# Patient Record
Sex: FEMALE | Race: WHITE | NOT HISPANIC OR LATINO | Employment: OTHER | URBAN - METROPOLITAN AREA
[De-identification: names, ages, dates, MRNs, and addresses within clinical notes are randomized per-mention and may not be internally consistent; named-entity substitution may affect disease eponyms.]

---

## 2017-02-13 ENCOUNTER — ALLSCRIPTS OFFICE VISIT (OUTPATIENT)
Dept: OTHER | Facility: OTHER | Age: 62
End: 2017-02-13

## 2017-03-15 ENCOUNTER — GENERIC CONVERSION - ENCOUNTER (OUTPATIENT)
Dept: OTHER | Facility: OTHER | Age: 62
End: 2017-03-15

## 2017-03-20 ENCOUNTER — LAB (OUTPATIENT)
Dept: LAB | Facility: CLINIC | Age: 62
End: 2017-03-20
Payer: COMMERCIAL

## 2017-03-20 DIAGNOSIS — L40.50 PSORIATIC ARTHROPATHY (HCC): Primary | ICD-10-CM

## 2017-03-20 DIAGNOSIS — E11.9 TYPE 2 DIABETES MELLITUS WITHOUT COMPLICATIONS (HCC): ICD-10-CM

## 2017-03-20 DIAGNOSIS — I10 ESSENTIAL (PRIMARY) HYPERTENSION: ICD-10-CM

## 2017-03-20 LAB
ALBUMIN SERPL BCP-MCNC: 3.7 G/DL (ref 3.5–5)
ALBUMIN SERPL BCP-MCNC: 3.7 G/DL (ref 3.5–5)
ALP SERPL-CCNC: 91 U/L (ref 46–116)
ALP SERPL-CCNC: 95 U/L (ref 46–116)
ALT SERPL W P-5'-P-CCNC: 40 U/L (ref 12–78)
ALT SERPL W P-5'-P-CCNC: 41 U/L (ref 12–78)
ANION GAP SERPL CALCULATED.3IONS-SCNC: 8 MMOL/L (ref 4–13)
ANION GAP SERPL CALCULATED.3IONS-SCNC: 9 MMOL/L (ref 4–13)
AST SERPL W P-5'-P-CCNC: 20 U/L (ref 5–45)
AST SERPL W P-5'-P-CCNC: 22 U/L (ref 5–45)
BASOPHILS # BLD AUTO: 0.03 THOUSANDS/ΜL (ref 0–0.1)
BASOPHILS NFR BLD AUTO: 1 % (ref 0–1)
BILIRUB SERPL-MCNC: 0.3 MG/DL (ref 0.2–1)
BILIRUB SERPL-MCNC: 0.41 MG/DL (ref 0.2–1)
BILIRUB UR QL STRIP: NEGATIVE
BUN SERPL-MCNC: 12 MG/DL (ref 5–25)
BUN SERPL-MCNC: 12 MG/DL (ref 5–25)
CALCIUM SERPL-MCNC: 9.2 MG/DL (ref 8.3–10.1)
CALCIUM SERPL-MCNC: 9.3 MG/DL (ref 8.3–10.1)
CHLORIDE SERPL-SCNC: 102 MMOL/L (ref 100–108)
CHLORIDE SERPL-SCNC: 103 MMOL/L (ref 100–108)
CHOLEST SERPL-MCNC: 181 MG/DL (ref 50–200)
CLARITY UR: NORMAL
CO2 SERPL-SCNC: 29 MMOL/L (ref 21–32)
CO2 SERPL-SCNC: 29 MMOL/L (ref 21–32)
COLOR UR: YELLOW
CREAT SERPL-MCNC: 0.47 MG/DL (ref 0.6–1.3)
CREAT SERPL-MCNC: 0.52 MG/DL (ref 0.6–1.3)
CREAT UR-MCNC: 42.7 MG/DL
CRP SERPL QL: 5.4 MG/L
EOSINOPHIL # BLD AUTO: 0.33 THOUSAND/ΜL (ref 0–0.61)
EOSINOPHIL NFR BLD AUTO: 6 % (ref 0–6)
ERYTHROCYTE [DISTWIDTH] IN BLOOD BY AUTOMATED COUNT: 13.4 % (ref 11.6–15.1)
ERYTHROCYTE [SEDIMENTATION RATE] IN BLOOD: 18 MM/HOUR (ref 0–20)
EST. AVERAGE GLUCOSE BLD GHB EST-MCNC: 194 MG/DL
GFR SERPL CREATININE-BSD FRML MDRD: >60 ML/MIN/1.73SQ M
GFR SERPL CREATININE-BSD FRML MDRD: >60 ML/MIN/1.73SQ M
GLUCOSE P FAST SERPL-MCNC: 157 MG/DL (ref 65–99)
GLUCOSE P FAST SERPL-MCNC: 166 MG/DL (ref 65–99)
GLUCOSE UR STRIP-MCNC: NEGATIVE MG/DL
HBA1C MFR BLD: 8.4 % (ref 4.2–6.3)
HCT VFR BLD AUTO: 42.7 % (ref 34.8–46.1)
HDLC SERPL-MCNC: 41 MG/DL (ref 40–60)
HGB BLD-MCNC: 14.5 G/DL (ref 11.5–15.4)
HGB UR QL STRIP.AUTO: NEGATIVE
KETONES UR STRIP-MCNC: NEGATIVE MG/DL
LDLC SERPL CALC-MCNC: 102 MG/DL (ref 0–100)
LEUKOCYTE ESTERASE UR QL STRIP: NEGATIVE
LYMPHOCYTES # BLD AUTO: 2.3 THOUSANDS/ΜL (ref 0.6–4.47)
LYMPHOCYTES NFR BLD AUTO: 42 % (ref 14–44)
MCH RBC QN AUTO: 30.3 PG (ref 26.8–34.3)
MCHC RBC AUTO-ENTMCNC: 34 G/DL (ref 31.4–37.4)
MCV RBC AUTO: 89 FL (ref 82–98)
MICROALBUMIN UR-MCNC: 6.5 MG/L (ref 0–20)
MICROALBUMIN/CREAT 24H UR: 15 MG/G CREATININE (ref 0–30)
MONOCYTES # BLD AUTO: 0.44 THOUSAND/ΜL (ref 0.17–1.22)
MONOCYTES NFR BLD AUTO: 8 % (ref 4–12)
NEUTROPHILS # BLD AUTO: 2.41 THOUSANDS/ΜL (ref 1.85–7.62)
NEUTS SEG NFR BLD AUTO: 43 % (ref 43–75)
NITRITE UR QL STRIP: NEGATIVE
NRBC BLD AUTO-RTO: 0 /100 WBCS
PH UR STRIP.AUTO: 7 [PH] (ref 4.5–8)
PLATELET # BLD AUTO: 177 THOUSANDS/UL (ref 149–390)
PMV BLD AUTO: 11.3 FL (ref 8.9–12.7)
POTASSIUM SERPL-SCNC: 3.5 MMOL/L (ref 3.5–5.3)
POTASSIUM SERPL-SCNC: 3.8 MMOL/L (ref 3.5–5.3)
PROT SERPL-MCNC: 7.4 G/DL (ref 6.4–8.2)
PROT SERPL-MCNC: 7.4 G/DL (ref 6.4–8.2)
PROT UR STRIP-MCNC: NEGATIVE MG/DL
RBC # BLD AUTO: 4.78 MILLION/UL (ref 3.81–5.12)
SODIUM SERPL-SCNC: 139 MMOL/L (ref 136–145)
SODIUM SERPL-SCNC: 141 MMOL/L (ref 136–145)
SP GR UR STRIP.AUTO: 1.01 (ref 1–1.03)
TRIGL SERPL-MCNC: 189 MG/DL
TSH SERPL DL<=0.05 MIU/L-ACNC: 2.21 UIU/ML (ref 0.36–3.74)
UROBILINOGEN UR QL STRIP.AUTO: 0.2 E.U./DL
WBC # BLD AUTO: 5.53 THOUSAND/UL (ref 4.31–10.16)

## 2017-03-20 PROCEDURE — 82570 ASSAY OF URINE CREATININE: CPT

## 2017-03-20 PROCEDURE — 84443 ASSAY THYROID STIM HORMONE: CPT

## 2017-03-20 PROCEDURE — 80061 LIPID PANEL: CPT

## 2017-03-20 PROCEDURE — 81003 URINALYSIS AUTO W/O SCOPE: CPT

## 2017-03-20 PROCEDURE — 80053 COMPREHEN METABOLIC PANEL: CPT

## 2017-03-20 PROCEDURE — 85025 COMPLETE CBC W/AUTO DIFF WBC: CPT

## 2017-03-20 PROCEDURE — 86140 C-REACTIVE PROTEIN: CPT

## 2017-03-20 PROCEDURE — 36415 COLL VENOUS BLD VENIPUNCTURE: CPT

## 2017-03-20 PROCEDURE — 83036 HEMOGLOBIN GLYCOSYLATED A1C: CPT

## 2017-03-20 PROCEDURE — 85652 RBC SED RATE AUTOMATED: CPT

## 2017-03-20 PROCEDURE — 82043 UR ALBUMIN QUANTITATIVE: CPT

## 2017-03-21 ENCOUNTER — GENERIC CONVERSION - ENCOUNTER (OUTPATIENT)
Dept: OTHER | Facility: OTHER | Age: 62
End: 2017-03-21

## 2017-04-06 ENCOUNTER — ALLSCRIPTS OFFICE VISIT (OUTPATIENT)
Dept: OTHER | Facility: OTHER | Age: 62
End: 2017-04-06

## 2017-06-15 ENCOUNTER — ALLSCRIPTS OFFICE VISIT (OUTPATIENT)
Dept: OTHER | Facility: OTHER | Age: 62
End: 2017-06-15

## 2017-06-30 ENCOUNTER — GENERIC CONVERSION - ENCOUNTER (OUTPATIENT)
Dept: OTHER | Facility: OTHER | Age: 62
End: 2017-06-30

## 2017-07-01 DIAGNOSIS — E11.9 TYPE 2 DIABETES MELLITUS WITHOUT COMPLICATIONS (HCC): ICD-10-CM

## 2017-08-09 ENCOUNTER — APPOINTMENT (OUTPATIENT)
Dept: LAB | Facility: CLINIC | Age: 62
End: 2017-08-09
Payer: COMMERCIAL

## 2017-08-09 DIAGNOSIS — E11.9 TYPE 2 DIABETES MELLITUS WITHOUT COMPLICATIONS (HCC): ICD-10-CM

## 2017-08-09 LAB
EST. AVERAGE GLUCOSE BLD GHB EST-MCNC: 131 MG/DL
HBA1C MFR BLD: 6.2 % (ref 4.2–6.3)

## 2017-08-09 PROCEDURE — 83036 HEMOGLOBIN GLYCOSYLATED A1C: CPT

## 2017-08-09 PROCEDURE — 36415 COLL VENOUS BLD VENIPUNCTURE: CPT

## 2017-08-10 ENCOUNTER — GENERIC CONVERSION - ENCOUNTER (OUTPATIENT)
Dept: OTHER | Facility: OTHER | Age: 62
End: 2017-08-10

## 2017-10-02 ENCOUNTER — ALLSCRIPTS OFFICE VISIT (OUTPATIENT)
Dept: OTHER | Facility: OTHER | Age: 62
End: 2017-10-02

## 2017-10-02 DIAGNOSIS — E11.9 TYPE 2 DIABETES MELLITUS WITHOUT COMPLICATIONS (HCC): ICD-10-CM

## 2017-10-02 DIAGNOSIS — I10 ESSENTIAL (PRIMARY) HYPERTENSION: ICD-10-CM

## 2017-10-02 DIAGNOSIS — E55.9 VITAMIN D DEFICIENCY: ICD-10-CM

## 2017-10-03 NOTE — PROGRESS NOTES
Assessment  1  Benign essential hypertension (401 1) (I10)   2  Anxiety disorder (300 00) (F41 9)   3  Hypercholesterolemia (272 0) (E78 00)   4  Controlled diabetes mellitus type II without complication (074 68) (K33 8)   5  Need for prophylactic vaccination and inoculation against influenza (V04 81) (Z23)   6  Vitamin D deficiency (268 9) (E55 9)   7  Other muscle spasm (728 85) (R07 395)    Plan  Benign essential hypertension    · HydroCHLOROthiazide 25 MG Oral Tablet; take 1 tablet by mouth every day  Benign essential hypertension, Controlled diabetes mellitus type II without complication,  Vitamin D deficiency    · (1) COMPREHENSIVE METABOLIC PANEL; Status:Active; Requested EOO:[id number];    · (1) HEMOGLOBIN A1C; Status:Active; Requested for:02Oct2017;    · (1) LIPID PANEL FASTING W DIRECT LDL REFLEX; Status:Active; Requested  for:02Oct2017;    · (1) TSH WITH FT4 REFLEX; Status:Active; Requested XES:[id number]; Controlled diabetes mellitus type II without complication    · *1 - [facility] FOR DIABETES [facility]  *  Status:  Need Information - Financial Authorization  Requested for: [id number]  Care Summary provided  : Yes  Fibromyalgia    · Hydrocodone-Acetaminophen 5-325 MG Oral Tablet; Take 1 tablet twice daily  Health Maintenance    · Aspir-81 81 MG Oral Tablet Delayed Release; TAKE 1 TABLET DAILY AS  DIRECTED  Need for prophylactic vaccination and inoculation against influenza    · Fluzone Quadrivalent 0 5 ML Intramuscular Suspension Prefilled Syringe  Other muscle spasm    · Methocarbamol 750 MG Oral Tablet; TAKE 1 TABLET 3 TIMES DAILY  Screening for depression    · *VB - PHQ-9 Tool; Status:Complete - Retrospective By Protocol Authorization;   Done:  20QIN4336 11:56AM  Vitamin D deficiency    · (1) VITAMIN D 25-HYDROXY; Status:Active;  Requested DZM:[id number];     Chief Complaint  Patient here for 3 month follow up on Anxiety, Hyperlipidemia, Hypertension, Type II Diabetes   Patient is here today for follow up of chronic conditions described in HPI  History of Present Illness  here for follow up   The patient states her hyperlipidemia has been under good control since the last visit  Comorbid Illnesses: diabetes mellitus-and-hypertension  She has no significant interval events  Symptoms: The patient is currently asymptomatic  Medications: the patient is adherent with her medication regimen  The patient states she has been doing well with her Type II Diabetes control since the last visit  Comorbid Illnesses: hypertension  She has no significant interval events  Symptoms: The patient is currently asymptomatic  Medications: the patient is adherent with her medication regimen  The patient presents for follow-up of essential hypertension  The patient states she has been doing well with her blood pressure control since the last visit  She has no significant interval events  Symptoms: The patient is currently asymptomatic  Medications: the patient is adherent with her medication regimen  Review of Systems    Constitutional: No fever, no chills, feels well, no tiredness, no recent weight gain or weight loss  Eyes: No complaints of eye pain, no red eyes, no eyesight problems, no discharge, no dry eyes, no itching of eyes  ENT: no complaints of earache, no loss of hearing, no nose bleeds, no nasal discharge, no sore throat, no hoarseness  Cardiovascular: No complaints of slow heart rate, no fast heart rate, no chest pain, no palpitations, no leg claudication, no lower extremity edema  Respiratory: No complaints of shortness of breath, no wheezing, no cough, no SOB on exertion, no orthopnea, no PND  Gastrointestinal: No complaints of abdominal pain, no constipation, no nausea or vomiting, no diarrhea, no bloody stools  Genitourinary: No complaints of dysuria, no incontinence, no pelvic pain, no dysmenorrhea, no vaginal discharge or bleeding     Musculoskeletal: as noted in HPI  Integumentary: No complaints of skin rash or lesions, no itching, no skin wounds, no breast pain or lump  Neurological: No complaints of headache, no confusion, no convulsions, no numbness, no dizziness or fainting, no tingling, no limb weakness, no difficulty walking  Psychiatric: Not suicidal, no sleep disturbance, no anxiety or depression, no change in personality, no emotional problems  Endocrine: No complaints of proptosis, no hot flashes, no muscle weakness, no deepening of the voice, no feelings of weakness  Hematologic/Lymphatic: No complaints of swollen glands, no swollen glands in the neck, does not bleed easily, does not bruise easily  Over the past 2 weeks, how often have you been bothered by the following problems? 1 ) Little interest or pleasure in doing things? Not at all    2 ) Feeling down, depressed or hopeless? Not at all    3 ) Trouble falling asleep or sleeping too much? Half the days or more  4 ) Feeling tired or having little energy? Several days  5 ) Poor appetite or overeating? Several days  6 ) Feeling bad about yourself, or that you are a failure, or have let yourself or your family down? Not at all    7 ) Trouble concentrating on things, such as reading a newspaper or watching television? Not at all    8 ) Moving or speaking so slowly that other people could have noticed, or the opposite, moving or speaking faster than usual? Not at all  How difficult have these problems made it for you to do your work, take care of things at home, or get along with people? Not at all  Score 4      Active Problems  1  Allergy to pollen (477 0) (J30 1)   2  Anxiety disorder (300 00) (F41 9)   3  Benign essential hypertension (401 1) (I10)   4  Controlled diabetes mellitus type II without complication (898 54) (D00 8)   5  Fibromyalgia (729 1) (M79 7)   6  Generalized osteoarthritis of multiple sites (715 09) (M15 9)   7  Hearing Loss (389 9)   8   Hypercholesterolemia (272 0) (E78 00)   9  Need for prophylactic vaccination against Streptococcus pneumoniae (pneumococcus)   (V03 82) (Z23)   10  Need for prophylactic vaccination and inoculation against influenza (V04 81) (Z23)   11  Other muscle spasm (728 85) (M62 838)   12  Pain of toe of right foot (729 5) (M79 674)   13  Psoriasis (696 1) (L40 9)   14  Psoriatic arthropathy (696 0) (L40 50)   15  Rheumatoid arthritis (714 0) (M06 9)   16  Right knee pain (719 46) (M25 561)   17  Screening for breast cancer (V76 10) (Z12 31)   18  Screening for cervical cancer (V76 2) (Z12 4)   19  Screening for colorectal cancer (V76 51) (Z12 11,Z12 12)   20  Screening for depression (V79 0) (Z13 89)   21  Squamous cell carcinoma of skin (173 92) (C44 92)   22  Vertigo (780 4) (R42)   23  Visit for pre-operative examination (V72 84) (Z01 818)   24  Vitamin D deficiency (268 9) (E55 9)    Past Medical History  1  History of Asthma with acute exacerbation (493 92) (J45 901)   2  History of Benign essential hypertension (401 1) (I10)   3  History of candidiasis of mouth (V12 09) (Z86 19)   4  History of contact dermatitis (V13 3) (Z87 2)   5  History of contusion (V15 59) (Z87 828)   6  History of hyperglycemia (V12 29) (Z86 39)   7  History of nausea (V12 79) (Z87 898)   8  History of psoriasis (V13 3) (Z87 2)   9  History of New onset type 2 diabetes mellitus (250 00) (E11 9)   10  History of New onset type 2 diabetes mellitus (250 00) (E11 9)   11  History of Pain and swelling of right lower leg (729 5,729 81) (M79 661,M79 89)   12  History of Sore throat (462) (J02 9)    The active problems and past medical history were reviewed and updated today  Surgical History  1  History of  Section   2  History of Closed Treatment Of Fracture Of Fibular Shaft   3  History of Closed Treatment Of Fracture Of Tibial Shaft   4  History of Knee Arthroscopy With Medial And Lateral Meniscus Repair   5  History of Oral Surgery Tooth Extraction   6  History of Pilonidal Cyst Resection    The surgical history was reviewed and updated today  Family History  Mother    1  Family history of Benign Essential Hypertension   2  Family history of Osteoarthritis (V17 7)  Father    3  Family history of Alcoholism  Brother    4  Family history of Benign Essential Hypertension   5  Family history of Diabetes Mellitus (V18 0)   6  Family history of Dyslipidemia    The family history was reviewed and updated today  Social History   · Never A Smoker  The social history was reviewed and updated today  Current Meds   1  ALPRAZolam 0 5 MG Oral Tablet; Take one half to one tablet every 6-8 hours as needed   for anxiety; Last Rx:76Xeh8426 Ordered   2  AmLODIPine Besylate 10 MG Oral Tablet; TAKE 1 TABLET DAILY; Therapy: 31CVH5113 to (Evaluate:02Oct2017)  Requested for: 07Oct2016; Last   Rx:07Oct2016 Ordered   3  Aspir-81 81 MG Oral Tablet Delayed Release; TAKE 1 TABLET DAILY AS DIRECTED; Therapy: 73IDD8632 to (Evaluate:03Jan2017); Last Rx:21Yop4518 Ordered   4  Enbrel 25 MG KIT; Therapy: 53IVZ6782 to Recorded   5  Escitalopram Oxalate 10 MG Oral Tablet; take 1 tablet by mouth once daily; Therapy: 81Zuh3358 to (Evaluate:35Byl7353)  Requested for: 63AUX3753; Last   Rx:71Zoy8102 Ordered   6  Folic Acid 1 MG Oral Tablet; TAKE 1 TABLET DAILY; Therapy: 36SKV2102 to (Evaluate:06Oct2017) Recorded   7  HydroCHLOROthiazide 25 MG Oral Tablet; take 1 tablet by mouth every day; Therapy: 67RMA8093 to (Evaluate:29Jun2017)  Requested for: 96JEN6524; Last   Rx:31Mar2017 Ordered   8  Hydrocodone-Acetaminophen 5-325 MG Oral Tablet; Take 1 tablet twice daily; Therapy: 16ISE6749 to (Evaluate:51Ytz4337); Last Rx:22Jan2016 Ordered   9  Losartan Potassium 50 MG Oral Tablet; TAKE 1 TABLET DAILY AS DIRECTED; Therapy: 04RWE4871 to (Evaluate:17Glp3658)  Requested for: 91BPC9487; Last   Rx:50Axd0029 Ordered   10   MetFORMIN HCl  MG Oral Tablet Extended Release 24 Hour; take 1 tablet by    mouth twice a day; Therapy: 49FZN8062 to (Evaluate:01Apr2018)  Requested for: 06Apr2017; Last    Rx:06Apr2017 Ordered   11  Methotrexate Sodium (PF) 25 MG/ML SOLN;    Therapy: 12XTD9046 to Recorded   12  Metoprolol Succinate  MG Oral Tablet Extended Release 24 Hour; take one tablet    by mouth one time daily; Therapy: 28WUS8572 to (Evaluate:72Ibu1642)  Requested for: 92XBP5895; Last    Rx:31Mar2017 Ordered   13  OneTouch Ultra Blue In Vitro Strip; TEST ONCE DAILY; Therapy: 32LSS2931 to (Last Rx:76Qzk4733)  Requested for: 12Bok6614 Ordered   14  Pioglitazone HCl - 15 MG Oral Tablet; Take 1 tablet daily; Therapy: 40UJJ1710 to (Evaluate:01Apr2018)  Requested for: 06Apr2017; Last    Rx:06Apr2017 Ordered   15  Simvastatin 10 MG Oral Tablet; TAKE 1 TABLET DAILY  Requested for: 20KZY5865; Last    Rx:08Nov2016 Ordered   16  Symbicort 160-4 5 MCG/ACT Inhalation Aerosol; INHALE 1 PUFF TWO TIMES A DAY; Therapy: 04LHK2716 to (Evaluate:71Srf4244)  Requested for: 84TUZ3424; Last    Rx:22Jun2017 Ordered   17  Ventolin  (90 Base) MCG/ACT Inhalation Aerosol Solution; INHALE 2 PUFFS    EVERY 4-6 HOURS AS NEEDED; Therapy: 05YGF9000 to (Last RK:42VFK0588)  Requested for: 48XVC8228 Ordered    Allergies  1  Azithromycin TABS   2  Lisinopril TABS  3  No Known Environmental Allergies   4  No Known Food Allergies    Vitals  Vital Signs    Recorded: 80THE6328 11:21AM   Heart Rate 72   Respiration 20   Systolic 726   Diastolic 84   Height 5 ft 2 in   Weight 196 lb 2 oz   BMI Calculated 35 87   BSA Calculated 1 9     Physical Exam    Constitutional   General appearance: No acute distress, well appearing and well nourished  Pulmonary   Respiratory effort: No increased work of breathing or signs of respiratory distress  Auscultation of lungs: Clear to auscultation  Cardiovascular   Auscultation of heart: Normal rate and rhythm, normal S1 and S2, without murmurs      Examination of extremities for edema and/or varicosities: Normal     Abdomen   Abdomen: Non-tender, no masses  Liver and spleen: No hepatomegaly or splenomegaly  Lymphatic   Palpation of lymph nodes in neck: No lymphadenopathy  Musculoskeletal   Inspection/palpation of joints, bones, and muscles: Abnormal  -muscle spasm  Skin   Skin and subcutaneous tissue: Normal without rashes or lesions  Results/Data  *VB - PHQ-9 Tool 02Oct2017 11:56AM Iver Closs     Test Name Result Flag Reference   PHQ-9 Adult Depression Score 4     PHQ-9 Adult Depression Screening Negative         Health Management  History of New onset type 2 diabetes mellitus   *VB - Eye Exam; every 2 years; Last 28Rnn2887; Next Due: 30Fgs8851; Overdue  Screening for breast cancer   Digital Bilateral Screening Mammogram With CAD; every 1 year; Next Due: 60GCY9999; Overdue  Screening for cervical cancer   (every) THINPREP TIS PAP RFX HPV; every 5 years; Next Due: 37ETW0898; Overdue  Screening for colorectal cancer   COLONOSCOPY; every 10 years; Last 39HOI7223; Next Due: 52SGA3651;  Active    Future Appointments    Date/Time Provider Specialty Site   01/03/2018 11:15 AM Iver Closs, DO Family Medicine [id number] [facility]     Signatures   Electronically signed by : Gavi Bob DO; Oct  2 2017 12:09PM EST                       (Author)

## 2017-11-24 ENCOUNTER — HOSPITAL ENCOUNTER (OUTPATIENT)
Facility: HOSPITAL | Age: 62
Setting detail: OBSERVATION
Discharge: HOME | End: 2017-11-25
Attending: EMERGENCY MEDICINE | Admitting: INTERNAL MEDICINE
Payer: COMMERCIAL

## 2017-11-24 ENCOUNTER — APPOINTMENT (EMERGENCY)
Dept: RADIOLOGY | Facility: HOSPITAL | Age: 62
End: 2017-11-24
Payer: COMMERCIAL

## 2017-11-24 DIAGNOSIS — R07.9 CHEST PAIN: Primary | ICD-10-CM

## 2017-11-24 LAB
ANION GAP SERPL CALCULATED.3IONS-SCNC: 5 MMOL/L (ref 4–13)
BASOPHILS # BLD AUTO: 0.04 THOUSANDS/ΜL (ref 0–0.1)
BASOPHILS NFR BLD AUTO: 1 % (ref 0–1)
BUN SERPL-MCNC: 14 MG/DL (ref 5–25)
CALCIUM SERPL-MCNC: 9.4 MG/DL (ref 8.3–10.1)
CHLORIDE SERPL-SCNC: 102 MMOL/L (ref 100–108)
CO2 SERPL-SCNC: 30 MMOL/L (ref 21–32)
CREAT SERPL-MCNC: 0.71 MG/DL (ref 0.6–1.3)
DEPRECATED D DIMER PPP: 410 NG/ML (FEU) (ref 0–424)
EOSINOPHIL # BLD AUTO: 0.33 THOUSAND/ΜL (ref 0–0.61)
EOSINOPHIL NFR BLD AUTO: 5 % (ref 0–6)
ERYTHROCYTE [DISTWIDTH] IN BLOOD BY AUTOMATED COUNT: 13.4 % (ref 11.6–15.1)
GFR SERPL CREATININE-BSD FRML MDRD: 90 ML/MIN/1.73SQ M
GLUCOSE SERPL-MCNC: 133 MG/DL (ref 65–140)
HCT VFR BLD AUTO: 40.8 % (ref 34.8–46.1)
HGB BLD-MCNC: 13.6 G/DL (ref 11.5–15.4)
HOLD SPECIMEN: NORMAL
LIPASE SERPL-CCNC: 134 U/L (ref 73–393)
LYMPHOCYTES # BLD AUTO: 2.36 THOUSANDS/ΜL (ref 0.6–4.47)
LYMPHOCYTES NFR BLD AUTO: 39 % (ref 14–44)
MCH RBC QN AUTO: 30.6 PG (ref 26.8–34.3)
MCHC RBC AUTO-ENTMCNC: 33.3 G/DL (ref 31.4–37.4)
MCV RBC AUTO: 92 FL (ref 82–98)
MONOCYTES # BLD AUTO: 0.5 THOUSAND/ΜL (ref 0.17–1.22)
MONOCYTES NFR BLD AUTO: 8 % (ref 4–12)
NEUTROPHILS # BLD AUTO: 2.87 THOUSANDS/ΜL (ref 1.85–7.62)
NEUTS SEG NFR BLD AUTO: 47 % (ref 43–75)
PLATELET # BLD AUTO: 164 THOUSANDS/UL (ref 149–390)
PMV BLD AUTO: 11.1 FL (ref 8.9–12.7)
POTASSIUM SERPL-SCNC: 3.6 MMOL/L (ref 3.5–5.3)
RBC # BLD AUTO: 4.44 MILLION/UL (ref 3.81–5.12)
SODIUM SERPL-SCNC: 137 MMOL/L (ref 136–145)
TROPONIN I SERPL-MCNC: <0.02 NG/ML
WBC # BLD AUTO: 6.1 THOUSAND/UL (ref 4.31–10.16)

## 2017-11-24 PROCEDURE — 99285 EMERGENCY DEPT VISIT HI MDM: CPT

## 2017-11-24 PROCEDURE — 96374 THER/PROPH/DIAG INJ IV PUSH: CPT

## 2017-11-24 PROCEDURE — 84484 ASSAY OF TROPONIN QUANT: CPT | Performed by: PHYSICIAN ASSISTANT

## 2017-11-24 PROCEDURE — 83690 ASSAY OF LIPASE: CPT | Performed by: PHYSICIAN ASSISTANT

## 2017-11-24 PROCEDURE — 85379 FIBRIN DEGRADATION QUANT: CPT | Performed by: PHYSICIAN ASSISTANT

## 2017-11-24 PROCEDURE — 80048 BASIC METABOLIC PNL TOTAL CA: CPT | Performed by: PHYSICIAN ASSISTANT

## 2017-11-24 PROCEDURE — 71020 HB CHEST X-RAY 2VW FRONTAL&LATL: CPT

## 2017-11-24 PROCEDURE — 93005 ELECTROCARDIOGRAM TRACING: CPT | Performed by: PHYSICIAN ASSISTANT

## 2017-11-24 PROCEDURE — 85025 COMPLETE CBC W/AUTO DIFF WBC: CPT | Performed by: PHYSICIAN ASSISTANT

## 2017-11-24 PROCEDURE — 36415 COLL VENOUS BLD VENIPUNCTURE: CPT

## 2017-11-24 PROCEDURE — 83880 ASSAY OF NATRIURETIC PEPTIDE: CPT | Performed by: PHYSICIAN ASSISTANT

## 2017-11-24 RX ORDER — MORPHINE SULFATE 4 MG/ML
4 INJECTION, SOLUTION INTRAMUSCULAR; INTRAVENOUS ONCE
Status: COMPLETED | OUTPATIENT
Start: 2017-11-24 | End: 2017-11-24

## 2017-11-24 RX ORDER — HYDROCHLOROTHIAZIDE 25 MG/1
25 TABLET ORAL DAILY
Status: DISCONTINUED | OUTPATIENT
Start: 2017-11-25 | End: 2017-11-25 | Stop reason: HOSPADM

## 2017-11-24 RX ORDER — MORPHINE SULFATE 2 MG/ML
1 INJECTION, SOLUTION INTRAMUSCULAR; INTRAVENOUS EVERY 4 HOURS PRN
Status: DISCONTINUED | OUTPATIENT
Start: 2017-11-24 | End: 2017-11-25 | Stop reason: HOSPADM

## 2017-11-24 RX ORDER — ESCITALOPRAM OXALATE 10 MG/1
5 TABLET ORAL DAILY
Status: DISCONTINUED | OUTPATIENT
Start: 2017-11-25 | End: 2017-11-25 | Stop reason: HOSPADM

## 2017-11-24 RX ORDER — 0.9 % SODIUM CHLORIDE 0.9 %
3 VIAL (ML) INJECTION AS NEEDED
Status: DISCONTINUED | OUTPATIENT
Start: 2017-11-24 | End: 2017-11-25 | Stop reason: HOSPADM

## 2017-11-24 RX ORDER — PRAVASTATIN SODIUM 40 MG
40 TABLET ORAL
Status: DISCONTINUED | OUTPATIENT
Start: 2017-11-25 | End: 2017-11-25 | Stop reason: HOSPADM

## 2017-11-24 RX ORDER — PIOGLITAZONEHYDROCHLORIDE 15 MG/1
15 TABLET ORAL DAILY
COMMUNITY
End: 2018-03-02 | Stop reason: SDUPTHER

## 2017-11-24 RX ORDER — AMLODIPINE BESYLATE 10 MG/1
10 TABLET ORAL DAILY
Status: DISCONTINUED | OUTPATIENT
Start: 2017-11-25 | End: 2017-11-25 | Stop reason: HOSPADM

## 2017-11-24 RX ORDER — MORPHINE SULFATE 4 MG/ML
4 INJECTION, SOLUTION INTRAMUSCULAR; INTRAVENOUS ONCE AS NEEDED
Status: DISCONTINUED | OUTPATIENT
Start: 2017-11-24 | End: 2017-11-25 | Stop reason: HOSPADM

## 2017-11-24 RX ORDER — METOPROLOL TARTRATE 5 MG/5ML
5 INJECTION INTRAVENOUS EVERY 6 HOURS PRN
Status: DISCONTINUED | OUTPATIENT
Start: 2017-11-24 | End: 2017-11-25 | Stop reason: HOSPADM

## 2017-11-24 RX ORDER — OXYCODONE HYDROCHLORIDE AND ACETAMINOPHEN 5; 325 MG/1; MG/1
1 TABLET ORAL EVERY 8 HOURS PRN
Status: DISCONTINUED | OUTPATIENT
Start: 2017-11-24 | End: 2017-11-25 | Stop reason: HOSPADM

## 2017-11-24 RX ORDER — METOPROLOL SUCCINATE 100 MG/1
100 TABLET, EXTENDED RELEASE ORAL DAILY
Status: DISCONTINUED | OUTPATIENT
Start: 2017-11-25 | End: 2017-11-25 | Stop reason: HOSPADM

## 2017-11-24 RX ORDER — OXYCODONE HYDROCHLORIDE AND ACETAMINOPHEN 5; 325 MG/1; MG/1
1 TABLET ORAL EVERY 8 HOURS PRN
COMMUNITY

## 2017-11-24 RX ORDER — ASPIRIN 81 MG/1
81 TABLET, CHEWABLE ORAL DAILY
Status: DISCONTINUED | OUTPATIENT
Start: 2017-11-25 | End: 2017-11-25 | Stop reason: SDUPTHER

## 2017-11-24 RX ORDER — LOSARTAN POTASSIUM 50 MG/1
50 TABLET ORAL DAILY
COMMUNITY
End: 2018-05-21 | Stop reason: SDUPTHER

## 2017-11-24 RX ORDER — ASPIRIN 81 MG/1
81 TABLET, CHEWABLE ORAL DAILY
Status: DISCONTINUED | OUTPATIENT
Start: 2017-11-25 | End: 2017-11-25 | Stop reason: HOSPADM

## 2017-11-24 RX ORDER — NITROGLYCERIN 0.4 MG/1
0.4 TABLET SUBLINGUAL
Status: DISCONTINUED | OUTPATIENT
Start: 2017-11-24 | End: 2017-11-25 | Stop reason: HOSPADM

## 2017-11-24 RX ORDER — ASPIRIN 81 MG/1
243 TABLET, CHEWABLE ORAL ONCE
Status: COMPLETED | OUTPATIENT
Start: 2017-11-24 | End: 2017-11-24

## 2017-11-24 RX ORDER — ACETAMINOPHEN 325 MG/1
650 TABLET ORAL EVERY 6 HOURS PRN
Status: DISCONTINUED | OUTPATIENT
Start: 2017-11-24 | End: 2017-11-25 | Stop reason: HOSPADM

## 2017-11-24 RX ORDER — LOSARTAN POTASSIUM 50 MG/1
50 TABLET ORAL DAILY
Status: DISCONTINUED | OUTPATIENT
Start: 2017-11-25 | End: 2017-11-25 | Stop reason: HOSPADM

## 2017-11-24 RX ORDER — FOLIC ACID 1 MG/1
1 TABLET ORAL DAILY
Status: DISCONTINUED | OUTPATIENT
Start: 2017-11-25 | End: 2017-11-25 | Stop reason: HOSPADM

## 2017-11-24 RX ADMIN — MORPHINE SULFATE 4 MG: 4 INJECTION, SOLUTION INTRAMUSCULAR; INTRAVENOUS at 22:04

## 2017-11-24 RX ADMIN — ASPIRIN 81 MG 243 MG: 81 TABLET ORAL at 21:40

## 2017-11-25 VITALS
SYSTOLIC BLOOD PRESSURE: 132 MMHG | DIASTOLIC BLOOD PRESSURE: 61 MMHG | BODY MASS INDEX: 36.47 KG/M2 | TEMPERATURE: 97.7 F | RESPIRATION RATE: 18 BRPM | HEART RATE: 84 BPM | OXYGEN SATURATION: 95 % | HEIGHT: 62 IN | WEIGHT: 198.19 LBS

## 2017-11-25 LAB
ANION GAP SERPL CALCULATED.3IONS-SCNC: 9 MMOL/L (ref 4–13)
BUN SERPL-MCNC: 14 MG/DL (ref 5–25)
CALCIUM SERPL-MCNC: 8.7 MG/DL (ref 8.3–10.1)
CHLORIDE SERPL-SCNC: 104 MMOL/L (ref 100–108)
CO2 SERPL-SCNC: 27 MMOL/L (ref 21–32)
CREAT SERPL-MCNC: 0.69 MG/DL (ref 0.6–1.3)
ERYTHROCYTE [DISTWIDTH] IN BLOOD BY AUTOMATED COUNT: 13.2 % (ref 11.6–15.1)
EST. AVERAGE GLUCOSE BLD GHB EST-MCNC: 146 MG/DL
GFR SERPL CREATININE-BSD FRML MDRD: 92 ML/MIN/1.73SQ M
GLUCOSE P FAST SERPL-MCNC: 222 MG/DL (ref 65–99)
GLUCOSE SERPL-MCNC: 107 MG/DL (ref 65–140)
GLUCOSE SERPL-MCNC: 222 MG/DL (ref 65–140)
HBA1C MFR BLD: 6.7 % (ref 4.2–6.3)
HCT VFR BLD AUTO: 38.3 % (ref 34.8–46.1)
HGB BLD-MCNC: 12.5 G/DL (ref 11.5–15.4)
MCH RBC QN AUTO: 30.3 PG (ref 26.8–34.3)
MCHC RBC AUTO-ENTMCNC: 32.6 G/DL (ref 31.4–37.4)
MCV RBC AUTO: 93 FL (ref 82–98)
NT-PROBNP SERPL-MCNC: 55 PG/ML
PLATELET # BLD AUTO: 145 THOUSANDS/UL (ref 149–390)
PLATELET # BLD AUTO: 145 THOUSANDS/UL (ref 149–390)
PMV BLD AUTO: 10.6 FL (ref 8.9–12.7)
PMV BLD AUTO: 10.6 FL (ref 8.9–12.7)
POTASSIUM SERPL-SCNC: 3.4 MMOL/L (ref 3.5–5.3)
RBC # BLD AUTO: 4.13 MILLION/UL (ref 3.81–5.12)
SODIUM SERPL-SCNC: 140 MMOL/L (ref 136–145)
TROPONIN I SERPL-MCNC: <0.02 NG/ML
TROPONIN I SERPL-MCNC: <0.02 NG/ML
WBC # BLD AUTO: 5.93 THOUSAND/UL (ref 4.31–10.16)

## 2017-11-25 PROCEDURE — 82948 REAGENT STRIP/BLOOD GLUCOSE: CPT

## 2017-11-25 PROCEDURE — 83036 HEMOGLOBIN GLYCOSYLATED A1C: CPT | Performed by: PHYSICIAN ASSISTANT

## 2017-11-25 PROCEDURE — 84484 ASSAY OF TROPONIN QUANT: CPT | Performed by: PHYSICIAN ASSISTANT

## 2017-11-25 PROCEDURE — 85049 AUTOMATED PLATELET COUNT: CPT | Performed by: PHYSICIAN ASSISTANT

## 2017-11-25 PROCEDURE — 85027 COMPLETE CBC AUTOMATED: CPT | Performed by: INTERNAL MEDICINE

## 2017-11-25 PROCEDURE — 80048 BASIC METABOLIC PNL TOTAL CA: CPT | Performed by: INTERNAL MEDICINE

## 2017-11-25 RX ORDER — DIPHENHYDRAMINE HCL 25 MG
25 TABLET ORAL
Status: DISCONTINUED | OUTPATIENT
Start: 2017-11-25 | End: 2017-11-25 | Stop reason: HOSPADM

## 2017-11-25 RX ADMIN — ESCITALOPRAM OXALATE 5 MG: 10 TABLET ORAL at 08:07

## 2017-11-25 RX ADMIN — LOSARTAN POTASSIUM 50 MG: 50 TABLET, FILM COATED ORAL at 08:07

## 2017-11-25 RX ADMIN — METOPROLOL SUCCINATE 100 MG: 100 TABLET, EXTENDED RELEASE ORAL at 08:07

## 2017-11-25 RX ADMIN — HYDROCHLOROTHIAZIDE 25 MG: 25 TABLET ORAL at 08:07

## 2017-11-25 RX ADMIN — ASPIRIN 81 MG 81 MG: 81 TABLET ORAL at 08:07

## 2017-11-25 RX ADMIN — DIPHENHYDRAMINE HYDROCHLORIDE 25 MG: 25 TABLET ORAL at 00:55

## 2017-11-25 RX ADMIN — AMLODIPINE BESYLATE 10 MG: 10 TABLET ORAL at 08:07

## 2017-11-25 RX ADMIN — FOLIC ACID 1 MG: 1 TABLET ORAL at 08:07

## 2017-11-25 RX ADMIN — OXYCODONE HYDROCHLORIDE AND ACETAMINOPHEN 1 TABLET: 5; 325 TABLET ORAL at 08:11

## 2017-11-25 NOTE — DISCHARGE SUMMARY
Discharge Summary - [facility] Internal Medicine    Patient Information: Marquis Tovar 57 y o  female MRN: [id number]  Unit/Bed#: -01 Encounter: [id number]    Discharging Physician / Practitioner: Jae Chu MD  PCP: Shanelle Olvera DO  Admission Date: 11/24/2017  Discharge Date: 11/25/17    Reason for Admission: chest pain     Discharge Diagnoses:     Principal Problem:    Chest pain  Active Problems:    Hypertension    Hyperlipidemia    Diabetes mellitus ([facility] )    Psoriatic arthritis ([facility] )    RA (rheumatoid arthritis) ([place] [address] )  Resolved Problems:    * No resolved hospital problems  *      Consultations During Hospital Stay:  · none    Procedures Performed:     · none    Significant Findings / Test Results:     · Troponin negative x3    Incidental Findings:   · none     Test Results Pending at Discharge (will require follow up):   · none     Outpatient Tests Requested:  · Outpatient stress testing  Complications:  none    Hospital Course:     Marquis Tovar is a 57 y o  female patient who originally presented to the hospital on 11/24/2017 due to atypical chest pain  Occurred at rest, was sharp and intermittent  No radiation or associated SOB or diaphoresis  Denies recent symptoms of chest pain with exertion  SHAISTA score of 2  Pt was admitted for observation on telemetry and trops were trended  There were no events on telemetry  Troponins negative x3  Discussed obtaining an outpt stress test for completion of work-up  Pt is apprehensive to complete as her last stress test (dobutamine echo stress) was very uncomfortable for her  Pt has exercise induced asthma  I explained the reasoning behind the test  Pt wishes to discuss this with her PCP prior to proceeding  Given her low risk features, I feel comfortable with this plan      Condition at Discharge: fair     Discharge Day Visit / Exam:     Subjective:    Vitals: Blood Pressure: 132/61 (11/25/17 0700)  Pulse: 84 (11/25/17 0700)  Temperature: 97 7 °F (36 5 °C) (11/25/17 0700)  Temp Source: Oral (11/25/17 0700)  Respirations: 18 (11/25/17 0700)  Height: 5' 2" (157 5 cm) (11/24/17 2350)  Weight - Scale: 89 9 kg (198 lb 3 1 oz) (11/24/17 2350)  SpO2: 95 % (11/25/17 0700)  Exam:   Physical Exam   Constitutional: She is oriented to person, place, and time  She appears well-developed and well-nourished  No distress  HENT:   Mouth/Throat: No oropharyngeal exudate  Cardiovascular: Normal rate  Pulmonary/Chest: Effort normal  No respiratory distress  Abdominal: Soft  She exhibits no distension  There is no tenderness  Neurological: She is alert and oriented to person, place, and time  No cranial nerve deficit  Skin: Skin is warm and dry  She is not diaphoretic  Psychiatric: She has a normal mood and affect  Her behavior is normal    Nursing note and vitals reviewed  Discussion with Family: patient     Discharge instructions/Information to patient and family:   See after visit summary for information provided to patient and family  Provisions for Follow-Up Care:  See after visit summary for information related to follow-up care and any pertinent home health orders  Disposition:     Home    For Discharges to [facility] SN[facility]:   · Not Applicable to this Patient - Not Applicable to this Patient    Planned Readmission: none     Discharge Statement:  I spent 25 minutes discharging the patient  This time was spent on the day of discharge  I had direct contact with the patient on the day of discharge  Greater than 50% of the total time was spent examining patient, answering all patient questions, arranging and discussing plan of care with patient as well as directly providing post-discharge instructions  Additional time then spent on discharge activities  Discharge Medications:  See after visit summary for reconciled discharge medications provided to patient and family        ** Please Note: This note has been constructed using a voice recognition system **

## 2017-11-25 NOTE — ED NOTES
Nico at bedside     Janet Pak [facility] [address]  11/24/17 [id number]

## 2017-11-25 NOTE — H&P
History and Physical - [facility] Internal Medicine    Patient Information: Riri Davila 57 y o  female MRN: [id number]  Unit/Bed#: ED 14 Encounter: [id number]  Admitting Physician: Griffin Hanna PA-C  PCP: Alfredo Coronado DO  Date of Admission:  11/24/17    Assessment/Plan:    Hospital Problem List:     Principal Problem:    Chest pain  Active Problems:    Hypertension    Hyperlipidemia    Diabetes mellitus ([facility] )    Psoriatic arthritis ([place] [address] )    RA (rheumatoid arthritis) ([place] [address] )      Plan for the Primary Problem(s):  · Chest Pain  · ACS r/o  · CXR- No acute pulmonary disease  · EKG- NSR  · Troponin <0 02  Will trend  · D-Dimer 410  · Nitro, Morphine, ASA  · EKG for chest pain  · Telemetry  · SHAISTA score of 2  Discussed stress test with patient- would prefer to do it as outpatient  · Consider cardiology consult  Plan for Additional Problems:   · HTN- /79  Continue home medications  Lopressor PRN  · HLD- Continue statin  · DM- Glucose 133  Hold metformin, actos  SSI coverage  · Psoriatic/Rheumatoid Arthritis- Continue home medications  VTE Prophylaxis: Enoxaparin (Lovenox)  / sequential compression device   Code Status: Full Code  POLST: POLST form is not discussed and not completed at this time  Anticipated Length of Stay:  Patient will be admitted on an Emergency basis with an anticipated length of stay of  Less than 2 midnights  Justification for Hospital Stay: ACS r/o  Total Time for Visit, including Counseling / Coordination of Care: 45 minutes  Greater than 50% of this total time spent on direct patient counseling and coordination of care  Chief Complaint:   Chest pain  History of Present Illness:    Riri Davila is a 57 y o  female who presents with chest pain beginning at 8pm this evening  Patient states that she was sitting at home, watching TV when she developed sudden onset, sharp, 4/10 central chest pain without radiation    The patient states that the pain is coming and going, and states that it is erratic   She denies associated diaphoresis, SOB, palpitations, dizziness, RESENDEZ  She denies personal cardiac history  Family history is positive for cardiac disease in mother and brother  She has had a stress test before in the past- she states she had to do a pharmacologic stress test as she has injured her ankles in the past and has exercise induced asthma so she could not do an exercise stress test  Stress was normal as far as she knows  Patient travels often, last trip was in September in which she flew  She denies pleuritic chest pain, posterior calf pain  No personal or family history of blood clots  Review of Systems:    Review of Systems   Constitutional: Negative for chills, diaphoresis and fever  Respiratory: Negative for cough, shortness of breath and wheezing  Cardiovascular: Positive for chest pain  Negative for palpitations  Gastrointestinal: Negative for abdominal pain, constipation, diarrhea, nausea and vomiting  Genitourinary: Negative for dysuria and hematuria  Neurological: Negative for dizziness, light-headedness and headaches  All other systems reviewed and are negative  Past Medical and Surgical History:     Past Medical History:   Diagnosis Date    Anxiety     Arthritis     OA    Asthma     exercise iniduced      Cancer ([address] )     Squamous Cell on back    Dependence on crutches     prn    Depression     Diabetes mellitus (HCC)     NIDDM    Fibromyalgia     Hearing aid worn     mayra    Heart murmur     Hyperlipidemia     Hypertension     Psoriasis     red dry patch left side- waist area    Psoriatic arthritis (HCC)     RA (rheumatoid arthritis) ([address] )     Wears glasses     reading       Past Surgical History:   Procedure Laterality Date    ARTHROSCOPY KNEE Left 3/2/2016    Procedure: ARTHROSCOPY KNEE;  Surgeon: Arnaldo Soulier, MD;  Location[facility] OR;  Service:      SECTION      X2    CYST REMOVAL Right     Cyst on bone     FRACTURE SURGERY      (Right) tibia/fibula Fx, and ankle    PILONIDAL CYST / SINUS EXCISION      SC KNEE SCOPE,MED/LAT MENISECTOMY Left 3/2/2016    Procedure: PARTIAL MEDIAL &  LATERAL MENISCECTOMIES, CHONDROPLASTY, REMOVAL OSTEOPHYTE, LIMITED SYNOVECTOMY;  Surgeon: Trina Parker MD;  Location: [facility];  Service: Orthopedics    REPAIR KNEE LIGAMENT         Meds/Allergies:    Prior to Admission medications    Medication Sig Start Date End Date Taking? Authorizing Provider   amLODIPine (NORVASC) 10 mg tablet Take 10 mg by mouth daily  Yes Historical Provider, MD   aspirin 81 mg chewable tablet Chew 81 mg daily  Yes Historical Provider, MD   escitalopram (LEXAPRO) 5 mg tablet Take 5 mg by mouth daily  Yes Historical Provider, MD   etanercept (ENBREL) 25 MG Inject 25 mg under the skin 2 (two) times a week  Yes Historical Provider, MD   folic acid (FOLVITE) 1 mg tablet Take 1 mg by mouth daily  Yes Historical Provider, MD   hydrochlorothiazide (HYDRODIURIL) 25 mg tablet Take 25 mg by mouth daily  Yes Historical Provider, MD   losartan (COZAAR) 50 mg tablet Take 50 mg by mouth daily   Yes Historical Provider, MD   metFORMIN (GLUCOPHAGE) 500 mg tablet Take 500 mg by mouth 2 (two) times a day with meals  Yes Historical Provider, MD   Methotrexate, PF, 10 MG/0 4ML SOAJ Inject 10 mg under the skin once a week  Yes Historical Provider, MD   metoprolol succinate (TOPROL-XL) 100 mg 24 hr tablet Take 100 mg by mouth daily  Yes Historical Provider, MD   oxyCODONE-acetaminophen (PERCOCET) 5-325 mg per tablet Take 1 tablet by mouth every 8 (eight) hours as needed for moderate pain   Yes Historical Provider, MD   pioglitazone (ACTOS) 15 mg tablet Take 15 mg by mouth daily   Yes Historical Provider, MD   simvastatin (ZOCOR) 10 mg tablet Take 20 mg by mouth daily       Yes Historical Provider, MD   Acetaminophen-Codeine (TYLENOL WITH CODEINE #3 PO) Take 1 tablet by mouth every 6 (six) hours as needed  11/24/17  Historical Provider, MD     I have reviewed home medications with patient personally  Allergies: Allergies   Allergen Reactions    Zithromax [Azithromycin] Hives       Social History:     Marital Status: /Civil Union   Occupation: Unknown  Patient Pre-hospital Living Situation: Home  Patient Pre-hospital Level of Mobility: Independent  Patient Pre-hospital Diet Restrictions: None  Substance Use History:   History   Alcohol Use No     History   Smoking Status    Former Smoker    Packs/day: 2 00    Quit date: [address] Never Used     History   Drug Use No       Family History:    non-contributory    Physical Exam:     Vitals:   Blood Pressure: (!) 180/79 (11/24/17 2158)  Pulse: 81 (11/24/17 2158)  Temperature: 97 9 °F (36 6 °C) (11/24/17 2052)  Temp Source: Oral (11/24/17 2052)  Respirations: 18 (11/24/17 2158)  Weight - Scale: 92 8 kg (204 lb 9 4 oz) (11/24/17 2052)  SpO2: 96 % (11/24/17 2158)    Physical Exam   Constitutional: She is oriented to person, place, and time  She appears well-developed and well-nourished  She is cooperative  She does not appear ill  No distress  HENT:   Head: Normocephalic and atraumatic  Eyes: Conjunctivae, EOM and lids are normal  Pupils are equal, round, and reactive to light  Cardiovascular: Normal rate, regular rhythm, normal heart sounds and normal pulses  No murmur heard  Pulmonary/Chest: Effort normal and breath sounds normal  She has no wheezes  She has no rhonchi  She has no rales  Abdominal: Soft  Normal appearance and bowel sounds are normal  There is no tenderness  Musculoskeletal: Normal range of motion  Neurological: She is alert and oriented to person, place, and time  She has normal strength  She is not disoriented  No sensory deficit  Skin: Skin is warm, dry and intact  She is not diaphoretic  Psychiatric: She has a normal mood and affect   Her speech is normal and behavior is normal  Cognition and memory are normal    Vitals reviewed  Additional Data:     Lab Results: I have personally reviewed pertinent reports  Results from last 7 days  Lab Units 11/24/17  2111   WBC Thousand/uL 6 10   HEMOGLOBIN g/dL 13 6   HEMATOCRIT % 40 8   PLATELETS Thousands/uL 164   NEUTROS PCT % 47   LYMPHS PCT % 39   MONOS PCT % 8   EOS PCT % 5       Results from last 7 days  Lab Units 11/24/17  2111   SODIUM mmol/L 137   POTASSIUM mmol/L 3 6   CHLORIDE mmol/L 102   CO2 mmol/L 30   BUN mg/dL 14   CREATININE mg/dL 0 71   CALCIUM mg/dL 9 4   GLUCOSE RANDOM mg/dL 133           Imaging: I have personally reviewed pertinent reports  No results found  EKG, Pathology, and Other Studies Reviewed on Admission:   · EKG: NSR  [employer] Records Reviewed: Yes     ** Please Note: Dragon 360 Dictation voice to text software may have been used in the creation of this document   **

## 2017-11-25 NOTE — DISCHARGE INSTRUCTIONS
Please follow-up with Dr Reg Henley to discuss the need for stress testing as an outpatient   Ideally, the stress test should be completed within 72 hours of discharge, so an appointment early this week would be ideal

## 2017-11-25 NOTE — ED NOTES
Pt presented without chest pain however during triage pt c/o pain in chest, the same as occurred earlier in night     Vianca Murillo RN  11/24/17 [id number]

## 2017-11-25 NOTE — ED PROVIDER NOTES
History  Chief Complaint   Patient presents with    Chest Pain     Pt states she started with some strange pain in the middle of her chest approx 30 minutes that made her sit up and take notice, pt also stated some pain in her L shoulder, pt with family hx of cardiac issues       History provided by:  Patient  Chest Pain   Pain location:  Substernal area  Pain quality: sharp and stabbing    Pain radiates to:  Does not radiate  Pain radiates to the back: no    Pain severity:  Severe  Onset quality:  Sudden  Duration:  1 hour  Timing:  Intermittent  Progression:  Partially resolved  Chronicity:  New  Context: at rest    Context: not breathing, not eating, not lifting, no movement, not raising an arm and no trauma    Relieved by:  Nothing  Worsened by:  Nothing tried  Ineffective treatments:  None tried  Associated symptoms: lower extremity edema and nausea    Associated symptoms: no abdominal pain, no altered mental status, no anorexia, no anxiety, no back pain, no claudication, no cough, no dizziness, no dysphagia, no fatigue, no fever, no headache, no heartburn, no near-syncope, no numbness, no orthopnea, no palpitations, no shortness of breath, no syncope, not vomiting and no weakness        Prior to Admission Medications   Prescriptions Last Dose Informant Patient Reported? Taking? Methotrexate, PF, 10 MG/0 4ML SOAJ Past Week at Unknown time Self Yes Yes   Sig: Inject 10 mg under the skin once a week  amLODIPine (NORVASC) 10 mg tablet 11/24/2017 at Unknown time Self Yes Yes   Sig: Take 10 mg by mouth daily  aspirin 81 mg chewable tablet 11/24/2017 at Unknown time Self Yes Yes   Sig: Chew 81 mg daily  escitalopram (LEXAPRO) 5 mg tablet 11/24/2017 at Unknown time Self Yes Yes   Sig: Take 5 mg by mouth daily  etanercept (ENBREL) 25 MG Past Week at Unknown time Self Yes Yes   Sig: Inject 25 mg under the skin 2 (two) times a week     folic acid (FOLVITE) 1 mg tablet 11/24/2017 at Unknown time Self Yes Yes Sig: Take 1 mg by mouth daily  hydrochlorothiazide (HYDRODIURIL) 25 mg tablet 2017 at Unknown time Self Yes Yes   Sig: Take 25 mg by mouth daily  losartan (COZAAR) 50 mg tablet 2017 at Unknown time  Yes Yes   Sig: Take 50 mg by mouth daily   metFORMIN (GLUCOPHAGE) 500 mg tablet 2017 at Unknown time Self Yes Yes   Sig: Take 500 mg by mouth 2 (two) times a day with meals  metoprolol succinate (TOPROL-XL) 100 mg 24 hr tablet 2017 at Unknown time Self Yes Yes   Sig: Take 100 mg by mouth daily  oxyCODONE-acetaminophen (PERCOCET) 5-325 mg per tablet 2017 at Unknown time  Yes Yes   Sig: Take 1 tablet by mouth every 8 (eight) hours as needed for moderate pain   pioglitazone (ACTOS) 15 mg tablet 2017 at Unknown time  Yes Yes   Sig: Take 15 mg by mouth daily   simvastatin (ZOCOR) 10 mg tablet 2017 at Unknown time Self Yes Yes   Sig: Take 20 mg by mouth daily  Facility-Administered Medications: None       Past Medical History:   Diagnosis Date    Anxiety     Arthritis     OA    Asthma     exercise iniduced      Cancer ([facility] )     Squamous Cell on back    Dependence on crutches     prn    Depression     Diabetes mellitus (HCC)     NIDDM    Fibromyalgia     Hearing aid worn     mayra    Heart murmur     Hyperlipidemia     Hypertension     Psoriasis     red dry patch left side- waist area    Psoriatic arthritis (HCC)     RA [facility]rheumatoid arthritis) ([facility]     Wears glasses     reading       Past Surgical History:   Procedure Laterality Date    ARTHROSCOPY KNEE Left 3/2/2016    Procedure: ARTHROSCOPY KNEE;  Surgeon: Laura Bajwa MD;  Location[facility];  Service:      SECTION      X2    CYST REMOVAL Right     Cyst on bone     FRACTURE SURGERY      (Right) tibia/fibula Fx, and ankle    PILONIDAL CYST / SINUS EXCISION      IL KNEE SCOPE,MED/LAT MENISECTOMY Left 3/2/2016    Procedure: PARTIAL MEDIAL &  LATERAL MENISCECTOMIES, CHONDROPLASTY, REMOVAL OSTEOPHYTE, LIMITED SYNOVECTOMY;  Surgeon: Millie Sanchez MD;  Location: [facility];  Service: [address]         History reviewed  No pertinent family history  I have reviewed and agree with the history as documented  Social History   Substance Use Topics    Smoking status: Former Smoker     Packs/day: 2 00     Quit date: 1996    Smokeless tobacco: Never Used    Alcohol use No        Review of Systems   Constitutional: Negative for activity change, chills, fatigue and fever  HENT: Negative for congestion, ear pain, mouth sores, sore throat and trouble swallowing  Eyes: Negative for photophobia and visual disturbance  Respiratory: Negative for cough, chest tightness, shortness of breath and wheezing  Cardiovascular: Positive for chest pain  Negative for palpitations, orthopnea, claudication, syncope and near-syncope  Gastrointestinal: Positive for nausea  Negative for abdominal pain, anorexia, constipation, diarrhea, heartburn and vomiting  Genitourinary: Negative for decreased urine volume, difficulty urinating, dysuria, genital sores and hematuria  Musculoskeletal: Negative for arthralgias, back pain, myalgias, neck pain and neck stiffness  Skin: Negative for rash and wound  Neurological: Negative for dizziness, syncope, weakness, light-headedness, numbness and headaches  Hematological: Negative for adenopathy  All other systems reviewed and are negative        Physical Exam  ED Triage Vitals [11/24/17 2052]   Temperature Pulse Respirations Blood Pressure SpO2   97 9 °F (36 6 °C) 88 20 (!) 189/84 99 %      Temp Source Heart Rate Source Patient Position - Orthostatic VS BP Location FiO2 (%)   Oral Monitor Lying Right arm --      Pain Score       No Pain           Orthostatic Vital Signs  Vitals:    11/24/17 2052 11/24/17 2158 11/24/17 2333 11/24/17 2350   BP: (!) 189/84 (!) 180/79 148/66 142/70   Pulse: 88 81 87 82   Patient Position - Orthostatic VS: Lying Lying Lying Lying       Physical Exam   Constitutional: She is oriented to person, place, and time  She appears well-developed and well-nourished  No distress  HENT:   Head: Normocephalic and atraumatic  Right Ear: External ear normal    Left Ear: External ear normal    Nose: Nose normal    Mouth/Throat: Oropharynx is clear and moist    Eyes: Conjunctivae and EOM are normal  Pupils are equal, round, and reactive to light  No scleral icterus  Neck: Normal range of motion  Neck supple  Cardiovascular: Normal rate, regular rhythm, normal heart sounds and intact distal pulses  Exam reveals no gallop and no friction rub  No murmur heard  Pulmonary/Chest: Effort normal and breath sounds normal  No respiratory distress  She has no wheezes  No chest wall tenderness   Abdominal: Soft  She exhibits no distension  There is no tenderness  There is no guarding  Musculoskeletal: Normal range of motion  She exhibits no edema, tenderness or deformity  Bilateral posterior calf tenderness, No pitting edema  Lymphadenopathy:     She has no cervical adenopathy  Neurological: She is alert and oriented to person, place, and time  Skin: Skin is warm and dry  Capillary refill takes less than 2 seconds  No rash noted  She is not diaphoretic  No erythema  Nursing note and vitals reviewed        ED Medications  Medications   sodium chloride (PF) 0 9 % injection 3 mL (not administered)   morphine (PF) 4 mg/mL injection 4 mg (not administered)   amLODIPine (NORVASC) tablet 10 mg (not administered)   aspirin chewable tablet 81 mg (not administered)   escitalopram (LEXAPRO) tablet 5 mg (not administered)   folic acid (FOLVITE) tablet 1 mg (not administered)   hydrochlorothiazide (HYDRODIURIL) tablet 25 mg (not administered)   losartan (COZAAR) tablet 50 mg (not administered)   metoprolol succinate (TOPROL-XL) 24 hr tablet 100 mg (not administered)   oxyCODONE-acetaminophen (PERCOCET) 5-325 mg per tablet 1 tablet (not administered)   pravastatin (PRAVACHOL) tablet 40 mg (not administered)   nitroglycerin (NITROSTAT) SL tablet 0 4 mg (not administered)   enoxaparin (LOVENOX) subcutaneous injection 40 mg (not administered)   acetaminophen (TYLENOL) tablet 650 mg (not administered)   morphine injection 1 mg (not administered)   insulin lispro (HumaLOG) 100 units/mL subcutaneous injection 1-6 Units (not administered)   metoprolol (LOPRESSOR) injection 5 mg (not administered)   aspirin chewable tablet 243 mg (243 mg Oral Given 11/24/17 2140)   morphine (PF) 4 mg/mL injection 4 mg (4 mg Intravenous Given 11/24/17 2204)       Diagnostic Studies  Results Reviewed     Procedure Component Value Units Date/Time    NT-BNP PRO [[id number]] Collected:  11/24/17 2111    Lab Status: In process Specimen:  Blood from Arm, Left Updated:  11/25/17 0005    [employer] draw [[id number]] Collected:  11/24/17 2111    Lab Status:  Final result Specimen:  Blood from Arm, Left Updated:  11/24/17 2301    Narrative: The following orders were created for panel order [employer] draw  Procedure                               Abnormality         Status                     ---------                               -----------         ------                     Tilly Mend Top on SMES[[id number]]                            Final result               Green / Yellow tube on SLPH[[id number]]                       Final result               Green / Black tube on MFYD[[id number]]                        Final result               Lavender Top on VUNX[[id number]]                              Final result                 Please view results for these tests on the individual orders      Troponin I [[id number]]  (Normal) Collected:  11/24/17 2111    Lab Status:  Final result Specimen:  Blood from Arm, Left Updated:  11/24/17 2217     Troponin I <0 02 ng/mL     Narrative:         [employer] Chemistry analyzer 99% cutoff is > 0 04 ng/mL in network labs    o cTnI 99% cutoff is useful only when applied to patients in the clinical setting of myocardial ischemia  o cTnI 99% cutoff should be interpreted in the context of clinical history, ECG findings and possibly cardiac imaging to establish correct diagnosis  o cTnI 99% cutoff may be suggestive but clearly not indicative of a coronary event without the clinical setting of myocardial ischemia  D-dimer, quantitative [[id number]]  (Normal) Collected:  11/24/17 2111    Lab Status:  Final result Specimen:  Blood from Arm, Left Updated:  11/24/17 2210     D-Dimer, Quant 410 ng/ml (FEU)     Basic metabolic panel [[id number]] Collected:  11/24/17 2111    Lab Status:  Final result Specimen:  Blood from Arm, Left Updated:  11/24/17 2210     Sodium 137 mmol/L      Potassium 3 6 mmol/L      Chloride 102 mmol/L      CO2 30 mmol/L      Anion Gap 5 mmol/L      BUN 14 mg/dL      Creatinine 0 71 mg/dL      Glucose 133 mg/dL      Calcium 9 4 mg/dL      eGFR 90 ml/min/1 73sq m     Narrative:         National Kidney Disease Education Program recommendations are as follows:  GFR calculation is accurate only with a steady state creatinine  Chronic Kidney disease less than 60 ml/min/1 73 sq  meters  Kidney failure less than 15 ml/min/1 73 sq  meters      Lipase [[id number]]  (Normal) Collected:  11/24/17 2111    Lab Status:  Final result Specimen:  Blood from Arm, Left Updated:  11/24/17 2210     Lipase 134 u/L     CBC and differential [[id number]]  (Normal) Collected:  11/24/17 2111    Lab Status:  Final result Specimen:  Blood from Arm, Left Updated:  11/24/17 2204     WBC 6 10 Thousand/uL      RBC 4 44 Million/uL      Hemoglobin 13 6 g/dL      Hematocrit 40 8 %      MCV 92 fL      MCH 30 6 pg      MCHC 33 3 g/dL      RDW 13 4 %      MPV 11 1 fL      Platelets 961 Thousands/uL      Neutrophils Relative 47 %      Lymphocytes Relative 39 %      Monocytes Relative 8 %      Eosinophils Relative 5 %      Basophils Relative 1 %      Neutrophils Absolute 2 87 Thousands/µL      Lymphocytes Absolute 2 36 Thousands/µL      Monocytes Absolute 0 50 Thousand/µL      Eosinophils Absolute 0 33 Thousand/µL      Basophils Absolute 0 04 Thousands/µL                  X-ray chest 2 views   ED Interpretation by Henrique Del Castillo PA-C (11/24 2211)   Abnormal   Pulmonary congestion                  Procedures  Procedures       Phone Contacts  ED Phone Contact    ED Course  ED Course                        SHAISTA Risk Score    Flowsheet Row Most Recent Value   Age >= 72  0 Filed at: 11/24/2017 2234   Known CAD (stenosis >= 50%)  0 Filed at: 11/24/2017 2234   Recent (<=24 hrs) Service Angina  0 Filed at: 11/24/2017 2234   ST Deviation >= 0 5 mm  0 Filed at: 11/24/2017 2234   3+ CAD Risk Factors (FHx, HTN, HLP, DM, Smoker)  1 Filed at: 11/24/2017 2234   Aspirin Use Past 7 Days  1 Filed at: 11/24/2017 2234   Elevated Cardiac Markers  0 Filed at: 11/24/2017 2234   SHAISTA Risk Score (Calculated)  2 Filed at: 11/24/2017 2234              MDM  Number of Diagnoses or Management Options  Chest pain: new and requires workup  Diagnosis management comments: ddx to include but not limited to: ACS, PE, PNA, GERD, Pericarditis, Aortic dissection     patient is a 78-year-old female with multiple comorbidities including diabetes mellitus, hypertension, hyperlipidemia, smoking history,  Presents emergency department for evaluation of 1 hour of chest pain  Patient states that an hour ago she started having intermittent sharp stabbing substernal pains  No history of GERD and patient did have 2 when she and the 2 previous hours before the pain started  She states that she did have nausea 30 min prior to pain starting  No SOB associated with pain  No abdominal pain either  No headaches, dizziness, lightheadedness  Patient states she travels often and was in [address] less than 2 months ago  Takes methotrexate for psoriatic arthritis   Plan with be to get cardiac workup to include d dimer to RO PE as source of pain, with her recent travel history  Will admit for observation to rule out cardiac source    Update: no STEMI on EKG, CXR with possible pulmonary congestion, ddimer WNL, trop I <0 02, pain controlled with morphine, 243 ASA given as patient had baby ASA this AM  Will discuss with SLIM and admit for obs for cardiac rule out  Amount and/or Complexity of Data Reviewed  Clinical lab tests: ordered and reviewed  Tests in the radiology section of CPT®: ordered and reviewed  Independent visualization of images, tracings, or specimens: yes    Risk of Complications, Morbidity, and/or Mortality  Presenting problems: moderate  Diagnostic procedures: high  Management options: moderate    Patient Progress  Patient progress: stable    CritCare Time    Disposition  Final diagnoses:   Chest pain     Time reflects when diagnosis was documented in both MDM as applicable and the Disposition within this note     Time User Action Codes Description Comment    11/24/2017 10:25 PM Pham Yin Add [R07 9] Chest pain       ED Disposition     ED Disposition Condition Comment    Admit  Case was discussed with Jamila GOMEZ  and the patient's admission status was agreed to be Admission Status: observation status to the service of Dr Brenna Hernandez   Follow-up Information    None       Current Discharge Medication List      CONTINUE these medications which have NOT CHANGED    Details   amLODIPine (NORVASC) 10 mg tablet Take 10 mg by mouth daily  aspirin 81 mg chewable tablet Chew 81 mg daily  escitalopram (LEXAPRO) 5 mg tablet Take 5 mg by mouth daily  etanercept (ENBREL) 25 MG Inject 25 mg under the skin 2 (two) times a week  folic acid (FOLVITE) 1 mg tablet Take 1 mg by mouth daily  hydrochlorothiazide (HYDRODIURIL) 25 mg tablet Take 25 mg by mouth daily  losartan (COZAAR) 50 mg tablet Take 50 mg by mouth daily      metFORMIN (GLUCOPHAGE) 500 mg tablet Take 500 mg by mouth 2 (two) times a day with meals          Methotrexate, PF, 10 MG/0 4ML SOAJ Inject 10 mg under the skin once a week  metoprolol succinate (TOPROL-XL) 100 mg 24 hr tablet Take 100 mg by mouth daily  oxyCODONE-acetaminophen (PERCOCET) 5-325 mg per tablet Take 1 tablet by mouth every 8 (eight) hours as needed for moderate pain      pioglitazone (ACTOS) 15 mg tablet Take 15 mg by mouth daily      simvastatin (ZOCOR) 10 mg tablet Take 20 mg by mouth daily  No discharge procedures on file      ED Provider  Electronically Signed by           Cornelius Abbott PA-C  11/25/17 0010

## 2017-11-25 NOTE — CASE MANAGEMENT
Initial Clinical Review    Admission: Date/Time/Statement:  11/24/2017  2227 OBSERVATION    Orders Placed This Encounter   Procedures    Place in Observation (expected length of stay for this patient is less than two midnights)     Standing Status:   Standing     Number of Occurrences:   1     Order Specific Question:   Admitting Physician     Answer:   Guille Arellano [[id number]]     Order Specific Question:   Level of Care     Answer:   Med Surg [16]         ED: Date/Time/Mode of Arrival:   ED Arrival Information     Expected Arrival Acuity Means of Arrival Escorted By Service Admission Type    - 11/24/2017 20:46 Urgent Walk-In Family Member General Medicine Urgent    Arrival Complaint    Chest Pain          Chief Complaint:   Chief Complaint   Patient presents with    Chest Pain     Pt states she started with some strange pain in the middle of her chest approx 30 minutes that made her sit up and take notice, pt also stated some pain in her L shoulder, pt with family hx of cardiac issues       History of Illness: 57 y o  female who presents with chest pain beginning at 8pm this evening  Patient states that she was sitting at home, watching TV when she developed sudden onset, sharp, 4/10 central chest pain without radiation  The patient states that the pain is coming and going, and states that it is erratic   She denies associated diaphoresis, SOB, palpitations, dizziness, RESENDEZ  She denies personal cardiac history  Family history is positive for cardiac disease in mother and brother  She has had a stress test before in the past- she states she had to do a pharmacologic stress test as she has injured her ankles in the past and has exercise induced asthma so she could not do an exercise stress test  Stress was normal as far as she knows  Patient travels often, last trip was in September in which she flew  She denies pleuritic chest pain, posterior calf pain  No personal or family history of blood clots      ED Vital Signs:   ED Triage Vitals [11/24/17 2052]   Temperature Pulse Respirations Blood Pressure SpO2   97 9 °F (36 6 °C) 88 20 (!) 189/84 99 %      Temp Source Heart Rate Source Patient Position - Orthostatic VS BP Location FiO2 (%)   Oral Monitor Lying Right arm --      Pain Score       No Pain        Wt Readings from Last 1 Encounters:   11/24/17 89 9 kg (198 lb 3 1 oz)       Vital Signs (abnormal): /79 - 189/84    Abnormal Labs/Diagnostic Test Results:   CxR- pulmonary congestion    Serial troponin negative    Labs am 11/25- K 3 4  Glucose 222  Platelets 038  ED Treatment:   Medication Administration from 11/24/2017 2046 to 11/24/2017 2349       Date/Time Order Dose Route Action Action by Comments     11/24/2017 2140 aspirin chewable tablet 243 mg 243 mg Oral Given Micky Burnett RN      11/24/2017 2204 morphine (PF) 4 mg/mL injection 4 mg 4 mg Intravenous Given Micky Burnett RN           Past Medical/Surgical History: Active Ambulatory Problems     Diagnosis Date Noted    Fibromyalgia      Resolved Ambulatory Problems     Diagnosis Date Noted    No Resolved Ambulatory Problems     Past Medical History:   Diagnosis Date    Anxiety     Arthritis     Asthma     [facility]     Dependence on crutches     Depression     Diabetes mellitus (HCC)     Fibromyalgia     Hearing aid worn     Heart murmur     Hyperlipidemia     Hypertension     Psoriasis     Psoriatic arthritis (HCC)     [facility]     Wears glasses        Admitting Diagnosis: Chest pain [R07 9]    Age/Sex: 57 y o  female  Assessment/Plan: Chest Pain  ? ACS r/o   ? CXR- No acute pulmonary disease  ? EKG- NSR  ? Troponin <0 02  Will trend  ? D-Dimer 410   ? Nitro, Morphine, ASA  ? EKG for chest pain  ? Telemetry  ? SHAISTA score of 2  Discussed stress test with patient- would prefer to do it as outpatient  ? Consider cardiology consult      Plan for Additional Problems:   · HTN- /79   Continue home medications  Lopressor PRN  · HLD- Continue statin  · DM- Glucose 133  Hold metformin, actos  SSI coverage  Psoriatic/Rheumatoid Arthritis- Continue home medications  Admission Orders:  TELE  11/24/2017  2227 OBSERVATION   Scheduled Meds:   amLODIPine 10 mg Oral Daily   aspirin 81 mg Oral Daily   enoxaparin 40 mg Subcutaneous Daily   escitalopram 5 mg Oral Daily   folic acid 1 mg Oral Daily   hydrochlorothiazide 25 mg Oral Daily   insulin lispro 1-6 Units Subcutaneous TID AC   losartan 50 mg Oral Daily   metoprolol succinate 100 mg Oral Daily   pravastatin 40 mg Oral Daily With Dinner     Continuous Infusions:    PRN Meds:   acetaminophen    diphenhydrAMINE - used x 1      metoprolol    morphine injection    morphine injection    nitroglycerin    oxyCODONE-acetaminophen    Insert peripheral IV **AND** sodium chloride (PF)    OTHER ORDERS:  Fingerstick glucose qid  Continuous st monitoring   scds      St  [address] [facility] in the [employer] by Flakito Tavares for 2017  Network Utilization Review Department  Phone: [phone]; Fax [phone]  ATTENTION: The Network Utilization Review Department is now centralized for our 7 Facilities  Make a note that we have a new phone and fax numbers for our Department  Please call with any questions or concerns to [phone] and carefully follow the prompts so that you are directed to the right person  All voicemails are confidential  Fax any determinations, approvals, denials, and requests for initial or continue stay review clinical to [phone]  Due to HIGH CALL volume, it would be easier if you could please send faxed requests to expedite your requests and in part, help us provide discharge notifications faster

## 2017-11-26 LAB
ATRIAL RATE: 83 BPM
P AXIS: 69 DEGREES
PR INTERVAL: 162 MS
QRS AXIS: 21 DEGREES
QRSD INTERVAL: 94 MS
QT INTERVAL: 368 MS
QTC INTERVAL: 432 MS
T WAVE AXIS: 58 DEGREES
VENTRICULAR RATE: 83 BPM

## 2018-01-03 ENCOUNTER — ALLSCRIPTS OFFICE VISIT (OUTPATIENT)
Dept: OTHER | Facility: OTHER | Age: 63
End: 2018-01-03

## 2018-01-03 DIAGNOSIS — N64.4 MASTODYNIA: ICD-10-CM

## 2018-01-10 NOTE — RESULT NOTES
Discussion/Summary   [address] [facility]     Verified Results  (1) HEMOGLOBIN A1C [id number] 07:33AM [facility] Order Number: [id number]     Test Name Result Flag Reference   HEMOGLOBIN A1C 6 2 %  4 2-6 3   EST  AVG   GLUCOSE 131 mg/dl

## 2018-01-10 NOTE — RESULT NOTES
Verified Results  (1) THROAT CULTURE (CULTURE, UPPER RESPIRATORY) 08Apr2016 07:48PM [facility]     Test Name Result Flag Reference   CLINICAL REPORT (Report)     Test:        Throat culture  Specimen Source:  Throat  Specimen Type:   Throat  Specimen Date:   4/8/2016 7:48 PM  Result Date:    4/10/2016 9:23 AM  Result Status:   Final result  Resulting Lab:   [place] [address]            Tel: [phone]                 CULTURE                                       ------------------                                   Negative for beta-hemolytic Streptococcus

## 2018-01-10 NOTE — RESULT NOTES
Verified Results  (1) COMPREHENSIVE METABOLIC PANEL 10HSB5574 97:01EW Sandy Ca Order Number: [id number]     Test Name Result Flag Reference   SODIUM 141 mmol/L  136-145   POTASSIUM 3 8 mmol/L  3 5-5 3   CHLORIDE 103 mmol/L  100-108   CARBON DIOXIDE 29 mmol/L  21-32   ANION GAP (CALC) 9 mmol/L  4-13   BLOOD UREA NITROGEN 12 mg/dL  5-25   CREATININE 0 52 mg/dL L 0 60-1 30   Standardized to IDMS reference method   CALCIUM 9 2 mg/dL  8 3-10 1   BILI, TOTAL 0 41 mg/dL  0 20-1 00   ALK PHOSPHATAS 95 U/L     ALT (SGPT) 40 U/L  12-78   AST(SGOT) 22 U/L  5-45   ALBUMIN 3 7 g/dL  3 5-5 0   TOTAL PROTEIN 7 4 g/dL  6 4-8 2   eGFR Non-African American      >60 0 ml/min/1 73sq m   - Patient Instructions: This is a fasting blood test  Water, black tea or black coffee only after 9:00pm the night before test Drink 2 glasses of water the morning of test   National Kidney Disease Education Program recommendations are as follows:  GFR calculation is accurate only with a steady state creatinine  Chronic Kidney disease less than 60 ml/min/1 73 sq  meters  Kidney failure less than 15 ml/min/1 73 sq  meters  GLUCOSE FASTING 166 mg/dL H 65-99     (1) HEMOGLOBIN A1C 20Mar2017 07:34AM Kelvin Garcia    Order Number: [id number]     Test Name Result Flag Reference   HEMOGLOBIN A1C 8 4 % H 4 2-6 3   EST  AVG  GLUCOSE 194 mg/dl       (1) LIPID PANEL FASTING W DIRECT LDL REFLEX [id number] 07:34AM Carlitosmauri Ca Order Number: [id number]     Test Name Result Flag Reference   CHOLESTEROL 181 mg/dL     LDL CHOLESTEROL CALCULATED 102 mg/dL H 0-100   - Patient Instructions: This is a fasting blood test  Water, black tea or black coffee only after 9:00pm the night before test   Drink 2 glasses of water the morning of test     - Patient Instructions:  This is a fasting blood test  Water, black tea or black coffee only after 9:00pm the night before test Drink 2 glasses of water the morning of test   Triglyceride:         Normal              <150 mg/dl       Borderline High    150-199 mg/dl       High               200-499 mg/dl       Very High          >499 mg/dl  Cholesterol:         Desirable        <200 mg/dl      Borderline High  200-239 mg/dl      High             >239 mg/dl  HDL Cholesterol:        High    >59 mg/dL      Low     <41 mg/dL  LDL Cholesterol:        Optimal          <100 mg/dl        Near Optimal     100-129 mg/dl        Above Optimal          Borderline High   130-159 mg/dl          High              160-189 mg/dl          Very High        >189 mg/dl  LDL CALCULATED:    This screening LDL is a calculated result  It does not have the accuracy of the Direct Measured LDL in the monitoring of patients with hyperlipidemia and/or statin therapy  Direct Measure LDL (OFF546) must be ordered separately in these patients  TRIGLYCERIDES 189 mg/dL H <=150   Specimen collection should occur prior to N-Acetylcysteine or Metamizole administration due to the potential for falsely depressed results  HDL,DIRECT 41 mg/dL  40-60   Specimen collection should occur prior to Metamizole administration due to the potential for falsely depressed results  (1) MICROALBUMIN CREATININE RATIO, RANDOM URINE 20Mar2017 07:34AM Jaimee Giron Order Number: [id number]     Test Name Result Flag Reference   MICROALBUMIN/ CREAT R 15 mg/g creatinine  0-30   MICROALBUMIN,URINE 6 5 mg/L  0 0-20 0   CREATININE URINE 42 7 mg/dL       (1) TSH WITH FT4 REFLEX 57CIB2989 07:34AM Adam Benz   [facility] Order Number: [id number]     Test Name Result Flag Reference   TSH 2 210 uIU/mL  0 358-3 740   - Patient Instructions: This is a fasting blood test  Water, black tea or black coffee only after 9:00pm the night before test Drink 2 glasses of water the morning of test   Patients undergoing fluorescein dye angiography may retain small amounts of fluorescein in the body for 48-72 hours post procedure  Samples containing fluorescein can produce falsely depressed TSH values  If the patient had this procedure,a specimen should be resubmitted post fluorescein clearance            The recommended reference ranges for TSH during pregnancy are as follows:  First trimester 0 1 to 2 5 uIU/mL  Second trimester  0 2 to 3 0 uIU/mL  Third trimester 0 3 to 3 0 uIU/m       Discussion/Summary   IMPROVING BUT NOT AT GOAL YET      [facility]

## 2018-01-11 NOTE — PROGRESS NOTES
History of Present Illness  [facility] Revaccination:   Revaccination   Vaccine Information: Vaccine(s) Given (names): Pneumovax [id number]  Spoke with patient regarding vaccine out of temperature range and risks and benefits of revaccination  Action(s): Pt will be revaccinated  Appointment scheduled: [phone]  Other Information: Revaccination  [id number] Spoke with patient; Joie Ring to be done at next [address] [phone]  Revaccination Completed: [id number]  Active Problems    1  Acute knee pain (719 46) (M25 569)   2  Allergy to pollen (477 0) (J30 1)   3  Anxiety disorder (300 00) (F41 9)   4  Benign essential hypertension (401 1) (I10)   5  Controlled diabetes mellitus type II without complication (790 92) (T78 2)   6  Contusion (924 9) (T14 8)   7  Fibromyalgia (729 1) (M79 7)   8  Generalized osteoarthritis of multiple sites (715 09) (M15 9)   9  Hand pain, unspecified laterality   10  Hearing Loss (389 9)   11  Hypercholesterolemia (272 0) (E78 00)   12  Nausea (787 02) (R11 0)   13  Neck muscle spasm (728 85) (M62 838)   14  Need for prophylactic vaccination against Streptococcus pneumoniae (pneumococcus)    (V03 82) (Z23)   15  Need for prophylactic vaccination and inoculation against influenza (V04 81) (Z23)   16  Need for revaccination (V05 9) (Z23)   17  New onset type 2 diabetes mellitus (250 00) (E11 9)   18  Oral thrush (112 0) (B37 0)   19  Other muscle spasm (728 85) (M62 838)   20  Pain and swelling of right lower leg (729 5,729 81) (M79 661,M79 89)   21  Psoriasis (696 1) (L40 9)   22  Psoriatic arthropathy (696 0) (L40 50)   23  Rheumatoid arthritis (714 0) (M06 9)   24  Right knee pain (719 46) (M25 561)   25  Screening for breast cancer (V76 10) (Z12 39)   26  Screening for cervical cancer (V76 2) (Z12 4)   27  Screening for colorectal cancer (V76 51) (Z12 11,Z12 12)   28  Squamous cell carcinoma of skin (173 92) (C44 92)   29  Strain of right knee and leg (844 9) (G01 545G)   30   Vertigo (780 4) (R42)   31  Visit for pre-operative examination (V72 84) (Z01 818)   32  Vitamin D deficiency (268 9) (E55 9)    Current Meds   1  ALPRAZolam 0 5 MG Oral Tablet; Take one half to one tablet every 6-8 hours as needed   for anxiety; Last Rx:73Qhv6979 Ordered   2  AmLODIPine Besylate 10 MG Oral Tablet; TAKE 1 TABLET DAILY; Therapy: 89GHJ3900 to (Evaluate:02Oct2017)  Requested for: 07Oct2016; Last   Rx:07Oct2016 Ordered   3  Aspir-81 81 MG Oral Tablet Delayed Release; TAKE 1 TABLET DAILY AS DIRECTED; Therapy: 67ZKO2194 to (Evaluate:03Jan2017); Last Rx:30Vwf7804 Ordered   4  Cyclobenzaprine HCl - 10 MG Oral Tablet; TAKE 1/2 TO 1 TABLET EVERY 8 HOURS; Therapy: 28ENA0141 to (Evaluate:11Jan2017)  Requested for: 80Kyv6298; Last   Rx:59Kwk8785 Ordered   5  Enbrel 25 MG KIT; Therapy: 09VNW4365 to Recorded   6  Escitalopram Oxalate 10 MG Oral Tablet; TAKE 1 TABLET BY MOUTH EVERY DAY; Therapy: 88Ods3442 to (Evaluate:16Jun2017)  Requested for: 21Jun2016; Last   Rx:21Jun2016 Ordered   7  HydroCHLOROthiazide 25 MG Oral Tablet; take 1 tablet by mouth every day; Therapy: 48OWF7569 to (Evaluate:09Oct2016)  Requested for: 38RKL8976; Last   Rx:57Tec1937 Ordered   8  Hydrocodone-Acetaminophen 5-325 MG Oral Tablet; Take 1 tablet twice daily; Therapy: 61LPF3871 to (Evaluate:74Ptm2109); Last Rx:22Jan2016 Ordered   9  MetFORMIN HCl - 500 MG Oral Tablet; TAKE 1 TABLET BY MOUTH AT NIGHT FOR 1   WEEK, THEN 1 TAB TWICE A DAY FOR 1 WEEK,THEN 1 TAB IN THE MORNING AND 2   TABS IN THE EVENING FOR 1 WEEK; Therapy: 74RGF0189 to (Rosalba Lyn)  Requested for: 64WJY2925; Last   Rx:62Uke9162 Ordered   10  Methotrexate Sodium (PF) 25 MG/ML SOLN;    Therapy: 49IFR3079 to Recorded   11  Metoprolol Succinate  MG Oral Tablet Extended Release 24 Hour; take 1 tablet    by mouth once daily; Therapy: 33HIM1263 to (Evaluate:63Ewl3266)  Requested for: 20OAU8890; Last    Rx:92Ddp4544 Ordered   12   Ondansetron 4 MG Oral Tablet Dispersible; TAKE 1 TABLET Every 6 hours; Therapy: 81Zqo6007 to ([address])  Requested for: 66Uyf9263; Last    Rx:83Tlc6300 Ordered   13  OneTouch Ultra Blue In Vitro Strip; TEST ONCE DAILY; Therapy: 16NRV7551 to (Last Rx:84Rsp5058)  Requested for: 97Ldg5371 Ordered   14  Simvastatin 10 MG Oral Tablet; TAKE 1 TABLET DAILY  Requested for: 72KKH5372; Last    Rx:08Nov2016 Ordered   15  Symbicort 160-4 5 MCG/ACT Inhalation Aerosol; INHALE 1 PUFF TWO TIMES A DAY; Therapy: 55FUU6996 to (Evaluate:47Xbt6671)  Requested for: 11Aug2016; Last    Rx:11Aug2016 Ordered   16  Ventolin  (90 Base) MCG/ACT Inhalation Aerosol Solution; INHALE 2 PUFFS    EVERY 4-6 HOURS AS NEEDED; Therapy: 60XVH4829 to (Last MP:68NBD0718)  Requested for: 39ZWG6320 Ordered    Allergies    1  Azithromycin TABS    2  No Known Environmental Allergies   3   No Known Food Allergies    Signatures   Electronically signed by : Nisha Parsons DO; Dec 28 2016 11:25AM EST                       (Author)

## 2018-01-12 VITALS — HEART RATE: 88 BPM | RESPIRATION RATE: 18 BRPM

## 2018-01-12 NOTE — RESULT NOTES
Verified Results  (1) CBC/PLT/DIFF 71PVF2921 12:13PM Kaitlin Cha Order Number: [id number]     Order Number: [id number]     Test Name Result Flag Reference   WBC COUNT 8 61 Thousand/uL  4 31-10 16   RBC COUNT 4 74 Million/uL  3 81-5 12   HEMOGLOBIN 14 7 g/dL  11 5-15 4   HEMATOCRIT 42 3 %  34 8-46  1   MCV 89 fL  82-98   MCH 31 0 pg  26 8-34 3   MCHC 34 8 g/dL  31 4-37 4   RDW 13 1 %  11 6-15 1   MPV 11 7 fL  8 9-12 7   PLATELET COUNT 177 Thousands/uL  149-390   nRBC AUTOMATED 0 /100 WBCs     NEUTROPHILS RELATIVE PERCENT 64 %  43-75   LYMPHOCYTES RELATIVE PERCENT 24 %  14-44   MONOCYTES RELATIVE PERCENT 8 %  4-12   EOSINOPHILS RELATIVE PERCENT 3 %  0-6   BASOPHILS RELATIVE PERCENT 1 %  0-1   NEUTROPHILS ABSOLUTE COUNT 5 58 Thousands/µL  1 85-7 62   LYMPHOCYTES ABSOLUTE COUNT 2 06 Thousands/µL  0 60-4 47   MONOCYTES ABSOLUTE COUNT 0 65 Thousand/µL  0 17-1 22   EOSINOPHILS ABSOLUTE COUNT 0 26 Thousand/µL  0 00-0 61   BASOPHILS ABSOLUTE COUNT 0 04 Thousands/µL  0 00-0 10

## 2018-01-13 VITALS
HEART RATE: 76 BPM | BODY MASS INDEX: 35 KG/M2 | SYSTOLIC BLOOD PRESSURE: 152 MMHG | DIASTOLIC BLOOD PRESSURE: 82 MMHG | RESPIRATION RATE: 14 BRPM | WEIGHT: 191.38 LBS

## 2018-01-14 VITALS
SYSTOLIC BLOOD PRESSURE: 120 MMHG | BODY MASS INDEX: 36.09 KG/M2 | HEIGHT: 62 IN | HEART RATE: 72 BPM | DIASTOLIC BLOOD PRESSURE: 84 MMHG | RESPIRATION RATE: 20 BRPM | WEIGHT: 196.13 LBS

## 2018-01-15 ENCOUNTER — GENERIC CONVERSION - ENCOUNTER (OUTPATIENT)
Dept: OTHER | Facility: OTHER | Age: 63
End: 2018-01-15

## 2018-01-15 ENCOUNTER — HOSPITAL ENCOUNTER (OUTPATIENT)
Dept: RADIOLOGY | Facility: HOSPITAL | Age: 63
Discharge: HOME | End: 2018-01-15
Payer: COMMERCIAL

## 2018-01-15 DIAGNOSIS — N64.4 MASTODYNIA: ICD-10-CM

## 2018-01-15 PROCEDURE — 77066 DX MAMMO INCL CAD BI: CPT

## 2018-01-15 PROCEDURE — 76642 ULTRASOUND BREAST LIMITED: CPT

## 2018-01-17 NOTE — PROGRESS NOTES
Chief Complaint  Patient here for flu shot      Active Problems    1  Acute knee pain (719 46) (M25 569)   2  Allergy to pollen (477 0) (J30 1)   3  Anxiety disorder (300 00) (F41 9)   4  Benign essential hypertension (401 1) (I10)   5  Contact dermatitis (692 9) (L25 9)   6  Controlled diabetes mellitus type II without complication (002 03) (E10 7)   7  Fibromyalgia (729 1) (M79 7)   8  Generalized osteoarthritis of multiple sites (715 09) (M15 9)   9  Hand pain, unspecified laterality   10  Hearing Loss (389 9)   11  Hypercholesterolemia (272 0) (E78 00)   12  Need for prophylactic vaccination against Streptococcus pneumoniae (pneumococcus)    (V03 82) (Z23)   13  Need for prophylactic vaccination and inoculation against influenza (V04 81) (Z23)   14  New onset type 2 diabetes mellitus (250 00) (E11 9)   15  Oral thrush (112 0) (B37 0)   16  Other muscle spasm (728 85) (M62 838)   17  Psoriasis (696 1) (L40 9)   18  Psoriatic arthropathy (696 0) (L40 50)   19  Rheumatoid arthritis (714 0) (M06 9)   20  Screening for breast cancer (V76 10) (Z12 39)   21  Screening for cervical cancer (V76 2) (Z12 4)   22  Screening for colorectal cancer (V76 51) (Z12 11,Z12 12)   23  Sore throat (462) (J02 9)   24  Squamous cell carcinoma of skin (173 92) (C44 92)   25  Vertigo (780 4) (R42)   26  Visit for pre-operative examination (V72 84) (Z01 818)   27  Vitamin D deficiency (268 9) (E55 9)    Current Meds   1  ALPRAZolam 0 5 MG Oral Tablet; Take one half to one tablet every 6-8 hours as needed   for anxiety; Last Rx:86Apd0539 Ordered   2  AmLODIPine Besylate 10 MG Oral Tablet; TAKE 1 TABLET DAILY; Therapy: 81HUK9838 to (Evaluate:02Oct2017)  Requested for: 07Oct2016; Last   Rx:07Oct2016 Ordered   3  Aspir-81 81 MG Oral Tablet Delayed Release; TAKE 1 TABLET DAILY AS DIRECTED; Therapy: 56IFP6537 to (Evaluate:03Jan2017); Last Rx:54Yvl6776 Ordered   4  Enbrel 25 MG KIT; Therapy: 26BMO1415 to Recorded   5   Escitalopram Oxalate 10 MG Oral Tablet; TAKE 1 TABLET BY MOUTH EVERY DAY; Therapy: 77Rch3468 to (Evaluate:16Jun2017)  Requested for: 36Fea6440; Last   Rx:21Jun2016 Ordered   6  HydroCHLOROthiazide 25 MG Oral Tablet; take 1 tablet by mouth every day; Therapy: 06LFB6596 to (Evaluate:09Oct2016)  Requested for: 06NZT7360; Last   Rx:61Cos5605 Ordered   7  Hydrocodone-Acetaminophen 5-325 MG Oral Tablet; Take 1 tablet twice daily; Therapy: 87WWD3182 to (Evaluate:23Iik2291); Last Rx:72Vgc7448 Ordered   8  MetFORMIN HCl - 500 MG Oral Tablet; 1 TAB AT NIGHT X1 WEEK,1 TAB TWICE DAILY   X1 WEEK,1 TAB AM AND 2 TABS PM X1 WEEK,2 TABS TWICE DAILY; Therapy: 97FSL5158 to (Martina Chu)  Requested for: 22KJY2909; Last   Rx:78Inj7508 Ordered   9  Methotrexate Sodium (PF) 25 MG/ML SOLN;   Therapy: 85MFY7692 to Recorded   10  Metoprolol Succinate  MG Oral Tablet Extended Release 24 Hour; take 1 tablet    by mouth once daily; Therapy: 94GZQ2137 to (Evaluate:58Mkn9113)  Requested for: 19HWA2340; Last    Rx:65Sel4692 Ordered   11  OneTouch Ultra Blue In Vitro Strip; TEST ONCE DAILY; Therapy: 13OVG1417 to (Last Rx:60Dbn2040)  Requested for: 02Oaq9556 Ordered   12  Simvastatin 10 MG Oral Tablet; TAKE 1 TABLET DAILY  Requested for: 06IPM5822; Last    Rx:08Nov2016 Ordered   13  Symbicort 160-4 5 MCG/ACT Inhalation Aerosol; INHALE 1 PUFF TWO TIMES A DAY; Therapy: 54MRJ7013 to (Evaluate:50Req7462)  Requested for: 33Vxt3362; Last    Rx:12Pfs7771 Ordered   14  Ventolin  (90 Base) MCG/ACT Inhalation Aerosol Solution; INHALE 2 PUFFS    EVERY 4-6 HOURS AS NEEDED; Therapy: 15AEK8848 to (Last CB:86YMX4934)  Requested for: 13TKU6421 Ordered    Allergies    1  Azithromycin TABS    2  No Known Environmental Allergies   3   No Known Food Allergies    Plan  Need for prophylactic vaccination and inoculation against influenza    · Fluzone Quadrivalent 0 5 ML Intramuscular Suspension Prefilled Syringe    Signatures   Electronically signed by : Gavin Oneal DO; Nov 21 2016 11:59AM EST                       (Author)

## 2018-01-17 NOTE — MISCELLANEOUS
Provider Comments  Provider Comments:   Our records indicate that you missed an appointment on 03/13/2017  If you have not done so already, please call our office and we will be happy to schedule another appointment for you  If you must cancel your appointment, our office policy requires you to call our office at least 24 hours in advance so that we may utilize your appointment time for another patient who requires our services  If you have any questions, please contact our office at [phone]  Signatures   Electronically signed by :  Tess Donald, ; Mar 15 2017  4:36PM EST                       (Author)

## 2018-01-22 VITALS — HEART RATE: 74 BPM | RESPIRATION RATE: 18 BRPM | SYSTOLIC BLOOD PRESSURE: 124 MMHG | DIASTOLIC BLOOD PRESSURE: 62 MMHG

## 2018-01-23 NOTE — RESULT NOTES
Discussion/Summary   LOOKS GOOD SHILPA Muñoz     Verified Results  *US BREAST RIGHT LIMITED (DIAGNOSTIC) 50CNE6898 12:00PM Rosa Booth Order Number: [id number]    - Patient Instructions: To schedule this appointment, please contact Central Scheduling at [phone]  Test Name Result Flag Reference   US BREAST RIGHT LIMITED (Report)     Patient History:   Patient is postmenopausal, has history of other cancer at age 62,   and had first child at age 33  Family history of breast cancer at age 80 in mother  Took estrogen beginning at age 46  Patient's BMI is 35 7  Reason for exam: clinical finding  Patient has pain at the site of a prior skin biopsy procedure for   removal of a skin tag  Mammo Diagnostic Bilateral W CAD: January 15, 2018 - Check In #:    [de-identified]   Bilateral CC and MLO view(s) were taken  Technologist: EBENEZER Cui   Prior study comparison: October 17, 2016, bilateral mammogram     June 25, 2013, bilateral mammogram      The breast tissue is almost entirely fat  The area of clinical    concern on the right breast was marked prior to imaging  The    breasts appear stable compared with prior studies with some    scattered benign-appearing calcifications and a small focal    asymmetry in the left outer breast unchanged for several years  In the area of clinical concern on the right, there is no visible   mammographic abnormality  There are no malignant calcifications   on either side  Skin and nipple contours appear within normal    limits  A targeted ultrasound of the right breast was performed over the    area of pain at the 1:00 location 12 cm from nipple  In this    area there is a focus of diminished echogenicity within the skin    with associated slight skin thickening  This has appearance most   compatible with a scar given the history of skin tag removal in    this area  The patient reports that this has been stable   This    finding measures 1 4 cm diameter and 2 mm thickness  The    underlying breast tissue appears fatty texture without any mass    lesion seen in the breast      US Breast Right Limited: January 15, 2018 - Check In #: [de-identified]   Standard views  Technologist: Johan Patton RDMS   AREA OF PAIN SCANNED   See above   See above     ACR BI-RADSï¾® Assessments: BiRad:2 - Benign (Overall)   Diag Mammogram: BiRad:2 - benign finding  Right breast Right Brst US: BiRad:2 - benign finding in the right   breast      Recommendation:   Clinical management of the right breast    Routine screening mammogram of both breasts in 1 year  Analyzed by CAD   The patient is scheduled in a reminder system for screening    mammography  Transcription Location: [address] [place] [address] [place]: [id number]     Risk Value(s):   Tyrer-Cuzick 10 Year: 6 200%, Tyrer-Cuzick Lifetime: 13 200%,    Myriad Table: 1 5%, JESSA 5 Year: 3 3%, NCI Lifetime: 12 7%   Signed by:   Radha Pizarro MD   1/15/18     MAMMO DIAGNOSTIC BILATERAL W CAD 37ZDS1105 11:58AM Yin Edmonds Order Number: [id number]    - Patient Instructions: To schedule this appointment, please contact Central Scheduling at [phone]  Do not wear any perfume, powder, lotion or deodorant on breast or underarm area  Please bring your doctors order, referral (if needed) and insurance information with you on the day of the test  Failure to bring this information may result in this test being rescheduled  Arrive 15 minutes prior to your appointment time to register  On the day of your test, please bring any prior mammogram or breast studies with you that were not performed at a [facility]  Failure to bring prior exams may result in your test needing to be rescheduled  Test Name Result Flag Reference   MAMMO DIAGNOSTIC BILATERAL W CAD (Report)     Patient History:   Patient is postmenopausal, has history of other cancer at age 62,   and had first child at age 33     Family history of breast cancer at age 80 in mother  Took estrogen beginning at age 46  Patient's BMI is 35 7  Reason for exam: clinical finding  Patient has pain at the site of a prior skin biopsy procedure for   removal of a skin tag  Mammo Diagnostic Bilateral W CAD: January 15, 2018 - Check In #:    [de-identified]   Bilateral CC and MLO view(s) were taken  Technologist: EBENEZER Knott   Prior study comparison: October 17, 2016, bilateral mammogram     June 25, 2013, bilateral mammogram      The breast tissue is almost entirely fat  The area of clinical    concern on the right breast was marked prior to imaging  The    breasts appear stable compared with prior studies with some    scattered benign-appearing calcifications and a small focal    asymmetry in the left outer breast unchanged for several years  In the area of clinical concern on the right, there is no visible   mammographic abnormality  There are no malignant calcifications   on either side  Skin and nipple contours appear within normal    limits  A targeted ultrasound of the right breast was performed over the    area of pain at the 1:00 location 12 cm from nipple  In this    area there is a focus of diminished echogenicity within the skin    with associated slight skin thickening  This has appearance most   compatible with a scar given the history of skin tag removal in    this area  The patient reports that this has been stable  This    finding measures 1 4 cm diameter and 2 mm thickness  The    underlying breast tissue appears fatty texture without any mass    lesion seen in the breast      US Breast Right Limited: January 15, 2018 - Check In #: [de-identified]   Standard views  Technologist: Danielle Quispe RDMS   AREA OF PAIN SCANNED   See above   See above     ACR BI-RADSï¾® Assessments: BiRad:2 - Benign (Overall)   Diag Mammogram: BiRad:2 - benign finding     Right breast Right Brst US: BiRad:2 - benign finding in the right   breast  Recommendation:   Clinical management of the right breast    Routine screening mammogram of both breasts in 1 year  Analyzed by CAD   The patient is scheduled in a reminder system for screening    mammography       Transcription Location: [address] [place] [address] [place]: [id number]     Risk Value(s):   Shayyer-Cuzick 10 Year: 6 200%, Tyrer-Culuis Lifetime: 13 200%,    Myriad Table: 1 5%, JESSA 5 Year: 3 3%, NCI Lifetime: 12 7%   Signed by:   Loise Baumgarten, MD   1/15/18

## 2018-01-23 NOTE — PROGRESS NOTES
Assessment   1  Encounter for preventive health examination (V70 0) (Z00 00)  2  Benign essential hypertension (401 1) (I10)  3  Controlled diabetes mellitus type II without complication (277 53) (A60 9)  4  Hypercholesterolemia (272 0) (E78 00)  5  Chronic pain of left knee (809 91,900 60) (M25 562,G89 29)  6  Breast pain, right (611 71) (N64 4)  7  Family history of myocardial infarction (V17 3) (Z82 49) : Mother  8  Family history of coronary artery disease (V17 3) (Z82 49) : Brother  9  Need for prophylactic vaccination against Streptococcus pneumoniae (pneumococcus)   (V03 82) (Z23)    Plan  Benign essential hypertension, Controlled diabetes mellitus type II without complication,  Hypercholesterolemia, Vitamin D deficiency    · (1) COMPREHENSIVE METABOLIC PANEL; Status:Active; Requested for:01Mar2018;    · (1) HEMOGLOBIN A1C; Status:Active; Requested for:01Mar2018;    · (1) LIPID PANEL FASTING W DIRECT LDL REFLEX; Status:Active; Requested  for:01Mar2018;    · (1) MICROALBUMIN CREATININE RATIO, RANDOM URINE; Status:Active; Requested  for:01Mar2018;    · (1) TSH WITH FT4 REFLEX; Status:Active; Requested for:01Mar2018;   Breast pain, right    · *US BREAST RIGHT LIMITED (DIAGNOSTIC); Status:Active; Requested NCA:[id number];    · MAMMO DIAGNOSTIC BILATERAL W CAD; Status:Active;  Requested IAH:[id number];   Chronic pain of left knee    · 1 - Emperatriz GUTIERREZ, Kg Templeton  (Orthopedic Surgery) Co-Management  *  Status: Active   Requested for: 69EVT0097  () Care Summary provided  : Yes  Need for prophylactic vaccination against Streptococcus pneumoniae (pneumococcus)    · Administered: Prevnar 13 Intramuscular Suspension  Other muscle spasm    · Continue: Methocarbamol 750 MG Oral Tablet; TAKE 1 TABLET 3 TIMES DAILY  PMH: Asthma with acute exacerbation    · Continue: Symbicort 160-4 5 MCG/ACT Inhalation Aerosol; INHALE 1 PUFF TWO TIMES  A DAY    Discussion/Summary  health maintenance visit Currently, she eats a healthy diet and has an adequate exercise regimen  the risks and benefits of cervical cancer screening were discussed Breast cancer screening: mammogram has been ordered  Colorectal cancer screening: colorectal cancer screening is current  Osteoporosis screening: bone mineral density testing is not indicated  She was advised to be evaluated by an ophthalmologist  Advice and education were given regarding aerobic exercise  Patient discussion: discussed with the patient  Chief Complaint  Patient here for annual wellness exam      History of Present Illness  HM, Adult Female: The patient is being seen for a health maintenance evaluation  General Health: The patient's health since the last visit is described as good  She has regular dental visits  She denies vision problems  She denies hearing loss  Lifestyle:  She consumes a diverse and healthy diet  She does not have any weight concerns  She does not exercise regularly  She does not use tobacco  She denies alcohol use  She denies drug use  Reproductive health: the patient is postmenopausal    Screening: Cervical cancer screening includes uncertain timing of her last pap smear  Breast cancer screening includes a mammogram performed last year  Colorectal cancer screening includes a colonoscopy performed within the past ten years  Review of Systems    Constitutional: No fever, no chills, feels well, no tiredness, no recent weight gain or weight loss  Eyes: No complaints of eye pain, no red eyes, no eyesight problems, no discharge, no dry eyes, no itching of eyes  ENT: no complaints of earache, no loss of hearing, no nose bleeds, no nasal discharge, no sore throat, no hoarseness  Cardiovascular: No complaints of slow heart rate, no fast heart rate, no chest pain, no palpitations, no leg claudication, no lower extremity edema  Respiratory: No complaints of shortness of breath, no wheezing, no cough, no SOB on exertion, no orthopnea, no PND     Gastrointestinal: No complaints of abdominal pain, no constipation, no nausea or vomiting, no diarrhea, no bloody stools  Genitourinary: No complaints of dysuria, no incontinence, no pelvic pain, no dysmenorrhea, no vaginal discharge or bleeding  Musculoskeletal: arthralgias and joint stiffness, but as noted in HPI  Integumentary: breast pain, but as noted in HPI  Neurological: No complaints of headache, no confusion, no convulsions, no numbness, no dizziness or fainting, no tingling, no limb weakness, no difficulty walking  Psychiatric: Not suicidal, no sleep disturbance, no anxiety or depression, no change in personality, no emotional problems  Endocrine: No complaints of proptosis, no hot flashes, no muscle weakness, no deepening of the voice, no feelings of weakness  Hematologic/Lymphatic: No complaints of swollen glands, no swollen glands in the neck, does not bleed easily, does not bruise easily  Active Problems   1  Allergy to pollen (477 0) (J30 1)  2  Anxiety disorder (300 00) (F41 9)  3  Benign essential hypertension (401 1) (I10)  4  Controlled diabetes mellitus type II without complication (024 34) (V98 7)  5  Fibromyalgia (729 1) (M79 7)  6  Generalized osteoarthritis of multiple sites (715 09) (M15 9)  7  Hearing Loss (389 9)  8  Hypercholesterolemia (272 0) (E78 00)  9  Need for prophylactic vaccination against Streptococcus pneumoniae (pneumococcus)   (V03 82) (Z23)  10  Need for prophylactic vaccination and inoculation against influenza (V04 81) (Z23)  11  Other muscle spasm (728 85) (M62 838)  12  Pain of toe of right foot (729 5) (M79 674)  13  Psoriasis (696 1) (L40 9)  14  Psoriatic arthropathy (696 0) (L40 50)  15  Rheumatoid arthritis (714 0) (M06 9)  16  Right knee pain (719 46) (M25 561)  17  Screening for breast cancer (V76 10) (Z12 31)  18  Screening for cervical cancer (V76 2) (Z12 4)  19  Screening for colorectal cancer (V76 51) (Z12 11,Z12 12)  20   Screening for depression (V79 0) (Z13 89)  21  Squamous cell carcinoma of skin (173 92) (C44 92)  22  Vertigo (780 4) (R42)  23  Visit for pre-operative examination (V72 84) (Z01 818)  24  Vitamin D deficiency (268 9) (E55 9)    Past Medical History    · History of Asthma with acute exacerbation (493 92) (J45 901)   · History of Benign essential hypertension (401 1) (I10)   · History of candidiasis of mouth (V12 09) (Z86 19)   · History of contact dermatitis (V13 3) (Z87 2)   · History of contusion (V15 59) (J79 587)   · History of hyperglycemia (V12 29) (Z86 39)   · History of nausea (V12 79) (Z87 898)   · History of psoriasis (V13 3) (Z87 2)   · History of New onset type 2 diabetes mellitus (250 00) (E11 9)   · History of New onset type 2 diabetes mellitus (250 00) (E11 9)   · History of Pain and swelling of right lower leg (729 5,729 81) (M79 661,M79 89)   · History of Sore throat (462) (J02 9)    Surgical History    · History of  Section   · History of Closed Treatment Of Fracture Of Fibular Shaft   · History of Closed Treatment Of Fracture Of Tibial Shaft   · History of Knee Arthroscopy With Medial And Lateral Meniscus Repair   · History of Oral Surgery Tooth Extraction   · History of Pilonidal Cyst Resection    Family History  Mother    · Family history of Benign Essential Hypertension   · Family history of myocardial infarction (V17 3) (Z82 49)   · Family history of Osteoarthritis (V17 7)  Father    · Family history of Alcoholism  Brother    · Family history of Benign Essential Hypertension   · Family history of Diabetes Mellitus (V18 0)   · Family history of Dyslipidemia   · Family history of coronary artery disease (V17 3) (Z82 49)    Social History    · Never A Smoker   · Past smoker, quit in   Current Meds  1  ALPRAZolam 0 5 MG Oral Tablet; Take one half to one tablet every 6-8 hours as needed   for anxiety; Last Rx:48Mtl8431 Ordered  2  AmLODIPine Besylate 10 MG Oral Tablet; take 1 tablet by mouth once daily;    Therapy: 02TUE4826 to (Evaluate:24Lun1958)  Requested for: 66Dil9368; Last   Rx:54Sov3311 Ordered  3  Aspir-81 81 MG Oral Tablet Delayed Release; TAKE 1 TABLET DAILY AS DIRECTED; Therapy: 45LOD9904 to (Evaluate:03Jan2022); Last Rx:46Mkb0743 Ordered  4  Cyclobenzaprine HCl - 10 MG Oral Tablet; TAKE 1 TABLET AT BEDTIME; Therapy: 70TVF7161 to (Laquita Palmer)  Requested for: 45PZF1557; Last   Rx:26Gyd6247 Ordered  5  Enbrel 25 MG KIT; Therapy: 65CEU6914 to Recorded  6  Escitalopram Oxalate 10 MG Oral Tablet; take 1 tablet by mouth once daily; Therapy: 84Rif8201 to (Evaluate:12Wvh4306)  Requested for: 17PVM2181; Last   Rx:28Ieo8711 Ordered  7  Folic Acid 1 MG Oral Tablet; TAKE 1 TABLET DAILY; Therapy: 95ZXE1673 to (Evaluate:06Oct2017) Recorded  8  HydroCHLOROthiazide 25 MG Oral Tablet; take 1 tablet by mouth every day; Therapy: 04VJQ7238 to (Evaluate:29Jun2021)  Requested for: 08EHZ0147; Last   Rx:31Mar2017 Ordered  9  Hydrocodone-Acetaminophen 5-325 MG Oral Tablet; Take 1 tablet twice daily; Therapy: 11KMP1366 to (Evaluate:01Gpr1311); Last Rx:22Jan2016 Ordered  10  Losartan Potassium 50 MG Oral Tablet; TAKE 1 TABLET DAILY AS DIRECTED; Therapy: 45LWT8765 to (Evaluate:14May2018)  Requested for: 83ONZ5830; Last    Rx:08Jrz3798 Ordered  11  MetFORMIN HCl  MG Oral Tablet Extended Release 24 Hour; take 1 tablet by    mouth twice a day; Therapy: 44QEK7505 to (Evaluate:01Apr2018)  Requested for: 46Bdd9158; Last    Rx:06Apr2017 Ordered  12  Methocarbamol 750 MG Oral Tablet; TAKE 1 TABLET 3 TIMES DAILY; Therapy: 71HAC8130 to (Charlette Severance)  Requested for: 02Oct2017; Last    Rx:02Oct2017 Ordered  13  Methotrexate Sodium (PF) 25 MG/ML SOLN;    Therapy: 32PPV1944 to Recorded  14  Metoprolol Succinate  MG Oral Tablet Extended Release 24 Hour; take 1 tablet    by mouth once daily; Therapy: 33WFF1674 to (Evaluate:09Qmj0344)  Requested for: 18Lws7031; Last    Rx:18Jmo4660 Ordered  15   OneTouch Ultra Blue In Vitro Strip; TEST ONCE DAILY; Therapy: 16RCG7464 to (Last Rx:73Tvu1881)  Requested for: 56Jpi3043 Ordered  16  Pioglitazone HCl - 15 MG Oral Tablet; Take 1 tablet daily; Therapy: 04LNA6533 to (Evaluate:01Apr2018)  Requested for: 02VJN2296; Last    Rx:19Xiq8966 Ordered  17  Simvastatin 10 MG Oral Tablet; take 1 tablet by mouth once daily; Therapy: 54Cmj6055 to (Evaluate:72Ngr1572)  Requested for: 61Uen9003; Last    Rx:83Wnr9341 Ordered  18  Symbicort 160-4 5 MCG/ACT Inhalation Aerosol; INHALE 1 PUFF TWO TIMES A DAY; Therapy: 10NJE9031 to (Evaluate:29Vax1964)  Requested for: 48NBQ0749; Last    Rx:86Lko8999 Ordered  19  Ventolin  (90 Base) MCG/ACT Inhalation Aerosol Solution; INHALE 2 PUFFS    EVERY 4-6 HOURS AS NEEDED; Therapy: 75OCJ5264 to (Last FW:53YZX1079)  Requested for: 50OUM1127 Ordered    Allergies   1  Azithromycin TABS  2  Lisinopril TABS   3  No Known Environmental Allergies  4  No Known Food Allergies    Vitals   Recorded: 54LDN7437 11:11AM   Heart Rate 74    Respiration 18    Systolic 324    Diastolic 62    Patient Refused Height Yes Yes   Patient Refused Weight Yes Yes     Physical Exam    Constitutional   General appearance: No acute distress, well appearing and well nourished  Head and Face   Head and face: Normal     Eyes   Conjunctiva and lids: No swelling, erythema or discharge  Pupils and irises: Equal, round, reactive to light  Ears, Nose, Mouth, and Throat   External inspection of ears and nose: Normal     Otoscopic examination: Tympanic membranes translucent with normal light reflex  Canals patent without erythema  Hearing: Normal     Nasal mucosa, septum, and turbinates: Normal without edema or erythema  Lips, teeth, and gums: Normal, good dentition  Oropharynx: Normal with no erythema, edema, exudate or lesions  Neck   Neck: Supple, symmetric, trachea midline, no masses  Thyroid: Normal, no thyromegaly      Pulmonary   Respiratory effort: No increased work of breathing or signs of respiratory distress  Auscultation of lungs: Clear to auscultation  Cardiovascular   Auscultation of heart: Normal rate and rhythm, normal S1 and S2, no murmurs  Examination of extremities for edema and/or varicosities: Normal     Abdomen   Abdomen: Non-tender, no masses  Liver and spleen: No hepatomegaly or splenomegaly  Lymphatic   Palpation of lymph nodes in neck: No lymphadenopathy  Palpation of lymph nodes in axillae: No lymphadenopathy  Musculoskeletal   Gait and station: Normal     Digits and nails: Normal without clubbing or cyanosis  Joints, bones, and muscles: Normal     Range of motion: Normal     Stability: Normal     Muscle strength/tone: Normal     Skin   Skin and subcutaneous tissue: Normal without rashes or lesions  Palpation of skin and subcutaneous tissue: Normal turgor  Neurologic   Cranial nerves: Cranial nerves II-XII intact  Cortical function: Normal mental status  Reflexes: 2+ and symmetric  Sensation: No sensory loss  Coordination: Normal finger to nose and heel to shin  Psychiatric   Judgment and insight: Normal     Orientation to person, place, and time: Normal     Recent and remote memory: Intact  Mood and affect: Normal        Health Management  History of New onset type 2 diabetes mellitus   *VB - Eye Exam; every 2 years; Last 32Yar7779; Next Due: 59Zls3078; Overdue  Screening for breast cancer   Digital Bilateral Screening Mammogram With CAD; every 1 year; Next Due: 05VBA4809; Overdue  Screening for cervical cancer   (every) THINPREP TIS PAP RFX HPV; every 5 years; Next Due: 74LPE4771; Overdue  Screening for colorectal cancer   COLONOSCOPY; every 10 years; Last 83CVH3499; Next Due: 23RGP0691;  Active    Future Appointments    Date/Time Provider Specialty Site   03/19/2018 11:45 AM Nuzhat Hughes DO Family Medicine [facility]     Signatures   Electronically signed by : Ruy Jacinto DO; Khalif  3 2018 12:12PM EST                       (Author)

## 2018-02-14 DIAGNOSIS — M62.838 MUSCLE SPASM: Primary | ICD-10-CM

## 2018-02-14 RX ORDER — CYCLOBENZAPRINE HCL 10 MG
TABLET ORAL
Qty: 30 TABLET | Refills: 1 | Status: SHIPPED | OUTPATIENT
Start: 2018-02-14 | End: 2018-05-02 | Stop reason: SDUPTHER

## 2018-02-20 ENCOUNTER — TELEPHONE (OUTPATIENT)
Dept: FAMILY MEDICINE CLINIC | Facility: CLINIC | Age: 63
End: 2018-02-20

## 2018-03-01 DIAGNOSIS — E55.9 VITAMIN D DEFICIENCY: ICD-10-CM

## 2018-03-01 DIAGNOSIS — E11.9 TYPE 2 DIABETES MELLITUS WITHOUT COMPLICATIONS (HCC): ICD-10-CM

## 2018-03-01 DIAGNOSIS — I10 ESSENTIAL (PRIMARY) HYPERTENSION: ICD-10-CM

## 2018-03-01 DIAGNOSIS — E78.00 PURE HYPERCHOLESTEROLEMIA: ICD-10-CM

## 2018-03-02 DIAGNOSIS — E11.9 CONTROLLED TYPE 2 DIABETES MELLITUS WITHOUT COMPLICATION, WITHOUT LONG-TERM CURRENT USE OF INSULIN (HCC): Primary | ICD-10-CM

## 2018-03-02 RX ORDER — PIOGLITAZONEHYDROCHLORIDE 15 MG/1
TABLET ORAL
Qty: 90 TABLET | Refills: 3 | Status: SHIPPED | OUTPATIENT
Start: 2018-03-02 | End: 2018-04-12 | Stop reason: SDUPTHER

## 2018-03-07 NOTE — PROGRESS NOTES
History of Present Illness    Revaccination   Vaccine Information: Vaccine(s) Given (names): Pneumovax [id number]  Spoke with patient regarding vaccine out of temperature range and risks and benefits of revaccination  Action(s): Pt will be revaccinated  Appointment scheduled: [id number]  Other Information: Revaccination  [id number] Spoke with patient; [facility] to be done at next [address] [id number]  Revaccination Completed: [id number]  Active Problems    1  Acute knee pain (719 46) (M25 569)   2  Allergy to pollen (477 0) (J30 1)   3  Anxiety disorder (300 00) (F41 9)   4  Contusion (924 9) (T14 8)   5  Fibromyalgia (729 1) (M79 7)   6  Generalized osteoarthritis of multiple sites (715 09) (M15 9)   7  Hand pain, unspecified laterality   8  Hearing Loss (389 9)   9  Hypercholesterolemia (272 0) (E78 00)   10  Need for prophylactic vaccination against Streptococcus pneumoniae (pneumococcus)    (V03 82) (Z23)   11  Need for prophylactic vaccination and inoculation against influenza (V04 81) (Z23)   12  Need for revaccination (V05 9) (Z23)   13  New onset type 2 diabetes mellitus (250 00) (E11 9)   14  Oral thrush (112 0) (B37 0)   15  Other muscle spasm (728 85) (M62 838)   16  Pain and swelling of right lower leg (729 5,729 81) (M79 661,M79 89)   17  Psoriasis (696 1) (L40 9)   18  Psoriatic arthropathy (696 0) (L40 50)   19  Rheumatoid arthritis (714 0) (M06 9)   20  Screening for breast cancer (V76 10) (Z12 39)   21  Screening for cervical cancer (V76 2) (Z12 4)   22  Screening for colorectal cancer (V76 51) (Z12 11,Z12 12)   23  Squamous cell carcinoma of skin (173 92) (C44 92)   24  Vertigo (780 4) (R42)   25  Visit for pre-operative examination (V72 84) (Z01 818)   26  Vitamin D deficiency (268 9) (E55 9)    Immunizations  Influenza --- Mirtha Ridges: 79-Kdo-1035Nus Siad: 21-Bdz-1699Gygxu Kaiser: 21-Nov-2016; Series4:  01-Jan-2014   PPSV --- Mirtha Magana: 29-Oct-2015     Current Meds   1  ALPRAZolam 0 5 MG Oral Tablet;  Take one half to one tablet every 6-8 hours as needed   for anxiety   2  AmLODIPine Besylate 10 MG Oral Tablet; TAKE 1 TABLET DAILY   3  Aspir-81 81 MG Oral Tablet Delayed Release; TAKE 1 TABLET DAILY AS DIRECTED   4  Enbrel 25 MG KIT   5  Escitalopram Oxalate 10 MG Oral Tablet; TAKE 1 TABLET BY MOUTH EVERY DAY   6  HydroCHLOROthiazide 25 MG Oral Tablet; take 1 tablet by mouth every day   7  Hydrocodone-Acetaminophen 5-325 MG Oral Tablet; TAKE 1 TABLET EVERY 4 TO 6   HOURS AS NEEDED   8  Hydrocodone-Acetaminophen 5-325 MG Oral Tablet; Take 1 tablet twice daily   9  MetFORMIN HCl - 500 MG Oral Tablet; TAKE 1 TABLET BY MOUTH AT NIGHT FOR 1   WEEK, THEN 1 TAB TWICE A DAY FOR 1 WEEK,THEN 1 TAB IN THE MORNING AND 2   TABS IN THE EVENING FOR 1 WEEK   10  Methotrexate Sodium (PF) 25 MG/ML SOLN   11  Metoprolol Succinate  MG Oral Tablet Extended Release 24 Hour; take 1 tablet by    mouth once daily   12  OneTouch Ultra Blue In Vitro Strip; TEST ONCE DAILY   13  Simvastatin 10 MG Oral Tablet; TAKE 1 TABLET DAILY   14  Symbicort 160-4 5 MCG/ACT Inhalation Aerosol; INHALE 1 PUFF TWO TIMES A DAY   15  Ventolin  (90 Base) MCG/ACT Inhalation Aerosol Solution; INHALE 2 PUFFS    EVERY 4-6 HOURS AS NEEDED    Allergies    1  Azithromycin TABS    2  No Known Environmental Allergies   3   No Known Food Allergies    Education  Education Items with no Session   RVAC-Pneumovax 23 25 MCG/0 5ML Injection Injectable;  Provided: 81VLV3217 11:02AM;  Counselor: Darrius Mendoza;     Signatures   Electronically signed by : Praveen Marino DO; Dec 28 2016 11:25AM EST                       (Author)

## 2018-03-19 ENCOUNTER — APPOINTMENT (OUTPATIENT)
Dept: LAB | Facility: CLINIC | Age: 63
End: 2018-03-19
Payer: COMMERCIAL

## 2018-03-19 DIAGNOSIS — E11.9 TYPE 2 DIABETES MELLITUS WITHOUT COMPLICATIONS (HCC): ICD-10-CM

## 2018-03-19 DIAGNOSIS — I10 ESSENTIAL (PRIMARY) HYPERTENSION: ICD-10-CM

## 2018-03-19 DIAGNOSIS — E78.00 PURE HYPERCHOLESTEROLEMIA: ICD-10-CM

## 2018-03-19 DIAGNOSIS — E55.9 VITAMIN D DEFICIENCY: ICD-10-CM

## 2018-03-19 LAB
ALBUMIN SERPL BCP-MCNC: 3.6 G/DL (ref 3.5–5)
ALP SERPL-CCNC: 96 U/L (ref 46–116)
ALT SERPL W P-5'-P-CCNC: 30 U/L (ref 12–78)
ANION GAP SERPL CALCULATED.3IONS-SCNC: 6 MMOL/L (ref 4–13)
AST SERPL W P-5'-P-CCNC: 19 U/L (ref 5–45)
BILIRUB SERPL-MCNC: 0.27 MG/DL (ref 0.2–1)
BUN SERPL-MCNC: 13 MG/DL (ref 5–25)
CALCIUM SERPL-MCNC: 9.2 MG/DL (ref 8.3–10.1)
CHLORIDE SERPL-SCNC: 104 MMOL/L (ref 100–108)
CHOLEST SERPL-MCNC: 180 MG/DL (ref 50–200)
CO2 SERPL-SCNC: 30 MMOL/L (ref 21–32)
CREAT SERPL-MCNC: 0.5 MG/DL (ref 0.6–1.3)
CREAT UR-MCNC: 23.9 MG/DL
EST. AVERAGE GLUCOSE BLD GHB EST-MCNC: 140 MG/DL
GFR SERPL CREATININE-BSD FRML MDRD: 103 ML/MIN/1.73SQ M
GLUCOSE P FAST SERPL-MCNC: 113 MG/DL (ref 65–99)
HBA1C MFR BLD: 6.5 % (ref 4.2–6.3)
HDLC SERPL-MCNC: 45 MG/DL (ref 40–60)
LDLC SERPL CALC-MCNC: 99 MG/DL (ref 0–100)
MICROALBUMIN UR-MCNC: <5 MG/L (ref 0–20)
MICROALBUMIN/CREAT 24H UR: <21 MG/G CREATININE (ref 0–30)
POTASSIUM SERPL-SCNC: 3.8 MMOL/L (ref 3.5–5.3)
PROT SERPL-MCNC: 7.6 G/DL (ref 6.4–8.2)
SODIUM SERPL-SCNC: 140 MMOL/L (ref 136–145)
TRIGL SERPL-MCNC: 179 MG/DL
TSH SERPL DL<=0.05 MIU/L-ACNC: 2.17 UIU/ML (ref 0.36–3.74)

## 2018-03-19 PROCEDURE — 36415 COLL VENOUS BLD VENIPUNCTURE: CPT

## 2018-03-19 PROCEDURE — 82043 UR ALBUMIN QUANTITATIVE: CPT

## 2018-03-19 PROCEDURE — 82570 ASSAY OF URINE CREATININE: CPT

## 2018-03-19 PROCEDURE — 80053 COMPREHEN METABOLIC PANEL: CPT

## 2018-03-19 PROCEDURE — 80061 LIPID PANEL: CPT

## 2018-03-19 PROCEDURE — 83036 HEMOGLOBIN GLYCOSYLATED A1C: CPT

## 2018-03-19 PROCEDURE — 84443 ASSAY THYROID STIM HORMONE: CPT

## 2018-03-19 RX ORDER — METHOCARBAMOL 750 MG/1
1 TABLET, FILM COATED ORAL 3 TIMES DAILY
COMMUNITY
Start: 2017-10-02 | End: 2018-04-26 | Stop reason: ALTCHOICE

## 2018-03-19 RX ORDER — ERGOCALCIFEROL (VITAMIN D2) 1250 MCG
CAPSULE ORAL
COMMUNITY
Start: 2015-02-21 | End: 2018-05-03 | Stop reason: ALTCHOICE

## 2018-03-19 RX ORDER — HYDROCODONE BITARTRATE AND ACETAMINOPHEN 5; 325 MG/1; MG/1
TABLET ORAL
COMMUNITY
Start: 2015-07-03 | End: 2018-05-03 | Stop reason: ALTCHOICE

## 2018-03-19 RX ORDER — ALPRAZOLAM 0.5 MG/1
0.25 TABLET ORAL DAILY
COMMUNITY
End: 2019-12-16 | Stop reason: SDUPTHER

## 2018-03-19 RX ORDER — BUDESONIDE AND FORMOTEROL FUMARATE DIHYDRATE 160; 4.5 UG/1; UG/1
1 AEROSOL RESPIRATORY (INHALATION) 2 TIMES DAILY
COMMUNITY
Start: 2014-03-05 | End: 2018-07-06 | Stop reason: ALTCHOICE

## 2018-03-19 RX ORDER — ALBUTEROL SULFATE 90 UG/1
2 AEROSOL, METERED RESPIRATORY (INHALATION) EVERY 4 HOURS PRN
COMMUNITY
Start: 2015-05-04 | End: 2019-05-15 | Stop reason: SDUPTHER

## 2018-03-20 ENCOUNTER — HOSPITAL ENCOUNTER (OUTPATIENT)
Dept: RADIOLOGY | Facility: HOSPITAL | Age: 63
Discharge: HOME | End: 2018-03-20
Attending: ORTHOPAEDIC SURGERY
Payer: COMMERCIAL

## 2018-03-20 ENCOUNTER — OFFICE VISIT (OUTPATIENT)
Dept: OBGYN CLINIC | Facility: HOSPITAL | Age: 63
End: 2018-03-20
Payer: COMMERCIAL

## 2018-03-20 VITALS
WEIGHT: 197.4 LBS | HEART RATE: 89 BPM | BODY MASS INDEX: 36.33 KG/M2 | SYSTOLIC BLOOD PRESSURE: 160 MMHG | DIASTOLIC BLOOD PRESSURE: 96 MMHG | HEIGHT: 62 IN

## 2018-03-20 DIAGNOSIS — M17.12 PRIMARY OSTEOARTHRITIS OF LEFT KNEE: ICD-10-CM

## 2018-03-20 DIAGNOSIS — M25.562 LEFT KNEE PAIN, UNSPECIFIED CHRONICITY: Primary | ICD-10-CM

## 2018-03-20 DIAGNOSIS — M25.562 LEFT KNEE PAIN, UNSPECIFIED CHRONICITY: ICD-10-CM

## 2018-03-20 PROBLEM — G89.29 CHRONIC PAIN OF LEFT KNEE: Status: ACTIVE | Noted: 2018-03-20

## 2018-03-20 PROCEDURE — 73562 X-RAY EXAM OF KNEE 3: CPT

## 2018-03-20 PROCEDURE — 99203 OFFICE O/P NEW LOW 30 MIN: CPT | Performed by: ORTHOPAEDIC SURGERY

## 2018-03-20 NOTE — PROGRESS NOTES
57 y o female with long history of left knee problems presents for evaluation  She reports pain level left knee joint, the pain is made worse bearing weight, the pain increases with increased activities  She has had a variety of treatments including injection of corticosteroid, viscosupplementation, and even arthroscopic surgery last year  While in [place] in the talar 2017 she fell, landing on her left knee  Her knee symptoms have been worse since  Review of Systems  Review of systems negative unless otherwise specified in HPI    Past Medical History  Past Medical History:   Diagnosis Date    Anxiety     Arthritis     OA    Asthma     exercise iniduced      Cancer ([facility] [address] )     Squamous Cell on back    Dependence on crutches     prn    Depression     Diabetes mellitus (HCC)     NIDDM; per Allscripts: Last Assessed: 7/15/15, New onset of type 2    Fibromyalgia     Hearing aid worn     mayra    Heart murmur     Hyperglycemia     Last Assessed:10/29/15    Hyperlipidemia     Hypertension     Psoriasis     red dry patch left side- waist area    Psoriatic arthritis ([facility] [address] )     RA (rheumatoid arthritis) ([facility] [address] )     Wears glasses     reading       Past Surgical History  Past Surgical History:   Procedure Laterality Date    ARTHROSCOPY KNEE Left 3/2/2016    Procedure: ARTHROSCOPY KNEE;  Surgeon: April Buckley MD;  Location: [facility];  Service:      SECTION      X2    CYST REMOVAL Right     Cyst on bone     FRACTURE SURGERY      (Right) tibia/fibula Fx, and ankle    PILONIDAL CYST / SINUS EXCISION      MS KNEE SCOPE,MED/LAT MENISECTOMY Left 3/2/2016    Procedure: PARTIAL MEDIAL &  LATERAL MENISCECTOMIES, CHONDROPLASTY, REMOVAL OSTEOPHYTE, LIMITED SYNOVECTOMY;  Surgeon: April Buckley MD;  Location: [facility];  Service: Orthopedics   Florian [address] EXTRACTION         Current Medications  Current Outpatient Prescriptions on File Prior to Visit   Medication Sig Dispense Refill    amLODIPine (NORVASC) 10 mg tablet Take 10 mg by mouth daily   aspirin 81 mg chewable tablet Chew 81 mg daily   cyclobenzaprine (FLEXERIL) 10 mg tablet TAKE 1 TABLET AT BEDTIME 30 tablet 1    folic acid (FOLVITE) 1 mg tablet Take 1 mg by mouth daily   hydrochlorothiazide (HYDRODIURIL) 25 mg tablet Take 25 mg by mouth daily   losartan (COZAAR) 50 mg tablet Take 50 mg by mouth daily      metFORMIN (GLUCOPHAGE) 500 mg tablet Take 500 mg by mouth 2 (two) times a day with meals   Methotrexate, PF, 10 MG/0 4ML SOAJ Inject 10 mg under the skin once a week   metoprolol succinate (TOPROL-XL) 100 mg 24 hr tablet Take 100 mg by mouth daily   oxyCODONE-acetaminophen (PERCOCET) 5-325 mg per tablet Take 1 tablet by mouth every 8 (eight) hours as needed for moderate pain      pioglitazone (ACTOS) 15 mg tablet take 1 tablet by mouth once daily 90 tablet 3    simvastatin (ZOCOR) 10 mg tablet Take 20 mg by mouth daily   escitalopram (LEXAPRO) 5 mg tablet Take 5 mg by mouth daily   etanercept (ENBREL) 25 MG Inject 25 mg under the skin 2 (two) times a week  No current facility-administered medications on file prior to visit  Recent Labs [facility])    0  Lab Value Date/Time   HCT 38 3 11/25/2017 0323   HCT 41 2 09/14/2015 1126   HGB 12 5 11/25/2017 0323   HGB 14 1 09/14/2015 1126   WBC 5 93 11/25/2017 0323   WBC 5 72 09/14/2015 1126   INR 0 91 06/28/2016 1705   ESR 18 03/20/2017 0747   ESR 17 09/14/2015 1126   CRP 5 4 (H) 03/20/2017 0747   CRP 4 5 (H) 09/14/2015 1126   GLUCOSE 222 (H) 11/25/2017 0331   GLUCOSE 100 09/14/2015 1126   HGBA1C 6 5 (H) 03/19/2018 0741   HGBA1C 6 4 (H) 10/27/2015 0727         Physical exam  · General: Awake, Alert, Oriented  · Eyes: Pupils equal, round and reactive to light  · Heart: regular rate and rhythm  · Lungs: No audible wheezing  · Abdomen: soft  Gait pattern has antalgia  Left hip moves well    Left thighs devoid of atrophy left knee is in varus  There is no effusion  Arthroscopic portals clean dry and healed  Extension is full flexion is greater than 100°  There is no mid flexion valgus instability  There is bony enlargement and tenderness medially  There is crepitation flexion extension  There is no palpable warmth of the synovium  Calf compartments are soft and supple  Toes are warm, sensate    Imaging  , mobile  Standing x-rays of the left knee show medial and patellofemoral compartment arthritis    Procedure  None indicated or performed today    Assessment/Plan:   57 y  o female who has osteoarthritis left knee that remained symptomatic  She has predictable pain and dysfunction  She has tried a comprehensive nonsurgical treatment plan, continues to have symptoms  At this point time, elective left total knee replacement surgery is indicated  After review of the risks and benefits, consent was that the above-mentioned surgery  No guarantees given    I would welcome the opportunity see back in the office a postoperative patient

## 2018-03-21 DIAGNOSIS — I10 ESSENTIAL HYPERTENSION: Primary | ICD-10-CM

## 2018-03-21 RX ORDER — HYDROCHLOROTHIAZIDE 25 MG/1
TABLET ORAL
Qty: 90 TABLET | Refills: 2 | Status: SHIPPED | OUTPATIENT
Start: 2018-03-21 | End: 2018-12-11 | Stop reason: SDUPTHER

## 2018-04-06 LAB
LEFT EYE DIABETIC RETINOPATHY: NORMAL
RIGHT EYE DIABETIC RETINOPATHY: NORMAL

## 2018-04-12 DIAGNOSIS — E11.9 CONTROLLED TYPE 2 DIABETES MELLITUS WITHOUT COMPLICATION, WITHOUT LONG-TERM CURRENT USE OF INSULIN (HCC): ICD-10-CM

## 2018-04-12 RX ORDER — METFORMIN HYDROCHLORIDE 500 MG/1
TABLET, EXTENDED RELEASE ORAL
Refills: 0 | COMMUNITY
Start: 2018-03-25 | End: 2018-06-28 | Stop reason: SDUPTHER

## 2018-04-12 RX ORDER — PIOGLITAZONEHYDROCHLORIDE 15 MG/1
15 TABLET ORAL DAILY
Qty: 90 TABLET | Refills: 2 | Status: SHIPPED | OUTPATIENT
Start: 2018-04-12 | End: 2019-04-01 | Stop reason: SDUPTHER

## 2018-04-26 ENCOUNTER — OFFICE VISIT (OUTPATIENT)
Dept: OBGYN CLINIC | Facility: CLINIC | Age: 63
End: 2018-04-26
Payer: COMMERCIAL

## 2018-04-26 VITALS — WEIGHT: 201 LBS | BODY MASS INDEX: 36.76 KG/M2 | DIASTOLIC BLOOD PRESSURE: 80 MMHG | SYSTOLIC BLOOD PRESSURE: 124 MMHG

## 2018-04-26 DIAGNOSIS — Z12.31 ENCOUNTER FOR SCREENING MAMMOGRAM FOR MALIGNANT NEOPLASM OF BREAST: Primary | ICD-10-CM

## 2018-04-26 PROCEDURE — S0610 ANNUAL GYNECOLOGICAL EXAMINA: HCPCS | Performed by: OBSTETRICS & GYNECOLOGY

## 2018-04-26 NOTE — PROGRESS NOTES
Assessment/Plan:    Call with any concerns  Encounter for screening mammogram for malignant neoplasm of breast  -     Mammo screening bilateral w 3d & cad; Future        Subjective:      Patient ID: Sydney Osler is a 57 y o  female  54yo here for yearly exam   Was patient of Dr Lynda Pritchard  , two daughters, one grandchild - they just moved to [facility] for [address] and will be there 3-5 years  Sexually active, does suffer from vaginal dryness - discussed coconut oil as lubricant  Has diagnostic mammo, next return to routine screening  History of vaginitis - occ  Uses Boric Acid, and takes acidophilus daily - doing well with this  The following portions of the patient's history were reviewed and updated as appropriate: allergies, current medications, past family history, past medical history, past social history, past surgical history and problem list     Review of Systems   Gastrointestinal: Negative for abdominal pain, constipation and diarrhea  Genitourinary: Negative for decreased urine volume, difficulty urinating, dyspareunia, pelvic pain, vaginal bleeding, vaginal discharge and vaginal pain  Objective:      /80 (BP Location: Right arm, Patient Position: Sitting, Cuff Size: Standard)   Wt 91 2 kg (201 lb)   BMI 36 76 kg/m²          Physical Exam   Constitutional: She is oriented to person, place, and time  She appears well-developed and well-nourished  Pulmonary/Chest: Effort normal  No respiratory distress  Abdominal: Soft  She exhibits no distension  There is no tenderness  Genitourinary: Vagina normal  There is no rash or lesion on the right labia  There is no rash or lesion on the left labia  Uterus is not enlarged and not tender  Cervix exhibits no motion tenderness  Right adnexum displays no mass and no fullness  Left adnexum displays no mass and no fullness  No vaginal discharge found  Musculoskeletal: She exhibits no edema  Neurological: She is alert and oriented to person, place, and time  Skin: Skin is warm and dry  Psychiatric: She has a normal mood and affect  Vitals reviewed

## 2018-05-02 DIAGNOSIS — M62.838 MUSCLE SPASM: ICD-10-CM

## 2018-05-02 RX ORDER — CYCLOBENZAPRINE HCL 10 MG
10 TABLET ORAL
Qty: 30 TABLET | Refills: 0 | Status: SHIPPED | OUTPATIENT
Start: 2018-05-02 | End: 2018-08-22 | Stop reason: SDUPTHER

## 2018-05-03 ENCOUNTER — APPOINTMENT (OUTPATIENT)
Dept: LAB | Facility: HOSPITAL | Age: 63
End: 2018-05-03
Attending: ORTHOPAEDIC SURGERY
Payer: COMMERCIAL

## 2018-05-03 ENCOUNTER — HOSPITAL ENCOUNTER (OUTPATIENT)
Dept: RADIOLOGY | Facility: HOSPITAL | Age: 63
Discharge: HOME | End: 2018-05-03
Payer: COMMERCIAL

## 2018-05-03 DIAGNOSIS — M25.562 LEFT KNEE PAIN, UNSPECIFIED CHRONICITY: ICD-10-CM

## 2018-05-03 DIAGNOSIS — M17.12 OSTEOARTHRITIS OF LEFT KNEE, UNSPECIFIED OSTEOARTHRITIS TYPE: ICD-10-CM

## 2018-05-03 DIAGNOSIS — M17.12 PRIMARY OSTEOARTHRITIS OF LEFT KNEE: ICD-10-CM

## 2018-05-03 LAB
ABO GROUP BLD: NORMAL
ALBUMIN SERPL BCP-MCNC: 4 G/DL (ref 3.5–5)
ALP SERPL-CCNC: 108 U/L (ref 46–116)
ALT SERPL W P-5'-P-CCNC: 44 U/L (ref 12–78)
ANION GAP SERPL CALCULATED.3IONS-SCNC: 7 MMOL/L (ref 4–13)
APTT PPP: 30 SECONDS (ref 23–35)
AST SERPL W P-5'-P-CCNC: 27 U/L (ref 5–45)
BILIRUB SERPL-MCNC: 0.25 MG/DL (ref 0.2–1)
BILIRUB UR QL STRIP: NEGATIVE
BLD GP AB SCN SERPL QL: NEGATIVE
BUN SERPL-MCNC: 12 MG/DL (ref 5–25)
CALCIUM SERPL-MCNC: 9.3 MG/DL (ref 8.3–10.1)
CHLORIDE SERPL-SCNC: 99 MMOL/L (ref 100–108)
CLARITY UR: CLEAR
CO2 SERPL-SCNC: 30 MMOL/L (ref 21–32)
COLOR UR: YELLOW
CREAT SERPL-MCNC: 0.58 MG/DL (ref 0.6–1.3)
ERYTHROCYTE [DISTWIDTH] IN BLOOD BY AUTOMATED COUNT: 13.9 % (ref 11.6–15.1)
EST. AVERAGE GLUCOSE BLD GHB EST-MCNC: 151 MG/DL
GFR SERPL CREATININE-BSD FRML MDRD: 98 ML/MIN/1.73SQ M
GLUCOSE SERPL-MCNC: 124 MG/DL (ref 65–140)
GLUCOSE UR STRIP-MCNC: NEGATIVE MG/DL
HBA1C MFR BLD: 6.9 % (ref 4.2–6.3)
HCT VFR BLD AUTO: 42.4 % (ref 34.8–46.1)
HGB BLD-MCNC: 14.4 G/DL (ref 11.5–15.4)
HGB UR QL STRIP.AUTO: NEGATIVE
INR PPP: 0.99 (ref 0.86–1.16)
KETONES UR STRIP-MCNC: NEGATIVE MG/DL
LEUKOCYTE ESTERASE UR QL STRIP: NEGATIVE
MCH RBC QN AUTO: 30.9 PG (ref 26.8–34.3)
MCHC RBC AUTO-ENTMCNC: 34 G/DL (ref 31.4–37.4)
MCV RBC AUTO: 91 FL (ref 82–98)
NITRITE UR QL STRIP: NEGATIVE
PH UR STRIP.AUTO: 6.5 [PH] (ref 4.5–8)
PLATELET # BLD AUTO: 197 THOUSANDS/UL (ref 149–390)
PMV BLD AUTO: 11 FL (ref 8.9–12.7)
POTASSIUM SERPL-SCNC: 3.8 MMOL/L (ref 3.5–5.3)
PROT SERPL-MCNC: 8.3 G/DL (ref 6.4–8.2)
PROT UR STRIP-MCNC: NEGATIVE MG/DL
PROTHROMBIN TIME: 13.1 SECONDS (ref 12.1–14.4)
RBC # BLD AUTO: 4.66 MILLION/UL (ref 3.81–5.12)
RH BLD: POSITIVE
SODIUM SERPL-SCNC: 136 MMOL/L (ref 136–145)
SP GR UR STRIP.AUTO: 1.01 (ref 1–1.03)
SPECIMEN EXPIRATION DATE: NORMAL
UROBILINOGEN UR QL STRIP.AUTO: 0.2 E.U./DL
WBC # BLD AUTO: 7.95 THOUSAND/UL (ref 4.31–10.16)

## 2018-05-03 PROCEDURE — 36415 COLL VENOUS BLD VENIPUNCTURE: CPT

## 2018-05-03 PROCEDURE — 83036 HEMOGLOBIN GLYCOSYLATED A1C: CPT

## 2018-05-03 PROCEDURE — 80053 COMPREHEN METABOLIC PANEL: CPT

## 2018-05-03 PROCEDURE — 85027 COMPLETE CBC AUTOMATED: CPT

## 2018-05-03 PROCEDURE — 85730 THROMBOPLASTIN TIME PARTIAL: CPT

## 2018-05-03 PROCEDURE — 86900 BLOOD TYPING SEROLOGIC ABO: CPT

## 2018-05-03 PROCEDURE — 86850 RBC ANTIBODY SCREEN: CPT

## 2018-05-03 PROCEDURE — 71046 X-RAY EXAM CHEST 2 VIEWS: CPT

## 2018-05-03 PROCEDURE — 86901 BLOOD TYPING SEROLOGIC RH(D): CPT

## 2018-05-03 PROCEDURE — 81003 URINALYSIS AUTO W/O SCOPE: CPT

## 2018-05-03 PROCEDURE — 85610 PROTHROMBIN TIME: CPT

## 2018-05-03 PROCEDURE — 93005 ELECTROCARDIOGRAM TRACING: CPT

## 2018-05-04 LAB
ATRIAL RATE: 80 BPM
P AXIS: 60 DEGREES
PR INTERVAL: 150 MS
QRS AXIS: 13 DEGREES
QRSD INTERVAL: 92 MS
QT INTERVAL: 394 MS
QTC INTERVAL: 454 MS
T WAVE AXIS: 32 DEGREES
VENTRICULAR RATE: 80 BPM

## 2018-05-04 PROCEDURE — 93010 ELECTROCARDIOGRAM REPORT: CPT | Performed by: INTERNAL MEDICINE

## 2018-05-18 PROBLEM — R07.9 CHEST PAIN: Status: RESOLVED | Noted: 2017-11-24 | Resolved: 2018-05-18

## 2018-05-21 ENCOUNTER — OFFICE VISIT (OUTPATIENT)
Dept: FAMILY MEDICINE CLINIC | Facility: CLINIC | Age: 63
End: 2018-05-21
Payer: COMMERCIAL

## 2018-05-21 VITALS — HEART RATE: 84 BPM | DIASTOLIC BLOOD PRESSURE: 60 MMHG | RESPIRATION RATE: 18 BRPM | SYSTOLIC BLOOD PRESSURE: 122 MMHG

## 2018-05-21 DIAGNOSIS — I10 BENIGN ESSENTIAL HYPERTENSION: ICD-10-CM

## 2018-05-21 DIAGNOSIS — M17.12 PRIMARY OSTEOARTHRITIS OF LEFT KNEE: ICD-10-CM

## 2018-05-21 DIAGNOSIS — E11.9 CONTROLLED TYPE 2 DIABETES MELLITUS WITHOUT COMPLICATION, WITHOUT LONG-TERM CURRENT USE OF INSULIN (HCC): ICD-10-CM

## 2018-05-21 DIAGNOSIS — I10 ESSENTIAL HYPERTENSION: Primary | ICD-10-CM

## 2018-05-21 DIAGNOSIS — E55.9 VITAMIN D DEFICIENCY: ICD-10-CM

## 2018-05-21 DIAGNOSIS — Z01.818 PRE-OP EXAMINATION: Primary | ICD-10-CM

## 2018-05-21 PROCEDURE — 99243 OFF/OP CNSLTJ NEW/EST LOW 30: CPT | Performed by: FAMILY MEDICINE

## 2018-05-21 PROCEDURE — 4010F ACE/ARB THERAPY RXD/TAKEN: CPT | Performed by: FAMILY MEDICINE

## 2018-05-21 RX ORDER — LOSARTAN POTASSIUM 50 MG/1
TABLET ORAL
Qty: 90 TABLET | Refills: 3 | Status: SHIPPED | OUTPATIENT
Start: 2018-05-21 | End: 2019-05-18 | Stop reason: SDUPTHER

## 2018-05-21 NOTE — PROGRESS NOTES
[facility] PRACTICE PRE-OPERATIVE EVALUATION  [facility] PHYSICIAN [facility] - [address]    NAME: Astrid Hedrick  AGE: 57 y o  SEX: female  : 1955     DATE: 2018    Family Practice Pre-Operative Evaluation      Chief Complaint: Pre-operative Evaluation     Surgery: left tkr  Anticipated Date of Surgery: 18  Referring Provider: Maite ref  provider found       History of Present Illness:     Astrid Hedrick is a 57 y o  female who presents to the office today for a preoperative consultation at the request of surgeon, Ammon Garza, who plans on performing [place] on 18  Planned anesthesia is general  Patient has a bleeding risk of: no recent abnormal bleeding  Patient does not have objections to receiving blood products if needed  Current anti-platelet/anti-coagulation medications that the patient is prescribed includes: Lovenox  Post op     Assessment of Chronic Conditions:   - Diabetes Mellitus: well controlled      Assessment of Cardiac Risk:  · Denies unstable or severe angina or MI in the last 6 weeks or history of stent placement in the last year   · Denies decompensated heart failure (e g  New onset heart failure, NYHA functional class IV heart failure, or worsening existing heart failure)  · Denies significant arrhythmias such as high grade AV block, symptomatic ventricular arrhythmia, newly recognized ventricular tachycardia, supraventricular tachycardia with resting heart rate >100, or symptomatic bradycardia  · Denies severe heart valve disease including aortic stenosis or symptomatic mitral stenosis     Exercise Capacity:  · Able to walk 4 blocks without symptoms?: Yes  · Able to walk 2 flights without symptoms?: Yes    Prior Anesthesia Reactions: Yes     Personal history of venous thromboembolic disease? No    History of steroid use for >2 weeks within last year? No         Review of Systems:     Review of Systems   Constitutional: Negative  HENT: Negative  Eyes: Negative  Respiratory: Negative  Cardiovascular: Negative  Gastrointestinal: Negative  Endocrine: Negative  Genitourinary: Negative  Musculoskeletal: Positive for joint swelling (knee pain left)  Skin: Negative  Allergic/Immunologic: Negative  Neurological: Negative  Hematological: Negative  Psychiatric/Behavioral: Negative  Current Problem List:     Patient Active Problem List   Diagnosis    Hypercholesterolemia    Fibromyalgia    [facility] arthropathy [facility] [address] [facility]    Rheumatoid arthritis ([facility] [address] [facility]    Chronic pain of left knee    Primary osteoarthritis of left knee    Allergy to pollen    Anxiety disorder    Benign essential hypertension    Controlled diabetes mellitus type II without complication (HCC)    Generalized osteoarthritis of multiple sites    Hearing loss    Psoriasis    Squamous cell carcinoma of skin    Vertigo    Vitamin D deficiency    Pre-op examination       Allergies: Allergies   Allergen Reactions    Zithromax [Azithromycin] Hives    Lisinopril Cough       Current Medications:       Current Outpatient Prescriptions:     albuterol (VENTOLIN HFA) 90 mcg/act inhaler, Inhale 2 puffs, Disp: , Rfl:     ALPRAZolam (XANAX) 0 5 mg tablet, Take by mouth, Disp: , Rfl:     amLODIPine (NORVASC) 10 mg tablet, Take 10 mg by mouth daily  , Disp: , Rfl:     ascorbic acid (VITAMIN C) 500 mg tablet, Take 500 mg by mouth daily, Disp: , Rfl:     aspirin 81 mg chewable tablet, Chew 81 mg daily  , Disp: , Rfl:     budesonide-formoterol (SYMBICORT) 160-4 5 mcg/act inhaler, Inhale 1 puff 2 (two) times a day, Disp: , Rfl:     cyclobenzaprine (FLEXERIL) 10 mg tablet, Take 1 tablet (10 mg total) by mouth daily at bedtime for 30 days, Disp: 30 tablet, Rfl: 0    escitalopram (LEXAPRO) 5 mg tablet, Take 5 mg by mouth daily  , Disp: , Rfl:     etanercept (ENBREL) 25 MG, Inject 50 mg under the skin once a week Tuesday , Disp: , Rfl:     ferrous sulfate 325 (65 FE) MG EC tablet, Take 325 mg by mouth 3 (three) times a day with meals, Disp: , Rfl:     folic acid (FOLVITE) 1 mg tablet, Take 1 mg by mouth daily  , Disp: , Rfl:     hydrochlorothiazide (HYDRODIURIL) 25 mg tablet, take 1 tablet by mouth once daily, Disp: 90 tablet, Rfl: 2    losartan (COZAAR) 50 mg tablet, take 1 tablet by mouth once daily as directed, Disp: 90 tablet, Rfl: 3    metFORMIN (GLUCOPHAGE-XR) 500 mg 24 hr tablet, , Disp: , Rfl: 0    Methotrexate, PF, 10 MG/0 4ML SOAJ, Inject 10 mg under the skin once a week Tuesday , Disp: , Rfl:     metoprolol succinate (TOPROL-XL) 100 mg 24 hr tablet, Take 100 mg by mouth daily  , Disp: , Rfl:     oxyCODONE-acetaminophen (PERCOCET) 5-325 mg per tablet, Take 1 tablet by mouth every 8 (eight) hours as needed for moderate pain, Disp: , Rfl:     pioglitazone (ACTOS) 15 mg tablet, Take 1 tablet (15 mg total) by mouth daily, Disp: 90 tablet, Rfl: 2    simvastatin (ZOCOR) 10 mg tablet, Take 20 mg by mouth daily  , Disp: , Rfl:     Past Medical History:       Past Medical History:   Diagnosis Date    Arthritis     OA    Asthma     exercise iniduced   Cancer (HCC)     Squamous Cell on back    Dependence on crutches     prn    Depression     ro       Diabetes mellitus ([address] )     NIDDM; per Allscripts: Last Assessed: 7/15/15, New onset of type 2    Fibromyalgia     Hearing aid worn     mayra    Heart murmur     Hyperglycemia     Last Assessed:10/29/15    Hyperlipidemia     Hypertension     Psoriasis     red dry patch left side- waist area    Psoriatic arthritis ([address] )     RA (rheumatoid arthritis) ([address] )     Wears glasses     reading        Past Surgical History:   Procedure Laterality Date    ARTHROSCOPY KNEE Left 3/2/2016    Procedure: ARTHROSCOPY KNEE;  Surgeon: Marisol Heck MD;  [facility];  Service:      SECTION      X2    CYST REMOVAL Right     Cyst on bone     FRACTURE SURGERY      (Right) tibia/fibula Fx, and ankle    PILONIDAL CYST / SINUS EXCISION      MN KNEE SCOPE,MED/LAT MENISECTOMY Left 3/2/2016    Procedure: PARTIAL MEDIAL &  LATERAL MENISCECTOMIES, CHONDROPLASTY, REMOVAL OSTEOPHYTE, LIMITED SYNOVECTOMY;  Surgeon: Haley Bloom MD;  [facility];  Service: Orthopedics    REPAIR KNEE LIGAMENT      TOOTH EXTRACTION          Family History   Problem Relation Age of Onset    Hypertension Mother     Heart attack Mother     Osteoarthritis Mother     Alcohol abuse Father     Hypertension Brother     Diabetes Brother     Other Brother      Dyslipidemia    Coronary artery disease Brother         Social History     Social History    Marital status: /Civil Union     Spouse name: N/A    Number of children: N/A    Years of education: N/A     Occupational History    Not on file  Social History Main Topics    Smoking status: Former Smoker     Packs/day: 2 00     Quit date: 1996    Smokeless tobacco: Never Used    Alcohol use No    Drug use: No    Sexual activity: Yes     Partners: Male     Birth control/ protection: Post-menopausal     Other Topics Concern    Not on file     Social History Narrative    No narrative on file        Physical Exam:     /60   Pulse 84   Resp 18     Physical Exam   Constitutional: She is oriented to person, place, and time  Vital signs are normal  She appears well-developed and well-nourished  HENT:   Head: Normocephalic and atraumatic  Nose: Nose normal    Mouth/Throat: Oropharynx is clear and moist    Eyes: Conjunctivae, EOM and lids are normal  Pupils are equal, round, and reactive to light  Neck: Normal range of motion  Neck supple  Cardiovascular: Normal rate, regular rhythm, S1 normal, S2 normal, normal heart sounds and intact distal pulses  Pulmonary/Chest: Effort normal and breath sounds normal    Abdominal: Soft  Bowel sounds are normal    Musculoskeletal: Normal range of motion  Neurological: She is alert and oriented to person, place, and time   She has normal strength and normal reflexes  Skin: Skin is warm, dry and intact  Psychiatric: She has a normal mood and affect  Her speech is normal and behavior is normal  Judgment and thought content normal  Cognition and memory are normal    Nursing note and vitals reviewed  Data:     Pre-operative work-up    Laboratory Results: I have personally reviewed the pertinent laboratory results/reports    a1c slightly increased  EKG: I have personally reviewed pertinent reports  Chest x-ray: I have personally reviewed pertinent reports  Previous cardiopulmonary studies within the past year:  · Echocardiogram: n/a  · Cardiac Catheterization: n/a  · Stress Test: n/a  · Pulmonary Function Testing: n/a      Assessment & Recommendations:     1  Pre-op examination     2  Vitamin D deficiency  Vitamin D 25 hydroxy   3  Controlled type 2 diabetes mellitus without complication, without long-term current use of insulin (HCC)  Comprehensive metabolic panel    HEMOGLOBIN A1C W/ EAG ESTIMATION    Lipid Panel with Direct LDL reflex   4  Benign essential hypertension  Lipid Panel with Direct LDL reflex    TSH, 3rd generation with T4 reflex   5  Primary osteoarthritis of left knee         Pre-Op Evaluation Assessment  57 y o  female with planned surgery: tkr  Known risk factors for perioperative complications: Diabetes mellitus  Current medications which may produce withdrawal symptoms if withheld perioperatively: pain meds which she is already decreasing  Pre-Op Evaluation Plan  1  Further preoperative workup as follows:   - None; no further preoperative work-up is required    2  Medication Management/Recommendations:   - Hold metformin the morning of surgery and do not resume until 48 hours AFTER surgery to avoid risk of lactic acidosis  Do not resume if eGFR is < 30    3  Prophylaxis for cardiac events with perioperative beta-blockers: not indicated      4  Patient requires further consultation with: None    Clearance  Patient is CLEARED for surgery without any additional cardiac testing       Lilian Darby DO  [address] [place] [address]  Phone#  [phone]  Fax#  [phone]

## 2018-06-04 ENCOUNTER — TELEPHONE (OUTPATIENT)
Dept: OBGYN CLINIC | Facility: HOSPITAL | Age: 63
End: 2018-06-04

## 2018-06-04 NOTE — TELEPHONE ENCOUNTER
Called patient, she went to PAT class & felt it was not the right time  She will call me back when she feels it is the right time and she will be ready to have the surgery with Dr Leatha Marinelli  I will cancel the surgery and all upcoming appointments

## 2018-06-04 NOTE — TELEPHONE ENCOUNTER
Patient is calling   [phone]  Patient sees Dr Risa Cai    Patient is calling to state that she does not want to be on the schedule at this point for surgery  Patient just doesn't feel that it's time  Patient will call when she wants to proceed

## 2018-06-08 DIAGNOSIS — E11.9 TYPE 2 DIABETES MELLITUS WITHOUT COMPLICATION, WITHOUT LONG-TERM CURRENT USE OF INSULIN (HCC): Primary | ICD-10-CM

## 2018-06-15 ENCOUNTER — TELEPHONE (OUTPATIENT)
Dept: FAMILY MEDICINE CLINIC | Facility: CLINIC | Age: 63
End: 2018-06-15

## 2018-06-15 NOTE — TELEPHONE ENCOUNTER
Ashok Simon - I spoke with Ravi Camara  Her daughter, son-in-law, and grandson recently moved to [place], [place]  He became ill with diarrhea and vomiting  Ravi Camara states that they took him to an ER locally, and it was attributed to "a throat infection "  Antibiotics started  The diarrhea worsened  They then went to a local, highly recommended Pediatrician, and they were told to stop antibiotics  They were worried, that although the diarrhea has lessened, and he is taking in some food / drink by mouth (he is not touching Pedialyte), he is still sleeping a lot, not as playful, etc   I told kaci that the antibiotics are not really necessary  They could add a little juice to the Pedialyte, and make it cold to improve flavor, or, if possible / available, try Pedialyte popsicles  I told her that they should not give him Pepto Bismol (the ASA moiety in it) or Imodium  They are keep food on the blander / lighter side, and to monitor his urine output closely -> not soaking diapers, etc, they need to likely return to the ER for IVF        Thanks,    Rowland Primrose

## 2018-06-28 DIAGNOSIS — E11.9 CONTROLLED TYPE 2 DIABETES MELLITUS WITHOUT COMPLICATION, WITHOUT LONG-TERM CURRENT USE OF INSULIN (HCC): Primary | ICD-10-CM

## 2018-06-28 RX ORDER — METFORMIN HYDROCHLORIDE 500 MG/1
TABLET, EXTENDED RELEASE ORAL
Qty: 180 TABLET | Refills: 3 | Status: SHIPPED | OUTPATIENT
Start: 2018-06-28 | End: 2019-09-30 | Stop reason: SDUPTHER

## 2018-07-06 ENCOUNTER — OFFICE VISIT (OUTPATIENT)
Dept: URGENT CARE | Age: 63
End: 2018-07-06
Payer: COMMERCIAL

## 2018-07-06 VITALS
SYSTOLIC BLOOD PRESSURE: 136 MMHG | DIASTOLIC BLOOD PRESSURE: 81 MMHG | HEART RATE: 64 BPM | HEIGHT: 63 IN | TEMPERATURE: 98.6 F | WEIGHT: 198.8 LBS | BODY MASS INDEX: 35.22 KG/M2 | RESPIRATION RATE: 18 BRPM | OXYGEN SATURATION: 97 %

## 2018-07-06 DIAGNOSIS — S61.211A LACERATION OF LEFT INDEX FINGER WITHOUT FOREIGN BODY WITHOUT DAMAGE TO NAIL, INITIAL ENCOUNTER: Primary | ICD-10-CM

## 2018-07-06 PROCEDURE — 12001 RPR S/N/AX/GEN/TRNK 2.5CM/<: CPT | Performed by: FAMILY MEDICINE

## 2018-07-06 PROCEDURE — 90715 TDAP VACCINE 7 YRS/> IM: CPT

## 2018-07-06 PROCEDURE — 99213 OFFICE O/P EST LOW 20 MIN: CPT | Performed by: FAMILY MEDICINE

## 2018-07-06 PROCEDURE — 90471 IMMUNIZATION ADMIN: CPT | Performed by: PHYSICIAN ASSISTANT

## 2018-07-06 RX ORDER — CEPHALEXIN 500 MG/1
500 CAPSULE ORAL EVERY 12 HOURS SCHEDULED
Qty: 14 CAPSULE | Refills: 0 | Status: SHIPPED | OUTPATIENT
Start: 2018-07-06 | End: 2018-07-13

## 2018-07-06 NOTE — PROGRESS NOTES
[address] [facility]        NAME: Kaylie Ryder is a 57 y o  female  : 1955    MRN: [id number]  DATE: 2018  TIME: 4:00 PM    Assessment and Plan   Laceration of left index finger without foreign body without damage to nail, initial encounter [S61 211A]  1  Laceration of left index finger without foreign body without damage to nail, initial encounter  cephalexin (KEFLEX) 500 mg capsule    Laceration repair    CANCELED: TDAP Vaccine greater than or equal to 8yo     TDAP was offered BUT NOT ADMINISTERED AT THIS OFFICE VISIT  States she wants to go home to check her records  She would prefer to return tomorrow for the shot if her records neccesitate  I informed her of the risks of tetanus  Instructions: If patient returns needing a TDAP, it would be appropriate to re-order and administer  Patient Instructions     Take Keflex as prescribed  Dermabond will slough off naturally in 5-10 days  Do not swim, scrub, soak or expose area to prolonged wetness until film has sloughed   Do not apply topical antibiotic ointment over dermabond  Watch area for signs of infection  Follow up with PCP in 3-5 days  Go to ED if symptoms worsen      Chief Complaint     Chief Complaint   Patient presents with    Laceration     Cut tip of R index finger today on  knife - continues to bleed  Takes ASA 81 mg daily  Unsure of last Td  Is R hand dominant  History of Present Illness       States she washed the area with cold water without soap  Unsure of last tetanus states she might have received the vaccine when her granddaughter was born last year  Laceration    The incident occurred 1 to 3 hours ago  Pain location: Left 2nd finger  The laceration mechanism was a clean knife (cutting sausage)  The pain is at a severity of 2/10  She reports no foreign bodies present  Her tetanus status is unknown         Review of Systems   Review of Systems   Constitutional: Negative for activity change, appetite change, chills and fever  Skin: Positive for wound  Neurological: Negative for dizziness, light-headedness and headaches  Current Medications       Current Outpatient Prescriptions:     albuterol (VENTOLIN HFA) 90 mcg/act inhaler, Inhale 2 puffs every 4 (four) hours as needed  , Disp: , Rfl:     ALPRAZolam (XANAX) 0 5 mg tablet, Take 0 25 mg by mouth daily  , Disp: , Rfl:     amLODIPine (NORVASC) 10 mg tablet, Take 10 mg by mouth daily  , Disp: , Rfl:     aspirin 81 mg chewable tablet, Chew 81 mg daily  , Disp: , Rfl:     escitalopram (LEXAPRO) 5 mg tablet, Take 5 mg by mouth daily  , Disp: , Rfl:     etanercept (ENBREL) 25 MG, Inject 50 mg under the skin once a week Tuesday , Disp: , Rfl:     folic acid (FOLVITE) 1 mg tablet, Take 1 mg by mouth daily  , Disp: , Rfl:     hydrochlorothiazide (HYDRODIURIL) 25 mg tablet, take 1 tablet by mouth once daily, Disp: 90 tablet, Rfl: 2    losartan (COZAAR) 50 mg tablet, take 1 tablet by mouth once daily as directed, Disp: 90 tablet, Rfl: 3    metFORMIN (GLUCOPHAGE-XR) 500 mg 24 hr tablet, take 1 tablet by mouth twice a day, Disp: 180 tablet, Rfl: 3    Methotrexate, PF, 10 MG/0 4ML SOAJ, Inject 10 mg under the skin once a week Tuesday , Disp: , Rfl:     metoprolol succinate (TOPROL-XL) 100 mg 24 hr tablet, Take 100 mg by mouth daily  , Disp: , Rfl:     ONE TOUCH ULTRA TEST test strip, TEST once daily, Disp: 90 each, Rfl: 3    oxyCODONE-acetaminophen (PERCOCET) 5-325 mg per tablet, Take 1 tablet by mouth every 8 (eight) hours as needed for moderate pain, Disp: , Rfl:     pioglitazone (ACTOS) 15 mg tablet, Take 1 tablet (15 mg total) by mouth daily, Disp: 90 tablet, Rfl: 2    simvastatin (ZOCOR) 10 mg tablet, Take 20 mg by mouth daily    , Disp: , Rfl:     ascorbic acid (VITAMIN C) 500 mg tablet, Take 500 mg by mouth daily, Disp: , Rfl:     cephalexin (KEFLEX) 500 mg capsule, Take 1 capsule (500 mg total) by mouth every 12 (twelve) hours for 7 days, Disp: 14 capsule, Rfl: 0    cyclobenzaprine (FLEXERIL) 10 mg tablet, Take 1 tablet (10 mg total) by mouth daily at bedtime for 30 days, Disp: 30 tablet, Rfl: 0    Current Allergies     Allergies as of 2018 - Reviewed 2018   Allergen Reaction Noted    Zithromax [azithromycin] Hives 2012    Lisinopril Cough 2017            The following portions of the patient's history were reviewed and updated as appropriate: allergies, current medications, past family history, past medical history, past social history, past surgical history and problem list      Past Medical History:   Diagnosis Date    Arthritis     OA    Asthma     exercise iniduced   Cancer (HCC)     Squamous Cell on back    Dependence on crutches     prn    Depression     ro       Diabetes mellitus ([facility] [address] [facility]     NIDDM; per Allscripts: Last Assessed: 7/15/15, New onset of type 2    Fibromyalgia     Hearing aid worn     mayra    Heart murmur     HL (hearing loss)     b/l - uses hearing aids    Hyperglycemia     Last Assessed:10/29/15    Hyperlipidemia     Hypertension     Psoriasis     red dry patch left side- waist area    Psoriatic arthritis (HCC)     RA (rheumatoid arthritis) ([facility] [address] [facility]     Wears glasses     reading       Past Surgical History:   Procedure Laterality Date    ARTHROSCOPY KNEE Left 3/2/2016    Procedure: ARTHROSCOPY KNEE;  Surgeon: Nino Borden MD;  [facility];  Service:      SECTION      X2    CYST REMOVAL Right     Cyst on bone     FRACTURE SURGERY      (Right) tibia/fibula Fx, and ankle    PILONIDAL CYST / SINUS EXCISION      MN KNEE SCOPE,MED/LAT MENISECTOMY Left 3/2/2016    Procedure: PARTIAL MEDIAL &  LATERAL MENISCECTOMIES, CHONDROPLASTY, REMOVAL OSTEOPHYTE, LIMITED SYNOVECTOMY;  Surgeon: Nino Borden MD;  [facility];  Service: Orthopedics    REPAIR KNEE LIGAMENT      TOOTH EXTRACTION         Family History   Problem Relation Age of Onset    Hypertension Mother     Heart attack Mother     Osteoarthritis Mother     Alcohol abuse Father     Hypertension Brother     Diabetes Brother     Other Brother         Dyslipidemia    Coronary artery disease Brother          Medications have been verified  Objective   /81 (BP Location: Right arm, Patient Position: Sitting, Cuff Size: Standard)   Pulse 64   Temp 98 6 °F (37 °C) (Oral)   Resp 18   Ht 5' 2 5" (1 588 m)   Wt 90 2 kg (198 lb 12 8 oz)   SpO2 97%   BMI 35 78 kg/m²        Physical Exam     Physical Exam   Constitutional: She appears well-developed and well-nourished  No distress  Cardiovascular: Normal rate, regular rhythm, normal heart sounds and intact distal pulses  Exam reveals no gallop and no friction rub  No murmur heard  Pulmonary/Chest: Effort normal and breath sounds normal  No respiratory distress  She has no wheezes  She has no rales  She exhibits no tenderness  Lymphadenopathy:     She has no cervical adenopathy  Skin: Skin is warm  She is not diaphoretic  1 cm superficial semi-lunar laceration over L 2nd tip of phalynx  Vitals reviewed  Laceration repair  Date/Time: 7/6/2018 1:37 PM  Performed by: Tahmina Chan  Authorized by: Tahmina Chan   Consent: Verbal consent obtained    Consent given by: patient  Body area: upper extremity  Location details: left index finger  Laceration length: 1 cm  Foreign bodies: no foreign bodies  Tendon involvement: none  Nerve involvement: none  Vascular damage: no    Wound Dehiscence:  Superficial Wound Dehiscence: simple closure      Procedure Details:  Amount of cleaning: standard  Skin closure: glue  Approximation: close  Patient tolerance: Patient tolerated the procedure well with no immediate complications

## 2018-07-06 NOTE — PATIENT INSTRUCTIONS
Take Keflex as prescribed  Dermabond will slough off naturally in 5-10 days  Do not swim, scrub, soak or expose area to prolonged wetness until film has sloughed   Do not apply topical antibiotic ointment over dermabond  Watch area for signs of infection  Follow up with PCP in 3-5 days  Go to ED if symptoms worsen    Finger Laceration   WHAT YOU NEED TO KNOW:   A finger laceration is a deep cut in your skin  It is often caused by a sharp object, such as a knife, or blunt force to your finger  Your blood vessels, bones, joints, tendons, or nerves may also be injured  DISCHARGE INSTRUCTIONS:   Return to the emergency department if:   · Your wound comes apart  · Blood soaks through your bandage  · You have severe pain in your finger or hand  · Your finger is pale and cold  · You have sudden trouble moving your finger  · Your swelling suddenly gets worse  · You have red streaks on your skin coming from your wound  Contact your healthcare provider or hand specialist if:   · You have new numbness or tingling  · Your finger feels warm, looks swollen or red, and is draining pus  · You have a fever  · You have questions or concerns about your condition or care  Medicines: You may  need any of the following:  · Antibiotics  help prevent a bacterial infection  · Acetaminophen  decreases pain and fever  It is available without a doctor's order  Ask how much to take and how often to take it  Follow directions  Read the labels of all other medicines you are using to see if they also contain acetaminophen, or ask your doctor or pharmacist  Acetaminophen can cause liver damage if not taken correctly  Do not use more than 4 grams (4,000 milligrams) total of acetaminophen in one day  · Prescription pain medicine  may be given  Ask your healthcare provider how to take this medicine safely  Some prescription pain medicines contain acetaminophen   Do not take other medicines that contain acetaminophen without talking to your healthcare provider  Too much acetaminophen may cause liver damage  Prescription pain medicine may cause constipation  Ask your healthcare provider how to prevent or treat constipation  · Take your medicine as directed  Contact your healthcare provider if you think your medicine is not helping or if you have side effects  Tell him or her if you are allergic to any medicine  Keep a list of the medicines, vitamins, and herbs you take  Include the amounts, and when and why you take them  Bring the list or the pill bottles to follow-up visits  Carry your medicine list with you in case of an emergency  Self-care:   · Apply ice  on your finger for 15 to 20 minutes every hour or as directed  Use an ice pack, or put crushed ice in a plastic bag  Cover it with a towel before you apply it to your skin  Ice helps prevent tissue damage and decreases swelling and pain  · Elevate  your hand above the level of your heart as often as you can  This will help decrease swelling and pain  Prop your hand on pillows or blankets to keep it elevated comfortably  · Wear your splint as directed  A splint will decrease movement and stress on your wound  The splint may help your wound heal faster  Ask your healthcare provider how to apply and remove a splint  · Apply ointments to decrease scarring  Do not apply ointments until your healthcare provider says it is okay  You may need to wait until your wound is healed  Ask which ointment to buy and how often to use it  Wound care:   · Do not get your wound wet until your healthcare provider says it is okay  Do not soak your hand in water  Do not go swimming until your healthcare provider says it is okay  When your healthcare provider says it is okay, carefully wash around the wound with soap and water  Let soap and water run over your wound  Gently pat the area dry or allow it to air dry       · Change your bandages when they get wet, dirty, or after washing  Apply new, clean bandages as directed  Do not apply elastic bandages or tape too tightly  Do not put powders or lotions on your wound  · Apply antibiotic ointment as directed  Your healthcare provider may give you antibiotic ointment to put over your wound if you have stitches  If you have Strips-Strips over your wound, let them dry up and fall off on their own  If they do not fall off within 14 days, gently remove them  If you have glue over your wound, do not remove or pick at it  If your glue comes off, do not replace it with glue that you have at home  · Check your wound every day for signs of infection  Signs of infection include swelling, redness, or pus  Follow up with your healthcare provider or hand specialist in 2 days:  Write down your questions so you remember to ask them during your visits  © 2017 [address] [employer] Information is for End User's use only and may not be sold, redistributed or otherwise used for commercial purposes  All illustrations and images included in CareNotes® are the copyrighted property of A D A M [employer] Inc  or Flakito Tavares  The above information is an  only  It is not intended as medical advice for individual conditions or treatments  Talk to your doctor, nurse or pharmacist before following any medical regimen to see if it is safe and effective for you

## 2018-08-22 DIAGNOSIS — M62.838 MUSCLE SPASM: Primary | ICD-10-CM

## 2018-08-22 RX ORDER — CYCLOBENZAPRINE HCL 10 MG
10 TABLET ORAL
Qty: 30 TABLET | Refills: 1 | Status: SHIPPED | OUTPATIENT
Start: 2018-08-22 | End: 2019-02-07 | Stop reason: SDUPTHER

## 2018-09-12 ENCOUNTER — APPOINTMENT (OUTPATIENT)
Dept: LAB | Facility: CLINIC | Age: 63
End: 2018-09-12
Payer: COMMERCIAL

## 2018-09-12 ENCOUNTER — TRANSCRIBE ORDERS (OUTPATIENT)
Dept: LAB | Facility: CLINIC | Age: 63
End: 2018-09-12

## 2018-09-12 DIAGNOSIS — M15.0 PRIMARY GENERALIZED HYPERTROPHIC OSTEOARTHROSIS: ICD-10-CM

## 2018-09-12 DIAGNOSIS — I10 BENIGN ESSENTIAL HYPERTENSION: ICD-10-CM

## 2018-09-12 DIAGNOSIS — L40.50 PSORIATIC ARTHROPATHY (HCC): Primary | ICD-10-CM

## 2018-09-12 DIAGNOSIS — E11.9 CONTROLLED TYPE 2 DIABETES MELLITUS WITHOUT COMPLICATION, WITHOUT LONG-TERM CURRENT USE OF INSULIN (HCC): ICD-10-CM

## 2018-09-12 DIAGNOSIS — E55.9 AVITAMINOSIS D: ICD-10-CM

## 2018-09-12 DIAGNOSIS — E55.9 VITAMIN D DEFICIENCY: ICD-10-CM

## 2018-09-12 LAB
25(OH)D3 SERPL-MCNC: 14.5 NG/ML (ref 30–100)
ALBUMIN SERPL BCP-MCNC: 4 G/DL (ref 3.5–5)
ALP SERPL-CCNC: 103 U/L (ref 46–116)
ALT SERPL W P-5'-P-CCNC: 52 U/L (ref 12–78)
ANION GAP SERPL CALCULATED.3IONS-SCNC: 7 MMOL/L (ref 4–13)
AST SERPL W P-5'-P-CCNC: 34 U/L (ref 5–45)
BILIRUB SERPL-MCNC: 0.4 MG/DL (ref 0.2–1)
BUN SERPL-MCNC: 8 MG/DL (ref 5–25)
CALCIUM SERPL-MCNC: 9.7 MG/DL (ref 8.3–10.1)
CHLORIDE SERPL-SCNC: 100 MMOL/L (ref 100–108)
CHOLEST SERPL-MCNC: 172 MG/DL (ref 50–200)
CO2 SERPL-SCNC: 28 MMOL/L (ref 21–32)
CREAT SERPL-MCNC: 0.53 MG/DL (ref 0.6–1.3)
CRP SERPL QL: 3 MG/L
ERYTHROCYTE [DISTWIDTH] IN BLOOD BY AUTOMATED COUNT: 13.2 % (ref 11.6–15.1)
ERYTHROCYTE [SEDIMENTATION RATE] IN BLOOD: 16 MM/HOUR (ref 0–20)
EST. AVERAGE GLUCOSE BLD GHB EST-MCNC: 134 MG/DL
GFR SERPL CREATININE-BSD FRML MDRD: 100 ML/MIN/1.73SQ M
GLUCOSE P FAST SERPL-MCNC: 116 MG/DL (ref 65–99)
HBA1C MFR BLD: 6.3 % (ref 4.2–6.3)
HCT VFR BLD AUTO: 44.2 % (ref 34.8–46.1)
HDLC SERPL-MCNC: 40 MG/DL (ref 40–60)
HGB BLD-MCNC: 14.9 G/DL (ref 11.5–15.4)
LDLC SERPL CALC-MCNC: 89 MG/DL (ref 0–100)
MAGNESIUM SERPL-MCNC: 2.4 MG/DL (ref 1.6–2.6)
MCH RBC QN AUTO: 31 PG (ref 26.8–34.3)
MCHC RBC AUTO-ENTMCNC: 33.7 G/DL (ref 31.4–37.4)
MCV RBC AUTO: 92 FL (ref 82–98)
PLATELET # BLD AUTO: 192 THOUSANDS/UL (ref 149–390)
PMV BLD AUTO: 10.8 FL (ref 8.9–12.7)
POTASSIUM SERPL-SCNC: 4.1 MMOL/L (ref 3.5–5.3)
PROT SERPL-MCNC: 7.8 G/DL (ref 6.4–8.2)
RBC # BLD AUTO: 4.8 MILLION/UL (ref 3.81–5.12)
SODIUM SERPL-SCNC: 135 MMOL/L (ref 136–145)
TRIGL SERPL-MCNC: 213 MG/DL
TSH SERPL DL<=0.05 MIU/L-ACNC: 1.94 UIU/ML (ref 0.36–3.74)
URATE SERPL-MCNC: 5.7 MG/DL (ref 2–6.8)
WBC # BLD AUTO: 5.14 THOUSAND/UL (ref 4.31–10.16)

## 2018-09-12 PROCEDURE — 80061 LIPID PANEL: CPT

## 2018-09-12 PROCEDURE — 83735 ASSAY OF MAGNESIUM: CPT

## 2018-09-12 PROCEDURE — 84550 ASSAY OF BLOOD/URIC ACID: CPT

## 2018-09-12 PROCEDURE — 82306 VITAMIN D 25 HYDROXY: CPT

## 2018-09-12 PROCEDURE — 85652 RBC SED RATE AUTOMATED: CPT

## 2018-09-12 PROCEDURE — 84443 ASSAY THYROID STIM HORMONE: CPT

## 2018-09-12 PROCEDURE — 36415 COLL VENOUS BLD VENIPUNCTURE: CPT

## 2018-09-12 PROCEDURE — 80053 COMPREHEN METABOLIC PANEL: CPT

## 2018-09-12 PROCEDURE — 85027 COMPLETE CBC AUTOMATED: CPT

## 2018-09-12 PROCEDURE — 86140 C-REACTIVE PROTEIN: CPT

## 2018-09-12 PROCEDURE — 83036 HEMOGLOBIN GLYCOSYLATED A1C: CPT

## 2018-09-13 DIAGNOSIS — I10 ESSENTIAL HYPERTENSION: Primary | ICD-10-CM

## 2018-09-13 RX ORDER — METOPROLOL SUCCINATE 100 MG/1
TABLET, EXTENDED RELEASE ORAL
Qty: 90 TABLET | Refills: 2 | Status: SHIPPED | OUTPATIENT
Start: 2018-09-13 | End: 2019-06-09 | Stop reason: SDUPTHER

## 2018-09-23 DIAGNOSIS — F41.1 GENERALIZED ANXIETY DISORDER: Primary | ICD-10-CM

## 2018-09-23 RX ORDER — ESCITALOPRAM OXALATE 10 MG/1
TABLET ORAL
Qty: 90 TABLET | Refills: 3 | Status: SHIPPED | OUTPATIENT
Start: 2018-09-23 | End: 2020-02-03 | Stop reason: SDUPTHER

## 2018-09-24 ENCOUNTER — OFFICE VISIT (OUTPATIENT)
Dept: FAMILY MEDICINE CLINIC | Facility: CLINIC | Age: 63
End: 2018-09-24
Payer: COMMERCIAL

## 2018-09-24 VITALS
SYSTOLIC BLOOD PRESSURE: 130 MMHG | WEIGHT: 195 LBS | DIASTOLIC BLOOD PRESSURE: 80 MMHG | RESPIRATION RATE: 16 BRPM | TEMPERATURE: 100 F | OXYGEN SATURATION: 96 % | BODY MASS INDEX: 35.1 KG/M2 | HEART RATE: 84 BPM

## 2018-09-24 DIAGNOSIS — E78.00 HYPERCHOLESTEROLEMIA: ICD-10-CM

## 2018-09-24 DIAGNOSIS — M06.9 RHEUMATOID ARTHRITIS INVOLVING MULTIPLE SITES, UNSPECIFIED RHEUMATOID FACTOR PRESENCE: ICD-10-CM

## 2018-09-24 DIAGNOSIS — E11.9 CONTROLLED TYPE 2 DIABETES MELLITUS WITHOUT COMPLICATION, WITHOUT LONG-TERM CURRENT USE OF INSULIN (HCC): ICD-10-CM

## 2018-09-24 DIAGNOSIS — E55.9 VITAMIN D DEFICIENCY: ICD-10-CM

## 2018-09-24 DIAGNOSIS — L40.50 PSORIASIS WITH ARTHROPATHY (HCC): ICD-10-CM

## 2018-09-24 DIAGNOSIS — I10 BENIGN ESSENTIAL HYPERTENSION: Primary | ICD-10-CM

## 2018-09-24 PROBLEM — Z01.818 PRE-OP EXAMINATION: Status: RESOLVED | Noted: 2018-05-21 | Resolved: 2018-09-24

## 2018-09-24 PROCEDURE — 3079F DIAST BP 80-89 MM HG: CPT | Performed by: FAMILY MEDICINE

## 2018-09-24 PROCEDURE — 99214 OFFICE O/P EST MOD 30 MIN: CPT | Performed by: FAMILY MEDICINE

## 2018-09-24 PROCEDURE — 1101F PT FALLS ASSESS-DOCD LE1/YR: CPT | Performed by: FAMILY MEDICINE

## 2018-09-24 PROCEDURE — 3075F SYST BP GE 130 - 139MM HG: CPT | Performed by: FAMILY MEDICINE

## 2018-09-24 RX ORDER — ERGOCALCIFEROL 1.25 MG/1
50000 CAPSULE ORAL WEEKLY
Qty: 12 CAPSULE | Refills: 3 | Status: SHIPPED | OUTPATIENT
Start: 2018-09-24 | End: 2019-09-10 | Stop reason: SDUPTHER

## 2018-09-24 NOTE — PROGRESS NOTES
Assessment/Plan:    Problem List Items Addressed This Visit     Hypercholesterolemia     Taking simastatin         Psoriasis with arthropathy ([facility] [address] )     seieng rheum         [facility]umat[facility] )     Stable   Seeing rheum           Benign essential hypertension - Primary     Taking cozaar         Controlled diabetes mellitus type II without complication ([facility] )     Lab Results   Component Value Date    HGBA1C 6 3 09/12/2018       No results for input(s): POCGLU in the last 72 hours  Blood Sugar Average: Last 72 hrs:  Stable on current meds           Relevant Orders    Hemoglobin A1C    Vitamin D deficiency     Needs vitamin D- [id number] weekly         Relevant Medications    ergocalciferol (VITAMIN D2) 50,000 units          Patient Instructions   Edema   WHAT YOU NEED TO KNOW:   What is edema? Edema is swelling throughout your body  Edema is usually a sign that you are retaining fluid  The swelling may be caused by heart failure or kidney, thyroid, or liver disease  It may also be caused by medicines such as antidepressants, blood pressure medicines, or hormones  Sudden swelling around the lips or face may be a sign of a severe allergic reaction  Swelling of an arm or leg may be caused by blockage of your veins  What other signs and symptoms may occur with edema? · Discomfort or tenderness in the swollen areas    · Tight and shiny skin over the swollen areas    · Weight gain  How is edema diagnosed? Your healthcare provider will ask about your symptoms and any other symptoms you have  He may also ask about any medical conditions you have  Your healthcare provider will examine your skin over the swollen areas  He may gently push on the swollen area for a short time to see if this leaves a dimple  He may also order tests to find the cause of your edema  How is edema treated and managed? Treatment for edema depends on the cause   Depending on your medical condition, you may be given medicine to help get rid of extra body fluid  Your healthcare provider may suggest that you do any of the following to help manage edema:  · Elevate  your arms or legs as directed  Raise them above the level of your heart as often as you can  This will help decrease swelling and pain  Prop them on pillows or blankets to keep them elevated comfortably  · Wear pressure stockings as directed  The stockings are tight and put pressure on your legs  This helps to keep fluid from collecting in your legs or ankles  · Limit your salt intake  Salt causes your body to hold water  Ask about any other changes to your diet  · Stay active  Do not stand or sit for long periods of time  Ask your healthcare provider about the best exercise plan for you  · Keep your skin moist  using lotion, cream, or ointment  Ask your healthcare provider what to use and how often to use it  When should I contact my healthcare provider? · The swollen area feels cold and is pale or blue in color  · The swollen area feels warm, painful, and is red in color  · You have increased swelling or swelling in other parts of your body  · You have questions or concerns about your condition or care  When should I seek immediate care? · You have shortness of breath at rest, especially when you lie down  · You cough up pink, foamy sputum  · You have chest pain  · Your heartbeat is fast or uneven  CARE AGREEMENT:   You have the right to help plan your care  Learn about your health condition and how it may be treated  Discuss treatment options with your caregivers to decide what care you want to receive  You always have the right to refuse treatment  The above information is an  only  It is not intended as medical advice for individual conditions or treatments  Talk to your doctor, nurse or pharmacist before following any medical regimen to see if it is safe and effective for you    © 2017 [employer] Information is for End User's use only and may not be sold, redistributed or otherwise used for commercial purposes  All illustrations and images included in CareNotes® are the copyrighted property of [employer] , [employer]  or Flakito Weldon in about 4 months (around 1/24/2019)  Subjective:      Patient ID: Roger Talbot is a 70 y o  female  Chief Complaint   Patient presents with    Follow-up     4 month f/u       Here for follow up  Doing well overall  Seeing rheum      Hypertension   This is a chronic problem  The current episode started more than 1 year ago  The problem is unchanged  The problem is controlled  Associated symptoms include peripheral edema  Pertinent negatives include no anxiety, blurred vision, chest pain, headaches, malaise/fatigue, neck pain, orthopnea, palpitations, PND, shortness of breath or sweats  There are no associated agents to hypertension  Risk factors for coronary artery disease include diabetes mellitus and dyslipidemia  Past treatments include ACE inhibitors  The current treatment provides significant improvement  There are no compliance problems  The following portions of the patient's history were reviewed and updated as appropriate: allergies, current medications, past family history, past medical history, past social history, past surgical history and problem list     Review of Systems   Constitutional: Negative  Negative for malaise/fatigue  HENT: Negative  Eyes: Negative  Negative for blurred vision  Respiratory: Negative for shortness of breath  Cardiovascular: Negative for chest pain, palpitations, orthopnea and PND  Gastrointestinal: Negative  Endocrine: Negative  Genitourinary: Negative  Musculoskeletal: Negative for neck pain  Skin: Negative  Allergic/Immunologic: Negative  Neurological: Negative for headaches  Hematological: Negative  Psychiatric/Behavioral: Negative            Current Outpatient Prescriptions   Medication Sig Dispense Refill    albuterol (VENTOLIN HFA) 90 mcg/act inhaler Inhale 2 puffs every 4 (four) hours as needed        ALPRAZolam (XANAX) 0 5 mg tablet Take 0 25 mg by mouth daily        amLODIPine (NORVASC) 10 mg tablet Take 10 mg by mouth daily   ascorbic acid (VITAMIN C) 500 mg tablet Take 500 mg by mouth daily      aspirin 81 mg chewable tablet Chew 81 mg daily   cyclobenzaprine (FLEXERIL) 10 mg tablet Take 1 tablet (10 mg total) by mouth daily at bedtime for 30 days 30 tablet 1    ergocalciferol (VITAMIN D2) 50,000 units Take 1 capsule (50,000 Units total) by mouth once a week 12 capsule 3    escitalopram (LEXAPRO) 5 mg tablet Take 5 mg by mouth daily   etanercept (ENBREL) 25 MG Inject 50 mg under the skin once a week Tuesday       folic acid (FOLVITE) 1 mg tablet Take 1 mg by mouth daily   hydrochlorothiazide (HYDRODIURIL) 25 mg tablet take 1 tablet by mouth once daily 90 tablet 2    losartan (COZAAR) 50 mg tablet take 1 tablet by mouth once daily as directed 90 tablet 3    metFORMIN (GLUCOPHAGE-XR) 500 mg 24 hr tablet take 1 tablet by mouth twice a day 180 tablet 3    Methotrexate, PF, 10 MG/0 4ML SOAJ Inject 10 mg under the skin once a week Tuesday       metoprolol succinate (TOPROL-XL) 100 mg 24 hr tablet take one tablet by mouth one time daily 90 tablet 2    ONE TOUCH ULTRA TEST test strip TEST once daily 90 each 3    oxyCODONE-acetaminophen (PERCOCET) 5-325 mg per tablet Take 1 tablet by mouth every 8 (eight) hours as needed for moderate pain      pioglitazone (ACTOS) 15 mg tablet Take 1 tablet (15 mg total) by mouth daily 90 tablet 2    simvastatin (ZOCOR) 10 mg tablet Take 20 mg by mouth daily  No current facility-administered medications for this visit          Objective:    /80 (BP Location: Left arm)   Pulse 84   Temp 100 °F (37 8 °C) (Tympanic)   Resp 16   Wt 88 5 kg (195 lb)   SpO2 96%   BMI 35 10 kg/m²        Physical Exam   Constitutional: She appears well-developed and well-nourished  Eyes: Pupils are equal, round, and reactive to light  Neck: Normal range of motion  Neck supple  Cardiovascular: Normal rate, regular rhythm, normal heart sounds and intact distal pulses  Pulmonary/Chest: Effort normal and breath sounds normal    Abdominal: Soft  Bowel sounds are normal    Musculoskeletal: Normal range of motion  Neurological: She is alert  Skin: Skin is warm and dry  Psychiatric: She has a normal mood and affect  Her behavior is normal  Judgment and thought content normal    Nursing note and vitals reviewed               Uma Shane DO

## 2018-09-24 NOTE — ASSESSMENT & PLAN NOTE
Lab Results   Component Value Date    HGBA1C 6 3 09/12/2018       No results for input(s): POCGLU in the last 72 hours      Blood Sugar Average: Last 72 hrs:  Stable on current meds

## 2018-09-24 NOTE — PATIENT INSTRUCTIONS
Edema   WHAT YOU NEED TO KNOW:   What is edema? Edema is swelling throughout your body  Edema is usually a sign that you are retaining fluid  The swelling may be caused by heart failure or kidney, thyroid, or liver disease  It may also be caused by medicines such as antidepressants, blood pressure medicines, or hormones  Sudden swelling around the lips or face may be a sign of a severe allergic reaction  Swelling of an arm or leg may be caused by blockage of your veins  What other signs and symptoms may occur with edema? · Discomfort or tenderness in the swollen areas    · Tight and shiny skin over the swollen areas    · Weight gain  How is edema diagnosed? Your healthcare provider will ask about your symptoms and any other symptoms you have  He may also ask about any medical conditions you have  Your healthcare provider will examine your skin over the swollen areas  He may gently push on the swollen area for a short time to see if this leaves a dimple  He may also order tests to find the cause of your edema  How is edema treated and managed? Treatment for edema depends on the cause  Depending on your medical condition, you may be given medicine to help get rid of extra body fluid  Your healthcare provider may suggest that you do any of the following to help manage edema:  · Elevate  your arms or legs as directed  Raise them above the level of your heart as often as you can  This will help decrease swelling and pain  Prop them on pillows or blankets to keep them elevated comfortably  · Wear pressure stockings as directed  The stockings are tight and put pressure on your legs  This helps to keep fluid from collecting in your legs or ankles  · Limit your salt intake  Salt causes your body to hold water  Ask about any other changes to your diet  · Stay active  Do not stand or sit for long periods of time  Ask your healthcare provider about the best exercise plan for you      · Keep your skin moist using lotion, cream, or ointment  Ask your healthcare provider what to use and how often to use it  When should I contact my healthcare provider? · The swollen area feels cold and is pale or blue in color  · The swollen area feels warm, painful, and is red in color  · You have increased swelling or swelling in other parts of your body  · You have questions or concerns about your condition or care  When should I seek immediate care? · You have shortness of breath at rest, especially when you lie down  · You cough up pink, foamy sputum  · You have chest pain  · Your heartbeat is fast or uneven  CARE AGREEMENT:   You have the right to help plan your care  Learn about your health condition and how it may be treated  Discuss treatment options with your caregivers to decide what care you want to receive  You always have the right to refuse treatment  The above information is an  only  It is not intended as medical advice for individual conditions or treatments  Talk to your doctor, nurse or pharmacist before following any medical regimen to see if it is safe and effective for you  © 2017 [address]  Information is for End User's use only and may not be sold, redistributed or otherwise used for commercial purposes  All illustrations and images included in CareNotes® are the copyrighted property of [employer]  or Flakito Tavares

## 2018-12-11 DIAGNOSIS — E78.2 MIXED HYPERLIPIDEMIA: Primary | ICD-10-CM

## 2018-12-11 DIAGNOSIS — I10 ESSENTIAL HYPERTENSION: ICD-10-CM

## 2018-12-11 RX ORDER — SIMVASTATIN 10 MG
TABLET ORAL
Qty: 90 TABLET | Refills: 3 | Status: SHIPPED | OUTPATIENT
Start: 2018-12-11 | End: 2019-09-23 | Stop reason: SDUPTHER

## 2018-12-11 RX ORDER — AMLODIPINE BESYLATE 10 MG/1
TABLET ORAL
Qty: 90 TABLET | Refills: 3 | Status: SHIPPED | OUTPATIENT
Start: 2018-12-11 | End: 2019-12-17 | Stop reason: SDUPTHER

## 2018-12-11 RX ORDER — HYDROCHLOROTHIAZIDE 25 MG/1
TABLET ORAL
Qty: 90 TABLET | Refills: 2 | Status: SHIPPED | OUTPATIENT
Start: 2018-12-11 | End: 2019-09-19 | Stop reason: SDUPTHER

## 2019-02-07 ENCOUNTER — OFFICE VISIT (OUTPATIENT)
Dept: FAMILY MEDICINE CLINIC | Facility: CLINIC | Age: 64
End: 2019-02-07
Payer: COMMERCIAL

## 2019-02-07 VITALS
DIASTOLIC BLOOD PRESSURE: 70 MMHG | SYSTOLIC BLOOD PRESSURE: 110 MMHG | BODY MASS INDEX: 34.84 KG/M2 | WEIGHT: 196.6 LBS | HEART RATE: 75 BPM | HEIGHT: 63 IN | RESPIRATION RATE: 16 BRPM | TEMPERATURE: 99.3 F | OXYGEN SATURATION: 96 %

## 2019-02-07 DIAGNOSIS — M06.9 RHEUMATOID ARTHRITIS INVOLVING MULTIPLE SITES, UNSPECIFIED RHEUMATOID FACTOR PRESENCE: ICD-10-CM

## 2019-02-07 DIAGNOSIS — I10 BENIGN ESSENTIAL HYPERTENSION: ICD-10-CM

## 2019-02-07 DIAGNOSIS — E11.9 CONTROLLED TYPE 2 DIABETES MELLITUS WITHOUT COMPLICATION, WITHOUT LONG-TERM CURRENT USE OF INSULIN (HCC): ICD-10-CM

## 2019-02-07 DIAGNOSIS — L40.50 PSORIASIS WITH ARTHROPATHY (HCC): ICD-10-CM

## 2019-02-07 DIAGNOSIS — E78.00 HYPERCHOLESTEROLEMIA: Primary | ICD-10-CM

## 2019-02-07 DIAGNOSIS — M62.838 MUSCLE SPASM: ICD-10-CM

## 2019-02-07 LAB — SL AMB POCT HEMOGLOBIN AIC: 6.5 (ref ?–6.5)

## 2019-02-07 PROCEDURE — 99214 OFFICE O/P EST MOD 30 MIN: CPT | Performed by: FAMILY MEDICINE

## 2019-02-07 PROCEDURE — 3078F DIAST BP <80 MM HG: CPT | Performed by: FAMILY MEDICINE

## 2019-02-07 PROCEDURE — 83036 HEMOGLOBIN GLYCOSYLATED A1C: CPT | Performed by: FAMILY MEDICINE

## 2019-02-07 PROCEDURE — 36415 COLL VENOUS BLD VENIPUNCTURE: CPT | Performed by: FAMILY MEDICINE

## 2019-02-07 PROCEDURE — 3074F SYST BP LT 130 MM HG: CPT | Performed by: FAMILY MEDICINE

## 2019-02-07 PROCEDURE — 3008F BODY MASS INDEX DOCD: CPT | Performed by: FAMILY MEDICINE

## 2019-02-07 PROCEDURE — 1160F RVW MEDS BY RX/DR IN RCRD: CPT | Performed by: FAMILY MEDICINE

## 2019-02-07 PROCEDURE — 3044F HG A1C LEVEL LT 7.0%: CPT | Performed by: FAMILY MEDICINE

## 2019-02-07 RX ORDER — CYCLOBENZAPRINE HCL 10 MG
10 TABLET ORAL
Qty: 30 TABLET | Refills: 1 | Status: SHIPPED | OUTPATIENT
Start: 2019-02-07 | End: 2019-04-10 | Stop reason: SDUPTHER

## 2019-02-07 NOTE — ASSESSMENT & PLAN NOTE
Lab Results   Component Value Date    HGBA1C 6 3 09/12/2018       No results for input(s): POCGLU in the last 72 hours  Blood Sugar Average: Last 72 hrs:   Will check poc today

## 2019-02-07 NOTE — PROGRESS NOTES
Assessment/Plan:    Problem List Items Addressed This Visit     Hypercholesterolemia - Primary     Stable on current meds- zocor         Relevant Orders    Lipid Panel with Direct LDL reflex    TSH, 3rd generation with Free T4 reflex    Psoriasis with arthropathy ([facility] [address] )     Seeing rheum           Rheumatoid arthritis ([facility] [address] )     Stable- seeing rheum  Need to change Embrel         Benign essential hypertension     Stable on cozaar         Relevant Orders    CBC    Comprehensive metabolic panel    [facility]rolled diabetes mellitus type II without complication [facility]     Lab Results   Component Value Date    HGBA1C 6 3 09/12/2018       No results for input(s): POCGLU in the last 72 hours  Blood Sugar Average: Last 72 hrs: Will check poc today           Relevant Orders    POCT hemoglobin A1c    Hemoglobin A1C    Microalbumin / creatinine urine ratio      Other Visit Diagnoses     Muscle spasm        Relevant Medications    cyclobenzaprine (FLEXERIL) 10 mg tablet        Recent Results (from the past 24 hour(s))   POCT hemoglobin A1c    Collection Time: 02/07/19  2:07 PM   Result Value Ref Range    Hemoglobin A1C 6 5 6 5     BMI Counseling: Body mass index is 35 39 kg/m²  Discussed the patient's BMI with her  The BMI is above average  BMI counseling and education was provided to the patient  Nutrition recommendations include decreasing overall calorie intake, consuming healthier snacks and moderation in carbohydrate intake  Exercise recommendations include exercising 3-5 times per week  There are no Patient Instructions on file for this visit  No Follow-up on file  Subjective:      Patient ID: Una Glover is a 70 y o  female      Chief Complaint   Patient presents with    Follow-up     Patient here for 4 month follow up on Hypertension, Hypercholesterolemia, Type II Diabetes        Here for follow up  Overall health is ok  Sleep is an issue  Increased weight- restarted journaling      Hypertension   This is a chronic problem  The current episode started more than 1 year ago  The problem is unchanged  The problem is controlled  There are no associated agents to hypertension  Risk factors for coronary artery disease include diabetes mellitus and dyslipidemia  Past treatments include angiotensin blockers  The current treatment provides significant improvement  There are no compliance problems  Hyperlipidemia   This is a chronic problem  The current episode started more than 1 year ago  The problem is controlled  Current antihyperlipidemic treatment includes statins  The current treatment provides significant improvement of lipids  There are no compliance problems  Diabetes   She presents for her follow-up diabetic visit  She has type 2 diabetes mellitus  Her disease course has been stable  There are no hypoglycemic associated symptoms  There are no diabetic associated symptoms  There are no hypoglycemic complications  Symptoms are stable  There are no diabetic complications  Current diabetic treatment includes oral agent (monotherapy)  She is compliant with treatment all of the time  Her weight is stable  She has not had a previous visit with a dietitian  She participates in exercise intermittently  There is no change in her home blood glucose trend  An ACE inhibitor/angiotensin II receptor blocker is being taken  She does not see a podiatrist Eye exam is current         The following portions of the patient's history were reviewed and updated as appropriate: allergies, current medications, past family history, past medical history, past social history, past surgical history and problem list     Review of Systems      Current Outpatient Prescriptions   Medication Sig Dispense Refill    albuterol (VENTOLIN HFA) 90 mcg/act inhaler Inhale 2 puffs every 4 (four) hours as needed        ALPRAZolam (XANAX) 0 5 mg tablet Take 0 25 mg by mouth daily        amLODIPine (NORVASC) 10 mg tablet take 1 tablet by mouth once daily 90 tablet 3    ascorbic acid (VITAMIN C) 500 mg tablet Take 500 mg by mouth daily      aspirin 81 mg chewable tablet Chew 81 mg daily   ergocalciferol (VITAMIN D2) 50,000 units Take 1 capsule (50,000 Units total) by mouth once a week 12 capsule 3    escitalopram (LEXAPRO) 5 mg tablet Take 5 mg by mouth daily   etanercept (ENBREL) 25 MG Inject 50 mg under the skin once a week Tuesday       folic acid (FOLVITE) 1 mg tablet Take 1 mg by mouth daily   hydrochlorothiazide (HYDRODIURIL) 25 mg tablet take 1 tablet by mouth once daily 90 tablet 2    losartan (COZAAR) 50 mg tablet take 1 tablet by mouth once daily as directed 90 tablet 3    metFORMIN (GLUCOPHAGE-XR) 500 mg 24 hr tablet take 1 tablet by mouth twice a day 180 tablet 3    Methotrexate, PF, 10 MG/0 4ML SOAJ Inject 10 mg under the skin once a week Tuesday       metoprolol succinate (TOPROL-XL) 100 mg 24 hr tablet take one tablet by mouth one time daily 90 tablet 2    ONE TOUCH ULTRA TEST test strip TEST once daily 90 each 3    oxyCODONE-acetaminophen (PERCOCET) 5-325 mg per tablet Take 1 tablet by mouth every 8 (eight) hours as needed for moderate pain      pioglitazone (ACTOS) 15 mg tablet Take 1 tablet (15 mg total) by mouth daily 90 tablet 2    simvastatin (ZOCOR) 10 mg tablet TAKE 1 TABLET DAILY  90 tablet 3    cyclobenzaprine (FLEXERIL) 10 mg tablet Take 1 tablet (10 mg total) by mouth daily at bedtime for 30 days 30 tablet 1     No current facility-administered medications for this visit  Objective:    /70   Pulse 75   Temp 99 3 °F (37 4 °C)   Resp 16   Ht 5' 2 5" (1 588 m)   Wt 89 2 kg (196 lb 9 6 oz)   SpO2 96%   BMI 35 39 kg/m²        Physical Exam   Constitutional: She appears well-developed and well-nourished  Eyes: Pupils are equal, round, and reactive to light  Neck: Normal range of motion  Neck supple  Cardiovascular: Normal rate, regular rhythm, normal heart sounds and intact distal pulses  Pulses are no weak pulses  Pulses:       Dorsalis pedis pulses are 2+ on the right side, and 2+ on the left side  Posterior tibial pulses are 2+ on the right side, and 2+ on the left side  Pulmonary/Chest: Effort normal and breath sounds normal    Abdominal: Soft  Bowel sounds are normal    Musculoskeletal: Normal range of motion  Feet:   Right Foot:   Skin Integrity: Negative for ulcer, skin breakdown, erythema, warmth, callus or dry skin  Left Foot:   Skin Integrity: Negative for ulcer, skin breakdown, erythema, warmth, callus or dry skin  Neurological: She is alert  Skin: Skin is warm and dry  Psychiatric: She has a normal mood and affect  Her behavior is normal  Judgment and thought content normal    Nursing note and vitals reviewed  Patient's shoes and socks removed  Right Foot/Ankle   Right Foot Inspection  Skin Exam: skin normal and skin intact no dry skin, no warmth, no callus, no erythema, no maceration, no abnormal color, no pre-ulcer, no ulcer and no callus                          Toe Exam: ROM and strength within normal limits  Sensory   Vibration: intact  Proprioception: intact   Monofilament testing: intact  Vascular  Capillary refills: < 3 seconds  The right DP pulse is 2+  The right PT pulse is 2+  Left Foot/Ankle  Left Foot Inspection  Skin Exam: skin normal and skin intactno dry skin, no warmth, no erythema, no maceration, normal color, no pre-ulcer, no ulcer and no callus                         Toe Exam: ROM and strength within normal limits                   Sensory   Vibration: intact  Proprioception: intact  Monofilament: intact  Vascular  Capillary refills: < 3 seconds  The left DP pulse is 2+  The left PT pulse is 2+  Assign Risk Category:  No deformity present; No loss of protective sensation;  No weak pulses       Risk: 0      Mandy Bibles, DO

## 2019-04-01 DIAGNOSIS — E11.9 CONTROLLED TYPE 2 DIABETES MELLITUS WITHOUT COMPLICATION, WITHOUT LONG-TERM CURRENT USE OF INSULIN (HCC): ICD-10-CM

## 2019-04-01 RX ORDER — PIOGLITAZONEHYDROCHLORIDE 15 MG/1
TABLET ORAL
Qty: 90 TABLET | Refills: 3 | Status: SHIPPED | OUTPATIENT
Start: 2019-04-01 | End: 2020-03-16

## 2019-04-10 DIAGNOSIS — M62.838 MUSCLE SPASM: ICD-10-CM

## 2019-04-10 RX ORDER — CYCLOBENZAPRINE HCL 10 MG
TABLET ORAL
Qty: 30 TABLET | Refills: 1 | Status: SHIPPED | OUTPATIENT
Start: 2019-04-10 | End: 2019-06-13 | Stop reason: SDUPTHER

## 2019-05-15 ENCOUNTER — OFFICE VISIT (OUTPATIENT)
Dept: FAMILY MEDICINE CLINIC | Facility: CLINIC | Age: 64
End: 2019-05-15
Payer: COMMERCIAL

## 2019-05-15 VITALS
TEMPERATURE: 98.3 F | HEART RATE: 77 BPM | OXYGEN SATURATION: 96 % | DIASTOLIC BLOOD PRESSURE: 70 MMHG | SYSTOLIC BLOOD PRESSURE: 130 MMHG | RESPIRATION RATE: 18 BRPM

## 2019-05-15 DIAGNOSIS — M54.2 CHRONIC NECK PAIN: ICD-10-CM

## 2019-05-15 DIAGNOSIS — I10 BENIGN ESSENTIAL HYPERTENSION: ICD-10-CM

## 2019-05-15 DIAGNOSIS — J45.41 MODERATE PERSISTENT ASTHMA WITH EXACERBATION: Primary | ICD-10-CM

## 2019-05-15 DIAGNOSIS — G89.29 CHRONIC NECK PAIN: ICD-10-CM

## 2019-05-15 DIAGNOSIS — J01.10 ACUTE NON-RECURRENT FRONTAL SINUSITIS: ICD-10-CM

## 2019-05-15 LAB
LEFT EYE DIABETIC RETINOPATHY: NORMAL
RIGHT EYE DIABETIC RETINOPATHY: NORMAL

## 2019-05-15 PROCEDURE — 3044F HG A1C LEVEL LT 7.0%: CPT | Performed by: FAMILY MEDICINE

## 2019-05-15 PROCEDURE — 1036F TOBACCO NON-USER: CPT | Performed by: FAMILY MEDICINE

## 2019-05-15 PROCEDURE — 2022F DILAT RTA XM EVC RTNOPTHY: CPT | Performed by: FAMILY MEDICINE

## 2019-05-15 PROCEDURE — 3078F DIAST BP <80 MM HG: CPT | Performed by: FAMILY MEDICINE

## 2019-05-15 PROCEDURE — 3075F SYST BP GE 130 - 139MM HG: CPT | Performed by: FAMILY MEDICINE

## 2019-05-15 PROCEDURE — 99214 OFFICE O/P EST MOD 30 MIN: CPT | Performed by: FAMILY MEDICINE

## 2019-05-15 RX ORDER — ALBUTEROL SULFATE 90 UG/1
2 AEROSOL, METERED RESPIRATORY (INHALATION) EVERY 4 HOURS PRN
Qty: 1 INHALER | Refills: 5 | Status: SHIPPED | OUTPATIENT
Start: 2019-05-15 | End: 2020-10-13 | Stop reason: ALTCHOICE

## 2019-05-15 RX ORDER — AMOXICILLIN 875 MG/1
875 TABLET, COATED ORAL 2 TIMES DAILY
Qty: 20 TABLET | Refills: 0 | Status: SHIPPED | OUTPATIENT
Start: 2019-05-15 | End: 2019-05-25

## 2019-05-15 RX ORDER — BUDESONIDE AND FORMOTEROL FUMARATE DIHYDRATE 160; 4.5 UG/1; UG/1
2 AEROSOL RESPIRATORY (INHALATION) 2 TIMES DAILY
Qty: 1 INHALER | Refills: 5 | Status: SHIPPED | OUTPATIENT
Start: 2019-05-15 | End: 2020-10-13 | Stop reason: ALTCHOICE

## 2019-05-15 RX ORDER — BUDESONIDE AND FORMOTEROL FUMARATE DIHYDRATE 160; 4.5 UG/1; UG/1
2 AEROSOL RESPIRATORY (INHALATION) 2 TIMES DAILY
COMMUNITY
End: 2019-10-15

## 2019-05-18 DIAGNOSIS — I10 ESSENTIAL HYPERTENSION: ICD-10-CM

## 2019-05-19 PROCEDURE — 4010F ACE/ARB THERAPY RXD/TAKEN: CPT | Performed by: FAMILY MEDICINE

## 2019-05-19 RX ORDER — LOSARTAN POTASSIUM 50 MG/1
TABLET ORAL
Qty: 90 TABLET | Refills: 3 | Status: SHIPPED | OUTPATIENT
Start: 2019-05-19 | End: 2020-02-18

## 2019-06-09 DIAGNOSIS — I10 ESSENTIAL HYPERTENSION: ICD-10-CM

## 2019-06-10 RX ORDER — METOPROLOL SUCCINATE 100 MG/1
TABLET, EXTENDED RELEASE ORAL
Qty: 90 TABLET | Refills: 2 | Status: SHIPPED | OUTPATIENT
Start: 2019-06-10 | End: 2019-07-08 | Stop reason: SDUPTHER

## 2019-06-13 DIAGNOSIS — M62.838 MUSCLE SPASM: ICD-10-CM

## 2019-06-13 RX ORDER — CYCLOBENZAPRINE HCL 10 MG
TABLET ORAL
Qty: 30 TABLET | Refills: 1 | Status: SHIPPED | OUTPATIENT
Start: 2019-06-13 | End: 2019-08-15 | Stop reason: SDUPTHER

## 2019-07-08 DIAGNOSIS — I10 ESSENTIAL HYPERTENSION: ICD-10-CM

## 2019-07-08 RX ORDER — METOPROLOL SUCCINATE 100 MG/1
100 TABLET, EXTENDED RELEASE ORAL DAILY
Qty: 90 TABLET | Refills: 3 | Status: SHIPPED | OUTPATIENT
Start: 2019-07-08 | End: 2020-03-27

## 2019-07-08 NOTE — TELEPHONE ENCOUNTER
Patient said that she never got her refill from when she called a few weeks ago for her blood pressure medication  She is completely out      Please refill    metoprolol succinate (TOPROL-XL) 100 mg 24 hr tablet, Dose, Route, Frequency: As Directed, Dispense Quantity: 90 tablet, Refills: 2, Sig: take 1 tablet by mouth once daily    [facility] [phone]

## 2019-08-15 DIAGNOSIS — M62.838 MUSCLE SPASM: ICD-10-CM

## 2019-09-03 RX ORDER — CYCLOBENZAPRINE HCL 10 MG
TABLET ORAL
Qty: 30 TABLET | Refills: 1 | Status: SHIPPED | OUTPATIENT
Start: 2019-09-03 | End: 2019-12-13 | Stop reason: SDUPTHER

## 2019-09-10 ENCOUNTER — EVALUATION (OUTPATIENT)
Dept: PHYSICAL THERAPY | Facility: REHABILITATION | Age: 64
End: 2019-09-10
Payer: COMMERCIAL

## 2019-09-10 DIAGNOSIS — M54.2 CHRONIC NECK PAIN: Primary | ICD-10-CM

## 2019-09-10 DIAGNOSIS — G89.29 CHRONIC NECK PAIN: Primary | ICD-10-CM

## 2019-09-10 DIAGNOSIS — M79.18 MYOFASCIAL PAIN SYNDROME, CERVICAL: ICD-10-CM

## 2019-09-10 DIAGNOSIS — E55.9 VITAMIN D DEFICIENCY: ICD-10-CM

## 2019-09-10 PROCEDURE — 97110 THERAPEUTIC EXERCISES: CPT | Performed by: PHYSICAL THERAPIST

## 2019-09-10 PROCEDURE — 97112 NEUROMUSCULAR REEDUCATION: CPT | Performed by: PHYSICAL THERAPIST

## 2019-09-10 PROCEDURE — 97161 PT EVAL LOW COMPLEX 20 MIN: CPT | Performed by: PHYSICAL THERAPIST

## 2019-09-10 RX ORDER — ERGOCALCIFEROL 1.25 MG/1
CAPSULE ORAL
Qty: 12 CAPSULE | Refills: 3 | Status: SHIPPED | OUTPATIENT
Start: 2019-09-10 | End: 2020-08-20

## 2019-09-10 NOTE — PROGRESS NOTES
PT Evaluation     Today's date: 9/10/2019  Patient name: Semaj Alexander  : 1955  MRN: [id number]  Referring provider: [facility], DO  Dx:   Encounter Diagnosis     ICD-10-CM    1  Chronic neck pain M54 2 Ambulatory referral to Physical Therapy    G89 29 PT plan of care cert/re-cert   2  Myofascial pain syndrome, cervical M79 18                   Assessment  Assessment details: Patient presents complaining of neck pain which has been ongoing for about 6 months ago  She reports some decrease in neck pain upon arrival today  She had an x-ray completed which demonstrated DDD  However her doctor believes it is muscle related, she reports her entire back feels like it is in spasm  She does report an increase in headaches in the past 6 months  She reports most of her pain laterally, as well as some central neck pain  She reports some pain going into her right shoulder  She does report some mild numbness and tingling going down to her fingers, but she reports this does not happen often  She reports having increased pain with neck movement following reading and having her head set in one position  She reports she retired  She reports also having a history of knee surgeries and pain, which is more limiting than her neck  She reports no increase in activity or trauma prior to the pain beginning  She reports she is occasionally taking flexeril which is assisting in pain control  Patient demonstrates excessive musculature tightness in her cervical and periscapular musculature  No significant strength loss demonstrated  Patient made aware of condition as well as the proposed treatment plan, including risks, benefits and alternatives  Impairments: abnormal or restricted ROM, activity intolerance, impaired physical strength, lacks appropriate home exercise program and pain with function     Prognosis: good    Goals  ST- demonstrate compliancy with HEP in 1 week     Decrease reported pain to 4/10 at worst and with activity, to improve functional capacity, within 2 weeks   Improve cervical flexibility, within 3 weeks     LT- Improve FOTO score to specified value to improve patients perceived benefit of therapy, in 8 weeks   Improve cervical motion to only minimal restriction with no complaints of pain, within 10 weeks   Be able to turn and look behind while driving, within 10 weeks     Plan  Plan details: Physical therapy with focus on there ex and manual therapy to improve ability to complete tasks around the house and complete functional activities, use of modalities as needed     Patient would benefit from: skilled physical therapy  Referral necessary: No  Planned modality interventions: cryotherapy, TENS and thermotherapy: hydrocollator packs  Planned therapy interventions: ADL training, balance, balance/weight bearing training, gait training, manual therapy, joint mobilization, neuromuscular re-education, strengthening, stretching, therapeutic activities and therapeutic exercise  Frequency: 2x week  Duration in weeks: 12  Plan of Care beginning date: 9/10/2019  Plan of Care expiration date: 12/3/2019  Treatment plan discussed with: patient        Subjective Evaluation    History of Present Illness  Date of onset: 3/1/2019  Mechanism of injury: Chronic onset   Pain  Current pain ratin  At best pain ratin  At worst pain ratin  Location: neck   Quality: dull ache and tight  Relieving factors: heat    Treatments  Current treatment: medication  Patient Goals  Patient goals for therapy: decreased pain and independence with ADLs/IADLs  Patient goal: be able to turn behind when driving         Objective     General Comments:      Cervical/Thoracic Comments  Ttp through cervical and periscapular musculature, especially in cervical paraspinals     rom  Cervical R side bend maximally limited  All other cervical movements moderately limited secondary to tightness   Shoulder movements WFL with complaints of pain above 90 mmt  Cervical flexion 4+/5   Cervical extension 4+/5   Cervical side bend 4+/5 B/L  Shoulder shrug 4+/5 B/L  Shoulder flexion 4/5* B/L  Shoulder abduction 4/5* B/L     Special tests (-) Spurlings     Joint play hypomobile with side glides   Slight limitations in C0-1 and C1-2       Flowsheet Rows      Most Recent Value   PT/OT G-Codes   Current Score  47   Projected Score  57             Precautions: DM, asthma, HTN, fibromyalgia, anxiety       Manual              IASTM B/L UT/LS, cervical paraspinals              F/B cervical ROM in sitting              UT/LS supine stretch             S-O release                              Exercise Diary              Doorway stretch              TB rows, extensions              Self snags              Thoracic extensions in sitting              1/2 foam roll progression                                                                                                                                                                                                                     Modalities              W/U MH cervical spine Statement Selected

## 2019-09-16 ENCOUNTER — OFFICE VISIT (OUTPATIENT)
Dept: PHYSICAL THERAPY | Facility: REHABILITATION | Age: 64
End: 2019-09-16
Payer: COMMERCIAL

## 2019-09-16 DIAGNOSIS — M79.18 MYOFASCIAL PAIN SYNDROME, CERVICAL: ICD-10-CM

## 2019-09-16 DIAGNOSIS — G89.29 CHRONIC NECK PAIN: Primary | ICD-10-CM

## 2019-09-16 DIAGNOSIS — M54.2 CHRONIC NECK PAIN: Primary | ICD-10-CM

## 2019-09-16 PROCEDURE — 97112 NEUROMUSCULAR REEDUCATION: CPT

## 2019-09-16 PROCEDURE — 97110 THERAPEUTIC EXERCISES: CPT

## 2019-09-16 PROCEDURE — 97140 MANUAL THERAPY 1/> REGIONS: CPT

## 2019-09-16 NOTE — PROGRESS NOTES
Daily Note     Today's date: 2019  Patient name: Eliza Merchant  : 1955  MRN: [id number]  Referring provider: Jewell Nugent DO  Dx:   Encounter Diagnosis     ICD-10-CM    1  Chronic neck pain M54 2     G89 29    2  Myofascial pain syndrome, cervical M79 18                   Subjective: Pt noted increased pain and tightness after last visit, states she was performing stretches/exercises incorrectly at home  Objective: See treatment diary below      Assessment: Tolerated treatment well  Patient would benefit from continued PT  Pt exhibits hypertonic behavior throughout session, has difficulty relaxing  Unable to perform proper PROM on cervical due to hypertonicity  Pt noted some relief with tightness at end of session  Pt requires cuing for form and relaxing during exercises  Pt  able to complete all exercises with no increase in pain during or after session  Pt 1:1 with PTA [id number], IEP for remainder  Plan: Continue per plan of care        Precautions: DM, asthma, HTN, fibromyalgia, anxiety       Manual              IASTM B/L UT/LS, cervical paraspinals  KP 8'            F/B cervical ROM in sitting  KP 1'            UT/LS supine stretch np            S-O release KP 2'                             Exercise Diary              Doorway stretch  5x20"            TB rows, extensions  2x10 ea otb            Self snags  5x10" ea            Thoracic extensions in sitting  10x10"            1/2 foam roll progression  5x1'            UBE 3/3                                                                                                                                                                                                      Modalities              W/U  cervical spine

## 2019-09-19 ENCOUNTER — OFFICE VISIT (OUTPATIENT)
Dept: PHYSICAL THERAPY | Facility: REHABILITATION | Age: 64
End: 2019-09-19
Payer: COMMERCIAL

## 2019-09-19 DIAGNOSIS — I10 ESSENTIAL HYPERTENSION: ICD-10-CM

## 2019-09-19 DIAGNOSIS — G89.29 CHRONIC NECK PAIN: Primary | ICD-10-CM

## 2019-09-19 DIAGNOSIS — M54.2 CHRONIC NECK PAIN: Primary | ICD-10-CM

## 2019-09-19 DIAGNOSIS — M79.18 MYOFASCIAL PAIN SYNDROME, CERVICAL: ICD-10-CM

## 2019-09-19 PROCEDURE — 97530 THERAPEUTIC ACTIVITIES: CPT | Performed by: PHYSICAL THERAPIST

## 2019-09-19 PROCEDURE — 97110 THERAPEUTIC EXERCISES: CPT | Performed by: PHYSICAL THERAPIST

## 2019-09-19 PROCEDURE — 97140 MANUAL THERAPY 1/> REGIONS: CPT | Performed by: PHYSICAL THERAPIST

## 2019-09-19 PROCEDURE — 97112 NEUROMUSCULAR REEDUCATION: CPT | Performed by: PHYSICAL THERAPIST

## 2019-09-19 RX ORDER — HYDROCHLOROTHIAZIDE 25 MG/1
TABLET ORAL
Qty: 90 TABLET | Refills: 2 | Status: SHIPPED | OUTPATIENT
Start: 2019-09-19 | End: 2020-03-20

## 2019-09-19 NOTE — PROGRESS NOTES
Daily Note     Today's date: 2019  Patient name: Ashok Sanderson  : 1955  MRN: [id number]  Referring provider: Marcel Whitman DO  Dx:   Encounter Diagnosis     ICD-10-CM    1  Chronic neck pain M54 2     G89 29    2  Myofascial pain syndrome, cervical M79 18                   Subjective: Reports having some soreness upon arrival today, but she is using heat on her neck, which feels good  Objective: See treatment diary below      Assessment: Tolerated treatment well  Patient would benefit from continued PT, patient arrived late for therapy today  Did well with all exercises today with no complaints of pain  Plan: Continue per plan of care        Precautions: DM, asthma, HTN, fibromyalgia, anxiety       Manual              IASTM B/L UT/LS, cervical paraspinals  KP 8' CW 6'            F/B cervical ROM in sitting  KP 1' CW 2'            UT/LS supine stretch np CW 4'           S-O release KP 2' CW 2'                             Exercise Diary              Doorway stretch  5x20" 5x20"            TB rows, extensions  2x10 ea otb 2x15 ea OTB           Self snags  5x10" ea 5x10" ea            Thoracic extensions in sitting  10x10" 10x10"            1/2 foam roll progression  5x1' 3x1'            UBE 3/3 3/3'            Chin tucks in sitting   15x5"                                                                                                                                                                                        Modalities              W/U  cervical spine

## 2019-09-20 ENCOUNTER — APPOINTMENT (OUTPATIENT)
Dept: LAB | Facility: CLINIC | Age: 64
End: 2019-09-20
Payer: COMMERCIAL

## 2019-09-20 ENCOUNTER — TRANSCRIBE ORDERS (OUTPATIENT)
Dept: LAB | Facility: CLINIC | Age: 64
End: 2019-09-20

## 2019-09-20 DIAGNOSIS — E11.9 CONTROLLED TYPE 2 DIABETES MELLITUS WITHOUT COMPLICATION, WITHOUT LONG-TERM CURRENT USE OF INSULIN (HCC): ICD-10-CM

## 2019-09-20 DIAGNOSIS — L40.50 PSORIATIC ARTHROPATHY (HCC): Primary | ICD-10-CM

## 2019-09-20 DIAGNOSIS — E78.00 HYPERCHOLESTEROLEMIA: ICD-10-CM

## 2019-09-20 DIAGNOSIS — I10 BENIGN ESSENTIAL HYPERTENSION: ICD-10-CM

## 2019-09-20 LAB
ALBUMIN SERPL BCP-MCNC: 4.2 G/DL (ref 3.5–5)
ALP SERPL-CCNC: 107 U/L (ref 46–116)
ALT SERPL W P-5'-P-CCNC: 39 U/L (ref 12–78)
ANION GAP SERPL CALCULATED.3IONS-SCNC: 6 MMOL/L (ref 4–13)
AST SERPL W P-5'-P-CCNC: 32 U/L (ref 5–45)
BASOPHILS # BLD AUTO: 0.08 THOUSANDS/ΜL (ref 0–0.1)
BASOPHILS NFR BLD AUTO: 2 % (ref 0–1)
BILIRUB SERPL-MCNC: 0.37 MG/DL (ref 0.2–1)
BILIRUB UR QL STRIP: NEGATIVE
BUN SERPL-MCNC: 7 MG/DL (ref 5–25)
CALCIUM SERPL-MCNC: 9.8 MG/DL (ref 8.3–10.1)
CHLORIDE SERPL-SCNC: 103 MMOL/L (ref 100–108)
CHOLEST SERPL-MCNC: 157 MG/DL (ref 50–200)
CLARITY UR: CLEAR
CO2 SERPL-SCNC: 28 MMOL/L (ref 21–32)
COLOR UR: YELLOW
CREAT SERPL-MCNC: 0.53 MG/DL (ref 0.6–1.3)
CREAT UR-MCNC: 28.3 MG/DL
CRP SERPL QL: 5.1 MG/L
EOSINOPHIL # BLD AUTO: 0.22 THOUSAND/ΜL (ref 0–0.61)
EOSINOPHIL NFR BLD AUTO: 5 % (ref 0–6)
ERYTHROCYTE [DISTWIDTH] IN BLOOD BY AUTOMATED COUNT: 12.8 % (ref 11.6–15.1)
ERYTHROCYTE [SEDIMENTATION RATE] IN BLOOD: 22 MM/HOUR (ref 0–20)
EST. AVERAGE GLUCOSE BLD GHB EST-MCNC: 146 MG/DL
GFR SERPL CREATININE-BSD FRML MDRD: 99 ML/MIN/1.73SQ M
GLUCOSE P FAST SERPL-MCNC: 118 MG/DL (ref 65–99)
GLUCOSE UR STRIP-MCNC: NEGATIVE MG/DL
HBA1C MFR BLD: 6.7 % (ref 4.2–6.3)
HCT VFR BLD AUTO: 42.8 % (ref 34.8–46.1)
HDLC SERPL-MCNC: 43 MG/DL (ref 40–60)
HGB BLD-MCNC: 14.4 G/DL (ref 11.5–15.4)
HGB UR QL STRIP.AUTO: NEGATIVE
IMM GRANULOCYTES # BLD AUTO: 0.01 THOUSAND/UL (ref 0–0.2)
IMM GRANULOCYTES NFR BLD AUTO: 0 % (ref 0–2)
KETONES UR STRIP-MCNC: NEGATIVE MG/DL
LDLC SERPL CALC-MCNC: 87 MG/DL (ref 0–100)
LEUKOCYTE ESTERASE UR QL STRIP: NEGATIVE
LYMPHOCYTES # BLD AUTO: 1.87 THOUSANDS/ΜL (ref 0.6–4.47)
LYMPHOCYTES NFR BLD AUTO: 40 % (ref 14–44)
MCH RBC QN AUTO: 30.9 PG (ref 26.8–34.3)
MCHC RBC AUTO-ENTMCNC: 33.6 G/DL (ref 31.4–37.4)
MCV RBC AUTO: 92 FL (ref 82–98)
MICROALBUMIN UR-MCNC: <5 MG/L (ref 0–20)
MICROALBUMIN/CREAT 24H UR: <18 MG/G CREATININE (ref 0–30)
MONOCYTES # BLD AUTO: 0.36 THOUSAND/ΜL (ref 0.17–1.22)
MONOCYTES NFR BLD AUTO: 8 % (ref 4–12)
NEUTROPHILS # BLD AUTO: 2.12 THOUSANDS/ΜL (ref 1.85–7.62)
NEUTS SEG NFR BLD AUTO: 45 % (ref 43–75)
NITRITE UR QL STRIP: NEGATIVE
NRBC BLD AUTO-RTO: 0 /100 WBCS
PH UR STRIP.AUTO: 6.5 [PH]
PLATELET # BLD AUTO: 181 THOUSANDS/UL (ref 149–390)
PMV BLD AUTO: 11 FL (ref 8.9–12.7)
POTASSIUM SERPL-SCNC: 3.7 MMOL/L (ref 3.5–5.3)
PROT SERPL-MCNC: 7.9 G/DL (ref 6.4–8.2)
PROT UR STRIP-MCNC: NEGATIVE MG/DL
RBC # BLD AUTO: 4.66 MILLION/UL (ref 3.81–5.12)
SODIUM SERPL-SCNC: 137 MMOL/L (ref 136–145)
SP GR UR STRIP.AUTO: 1.01 (ref 1–1.03)
TRIGL SERPL-MCNC: 137 MG/DL
TSH SERPL DL<=0.05 MIU/L-ACNC: 1.66 UIU/ML (ref 0.36–3.74)
UROBILINOGEN UR QL STRIP.AUTO: 0.2 E.U./DL
WBC # BLD AUTO: 4.66 THOUSAND/UL (ref 4.31–10.16)

## 2019-09-20 PROCEDURE — 85652 RBC SED RATE AUTOMATED: CPT

## 2019-09-20 PROCEDURE — 82570 ASSAY OF URINE CREATININE: CPT

## 2019-09-20 PROCEDURE — 82043 UR ALBUMIN QUANTITATIVE: CPT

## 2019-09-20 PROCEDURE — 36415 COLL VENOUS BLD VENIPUNCTURE: CPT

## 2019-09-20 PROCEDURE — 80053 COMPREHEN METABOLIC PANEL: CPT

## 2019-09-20 PROCEDURE — 85025 COMPLETE CBC W/AUTO DIFF WBC: CPT

## 2019-09-20 PROCEDURE — 84443 ASSAY THYROID STIM HORMONE: CPT

## 2019-09-20 PROCEDURE — 86140 C-REACTIVE PROTEIN: CPT

## 2019-09-20 PROCEDURE — 81003 URINALYSIS AUTO W/O SCOPE: CPT

## 2019-09-20 PROCEDURE — 80061 LIPID PANEL: CPT

## 2019-09-20 PROCEDURE — 83036 HEMOGLOBIN GLYCOSYLATED A1C: CPT

## 2019-09-23 ENCOUNTER — OFFICE VISIT (OUTPATIENT)
Dept: PHYSICAL THERAPY | Facility: REHABILITATION | Age: 64
End: 2019-09-23
Payer: COMMERCIAL

## 2019-09-23 DIAGNOSIS — M54.2 CHRONIC NECK PAIN: Primary | ICD-10-CM

## 2019-09-23 DIAGNOSIS — E78.2 MIXED HYPERLIPIDEMIA: ICD-10-CM

## 2019-09-23 DIAGNOSIS — G89.29 CHRONIC NECK PAIN: Primary | ICD-10-CM

## 2019-09-23 DIAGNOSIS — M79.18 MYOFASCIAL PAIN SYNDROME, CERVICAL: ICD-10-CM

## 2019-09-23 PROCEDURE — 97110 THERAPEUTIC EXERCISES: CPT

## 2019-09-23 PROCEDURE — 97140 MANUAL THERAPY 1/> REGIONS: CPT

## 2019-09-23 PROCEDURE — 97112 NEUROMUSCULAR REEDUCATION: CPT

## 2019-09-23 RX ORDER — SIMVASTATIN 10 MG
TABLET ORAL
Qty: 90 TABLET | Refills: 3 | Status: SHIPPED | OUTPATIENT
Start: 2019-09-23 | End: 2020-12-07

## 2019-09-23 NOTE — PROGRESS NOTES
Daily Note     Today's date: 2019  Patient name: Shayne Ashraf  : 1955  MRN: [id number]  Referring provider: Tabitha Faye DO  Dx:   Encounter Diagnosis     ICD-10-CM    1  Chronic neck pain M54 2     G89 29    2  Myofascial pain syndrome, cervical M79 18                   Subjective: Pt notes improvement upon arrival, states she has had a reduction in tightness and an increase in ROM      Objective: See treatment diary below      Assessment: Tolerated treatment well  Patient would benefit from continued PT   Pt  able to complete all exercises with no increase in pain during or after session  Pt able to progress exercises this session without complaint  Pt 1:1 with PTA [id number], IEP for remainder  Plan: Continue per plan of care        Precautions: DM, asthma, HTN, fibromyalgia, anxiety       Manual            IASTM B/L UT/LS, cervical paraspinals  KP 8' CW 6'  KP 5'          F/B cervical ROM in sitting  KP 1' CW 2'  KP 2'          UT/LS supine stretch np CW 4' KP 3'          S-O release KP 2' CW 2'  KP 2'                           Exercise Diary            Doorway stretch  5x20" 5x20"  5x20"          TB rows, extensions  2x10 ea otb 2x15 ea OTB 2x20 ea btb          Self snags  5x10" ea 5x10" ea  5x10" ea          Thoracic extensions in sitting  10x10" 10x10"  10x10"          1/2 foam roll progression  5x1' 3x1'  5x1'          UBE 3/3 3/3'  3/3          Chin tucks in sitting   15x5" 15x5"                                                                                                                                                                                       Modalities              W/U  cervical spine

## 2019-09-24 ENCOUNTER — APPOINTMENT (OUTPATIENT)
Dept: PHYSICAL THERAPY | Facility: REHABILITATION | Age: 64
End: 2019-09-24
Payer: COMMERCIAL

## 2019-09-30 DIAGNOSIS — E11.9 CONTROLLED TYPE 2 DIABETES MELLITUS WITHOUT COMPLICATION, WITHOUT LONG-TERM CURRENT USE OF INSULIN (HCC): ICD-10-CM

## 2019-09-30 RX ORDER — METFORMIN HYDROCHLORIDE 500 MG/1
TABLET, EXTENDED RELEASE ORAL
Qty: 180 TABLET | Refills: 0 | Status: SHIPPED | OUTPATIENT
Start: 2019-09-30 | End: 2019-12-23 | Stop reason: SDUPTHER

## 2019-09-30 NOTE — PROGRESS NOTES
Patient will be D/C due to financial limitations  No formal re-evaluation performed and goals were not assessed

## 2019-10-15 ENCOUNTER — OFFICE VISIT (OUTPATIENT)
Dept: FAMILY MEDICINE CLINIC | Facility: CLINIC | Age: 64
End: 2019-10-15
Payer: COMMERCIAL

## 2019-10-15 VITALS
RESPIRATION RATE: 18 BRPM | WEIGHT: 196 LBS | BODY MASS INDEX: 34.73 KG/M2 | SYSTOLIC BLOOD PRESSURE: 120 MMHG | HEIGHT: 63 IN | OXYGEN SATURATION: 97 % | HEART RATE: 79 BPM | DIASTOLIC BLOOD PRESSURE: 74 MMHG

## 2019-10-15 DIAGNOSIS — I10 BENIGN ESSENTIAL HYPERTENSION: Primary | ICD-10-CM

## 2019-10-15 DIAGNOSIS — Z23 NEED FOR VACCINATION: ICD-10-CM

## 2019-10-15 DIAGNOSIS — Z11.59 ENCOUNTER FOR HEPATITIS C SCREENING TEST FOR LOW RISK PATIENT: ICD-10-CM

## 2019-10-15 DIAGNOSIS — E11.9 CONTROLLED TYPE 2 DIABETES MELLITUS WITHOUT COMPLICATION, WITHOUT LONG-TERM CURRENT USE OF INSULIN (HCC): ICD-10-CM

## 2019-10-15 DIAGNOSIS — E78.00 HYPERCHOLESTEROLEMIA: ICD-10-CM

## 2019-10-15 DIAGNOSIS — L40.50 PSORIASIS WITH ARTHROPATHY (HCC): ICD-10-CM

## 2019-10-15 DIAGNOSIS — Z12.39 SCREENING FOR BREAST CANCER: ICD-10-CM

## 2019-10-15 PROBLEM — J01.10 ACUTE NON-RECURRENT FRONTAL SINUSITIS: Status: RESOLVED | Noted: 2019-05-15 | Resolved: 2019-10-15

## 2019-10-15 PROCEDURE — 90471 IMMUNIZATION ADMIN: CPT

## 2019-10-15 PROCEDURE — 99214 OFFICE O/P EST MOD 30 MIN: CPT | Performed by: FAMILY MEDICINE

## 2019-10-15 PROCEDURE — 3074F SYST BP LT 130 MM HG: CPT | Performed by: FAMILY MEDICINE

## 2019-10-15 PROCEDURE — 90662 IIV NO PRSV INCREASED AG IM: CPT

## 2019-10-15 PROCEDURE — 3008F BODY MASS INDEX DOCD: CPT | Performed by: FAMILY MEDICINE

## 2019-10-15 PROCEDURE — 3078F DIAST BP <80 MM HG: CPT | Performed by: FAMILY MEDICINE

## 2019-10-15 NOTE — PROGRESS NOTES
Assessment/Plan:    1  Benign essential hypertension  Assessment & Plan:  Stable on losartan    Orders:  -     CBC; Future; Expected date: 03/02/2020  -     Comprehensive metabolic panel; Future; Expected date: 03/02/2020  -     TSH, 3rd generation with Free T4 reflex; Future; Expected date: 03/02/2020    2  Controlled type 2 diabetes mellitus without complication, without long-term current use of insulin (HCC)  Assessment & Plan:  Stable on current labs  Lab Results   Component Value Date    HGBA1C 6 7 (H) 09/20/2019       Orders:  -     Hemoglobin A1C; Future; Expected date: 03/02/2020  -     Microalbumin / creatinine urine ratio; Future; Expected date: 03/02/2020  -     TSH, 3rd generation with Free T4 reflex; Future; Expected date: 03/02/2020    3  Psoriasis with arthropathy ([address] )  Assessment & Plan:  Stable  Seeing rheum      4  Hypercholesterolemia  Assessment & Plan:  Stable zocor    Orders:  -     Lipid Panel with Direct LDL reflex; Future; Expected date: 03/02/2020    5  Need for vaccination  -     influenza vaccine, 1828-3416, high-dose, PF 0 5 mL (FLUZONE HIGH-DOSE)    6  Encounter for hepatitis C screening test for low risk patient  -     Hepatitis C antibody; Future; Expected date: 03/02/2020    7  Screening for breast cancer  -     Mammo screening bilateral w 3d & cad; Future; Expected date: 10/15/2019        BMI Counseling: Body mass index is 35 28 kg/m²  Discussed the patient's BMI with her  The BMI is above normal  Nutrition recommendations include reducing portion sizes and 3-5 servings of fruits/vegetables daily  Exercise recommendations include exercising 3-5 times per week  There are no Patient Instructions on file for this visit  No follow-ups on file  Subjective:      Patient ID: Waylon Holcomb is a 75 y o  female      Chief Complaint   Patient presents with    Follow-up     Follow up diabetes    Diabetes       Here for follow up  Overall feeling ok    Diabetes   She presents for her follow-up diabetic visit  She has type 2 diabetes mellitus  Her disease course has been stable  There are no hypoglycemic associated symptoms  There are no diabetic associated symptoms  There are no hypoglycemic complications  Symptoms are stable  There are no diabetic complications  Risk factors for coronary artery disease include diabetes mellitus  Current diabetic treatment includes oral agent (monotherapy)  She is compliant with treatment all of the time  Her weight is stable  She has not had a previous visit with a dietitian  She participates in exercise intermittently  There is no change in her home blood glucose trend  An ACE inhibitor/angiotensin II receptor blocker is being taken  She does not see a podiatrist Eye exam is current  The following portions of the patient's history were reviewed and updated as appropriate: allergies, current medications, past family history, past medical history, past social history, past surgical history and problem list     Review of Systems   Constitutional: Negative  HENT: Negative  Eyes: Negative  Respiratory: Negative  Cardiovascular: Negative  Gastrointestinal: Negative  Endocrine: Negative  Genitourinary: Negative  Musculoskeletal: Positive for neck pain  Allergic/Immunologic: Negative  Neurological: Negative  Hematological: Negative  Psychiatric/Behavioral: Positive for sleep disturbance  Current Outpatient Medications   Medication Sig Dispense Refill    albuterol (VENTOLIN HFA) 90 mcg/act inhaler Inhale 2 puffs every 4 (four) hours as needed for wheezing or shortness of breath 1 Inhaler 5    ALPRAZolam (XANAX) 0 5 mg tablet Take 0 25 mg by mouth daily        amLODIPine (NORVASC) 10 mg tablet take 1 tablet by mouth once daily 90 tablet 3    budesonide-formoterol (SYMBICORT) 160-4 5 mcg/act inhaler Inhale 2 puffs 2 (two) times a day Rinse mouth after use   1 Inhaler 5    cyclobenzaprine (FLEXERIL) 10 mg tablet take 1 tablet by mouth at bedtime 30 tablet 1    ergocalciferol (VITAMIN D2) 50,000 units take 1 capsule by mouth every week 12 capsule 3    escitalopram (LEXAPRO) 5 mg tablet Take 5 mg by mouth daily   etanercept (ENBREL) 25 MG Inject 50 mg under the skin once a week Tuesday       folic acid (FOLVITE) 1 mg tablet Take 1 mg by mouth daily   hydrochlorothiazide (HYDRODIURIL) 25 mg tablet take 1 tablet by mouth once daily 90 tablet 2    losartan (COZAAR) 50 mg tablet take 1 tablet by mouth once daily as directed 90 tablet 3    metFORMIN (GLUCOPHAGE-XR) 500 mg 24 hr tablet take 1 tablet by mouth twice a day 180 tablet 0    Methotrexate, PF, 10 MG/0 4ML SOAJ Inject 10 mg under the skin once a week Tuesday       metoprolol succinate (TOPROL-XL) 100 mg 24 hr tablet Take 1 tablet (100 mg total) by mouth daily 90 tablet 3    oxyCODONE-acetaminophen (PERCOCET) 5-325 mg per tablet Take 1 tablet by mouth every 8 (eight) hours as needed for moderate pain      pioglitazone (ACTOS) 15 mg tablet take 1 tablet by mouth once daily 90 tablet 3    simvastatin (ZOCOR) 10 mg tablet take 1 tablet once daily 90 tablet 3     No current facility-administered medications for this visit  Objective:    /74 (BP Location: Left arm, Patient Position: Sitting, Cuff Size: Standard)   Pulse 79   Resp 18   Ht 5' 2 5" (1 588 m)   Wt 88 9 kg (196 lb)   SpO2 97%   BMI 35 28 kg/m²        Physical Exam   Constitutional: She appears well-developed and well-nourished  HENT:   Head: Normocephalic and atraumatic  Eyes: Pupils are equal, round, and reactive to light  EOM are normal    Neck: Normal range of motion  Neck supple  Cardiovascular: Normal rate, regular rhythm, normal heart sounds and intact distal pulses  Pulmonary/Chest: Effort normal and breath sounds normal    Abdominal: Soft  Bowel sounds are normal    Musculoskeletal: Normal range of motion  Neurological: She is alert  Skin: Skin is warm  Capillary refill takes less than 2 seconds  Psychiatric: She has a normal mood and affect  Her behavior is normal  Judgment and thought content normal    Nursing note and vitals reviewed               Priya Bowman,

## 2019-12-13 DIAGNOSIS — M62.838 MUSCLE SPASM: ICD-10-CM

## 2019-12-13 RX ORDER — CYCLOBENZAPRINE HCL 10 MG
TABLET ORAL
Qty: 30 TABLET | Refills: 0 | Status: SHIPPED | OUTPATIENT
Start: 2019-12-13 | End: 2020-01-09

## 2019-12-16 DIAGNOSIS — F41.9 ANXIETY: ICD-10-CM

## 2019-12-16 DIAGNOSIS — E11.9 CONTROLLED TYPE 2 DIABETES MELLITUS WITHOUT COMPLICATION, WITHOUT LONG-TERM CURRENT USE OF INSULIN (HCC): Primary | ICD-10-CM

## 2019-12-16 RX ORDER — ALPRAZOLAM 0.5 MG/1
0.25 TABLET ORAL DAILY
Qty: 30 TABLET | Refills: 0 | Status: SHIPPED | OUTPATIENT
Start: 2019-12-16

## 2019-12-16 RX ORDER — LANCETS
EACH MISCELLANEOUS
Qty: 100 EACH | Refills: 5 | Status: SHIPPED | OUTPATIENT
Start: 2019-12-16 | End: 2022-05-19

## 2019-12-16 NOTE — TELEPHONE ENCOUNTER
07/19/2019  1  03/18/2019  ALPRAZOLAM 0 5 MG TABLET  30 0  30  NI [facility]
Medication:ALPRAZolam (XANAX) 0 5 mg tablet       Dosage:0 5 mg  How Often:Sig: Take 0 25 mg by mouth daily  Quantity:  30  Last Office Visit: 10/15/19  Next Office Visit:4/21/20  Last refilled:?   How many pills left:0  Pharmacy:   [facility] [address] [id number], [facility],[address] [address]  Phone: [phone] Fax: [phone]        Patient also wanted to know if  test strips and needles can be sent over for her she uses a [facility] but I don't see anything in her chart      Please advise
Scripts put through
No

## 2019-12-17 DIAGNOSIS — I10 ESSENTIAL HYPERTENSION: ICD-10-CM

## 2019-12-17 RX ORDER — AMLODIPINE BESYLATE 10 MG/1
TABLET ORAL
Qty: 90 TABLET | Refills: 3 | Status: SHIPPED | OUTPATIENT
Start: 2019-12-17 | End: 2020-12-11

## 2019-12-23 DIAGNOSIS — E11.9 CONTROLLED TYPE 2 DIABETES MELLITUS WITHOUT COMPLICATION, WITHOUT LONG-TERM CURRENT USE OF INSULIN (HCC): ICD-10-CM

## 2019-12-23 RX ORDER — METFORMIN HYDROCHLORIDE 500 MG/1
TABLET, EXTENDED RELEASE ORAL
Qty: 180 TABLET | Refills: 3 | Status: SHIPPED | OUTPATIENT
Start: 2019-12-23 | End: 2020-12-22

## 2020-01-09 DIAGNOSIS — M62.838 MUSCLE SPASM: ICD-10-CM

## 2020-01-09 RX ORDER — CYCLOBENZAPRINE HCL 10 MG
TABLET ORAL
Qty: 30 TABLET | Refills: 0 | Status: SHIPPED | OUTPATIENT
Start: 2020-01-09 | End: 2020-02-16

## 2020-02-03 DIAGNOSIS — F41.1 GENERALIZED ANXIETY DISORDER: ICD-10-CM

## 2020-02-03 RX ORDER — ESCITALOPRAM OXALATE 10 MG/1
TABLET ORAL
Qty: 90 TABLET | Refills: 0 | Status: SHIPPED | OUTPATIENT
Start: 2020-02-03 | End: 2020-04-29

## 2020-02-04 ENCOUNTER — HOSPITAL ENCOUNTER (OUTPATIENT)
Dept: RADIOLOGY | Age: 65
Discharge: HOME | End: 2020-02-04
Payer: COMMERCIAL

## 2020-02-04 VITALS — BODY MASS INDEX: 36.25 KG/M2 | HEIGHT: 62 IN | WEIGHT: 197 LBS

## 2020-02-04 DIAGNOSIS — Z12.39 SCREENING FOR BREAST CANCER: ICD-10-CM

## 2020-02-04 PROCEDURE — 77067 SCR MAMMO BI INCL CAD: CPT

## 2020-02-04 PROCEDURE — 77063 BREAST TOMOSYNTHESIS BI: CPT

## 2020-02-15 DIAGNOSIS — M62.838 MUSCLE SPASM: ICD-10-CM

## 2020-02-16 RX ORDER — CYCLOBENZAPRINE HCL 10 MG
TABLET ORAL
Qty: 30 TABLET | Refills: 5 | Status: SHIPPED | OUTPATIENT
Start: 2020-02-16 | End: 2020-10-13 | Stop reason: ALTCHOICE

## 2020-02-18 DIAGNOSIS — I10 ESSENTIAL HYPERTENSION: ICD-10-CM

## 2020-02-18 RX ORDER — LOSARTAN POTASSIUM 50 MG/1
TABLET ORAL
Qty: 90 TABLET | Refills: 3 | Status: SHIPPED | OUTPATIENT
Start: 2020-02-18 | End: 2020-05-11

## 2020-02-25 ENCOUNTER — APPOINTMENT (OUTPATIENT)
Dept: LAB | Facility: CLINIC | Age: 65
End: 2020-02-25
Payer: COMMERCIAL

## 2020-02-25 ENCOUNTER — TRANSCRIBE ORDERS (OUTPATIENT)
Dept: LAB | Facility: CLINIC | Age: 65
End: 2020-02-25

## 2020-02-25 DIAGNOSIS — L40.50 PSORIATIC ARTHROPATHY (HCC): Primary | ICD-10-CM

## 2020-02-25 DIAGNOSIS — L40.50 PSORIATIC ARTHROPATHY (HCC): ICD-10-CM

## 2020-02-25 LAB
ALBUMIN SERPL BCP-MCNC: 3.8 G/DL (ref 3.5–5)
ALP SERPL-CCNC: 105 U/L (ref 46–116)
ALT SERPL W P-5'-P-CCNC: 40 U/L (ref 12–78)
ANION GAP SERPL CALCULATED.3IONS-SCNC: 6 MMOL/L (ref 4–13)
AST SERPL W P-5'-P-CCNC: 23 U/L (ref 5–45)
BACTERIA UR QL AUTO: NORMAL /HPF
BASOPHILS # BLD AUTO: 0.06 THOUSANDS/ΜL (ref 0–0.1)
BASOPHILS NFR BLD AUTO: 1 % (ref 0–1)
BILIRUB SERPL-MCNC: 0.32 MG/DL (ref 0.2–1)
BILIRUB UR QL STRIP: NEGATIVE
BUN SERPL-MCNC: 9 MG/DL (ref 5–25)
CALCIUM SERPL-MCNC: 9.6 MG/DL (ref 8.3–10.1)
CHLORIDE SERPL-SCNC: 100 MMOL/L (ref 100–108)
CLARITY UR: CLEAR
CO2 SERPL-SCNC: 28 MMOL/L (ref 21–32)
COLOR UR: YELLOW
CREAT SERPL-MCNC: 0.51 MG/DL (ref 0.6–1.3)
CRP SERPL QL: <3 MG/L
EOSINOPHIL # BLD AUTO: 0.27 THOUSAND/ΜL (ref 0–0.61)
EOSINOPHIL NFR BLD AUTO: 5 % (ref 0–6)
ERYTHROCYTE [DISTWIDTH] IN BLOOD BY AUTOMATED COUNT: 12.9 % (ref 11.6–15.1)
ERYTHROCYTE [SEDIMENTATION RATE] IN BLOOD: 18 MM/HOUR (ref 0–20)
GFR SERPL CREATININE-BSD FRML MDRD: 101 ML/MIN/1.73SQ M
GLUCOSE SERPL-MCNC: 136 MG/DL (ref 65–140)
GLUCOSE UR STRIP-MCNC: NEGATIVE MG/DL
HCT VFR BLD AUTO: 41 % (ref 34.8–46.1)
HGB BLD-MCNC: 13.7 G/DL (ref 11.5–15.4)
HGB UR QL STRIP.AUTO: NEGATIVE
HYALINE CASTS #/AREA URNS LPF: NORMAL /LPF
IMM GRANULOCYTES # BLD AUTO: 0.01 THOUSAND/UL (ref 0–0.2)
IMM GRANULOCYTES NFR BLD AUTO: 0 % (ref 0–2)
KETONES UR STRIP-MCNC: NEGATIVE MG/DL
LEUKOCYTE ESTERASE UR QL STRIP: NEGATIVE
LYMPHOCYTES # BLD AUTO: 2.11 THOUSANDS/ΜL (ref 0.6–4.47)
LYMPHOCYTES NFR BLD AUTO: 35 % (ref 14–44)
MCH RBC QN AUTO: 30.6 PG (ref 26.8–34.3)
MCHC RBC AUTO-ENTMCNC: 33.4 G/DL (ref 31.4–37.4)
MCV RBC AUTO: 92 FL (ref 82–98)
MONOCYTES # BLD AUTO: 0.46 THOUSAND/ΜL (ref 0.17–1.22)
MONOCYTES NFR BLD AUTO: 8 % (ref 4–12)
NEUTROPHILS # BLD AUTO: 3.14 THOUSANDS/ΜL (ref 1.85–7.62)
NEUTS SEG NFR BLD AUTO: 51 % (ref 43–75)
NITRITE UR QL STRIP: NEGATIVE
NON-SQ EPI CELLS URNS QL MICRO: NORMAL /HPF
NRBC BLD AUTO-RTO: 0 /100 WBCS
PH UR STRIP.AUTO: 7 [PH]
PLATELET # BLD AUTO: 181 THOUSANDS/UL (ref 149–390)
PMV BLD AUTO: 10.8 FL (ref 8.9–12.7)
POTASSIUM SERPL-SCNC: 4 MMOL/L (ref 3.5–5.3)
PROT SERPL-MCNC: 7.7 G/DL (ref 6.4–8.2)
PROT UR STRIP-MCNC: NEGATIVE MG/DL
RBC # BLD AUTO: 4.47 MILLION/UL (ref 3.81–5.12)
RBC #/AREA URNS AUTO: NORMAL /HPF
SODIUM SERPL-SCNC: 134 MMOL/L (ref 136–145)
SP GR UR STRIP.AUTO: 1.01 (ref 1–1.03)
UROBILINOGEN UR QL STRIP.AUTO: 0.2 E.U./DL
WBC # BLD AUTO: 6.05 THOUSAND/UL (ref 4.31–10.16)
WBC #/AREA URNS AUTO: NORMAL /HPF

## 2020-02-25 PROCEDURE — 81001 URINALYSIS AUTO W/SCOPE: CPT

## 2020-02-25 PROCEDURE — 85652 RBC SED RATE AUTOMATED: CPT

## 2020-02-25 PROCEDURE — 85025 COMPLETE CBC W/AUTO DIFF WBC: CPT

## 2020-02-25 PROCEDURE — 86140 C-REACTIVE PROTEIN: CPT

## 2020-02-25 PROCEDURE — 36415 COLL VENOUS BLD VENIPUNCTURE: CPT

## 2020-02-25 PROCEDURE — 80053 COMPREHEN METABOLIC PANEL: CPT

## 2020-03-06 ENCOUNTER — OFFICE VISIT (OUTPATIENT)
Dept: URGENT CARE | Age: 65
End: 2020-03-06
Payer: COMMERCIAL

## 2020-03-06 VITALS
TEMPERATURE: 98 F | SYSTOLIC BLOOD PRESSURE: 133 MMHG | HEART RATE: 79 BPM | DIASTOLIC BLOOD PRESSURE: 64 MMHG | RESPIRATION RATE: 18 BRPM | OXYGEN SATURATION: 96 %

## 2020-03-06 DIAGNOSIS — J01.40 ACUTE NON-RECURRENT PANSINUSITIS: ICD-10-CM

## 2020-03-06 DIAGNOSIS — J06.9 ACUTE UPPER RESPIRATORY INFECTION: Primary | ICD-10-CM

## 2020-03-06 DIAGNOSIS — J20.9 ACUTE BRONCHITIS, UNSPECIFIED ORGANISM: ICD-10-CM

## 2020-03-06 PROCEDURE — 99283 EMERGENCY DEPT VISIT LOW MDM: CPT | Performed by: FAMILY MEDICINE

## 2020-03-06 PROCEDURE — G0382 LEV 3 HOSP TYPE B ED VISIT: HCPCS | Performed by: FAMILY MEDICINE

## 2020-03-06 RX ORDER — AMOXICILLIN 500 MG/1
500 CAPSULE ORAL EVERY 8 HOURS SCHEDULED
Qty: 30 CAPSULE | Refills: 0 | Status: SHIPPED | OUTPATIENT
Start: 2020-03-06 | End: 2020-03-16

## 2020-03-06 NOTE — PROGRESS NOTES
[facility]        NAME: Merlinda Sole is a 75 y o  female  : 1955    MRN: [id number]  DATE: 2020  TIME: 2:56 PM    Assessment and Plan   Acute upper respiratory infection [J06 9]  1  Acute upper respiratory infection     2  Acute non-recurrent pansinusitis  amoxicillin (AMOXIL) 500 mg capsule   3  Acute bronchitis, unspecified organism           Patient Instructions     Patient Instructions   Options discussed with patient (patient states she is not currently taking methotrexate)  Rest, limit activity  Amoxicillin 3 times a day until finished (please take probiotics)  Cold/cough/sinus medication as needed (try Flonase nasal spray 1 spray in each nostril once or twice a day)  Tylenol, or ibuprofen (Advil/Motrin) as needed  Recheck/follow-up with family physician as needed  Please go to the hospital emergency department if needed  Follow up with PCP in 3-5 days  Proceed to  ER if symptoms worsen  Chief Complaint     Chief Complaint   Patient presents with    Cold Like Symptoms     x 8 days, cough, fever , head and chest congestion   Shortness of Breath         History of Present Illness       Fever, congestion, sinus pressure, cough, shortness of breath; patient states she is using Afrin nasal spray      Review of Systems   Review of Systems   HENT: Positive for congestion and sinus pressure  Respiratory: Positive for cough and shortness of breath  Cardiovascular: Negative  Musculoskeletal: Negative  Skin: Negative  Neurological: Negative            Current Medications       Current Outpatient Medications:     albuterol (VENTOLIN HFA) 90 mcg/act inhaler, Inhale 2 puffs every 4 (four) hours as needed for wheezing or shortness of breath, Disp: 1 Inhaler, Rfl: 5    ALPRAZolam (XANAX) 0 5 mg tablet, Take 0 5 tablets (0 25 mg total) by mouth daily, Disp: 30 tablet, Rfl: 0    amLODIPine (NORVASC) 10 mg tablet, take 1 tablet by mouth once daily, Disp: 90 tablet, Rfl: 3    amoxicillin (AMOXIL) 500 mg capsule, Take 1 capsule (500 mg total) by mouth every 8 (eight) hours for 30 doses, Disp: 30 capsule, Rfl: 0    budesonide-formoterol (SYMBICORT) 160-4 5 mcg/act inhaler, Inhale 2 puffs 2 (two) times a day Rinse mouth after use , Disp: 1 Inhaler, Rfl: 5    cyclobenzaprine (FLEXERIL) 10 mg tablet, take 1 tablet by mouth at bedtime, Disp: 30 tablet, Rfl: 5    ergocalciferol (VITAMIN D2) 50,000 units, take 1 capsule by mouth every week, Disp: 12 capsule, Rfl: 3    escitalopram (LEXAPRO) 10 mg tablet, take 1 tablet by mouth once daily, Disp: 90 tablet, Rfl: 0    escitalopram (LEXAPRO) 5 mg tablet, Take 5 mg by mouth daily  , Disp: , Rfl:     etanercept (ENBREL) 25 MG, Inject 50 mg under the skin once a week Tuesday , Disp: , Rfl:     folic acid (FOLVITE) 1 mg tablet, Take 1 mg by mouth daily  , Disp: , Rfl:     glucose blood (ONE TOUCH ULTRA TEST) test strip, Check Blood Sugar 1 time Daily  , Disp: 100 each, Rfl: 5    hydrochlorothiazide (HYDRODIURIL) 25 mg tablet, take 1 tablet by mouth once daily, Disp: 90 tablet, Rfl: 2    Lancets (ONETOUCH ULTRASOFT) lancets, Check Blood Sugar 1 time daily, Disp: 100 each, Rfl: 5    losartan (COZAAR) 50 mg tablet, take 1 tablet once daily as directed, Disp: 90 tablet, Rfl: 3    metFORMIN (GLUCOPHAGE-XR) 500 mg 24 hr tablet, take 1 tablet by mouth twice a day, Disp: 180 tablet, Rfl: 3    Methotrexate, PF, 10 MG/0 4ML SOAJ, Inject 10 mg under the skin once a week Tuesday , Disp: , Rfl:     metoprolol succinate (TOPROL-XL) 100 mg 24 hr tablet, Take 1 tablet (100 mg total) by mouth daily, Disp: 90 tablet, Rfl: 3    oxyCODONE-acetaminophen (PERCOCET) 5-325 mg per tablet, Take 1 tablet by mouth every 8 (eight) hours as needed for moderate pain, Disp: , Rfl:     pioglitazone (ACTOS) 15 mg tablet, take 1 tablet by mouth once daily, Disp: 90 tablet, Rfl: 3    simvastatin (ZOCOR) 10 mg tablet, take 1 tablet once daily, Disp: 90 tablet, Rfl: 3    Current Allergies     Allergies as of 2020 - Reviewed 2020   Allergen Reaction Noted    Zithromax [azithromycin] Hives 2012    Lisinopril Cough 2017            The following portions of the patient's history were reviewed and updated as appropriate: allergies, current medications, past family history, past medical history, past social history, past surgical history and problem list      Past Medical History:   Diagnosis Date    Acute non-recurrent frontal sinusitis 5/15/2019    Anxiety disorder     Arthritis     OA    Asthma     exercise iniduced   Cancer (HCC)     Squamous Cell on back    Dependence on crutches     prn    Depression     ro       Diabetes mellitus ([facility] [address] )     NIDDM; per Allscripts: Last Assessed: 7/15/15, New onset of type 2    Fibromyalgia     Hearing aid worn     mayra    Heart murmur     HL (hearing loss)     b/l - uses hearing aids    Hyperglycemia     Last Assessed:10/29/15    Hyperlipidemia     Hypertension     Psoriasis     red dry patch left side- waist area    Psoriatic arthritis (HCC)     RA (rheumatoid arthritis) ([facility] )     Wears glasses     reading       Past Surgical History:   Procedure Laterality Date    ARTHROSCOPY KNEE Left 3/2/2016    Procedure: ARTHROSCOPY KNEE;  Surgeon: Sharmila Gomez MD;  [facility]: [facility];  Service:      SECTION      X2    CYST REMOVAL Right     Cyst on bone     FRACTURE SURGERY      (Right) tibia/fibula Fx, and ankle    PILONIDAL CYST / SINUS EXCISION      [place] KNEE SCOPE,MED/LAT MENISECTOMY Left 3/2/2016    Procedure: PARTIAL MEDIAL &  LATERAL MENISCECTOMIES, CHONDROPLASTY, REMOVAL OSTEOPHYTE, LIMITED SYNOVECTOMY;  Surgeon: Sharmila Gomez MD;  Location: [facility];  Service: Orthopedics    REPAIR KNEE LIGAMENT      TOOTH EXTRACTION         Family History   Problem Relation Age of Onset    Hypertension Mother     Heart attack Mother     Osteoarthritis Mother     Breast cancer Mother 80    Alcohol abuse Father     Hypertension Brother     Diabetes Brother     Other Brother         Dyslipidemia    Coronary artery disease Brother     No Known Problems Sister     No Known Problems Daughter     No Known Problems Maternal Grandmother     No Known Problems Maternal Grandfather     No Known Problems Paternal Grandmother     No Known Problems Paternal Grandfather     No Known Problems Daughter     No Known Problems Maternal Aunt     No Known Problems Paternal Aunt     No Known Problems Paternal Aunt     No Known Problems Paternal Aunt     No Known Problems Paternal Aunt     No Known Problems Paternal Aunt          Medications have been verified  Objective   /64   Pulse 79   Temp 98 °F (36 7 °C)   Resp 18   SpO2 96%        Physical Exam     Physical Exam   Constitutional: She is oriented to person, place, and time  She appears well-developed and well-nourished  Patient appears ill   HENT:   Nasal congestion; discomfort over the sinuses; injection of the oropharynx   Neck: Normal range of motion  Neck supple  Cardiovascular: Normal rate, regular rhythm and normal heart sounds  Pulmonary/Chest: Effort normal  No respiratory distress  She has no wheezes  Coarse breath sounds   Neurological: She is alert and oriented to person, place, and time  No nuchal rigidity   Skin:   Fair color and turgor   Psychiatric: She has a normal mood and affect  Her behavior is normal    Nursing note and vitals reviewed

## 2020-03-06 NOTE — PATIENT INSTRUCTIONS
Options discussed with patient (patient states she is not currently taking methotrexate)  Rest, limit activity  Amoxicillin 3 times a day until finished (please take probiotics)  Cold/cough/sinus medication as needed (try Flonase nasal spray 1 spray in each nostril once or twice a day)  Tylenol, or ibuprofen (Advil/Motrin) as needed  Recheck/follow-up with family physician as needed  Please go to the hospital emergency department if needed

## 2020-03-10 ENCOUNTER — OFFICE VISIT (OUTPATIENT)
Dept: FAMILY MEDICINE CLINIC | Facility: CLINIC | Age: 65
End: 2020-03-10
Payer: COMMERCIAL

## 2020-03-10 VITALS
RESPIRATION RATE: 18 BRPM | HEART RATE: 100 BPM | SYSTOLIC BLOOD PRESSURE: 130 MMHG | OXYGEN SATURATION: 95 % | TEMPERATURE: 99.1 F | DIASTOLIC BLOOD PRESSURE: 60 MMHG

## 2020-03-10 DIAGNOSIS — J45.41 MODERATE PERSISTENT ASTHMA WITH EXACERBATION: Primary | ICD-10-CM

## 2020-03-10 DIAGNOSIS — J01.00 ACUTE NON-RECURRENT MAXILLARY SINUSITIS: ICD-10-CM

## 2020-03-10 PROCEDURE — 1036F TOBACCO NON-USER: CPT | Performed by: FAMILY MEDICINE

## 2020-03-10 PROCEDURE — 4040F PNEUMOC VAC/ADMIN/RCVD: CPT | Performed by: FAMILY MEDICINE

## 2020-03-10 PROCEDURE — 2022F DILAT RTA XM EVC RTNOPTHY: CPT | Performed by: FAMILY MEDICINE

## 2020-03-10 PROCEDURE — 94640 AIRWAY INHALATION TREATMENT: CPT | Performed by: FAMILY MEDICINE

## 2020-03-10 PROCEDURE — 3078F DIAST BP <80 MM HG: CPT | Performed by: FAMILY MEDICINE

## 2020-03-10 PROCEDURE — 3075F SYST BP GE 130 - 139MM HG: CPT | Performed by: FAMILY MEDICINE

## 2020-03-10 PROCEDURE — 1160F RVW MEDS BY RX/DR IN RCRD: CPT | Performed by: FAMILY MEDICINE

## 2020-03-10 PROCEDURE — 99213 OFFICE O/P EST LOW 20 MIN: CPT | Performed by: FAMILY MEDICINE

## 2020-03-10 RX ORDER — ALBUTEROL SULFATE 2.5 MG/3ML
2.5 SOLUTION RESPIRATORY (INHALATION) EVERY 6 HOURS PRN
Qty: 25 VIAL | Refills: 3 | Status: SHIPPED | OUTPATIENT
Start: 2020-03-10

## 2020-03-10 RX ORDER — CEFPROZIL 500 MG/1
500 TABLET, FILM COATED ORAL 2 TIMES DAILY
Qty: 20 TABLET | Refills: 0 | Status: SHIPPED | OUTPATIENT
Start: 2020-03-10 | End: 2020-03-20

## 2020-03-10 RX ORDER — PROMETHAZINE HYDROCHLORIDE AND CODEINE PHOSPHATE 6.25; 1 MG/5ML; MG/5ML
5 SYRUP ORAL EVERY 4 HOURS PRN
Qty: 120 ML | Refills: 0 | Status: SHIPPED | OUTPATIENT
Start: 2020-03-10 | End: 2020-04-21 | Stop reason: ALTCHOICE

## 2020-03-10 NOTE — PROGRESS NOTES
Assessment/Plan:    1  Moderate persistent asthma with exacerbation  Assessment & Plan:  Discussed prednisone  Pt would like to hold off 1 day to see if improvement with nebulizer    Orders:  -     Mini neb; Future  -     albuterol (2 5 mg/3 mL) 0 083 % nebulizer solution; Take 1 vial (2 5 mg total) by nebulization every 6 (six) hours as needed for wheezing or shortness of breath  -     Nebulizer  -     Nebulizer Supplies    2  Acute non-recurrent maxillary sinusitis  -     cefprosil (CEFZIL) 500 MG tablet; Take 1 tablet (500 mg total) by mouth 2 (two) times a day for 10 days    BMI Counseling: There is no height or weight on file to calculate BMI  The BMI is above normal  Nutrition recommendations include encouraging healthy choices of fruits and vegetables  Exercise recommendations include exercising 3-5 times per week  Mini neb  Performed by: Vik Bell DO  Authorized by: Vik Bell DO     Number of treatments:  1  Treatment 1:   Pre-Procedure     Symptoms:  Labored breathing, shortness of breath and cough    Lung Sounds:  Dec BS    Medication Administered:  Albuterol 2 5 mg  Post-Procedure     Symptoms:  Wheezing and cough    Lung sounds:  Increased wheezing  Nebulizer Comments: Will have her start home nebs every 4 hours      There are no Patient Instructions on file for this visit  No follow-ups on file  Subjective:      Patient ID: Ruchi Howard is a 75 y o  female  Chief Complaint   Patient presents with   Verner Cure Like Symptoms     Patient here with congestion cough in head and chest shortness of breath        Here for illness  2 weeks ago started with illness  Not better but last Friday went to urgent care  Gave her amoxil and mucinex, dayquil  Day 5 of antibiotic  Coughing all the time  Sleeping sitting up  ventolin    URI    This is a new problem  The current episode started 1 to 4 weeks ago  The problem has been unchanged  Maximum temperature: possible   Associated symptoms include coughing, headaches, rhinorrhea and wheezing  She has tried antihistamine, decongestant and inhaler use for the symptoms  The following portions of the patient's history were reviewed and updated as appropriate:  past social history    Review of Systems   Constitutional: Positive for fatigue  Negative for fever  HENT: Positive for rhinorrhea  Eyes: Negative  Respiratory: Positive for cough, shortness of breath and wheezing  Cardiovascular: Negative  Gastrointestinal: Negative  Endocrine: Negative  Genitourinary: Negative  Musculoskeletal: Negative  Allergic/Immunologic: Negative  Neurological: Positive for headaches  Hematological: Negative  Psychiatric/Behavioral: Negative  Current Outpatient Medications   Medication Sig Dispense Refill    albuterol (VENTOLIN HFA) 90 mcg/act inhaler Inhale 2 puffs every 4 (four) hours as needed for wheezing or shortness of breath 1 Inhaler 5    ALPRAZolam (XANAX) 0 5 mg tablet Take 0 5 tablets (0 25 mg total) by mouth daily 30 tablet 0    amLODIPine (NORVASC) 10 mg tablet take 1 tablet by mouth once daily 90 tablet 3    amoxicillin (AMOXIL) 500 mg capsule Take 1 capsule (500 mg total) by mouth every 8 (eight) hours for 30 doses 30 capsule 0    budesonide-formoterol (SYMBICORT) 160-4 5 mcg/act inhaler Inhale 2 puffs 2 (two) times a day Rinse mouth after use  1 Inhaler 5    cyclobenzaprine (FLEXERIL) 10 mg tablet take 1 tablet by mouth at bedtime 30 tablet 5    ergocalciferol (VITAMIN D2) 50,000 units take 1 capsule by mouth every week 12 capsule 3    escitalopram (LEXAPRO) 10 mg tablet take 1 tablet by mouth once daily 90 tablet 0    escitalopram (LEXAPRO) 5 mg tablet Take 5 mg by mouth daily   etanercept (ENBREL) 25 MG Inject 50 mg under the skin once a week Tuesday       folic acid (FOLVITE) 1 mg tablet Take 1 mg by mouth daily        glucose blood (ONE TOUCH ULTRA TEST) test strip Check Blood Sugar 1 time Daily  100 each 5    hydrochlorothiazide (HYDRODIURIL) 25 mg tablet take 1 tablet by mouth once daily 90 tablet 2    Lancets (ONETOUCH ULTRASOFT) lancets Check Blood Sugar 1 time daily 100 each 5    losartan (COZAAR) 50 mg tablet take 1 tablet once daily as directed 90 tablet 3    metFORMIN (GLUCOPHAGE-XR) 500 mg 24 hr tablet take 1 tablet by mouth twice a day 180 tablet 3    Methotrexate, PF, 10 MG/0 4ML SOAJ Inject 10 mg under the skin once a week Tuesday       metoprolol succinate (TOPROL-XL) 100 mg 24 hr tablet Take 1 tablet (100 mg total) by mouth daily 90 tablet 3    oxyCODONE-acetaminophen (PERCOCET) 5-325 mg per tablet Take 1 tablet by mouth every 8 (eight) hours as needed for moderate pain      pioglitazone (ACTOS) 15 mg tablet take 1 tablet by mouth once daily 90 tablet 3    simvastatin (ZOCOR) 10 mg tablet take 1 tablet once daily 90 tablet 3    albuterol (2 5 mg/3 mL) 0 083 % nebulizer solution Take 1 vial (2 5 mg total) by nebulization every 6 (six) hours as needed for wheezing or shortness of breath 25 vial 3    cefprosil (CEFZIL) 500 MG tablet Take 1 tablet (500 mg total) by mouth 2 (two) times a day for 10 days 20 tablet 0     No current facility-administered medications for this visit  Objective:    /60   Pulse 100   Temp 99 1 °F (37 3 °C)   Resp 18   SpO2 95%      Physical Exam   Constitutional: She appears well-developed and well-nourished  HENT:   Head: Normocephalic and atraumatic  Right Ear: External ear normal    Left Ear: External ear normal    Nose: Nose normal    Mouth/Throat: Oropharynx is clear and moist    Eyes: Pupils are equal, round, and reactive to light  EOM are normal    Neck: Normal range of motion  Neck supple  Cardiovascular: Normal rate, regular rhythm, normal heart sounds and intact distal pulses  Pulmonary/Chest: Effort normal and breath sounds normal    Abdominal: Soft  Bowel sounds are normal    Neurological: She is alert     Skin: Skin is warm  Capillary refill takes less than 2 seconds  Psychiatric: She has a normal mood and affect  Nursing note and vitals reviewed            Jose Jade DO

## 2020-03-11 ENCOUNTER — TELEPHONE (OUTPATIENT)
Dept: FAMILY MEDICINE CLINIC | Facility: CLINIC | Age: 65
End: 2020-03-11

## 2020-03-11 DIAGNOSIS — J45.41 MODERATE PERSISTENT ASTHMA WITH EXACERBATION: Primary | ICD-10-CM

## 2020-03-11 RX ORDER — PREDNISONE 10 MG/1
TABLET ORAL
Qty: 30 TABLET | Refills: 0 | Status: SHIPPED | OUTPATIENT
Start: 2020-03-11 | End: 2020-04-21 | Stop reason: ALTCHOICE

## 2020-03-11 NOTE — TELEPHONE ENCOUNTER
Patient called to let you know that she is feeling the same; she may be a "little better" as she made herself a meal      She has taken her medication and breathing treatments      She also found her Prednisone but it's 1years old

## 2020-03-11 NOTE — TELEPHONE ENCOUNTER
Yes she would like the prednisone please send to [facility]

## 2020-03-13 ENCOUNTER — OFFICE VISIT (OUTPATIENT)
Dept: URGENT CARE | Age: 65
End: 2020-03-13
Payer: COMMERCIAL

## 2020-03-13 VITALS — OXYGEN SATURATION: 94 % | HEART RATE: 72 BPM | RESPIRATION RATE: 20 BRPM | TEMPERATURE: 97.8 F

## 2020-03-13 DIAGNOSIS — J20.9 ACUTE BRONCHITIS, UNSPECIFIED ORGANISM: ICD-10-CM

## 2020-03-13 DIAGNOSIS — J06.9 ACUTE UPPER RESPIRATORY INFECTION: Primary | ICD-10-CM

## 2020-03-13 PROCEDURE — 99283 EMERGENCY DEPT VISIT LOW MDM: CPT | Performed by: FAMILY MEDICINE

## 2020-03-13 PROCEDURE — G0382 LEV 3 HOSP TYPE B ED VISIT: HCPCS | Performed by: FAMILY MEDICINE

## 2020-03-13 PROCEDURE — U0002 COVID-19 LAB TEST NON-CDC: HCPCS | Performed by: FAMILY MEDICINE

## 2020-03-13 RX ORDER — METHOTREXATE 25 MG/ML
INJECTION, SOLUTION INTRA-ARTERIAL; INTRAMUSCULAR; INTRAVENOUS
COMMUNITY
Start: 2019-12-31 | End: 2020-12-04 | Stop reason: ALTCHOICE

## 2020-03-13 RX ORDER — SIMVASTATIN 10 MG
TABLET ORAL
COMMUNITY
Start: 2020-03-11 | End: 2020-04-21 | Stop reason: SDUPTHER

## 2020-03-13 RX ORDER — PREDNISONE 10 MG/1
TABLET ORAL
COMMUNITY
Start: 2020-03-11 | End: 2020-04-21 | Stop reason: ALTCHOICE

## 2020-03-13 RX ORDER — PROMETHAZINE HYDROCHLORIDE AND CODEINE PHOSPHATE 6.25; 1 MG/5ML; MG/5ML
SOLUTION ORAL
COMMUNITY
Start: 2020-03-10 | End: 2020-04-21 | Stop reason: SDUPTHER

## 2020-03-13 RX ORDER — OXYCODONE HYDROCHLORIDE AND ACETAMINOPHEN 5; 325 MG/1; MG/1
TABLET ORAL
COMMUNITY
Start: 2019-12-31 | End: 2020-04-21 | Stop reason: SDUPTHER

## 2020-03-13 RX ORDER — ALBUTEROL SULFATE 2.5 MG/3ML
SOLUTION RESPIRATORY (INHALATION)
COMMUNITY
Start: 2020-03-10

## 2020-03-13 NOTE — PROGRESS NOTES
[address]        NAME: Pio Rudolph is a 75 y o  female  : 1955    MRN: [id number]  DATE: 2020  TIME: 5:21 PM    Assessment and Plan   Acute upper respiratory infection [J06 9]  1  Acute upper respiratory infection  MISCELLANEOUS LAB TEST    MISCELLANEOUS LAB TEST    CANCELED: MISCELLANEOUS LAB TEST   2  Acute bronchitis, unspecified organism  MISCELLANEOUS LAB TEST    MISCELLANEOUS LAB TEST    CANCELED: MISCELLANEOUS LAB TEST    CANCELED: MISCELLANEOUS LAB TEST    CANCELED: MISCELLANEOUS LAB TEST         Patient Instructions     Patient Instructions   Rest, limit activity until lab results are available  Continue antibiotics and steroids as directed by your treating physician  Nebulizer treatment with albuterol every 4 hours as needed  Recheck/follow-up with treating physician  Please go to the hospital emergency department if neeeded  [address]    Your healthcare provider and/or public health staff have evaluated you and have determined that you do not need to be hospitalized at this time  At this time you can be isolated at home where you will be monitored by staff from your local or state health department  You should carefully follow the prevention and isolation steps below until a healthcare provider or local or state health department says that you can return to your normal activities  Stay home except to get medical care    People who are mildly ill with COVID-19 are able to isolate at home during their illness  You should restrict activities outside your home, except for getting medical care  Do not go to work, school, or public areas  Avoid using public transportation, ride-sharing, or taxis  Separate yourself from other people and animals in your home    People: As much as possible, you should stay in a specific room and away from other people in your home  Also, you should use a separate bathroom, if available  Animals:  You should restrict contact with pets and other animals while you are sick with COVID-19, just like you would around other people  Although there have not been reports of pets or other animals becoming sick with COVID-19, it is still recommended that people sick with COVID-19 limit contact with animals until more information is known about the virus  When possible, have another member of your household care for your animals while you are sick  If you are sick with COVID-19, avoid contact with your pet, including petting, snuggling, being kissed or licked, and sharing food  If you must care for your pet or be around animals while you are sick, wash your hands before and after you interact with pets and wear a facemask  See COVID-19 and Animals for more information  Call ahead before visiting your doctor    If you have a medical appointment, call the healthcare provider and tell them that you have or may have COVID-19  This will help the healthcare providers office take steps to keep other people from getting infected or exposed  Wear a facemask    You should wear a facemask when you are around other people (e g , sharing a room or vehicle) or pets and before you enter a healthcare providers office  If you are not able to wear a facemask (for example, because it causes trouble breathing), then people who live with you should not stay in the same room with you, or they should wear a facemask if they enter your room  Cover your coughs and sneezes    Cover your mouth and nose with a tissue when you cough or sneeze  Throw used tissues in a lined trash can  Immediately wash your hands with soap and water for at least 20 seconds or, if soap and water are not available, clean your hands with an alcohol-based hand  that contains at least 60% alcohol      Clean your hands often    Wash your hands often with soap and water for at least 20 seconds, especially after blowing your nose, coughing, or sneezing; going to the bathroom; and before eating or preparing food  If soap and water are not readily available, use an alcohol-based hand  with at least 60% alcohol, covering all surfaces of your hands and rubbing them together until they feel dry  Soap and water are the best option if hands are visibly dirty  Avoid touching your eyes, nose, and mouth with unwashed hands  Avoid sharing personal household items    You should not share dishes, drinking glasses, cups, eating utensils, towels, or bedding with other people or pets in your home  After using these items, they should be washed thoroughly with soap and water  Clean all high-touch surfaces everyday    High touch surfaces include counters, tabletops, doorknobs, bathroom fixtures, toilets, phones, keyboards, tablets, and bedside tables  Also, clean any surfaces that may have blood, stool, or body fluids on them  Use a household cleaning spray or wipe, according to the label instructions  Labels contain instructions for safe and effective use of the cleaning product including precautions you should take when applying the product, such as wearing gloves and making sure you have good ventilation during use of the product  Monitor your symptoms    Seek prompt medical attention if your illness is worsening (e g , difficulty breathing)  Before seeking care, call your healthcare provider and tell them that you have, or are being evaluated for, COVID-19  Put on a facemask before you enter the facility  These steps will help the healthcare providers office to keep other people in the office or waiting room from getting infected or exposed  Ask your healthcare provider to call the local or state health department  Persons who are placed under active monitoring or facilitated self-monitoring should follow instructions provided by their local health department or occupational health professionals, as appropriate    If you have a medical emergency and need to call 911, notify the dispatch personnel that you have, or are being evaluated for COVID-19  If possible, put on a facemask before emergency medical services arrive  Discontinuing home isolation    Patients with confirmed COVID-19 should remain under home isolation precautions until the risk of secondary transmission to others is thought to be low  The decision to discontinue home isolation precautions should be made on a case-by-case basis, in consultation with healthcare providers and state and local health departments  Source: [employer]      Follow up with PCP in 3-5 days  Proceed to  ER if symptoms worsen  Chief Complaint     Chief Complaint   Patient presents with    Shortness of Breath     Cough for going on 3 weeks, treated for URI but now seems worse  Mucous is clear  Was told by her provider and the [address] [place] [address] office to come to [facility] for Herminio virus testin  History of Present Illness       Patient with cough and bronchial asthma; patient was seen here 03/06/2020 and started on amoxicillin; patient was switched to Cefzil 03/10/2020 by her family physician and then started on prednisone today (03/13/2020); patient states she is using her nebulizer with albuterol every 4 hours      Review of Systems   Review of Systems   HENT: Positive for congestion  Respiratory: Positive for cough, chest tightness, shortness of breath and wheezing  Cardiovascular: Negative  Musculoskeletal: Negative  Skin: Negative  Neurological: Negative            Current Medications       Current Outpatient Medications:     albuterol (2 5 mg/3 mL) 0 083 % nebulizer solution, Take 1 vial (2 5 mg total) by nebulization every 6 (six) hours as needed for wheezing or shortness of breath, Disp: 25 vial, Rfl: 3    albuterol (2 5 mg/3 mL) 0 083 % nebulizer solution, , Disp: , Rfl:     albuterol (VENTOLIN HFA) 90 mcg/act inhaler, Inhale 2 puffs every 4 (four) hours as needed for wheezing or shortness of breath, Disp: 1 Inhaler, Rfl: 5    ALPRAZolam (XANAX) 0 5 mg tablet, Take 0 5 tablets (0 25 mg total) by mouth daily, Disp: 30 tablet, Rfl: 0    amLODIPine (NORVASC) 10 mg tablet, take 1 tablet by mouth once daily, Disp: 90 tablet, Rfl: 3    amoxicillin (AMOXIL) 500 mg capsule, Take 1 capsule (500 mg total) by mouth every 8 (eight) hours for 30 doses, Disp: 30 capsule, Rfl: 0    budesonide-formoterol (SYMBICORT) 160-4 5 mcg/act inhaler, Inhale 2 puffs 2 (two) times a day Rinse mouth after use , Disp: 1 Inhaler, Rfl: 5    cefprosil (CEFZIL) 500 MG tablet, Take 1 tablet (500 mg total) by mouth 2 (two) times a day for 10 days, Disp: 20 tablet, Rfl: 0    cyclobenzaprine (FLEXERIL) 10 mg tablet, take 1 tablet by mouth at bedtime, Disp: 30 tablet, Rfl: 5    ergocalciferol (VITAMIN D2) 50,000 units, take 1 capsule by mouth every week, Disp: 12 capsule, Rfl: 3    escitalopram (LEXAPRO) 10 mg tablet, take 1 tablet by mouth once daily, Disp: 90 tablet, Rfl: 0    escitalopram (LEXAPRO) 5 mg tablet, Take 5 mg by mouth daily  , Disp: , Rfl:     etanercept (ENBREL) 25 MG, Inject 50 mg under the skin once a week Tuesday , Disp: , Rfl:     folic acid (FOLVITE) 1 mg tablet, Take 1 mg by mouth daily  , Disp: , Rfl:     glucose blood (ONE TOUCH ULTRA TEST) test strip, Check Blood Sugar 1 time Daily  , Disp: 100 each, Rfl: 5    hydrochlorothiazide (HYDRODIURIL) 25 mg tablet, take 1 tablet by mouth once daily, Disp: 90 tablet, Rfl: 2    Lancets (ONETOUCH ULTRASOFT) lancets, Check Blood Sugar 1 time daily, Disp: 100 each, Rfl: 5    losartan (COZAAR) 50 mg tablet, take 1 tablet once daily as directed, Disp: 90 tablet, Rfl: 3    metFORMIN (GLUCOPHAGE-XR) 500 mg 24 hr tablet, take 1 tablet by mouth twice a day, Disp: 180 tablet, Rfl: 3    methotrexate 50 MG/2ML injection, , Disp: , Rfl:     Methotrexate, PF, 10 MG/0 4ML SOAJ, Inject 10 mg under the skin once a week Tuesday , Disp: , Rfl:   metoprolol succinate (TOPROL-XL) 100 mg 24 hr tablet, Take 1 tablet (100 mg total) by mouth daily, Disp: 90 tablet, Rfl: 3    oxyCODONE-acetaminophen (PERCOCET) 5-325 mg per tablet, Take 1 tablet by mouth every 8 (eight) hours as needed for moderate pain, Disp: , Rfl:     oxyCODONE-acetaminophen (PERCOCET) 5-325 mg per tablet, , Disp: , Rfl:     pioglitazone (ACTOS) 15 mg tablet, take 1 tablet by mouth once daily, Disp: 90 tablet, Rfl: 3    predniSONE 10 mg tablet, Take 4 daily for 4 days, then 3 daily for3 days, then 2 daily for 2 days, then 1 daily for 1 days  Take with food  , Disp: 30 tablet, Rfl: 0    predniSONE 10 mg tablet, , Disp: , Rfl:     promethazine-codeine (PHENERGAN WITH CODEINE) 6 25-10 mg/5 mL syrup, Take 5 mL by mouth every 4 (four) hours as needed for cough, Disp: 120 mL, Rfl: 0    Promethazine-Codeine 6 25-10 MG/5ML SOLN, , Disp: , Rfl:     simvastatin (ZOCOR) 10 mg tablet, take 1 tablet once daily, Disp: 90 tablet, Rfl: 3    simvastatin (ZOCOR) 10 mg tablet, , Disp: , Rfl:     Current Allergies     Allergies as of 03/13/2020 - Reviewed 03/13/2020   Allergen Reaction Noted    Zithromax [azithromycin] Hives 12/04/2012    Lisinopril Cough 05/19/2017            The following portions of the patient's history were reviewed and updated as appropriate: allergies, current medications, past family history, past medical history, past social history, past surgical history and problem list      Past Medical History:   Diagnosis Date    Acute non-recurrent frontal sinusitis 5/15/2019    Anxiety disorder     Arthritis     OA    Asthma     exercise iniduced   Cancer (HCC)     Squamous Cell on back    Dependence on crutches     prn    Depression     ro       Diabetes mellitus ([facility] [address] )     NIDDM; per All[employer]: Last Assessed: 7/15/15, New onset of type 2    Fibromyalgia     Hearing aid worn     mayra    Heart murmur     HL (hearing loss)     b/l - uses hearing aids    Hyperglycemia Last Assessed:10/29/15    Hyperlipidemia     Hypertension     Psoriasis     red dry patch left side- waist area    Psoriatic arthritis (HCC)     RA (rheumatoid arthritis) ([address] )     Wears glasses     reading       Past Surgical History:   Procedure Laterality Date    ARTHROSCOPY KNEE Left 3/2/2016    Procedure: ARTHROSCOPY KNEE;  Surgeon: Mohini Mccann MD;  [facility];  Service:      SECTION      X2    CYST REMOVAL Right     Cyst on bone     FRACTURE SURGERY      (Right) tibia/fibula Fx, and ankle    PILONIDAL CYST / SINUS EXCISION      CO KNEE SCOPE,MED/LAT MENISECTOMY Left 3/2/2016    Procedure: PARTIAL MEDIAL &  LATERAL MENISCECTOMIES, CHONDROPLASTY, REMOVAL OSTEOPHYTE, LIMITED SYNOVECTOMY;  Surgeon: Mohini Mccann MD;  Location: [facility];  Service: Orthopedics    REPAIR KNEE LIGAMENT      TOOTH EXTRACTION         Family History   Problem Relation Age of Onset    Hypertension Mother     Heart attack Mother     Osteoarthritis Mother     Breast cancer Mother 80    Alcohol abuse Father     Hypertension Brother     Diabetes Brother     Other Brother         Dyslipidemia    Coronary artery disease Brother     No Known Problems Sister     No Known Problems Daughter     No Known Problems Maternal Grandmother     No Known Problems Maternal Grandfather     No Known Problems Paternal Grandmother     No Known Problems Paternal Grandfather     No Known Problems Daughter     No Known Problems Maternal Aunt     No Known Problems Paternal Aunt     No Known Problems Paternal Aunt     No Known Problems Paternal Aunt     No Known Problems Paternal Aunt     No Known Problems Paternal Aunt          Medications have been verified  Objective   Pulse 72   Temp 97 8 °F (36 6 °C) (Temporal)   Resp 20   SpO2 94%        Physical Exam     Physical Exam   Constitutional: She is oriented to person, place, and time  She appears well-developed and well-nourished     HENT:   Nasal congestion; injection of the oropharynx   Neck: Normal range of motion  Neck supple  Cardiovascular: Normal rate, regular rhythm and normal heart sounds  Pulmonary/Chest: Effort normal  She has wheezes  She has rhonchi  Coarse breath sounds with scattered rhonchi and wheezes   Neurological: She is alert and oriented to person, place, and time  No nuchal rigidity   Skin:   Good color and turgor   Psychiatric: She has a normal mood and affect  Her behavior is normal    Nursing note and vitals reviewed

## 2020-03-13 NOTE — PATIENT INSTRUCTIONS
Rest, limit activity until lab results are available  Continue antibiotics and steroids as directed by your treating physician  Nebulizer treatment with albuterol every 4 hours as needed  Recheck/follow-up with treating physician  Please go to the hospital emergency department if neeeded  [address]    Your healthcare provider and/or public health staff have evaluated you and have determined that you do not need to be hospitalized at this time  At this time you can be isolated at home where you will be monitored by staff from your local or state health department  You should carefully follow the prevention and isolation steps below until a healthcare provider or local or state health department says that you can return to your normal activities  Stay home except to get medical care    People who are mildly ill with COVID-19 are able to isolate at home during their illness  You should restrict activities outside your home, except for getting medical care  Do not go to work, school, or public areas  Avoid using public transportation, ride-sharing, or taxis  Separate yourself from other people and animals in your home    People: As much as possible, you should stay in a specific room and away from other people in your home  Also, you should use a separate bathroom, if available  Animals: You should restrict contact with pets and other animals while you are sick with COVID-19, just like you would around other people  Although there have not been reports of pets or other animals becoming sick with COVID-19, it is still recommended that people sick with COVID-19 limit contact with animals until more information is known about the virus  When possible, have another member of your household care for your animals while you are sick  If you are sick with COVID-19, avoid contact with your pet, including petting, snuggling, being kissed or licked, and sharing food   If you must care for your pet or be around animals while you are sick, wash your hands before and after you interact with pets and wear a facemask  See COVID-19 and Animals for more information  Call ahead before visiting your doctor    If you have a medical appointment, call the healthcare provider and tell them that you have or may have COVID-19  This will help the healthcare providers office take steps to keep other people from getting infected or exposed  Wear a facemask    You should wear a facemask when you are around other people (e g , sharing a room or vehicle) or pets and before you enter a healthcare providers office  If you are not able to wear a facemask (for example, because it causes trouble breathing), then people who live with you should not stay in the same room with you, or they should wear a facemask if they enter your room  Cover your coughs and sneezes    Cover your mouth and nose with a tissue when you cough or sneeze  Throw used tissues in a lined trash can  Immediately wash your hands with soap and water for at least 20 seconds or, if soap and water are not available, clean your hands with an alcohol-based hand  that contains at least 60% alcohol  Clean your hands often    Wash your hands often with soap and water for at least 20 seconds, especially after blowing your nose, coughing, or sneezing; going to the bathroom; and before eating or preparing food  If soap and water are not readily available, use an alcohol-based hand  with at least 60% alcohol, covering all surfaces of your hands and rubbing them together until they feel dry  Soap and water are the best option if hands are visibly dirty  Avoid touching your eyes, nose, and mouth with unwashed hands  Avoid sharing personal household items    You should not share dishes, drinking glasses, cups, eating utensils, towels, or bedding with other people or pets in your home   After using these items, they should be washed thoroughly with soap and water  Clean all high-touch surfaces everyday    High touch surfaces include counters, tabletops, doorknobs, bathroom fixtures, toilets, phones, keyboards, tablets, and bedside tables  Also, clean any surfaces that may have blood, stool, or body fluids on them  Use a household cleaning spray or wipe, according to the label instructions  Labels contain instructions for safe and effective use of the cleaning product including precautions you should take when applying the product, such as wearing gloves and making sure you have good ventilation during use of the product  Monitor your symptoms    Seek prompt medical attention if your illness is worsening (e g , difficulty breathing)  Before seeking care, call your healthcare provider and tell them that you have, or are being evaluated for, COVID-19  Put on a facemask before you enter the facility  These steps will help the healthcare providers office to keep other people in the office or waiting room from getting infected or exposed  Ask your healthcare provider to call the local or state health department  Persons who are placed under active monitoring or facilitated self-monitoring should follow instructions provided by their local health department or occupational health professionals, as appropriate  If you have a medical emergency and need to call 911, notify the dispatch personnel that you have, or are being evaluated for COVID-19  If possible, put on a facemask before emergency medical services arrive  Discontinuing home isolation    Patients with confirmed COVID-19 should remain under home isolation precautions until the risk of secondary transmission to others is thought to be low  The decision to discontinue home isolation precautions should be made on a case-by-case basis, in consultation with healthcare providers and state and local health departments      Source: [employer]

## 2020-03-15 DIAGNOSIS — E11.9 CONTROLLED TYPE 2 DIABETES MELLITUS WITHOUT COMPLICATION, WITHOUT LONG-TERM CURRENT USE OF INSULIN (HCC): ICD-10-CM

## 2020-03-16 RX ORDER — PIOGLITAZONEHYDROCHLORIDE 15 MG/1
TABLET ORAL
Qty: 90 TABLET | Refills: 3 | Status: SHIPPED | OUTPATIENT
Start: 2020-03-16 | End: 2021-03-08

## 2020-03-19 ENCOUNTER — TELEPHONE (OUTPATIENT)
Dept: FAMILY MEDICINE CLINIC | Facility: CLINIC | Age: 65
End: 2020-03-19

## 2020-03-19 ENCOUNTER — TELEPHONE (OUTPATIENT)
Dept: URGENT CARE | Age: 65
End: 2020-03-19

## 2020-03-19 DIAGNOSIS — N76.0 VULVOVAGINITIS: Primary | ICD-10-CM

## 2020-03-19 LAB — MISCELLANEOUS LAB TEST RESULT: NORMAL

## 2020-03-19 RX ORDER — FLUCONAZOLE 150 MG/1
150 TABLET ORAL ONCE
Qty: 1 TABLET | Refills: 0 | Status: SHIPPED | OUTPATIENT
Start: 2020-03-19 | End: 2020-03-19

## 2020-03-19 NOTE — TELEPHONE ENCOUNTER
Dr Jennifer Bridges put Chadd De Souza on [facility]  She has been taking a Probiotic & also tried Monostat since she always gets Yeast Infections, but nothing is helping  Could an Rx be sent to [facility] to treat he Yeast Infection? Chadd De Souza uses [facility]    Thank you!

## 2020-03-20 DIAGNOSIS — I10 ESSENTIAL HYPERTENSION: ICD-10-CM

## 2020-03-20 RX ORDER — HYDROCHLOROTHIAZIDE 25 MG/1
TABLET ORAL
Qty: 90 TABLET | Refills: 3 | Status: SHIPPED | OUTPATIENT
Start: 2020-03-20 | End: 2020-06-19

## 2020-03-27 ENCOUNTER — TELEPHONE (OUTPATIENT)
Dept: FAMILY MEDICINE CLINIC | Facility: CLINIC | Age: 65
End: 2020-03-27

## 2020-03-27 DIAGNOSIS — I10 ESSENTIAL HYPERTENSION: ICD-10-CM

## 2020-03-27 RX ORDER — METOPROLOL SUCCINATE 100 MG/1
TABLET, EXTENDED RELEASE ORAL
Qty: 90 TABLET | Refills: 3 | Status: SHIPPED | OUTPATIENT
Start: 2020-03-27 | End: 2020-06-18

## 2020-03-27 NOTE — TELEPHONE ENCOUNTER
Patient called and sent Dr Lul Root a message through [address] [place] [address] [id number] but saw that it might not be read for up to 48 hrs, so she called the office, I also explained that Dr Alexa Beckman is out of the office on [address] and she asked if the covering provider can take a look at it  I have copied the message and it is below please advise:     Dr Lul Root,   Hi! I am finally better from the bronchitis/sinusitis/asthma, but something new has developed  I have been having severe, and I mean intense, pain in my lower abdomen  It feels like someone is cutting me with a knife  It has been horrible for two days, and today it seems slightly better, But the pain is also higher up in my stomach  I have attributed this to gas pains, and have been taking enzyme and probiotic regularity support, Beano, and today something that is comparable to activated charcoal  The pain has been so bad that I have awakened Erich at night with my groaning and moaning  I have been having regular bowel movements, in fact I've probably had two a day for the past two days  I ate very little yesterday and think that may be helping me today  All I have had is an egg white omelette and one piece of high-fiber toast, But the pain persists  It has been so bad that under normal circumstances I would have gone to the emergency room, but with the [place] virus outbreak I am   terrified of going near a hospital  Can you please call me at [phone] or send me a message recommending some thing  Thank you so much Dr Lul Root   Sincerely,   Merna Davila

## 2020-03-27 NOTE — TELEPHONE ENCOUNTER
Spoke with pt and advised her of Lucille's instructions that she should go to the ER b/c she could be having a flare of diverticulitis worry about bowel rupture/   Pt understood but is declining going at this time b/c she stated she just is feeling better and does not want to risk exposure to COVID-19  Wellington Oliveira also sent pt a message similar to mine advising the pt of this

## 2020-03-29 ENCOUNTER — NURSE TRIAGE (OUTPATIENT)
Dept: OTHER | Facility: OTHER | Age: 65
End: 2020-03-29

## 2020-03-29 NOTE — TELEPHONE ENCOUNTER
Second attempt no answer  I left a message to please call back if assistance is still needed  Call closed

## 2020-03-29 NOTE — TELEPHONE ENCOUNTER
Regarding: diverticullitis  ----- Message from [facility] sent at 3/29/2020 12:38 PM EDT -----  "I am having diverticulitis attack "

## 2020-03-30 ENCOUNTER — TELEPHONE (OUTPATIENT)
Dept: GASTROENTEROLOGY | Facility: AMBULARY SURGERY CENTER | Age: 65
End: 2020-03-30

## 2020-03-30 ENCOUNTER — TELEMEDICINE (OUTPATIENT)
Dept: FAMILY MEDICINE CLINIC | Facility: CLINIC | Age: 65
End: 2020-03-30
Payer: COMMERCIAL

## 2020-03-30 DIAGNOSIS — B37.3 VAGINAL CANDIDIASIS: ICD-10-CM

## 2020-03-30 DIAGNOSIS — R10.32 LLQ ABDOMINAL PAIN: Primary | ICD-10-CM

## 2020-03-30 PROBLEM — J01.00 ACUTE NON-RECURRENT MAXILLARY SINUSITIS: Status: RESOLVED | Noted: 2020-03-10 | Resolved: 2020-03-30

## 2020-03-30 PROCEDURE — 99213 OFFICE O/P EST LOW 20 MIN: CPT | Performed by: FAMILY MEDICINE

## 2020-03-30 RX ORDER — FLUCONAZOLE 150 MG/1
150 TABLET ORAL ONCE
Qty: 1 TABLET | Refills: 0 | Status: SHIPPED | OUTPATIENT
Start: 2020-03-30 | End: 2020-03-30

## 2020-03-30 RX ORDER — METRONIDAZOLE 500 MG/1
500 TABLET ORAL EVERY 8 HOURS SCHEDULED
Qty: 30 TABLET | Refills: 0 | Status: SHIPPED | OUTPATIENT
Start: 2020-03-30 | End: 2020-04-09

## 2020-03-30 RX ORDER — CIPROFLOXACIN 500 MG/1
500 TABLET, FILM COATED ORAL EVERY 12 HOURS SCHEDULED
Qty: 20 TABLET | Refills: 0 | Status: SHIPPED | OUTPATIENT
Start: 2020-03-30 | End: 2020-04-09

## 2020-03-30 NOTE — PROGRESS NOTES
Virtual Regular Visit    Problem List Items Addressed This Visit        Other    LLQ abdominal pain - Primary    Relevant Medications    metroNIDAZOLE (FLAGYL) 500 mg tablet    ciprofloxacin (CIPRO) 500 mg tablet      Other Visit Diagnoses     Vaginal candidiasis        Relevant Medications    fluconazole (DIFLUCAN) 150 mg tablet               Reason for visit is abdominal pain  Started last Wednesday with pain  LLQ pain  Waves of pain comes and goes  Improved with eating less  Pain causing distress, tears from pain  persocet not relieved pain  No nausea   hx of diverticulosis  Did have diarrhea with last antibiotic  Formed bowel movement  No fever   No chills      Vital- temp 98 4  bp 162/87   Pulse ox 97    Encounter provider Leslie Perera DO    Provider located at [place] [address] [place] [address]      Recent Visits  Date Type Provider Dept   03/27/20 Telephone [facility] recent visits within past 7 days and meeting all other requirements     Today's Visits  Date Type Provider Dept   03/30/20 Telemedicine Leslie Perera DO [facility]   Showing today's visits and meeting all other requirements     Future Appointments  Date Type Provider Dept   03/30/20 Telemedicine Leslie Perera DO [facility]   Showing future appointments within next 150 days and meeting all other requirements        The patient was identified by name and date of birth  Murtis [employer] was informed that this is a telemedicine visit and that the visit is being conducted through [address] [place] [address] and patient was informed that this is not a secure, HIPAA-complaint platform  she agrees to proceed     My office door was closed  No one else was in the room  She acknowledged consent and understanding of privacy and security of the video platform  The patient has agreed to participate and understands they can discontinue the visit at any time      Patient is aware this is a billable service  [address] is a 75 y o  female 4-5 days of severe abdominal pain  Past Medical History:   Diagnosis Date    Acute non-recurrent frontal sinusitis 5/15/2019    Acute non-recurrent maxillary sinusitis 3/10/2020    Anxiety disorder     Arthritis     OA    Asthma     exercise iniduced   Cancer (HCC)     Squamous Cell on back    Dependence on crutches     prn    Depression     ro       Diabetes mellitus ([facility] )     NIDDM; per Allscripts: Last Assessed: 7/15/15, New onset of type 2    Fibromyalgia     Hearing aid worn     mayra    Heart murmur     HL (hearing loss)     b/l - uses hearing aids    Hyperglycemia     Last Assessed:10/29/15    Hyperlipidemia     Hypertension     Psoriasis     red dry patch left side- waist area    Psoriatic arthritis ([facility] )     RA (rheumatoid arthritis) ([facility] )     Wears glasses     reading       Past Surgical History:   Procedure Laterality Date    ARTHROSCOPY KNEE Left 3/2/2016    Procedure: ARTHROSCOPY KNEE;  Surgeon: Esme Mcnally MD;  [facility];  Service:      SECTION      X2    CYST REMOVAL Right     Cyst on bone     FRACTURE SURGERY      (Right) tibia/fibula Fx, and ankle    PILONIDAL CYST / SINUS EXCISION      FL KNEE SCOPE,MED/LAT MENISECTOMY Left 3/2/2016    Procedure: PARTIAL MEDIAL &  LATERAL MENISCECTOMIES, CHONDROPLASTY, REMOVAL OSTEOPHYTE, LIMITED SYNOVECTOMY;  Surgeon: Esme Mcnally MD;  [facility];  Service: Orthopedics    REPAIR KNEE LIGAMENT      TOOTH EXTRACTION         Current Outpatient Medications   Medication Sig Dispense Refill    albuterol (2 5 mg/3 mL) 0 083 % nebulizer solution Take 1 vial (2 5 mg total) by nebulization every 6 (six) hours as needed for wheezing or shortness of breath 25 vial 3    albuterol (2 5 mg/3 mL) 0 083 % nebulizer solution       albuterol (VENTOLIN HFA) 90 mcg/act inhaler Inhale 2 puffs every 4 (four) hours as needed for wheezing or shortness of breath 1 Inhaler 5    ALPRAZolam (XANAX) 0 5 mg tablet Take 0 5 tablets (0 25 mg total) by mouth daily 30 tablet 0    amLODIPine (NORVASC) 10 mg tablet take 1 tablet by mouth once daily 90 tablet 3    budesonide-formoterol (SYMBICORT) 160-4 5 mcg/act inhaler Inhale 2 puffs 2 (two) times a day Rinse mouth after use  1 Inhaler 5    ciprofloxacin (CIPRO) 500 mg tablet Take 1 tablet (500 mg total) by mouth every 12 (twelve) hours for 10 days 20 tablet 0    cyclobenzaprine (FLEXERIL) 10 mg tablet take 1 tablet by mouth at bedtime 30 tablet 5    ergocalciferol (VITAMIN D2) 50,000 units take 1 capsule by mouth every week 12 capsule 3    escitalopram (LEXAPRO) 10 mg tablet take 1 tablet by mouth once daily 90 tablet 0    escitalopram (LEXAPRO) 5 mg tablet Take 5 mg by mouth daily   etanercept (ENBREL) 25 MG Inject 50 mg under the skin once a week Tuesday       fluconazole (DIFLUCAN) 150 mg tablet Take 1 tablet (150 mg total) by mouth once for 1 dose 1 tablet 0    folic acid (FOLVITE) 1 mg tablet Take 1 mg by mouth daily   glucose blood (ONE TOUCH ULTRA TEST) test strip Check Blood Sugar 1 time Daily   100 each 5    hydrochlorothiazide (HYDRODIURIL) 25 mg tablet take 1 tablet by mouth once daily 90 tablet 3    Lancets (ONETOUCH ULTRASOFT) lancets Check Blood Sugar 1 time daily 100 each 5    losartan (COZAAR) 50 mg tablet take 1 tablet once daily as directed 90 tablet 3    metFORMIN (GLUCOPHAGE-XR) 500 mg 24 hr tablet take 1 tablet by mouth twice a day 180 tablet 3    methotrexate 50 MG/2ML injection       Methotrexate, PF, 10 MG/0 4ML SOAJ Inject 10 mg under the skin once a week Tuesday       metoprolol succinate (TOPROL-XL) 100 mg 24 hr tablet take 1 tablet by mouth once daily 90 tablet 3    metroNIDAZOLE (FLAGYL) 500 mg tablet Take 1 tablet (500 mg total) by mouth every 8 (eight) hours for 10 days 30 tablet 0    oxyCODONE-acetaminophen (PERCOCET) 5-325 mg per tablet Take 1 tablet by mouth every 8 (eight) hours as needed for moderate pain      oxyCODONE-acetaminophen (PERCOCET) 5-325 mg per tablet       pioglitazone (ACTOS) 15 mg tablet take 1 tablet by mouth once daily 90 tablet 3    predniSONE 10 mg tablet Take 4 daily for 4 days, then 3 daily for3 days, then 2 daily for 2 days, then 1 daily for 1 days  Take with food  30 tablet 0    predniSONE 10 mg tablet       promethazine-codeine (PHENERGAN WITH CODEINE) 6 25-10 mg/5 mL syrup Take 5 mL by mouth every 4 (four) hours as needed for cough 120 mL 0    Promethazine-Codeine 6 25-10 MG/5ML SOLN       simvastatin (ZOCOR) 10 mg tablet take 1 tablet once daily 90 tablet 3    simvastatin (ZOCOR) 10 mg tablet        No current facility-administered medications for this visit  Allergies   Allergen Reactions    Zithromax [Azithromycin] Hives    Lisinopril Cough       Review of Systems   Constitutional: Positive for appetite change  Negative for fatigue and fever  HENT: Negative  Eyes: Negative  Respiratory: Negative  Cardiovascular: Negative  Gastrointestinal: Positive for abdominal pain and diarrhea  Endocrine: Negative  Genitourinary: Negative  Musculoskeletal: Negative  Skin: Negative  Allergic/Immunologic: Negative  Neurological: Negative  Hematological: Negative  Psychiatric/Behavioral: Negative  Physical Exam   Constitutional: No distress  Pulmonary/Chest: No respiratory distress  She has no wheezes  Abdominal: There is tenderness (points to LLQ)  Skin: She is not diaphoretic  I spent 15 minutes with the patient during this visit

## 2020-03-31 ENCOUNTER — APPOINTMENT (EMERGENCY)
Dept: RADIOLOGY | Facility: HOSPITAL | Age: 65
End: 2020-03-31
Payer: COMMERCIAL

## 2020-03-31 ENCOUNTER — HOSPITAL ENCOUNTER (EMERGENCY)
Facility: HOSPITAL | Age: 65
Discharge: HOME | End: 2020-03-31
Attending: EMERGENCY MEDICINE | Admitting: EMERGENCY MEDICINE
Payer: COMMERCIAL

## 2020-03-31 ENCOUNTER — TELEPHONE (OUTPATIENT)
Dept: FAMILY MEDICINE CLINIC | Facility: CLINIC | Age: 65
End: 2020-03-31

## 2020-03-31 VITALS
OXYGEN SATURATION: 98 % | SYSTOLIC BLOOD PRESSURE: 159 MMHG | HEART RATE: 76 BPM | RESPIRATION RATE: 20 BRPM | DIASTOLIC BLOOD PRESSURE: 75 MMHG | TEMPERATURE: 97.9 F

## 2020-03-31 DIAGNOSIS — K57.92 DIVERTICULITIS: Primary | ICD-10-CM

## 2020-03-31 LAB
ALBUMIN SERPL BCP-MCNC: 3.3 G/DL (ref 3.5–5)
ALP SERPL-CCNC: 100 U/L (ref 46–116)
ALT SERPL W P-5'-P-CCNC: 24 U/L (ref 12–78)
ANION GAP SERPL CALCULATED.3IONS-SCNC: 5 MMOL/L (ref 4–13)
AST SERPL W P-5'-P-CCNC: 12 U/L (ref 5–45)
BASOPHILS # BLD AUTO: 0.06 THOUSANDS/ΜL (ref 0–0.1)
BASOPHILS NFR BLD AUTO: 1 % (ref 0–1)
BILIRUB SERPL-MCNC: 0.36 MG/DL (ref 0.2–1)
BILIRUB UR QL STRIP: NEGATIVE
BUN SERPL-MCNC: 8 MG/DL (ref 5–25)
CALCIUM SERPL-MCNC: 9.1 MG/DL (ref 8.3–10.1)
CHLORIDE SERPL-SCNC: 102 MMOL/L (ref 100–108)
CLARITY UR: CLEAR
CLARITY, POC: CLEAR
CO2 SERPL-SCNC: 27 MMOL/L (ref 21–32)
COLOR UR: YELLOW
COLOR, POC: YELLOW
CREAT SERPL-MCNC: 0.64 MG/DL (ref 0.6–1.3)
EOSINOPHIL # BLD AUTO: 0.25 THOUSAND/ΜL (ref 0–0.61)
EOSINOPHIL NFR BLD AUTO: 4 % (ref 0–6)
ERYTHROCYTE [DISTWIDTH] IN BLOOD BY AUTOMATED COUNT: 13 % (ref 11.6–15.1)
GFR SERPL CREATININE-BSD FRML MDRD: 93 ML/MIN/1.73SQ M
GLUCOSE SERPL-MCNC: 256 MG/DL (ref 65–140)
GLUCOSE UR STRIP-MCNC: NEGATIVE MG/DL
HCT VFR BLD AUTO: 40.8 % (ref 34.8–46.1)
HGB BLD-MCNC: 13.6 G/DL (ref 11.5–15.4)
HGB UR QL STRIP.AUTO: NEGATIVE
IMM GRANULOCYTES # BLD AUTO: 0.03 THOUSAND/UL (ref 0–0.2)
IMM GRANULOCYTES NFR BLD AUTO: 1 % (ref 0–2)
KETONES UR STRIP-MCNC: NEGATIVE MG/DL
LEUKOCYTE ESTERASE UR QL STRIP: NEGATIVE
LIPASE SERPL-CCNC: 95 U/L (ref 73–393)
LYMPHOCYTES # BLD AUTO: 1.38 THOUSANDS/ΜL (ref 0.6–4.47)
LYMPHOCYTES NFR BLD AUTO: 22 % (ref 14–44)
MCH RBC QN AUTO: 30.7 PG (ref 26.8–34.3)
MCHC RBC AUTO-ENTMCNC: 33.3 G/DL (ref 31.4–37.4)
MCV RBC AUTO: 92 FL (ref 82–98)
MONOCYTES # BLD AUTO: 0.43 THOUSAND/ΜL (ref 0.17–1.22)
MONOCYTES NFR BLD AUTO: 7 % (ref 4–12)
NEUTROPHILS # BLD AUTO: 4.28 THOUSANDS/ΜL (ref 1.85–7.62)
NEUTS SEG NFR BLD AUTO: 65 % (ref 43–75)
NITRITE UR QL STRIP: NEGATIVE
NRBC BLD AUTO-RTO: 0 /100 WBCS
PH UR STRIP.AUTO: 7 [PH] (ref 4.5–8)
PLATELET # BLD AUTO: 185 THOUSANDS/UL (ref 149–390)
PMV BLD AUTO: 10.7 FL (ref 8.9–12.7)
POTASSIUM SERPL-SCNC: 3.4 MMOL/L (ref 3.5–5.3)
PROT SERPL-MCNC: 8.1 G/DL (ref 6.4–8.2)
PROT UR STRIP-MCNC: NEGATIVE MG/DL
RBC # BLD AUTO: 4.43 MILLION/UL (ref 3.81–5.12)
SODIUM SERPL-SCNC: 134 MMOL/L (ref 136–145)
SP GR UR STRIP.AUTO: 1.01 (ref 1–1.03)
UROBILINOGEN UR QL STRIP.AUTO: 0.2 E.U./DL
WBC # BLD AUTO: 6.43 THOUSAND/UL (ref 4.31–10.16)

## 2020-03-31 PROCEDURE — 99284 EMERGENCY DEPT VISIT MOD MDM: CPT

## 2020-03-31 PROCEDURE — 85025 COMPLETE CBC W/AUTO DIFF WBC: CPT | Performed by: STUDENT IN AN ORGANIZED HEALTH CARE EDUCATION/TRAINING PROGRAM

## 2020-03-31 PROCEDURE — 74177 CT ABD & PELVIS W/CONTRAST: CPT

## 2020-03-31 PROCEDURE — 96374 THER/PROPH/DIAG INJ IV PUSH: CPT

## 2020-03-31 PROCEDURE — 99285 EMERGENCY DEPT VISIT HI MDM: CPT | Performed by: EMERGENCY MEDICINE

## 2020-03-31 PROCEDURE — 36415 COLL VENOUS BLD VENIPUNCTURE: CPT | Performed by: STUDENT IN AN ORGANIZED HEALTH CARE EDUCATION/TRAINING PROGRAM

## 2020-03-31 PROCEDURE — 80053 COMPREHEN METABOLIC PANEL: CPT | Performed by: STUDENT IN AN ORGANIZED HEALTH CARE EDUCATION/TRAINING PROGRAM

## 2020-03-31 PROCEDURE — 81003 URINALYSIS AUTO W/O SCOPE: CPT

## 2020-03-31 PROCEDURE — 83690 ASSAY OF LIPASE: CPT | Performed by: STUDENT IN AN ORGANIZED HEALTH CARE EDUCATION/TRAINING PROGRAM

## 2020-03-31 RX ORDER — POTASSIUM CHLORIDE 20 MEQ/1
40 TABLET, EXTENDED RELEASE ORAL ONCE
Status: COMPLETED | OUTPATIENT
Start: 2020-03-31 | End: 2020-03-31

## 2020-03-31 RX ORDER — MORPHINE SULFATE 4 MG/ML
4 INJECTION, SOLUTION INTRAMUSCULAR; INTRAVENOUS ONCE
Status: COMPLETED | OUTPATIENT
Start: 2020-03-31 | End: 2020-03-31

## 2020-03-31 RX ADMIN — IOHEXOL 100 ML: 350 INJECTION, SOLUTION INTRAVENOUS at 14:08

## 2020-03-31 RX ADMIN — MORPHINE SULFATE 4 MG: 4 INJECTION INTRAVENOUS at 13:06

## 2020-03-31 RX ADMIN — POTASSIUM CHLORIDE 40 MEQ: 1500 TABLET, EXTENDED RELEASE ORAL at 14:20

## 2020-03-31 NOTE — TELEPHONE ENCOUNTER
Davina's daughter called asking us to let Dr Clark Leyden know that Davina's pain has spread & gotten so bad that she is taking her to the ED  [facility] will most [facility] go to [facility]  Just an FYI since Dr Clark Leyden had just done a Virtual Visit with her yesterday  Her visit on 04/02/20 w/Dr Clark Leyden is canceled for now  Thank you!

## 2020-03-31 NOTE — TELEPHONE ENCOUNTER
Davina's daughter called asking us to let Dr Valeria Clemens know that Davina's pain has spread & gotten so bad that she is taking her to the ED      They will most likely go to [facility]      Just an FYI since Dr Valeria Clemens had just done a Virtual Visit with her yesterday      Her visit on 04/02/20 w/Dr Valeria Clemens is canceled for now      Thank you!

## 2020-04-20 ENCOUNTER — TELEPHONE (OUTPATIENT)
Dept: GASTROENTEROLOGY | Facility: MEDICAL CENTER | Age: 65
End: 2020-04-20

## 2020-04-21 ENCOUNTER — TELEMEDICINE (OUTPATIENT)
Dept: FAMILY MEDICINE CLINIC | Facility: CLINIC | Age: 65
End: 2020-04-21
Payer: COMMERCIAL

## 2020-04-21 VITALS — BODY MASS INDEX: 35.67 KG/M2 | WEIGHT: 195 LBS | TEMPERATURE: 98.4 F | HEART RATE: 98 BPM

## 2020-04-21 DIAGNOSIS — E78.00 HYPERCHOLESTEROLEMIA: ICD-10-CM

## 2020-04-21 DIAGNOSIS — I10 BENIGN ESSENTIAL HYPERTENSION: ICD-10-CM

## 2020-04-21 DIAGNOSIS — M06.9 RHEUMATOID ARTHRITIS INVOLVING MULTIPLE SITES, UNSPECIFIED RHEUMATOID FACTOR PRESENCE: ICD-10-CM

## 2020-04-21 DIAGNOSIS — E11.9 CONTROLLED TYPE 2 DIABETES MELLITUS WITHOUT COMPLICATION, WITHOUT LONG-TERM CURRENT USE OF INSULIN (HCC): Primary | ICD-10-CM

## 2020-04-21 PROBLEM — K57.30 DIVERTICULOSIS OF COLON: Status: ACTIVE | Noted: 2020-04-21

## 2020-04-21 PROBLEM — R10.32 LLQ ABDOMINAL PAIN: Status: RESOLVED | Noted: 2020-03-30 | Resolved: 2020-04-21

## 2020-04-21 PROCEDURE — 99214 OFFICE O/P EST MOD 30 MIN: CPT | Performed by: FAMILY MEDICINE

## 2020-04-29 DIAGNOSIS — F41.1 GENERALIZED ANXIETY DISORDER: ICD-10-CM

## 2020-04-29 RX ORDER — ESCITALOPRAM OXALATE 10 MG/1
TABLET ORAL
Qty: 90 TABLET | Refills: 3 | Status: SHIPPED | OUTPATIENT
Start: 2020-04-29 | End: 2021-04-21

## 2020-05-01 ENCOUNTER — TELEPHONE (OUTPATIENT)
Dept: FAMILY MEDICINE CLINIC | Facility: CLINIC | Age: 65
End: 2020-05-01

## 2020-05-01 ENCOUNTER — TELEMEDICINE (OUTPATIENT)
Dept: FAMILY MEDICINE CLINIC | Facility: CLINIC | Age: 65
End: 2020-05-01
Payer: COMMERCIAL

## 2020-05-01 VITALS
DIASTOLIC BLOOD PRESSURE: 80 MMHG | TEMPERATURE: 98.4 F | SYSTOLIC BLOOD PRESSURE: 150 MMHG | WEIGHT: 194 LBS | HEART RATE: 82 BPM | BODY MASS INDEX: 35.48 KG/M2

## 2020-05-01 DIAGNOSIS — K57.92 ACUTE DIVERTICULITIS: Primary | ICD-10-CM

## 2020-05-01 PROCEDURE — 99203 OFFICE O/P NEW LOW 30 MIN: CPT | Performed by: FAMILY MEDICINE

## 2020-05-01 RX ORDER — AMOXICILLIN AND CLAVULANATE POTASSIUM 875; 125 MG/1; MG/1
1 TABLET, FILM COATED ORAL 2 TIMES DAILY WITH MEALS
Qty: 20 TABLET | Refills: 0 | Status: SHIPPED | OUTPATIENT
Start: 2020-05-01 | End: 2020-05-04

## 2020-05-04 ENCOUNTER — TELEMEDICINE (OUTPATIENT)
Dept: GASTROENTEROLOGY | Facility: MEDICAL CENTER | Age: 65
End: 2020-05-04
Payer: COMMERCIAL

## 2020-05-04 ENCOUNTER — APPOINTMENT (EMERGENCY)
Dept: RADIOLOGY | Facility: HOSPITAL | Age: 65
End: 2020-05-04
Payer: COMMERCIAL

## 2020-05-04 ENCOUNTER — HOSPITAL ENCOUNTER (EMERGENCY)
Facility: HOSPITAL | Age: 65
Discharge: HOME | End: 2020-05-04
Attending: EMERGENCY MEDICINE | Admitting: EMERGENCY MEDICINE
Payer: COMMERCIAL

## 2020-05-04 VITALS
WEIGHT: 194 LBS | DIASTOLIC BLOOD PRESSURE: 71 MMHG | RESPIRATION RATE: 18 BRPM | OXYGEN SATURATION: 94 % | HEART RATE: 110 BPM | SYSTOLIC BLOOD PRESSURE: 179 MMHG | HEIGHT: 63 IN | BODY MASS INDEX: 34.38 KG/M2

## 2020-05-04 DIAGNOSIS — K57.92 DIVERTICULITIS: Primary | ICD-10-CM

## 2020-05-04 DIAGNOSIS — K57.30 DIVERTICULOSIS OF COLON: Primary | ICD-10-CM

## 2020-05-04 DIAGNOSIS — K57.92 DIVERTICULITIS: ICD-10-CM

## 2020-05-04 DIAGNOSIS — R91.1 PULMONARY NODULE: ICD-10-CM

## 2020-05-04 LAB
ALBUMIN SERPL BCP-MCNC: 3.8 G/DL (ref 3.5–5)
ALP SERPL-CCNC: 97 U/L (ref 46–116)
ALT SERPL W P-5'-P-CCNC: 31 U/L (ref 12–78)
ANION GAP SERPL CALCULATED.3IONS-SCNC: 9 MMOL/L (ref 4–13)
AST SERPL W P-5'-P-CCNC: 19 U/L (ref 5–45)
BASOPHILS # BLD AUTO: 0.02 THOUSANDS/ΜL (ref 0–0.1)
BASOPHILS NFR BLD AUTO: 0 % (ref 0–1)
BILIRUB SERPL-MCNC: 0.54 MG/DL (ref 0.2–1)
BUN SERPL-MCNC: 14 MG/DL (ref 5–25)
CALCIUM SERPL-MCNC: 9.7 MG/DL (ref 8.3–10.1)
CHLORIDE SERPL-SCNC: 105 MMOL/L (ref 100–108)
CO2 SERPL-SCNC: 24 MMOL/L (ref 21–32)
CREAT SERPL-MCNC: 0.61 MG/DL (ref 0.6–1.3)
EOSINOPHIL # BLD AUTO: 0.28 THOUSAND/ΜL (ref 0–0.61)
EOSINOPHIL NFR BLD AUTO: 3 % (ref 0–6)
ERYTHROCYTE [DISTWIDTH] IN BLOOD BY AUTOMATED COUNT: 12.9 % (ref 11.6–15.1)
GFR SERPL CREATININE-BSD FRML MDRD: 95 ML/MIN/1.73SQ M
GLUCOSE SERPL-MCNC: 176 MG/DL (ref 65–140)
HCT VFR BLD AUTO: 46.9 % (ref 34.8–46.1)
HGB BLD-MCNC: 15.8 G/DL (ref 11.5–15.4)
IMM GRANULOCYTES # BLD AUTO: 0.02 THOUSAND/UL (ref 0–0.2)
IMM GRANULOCYTES NFR BLD AUTO: 0 % (ref 0–2)
LIPASE SERPL-CCNC: 72 U/L (ref 73–393)
LYMPHOCYTES # BLD AUTO: 0.84 THOUSANDS/ΜL (ref 0.6–4.47)
LYMPHOCYTES NFR BLD AUTO: 9 % (ref 14–44)
MCH RBC QN AUTO: 30.2 PG (ref 26.8–34.3)
MCHC RBC AUTO-ENTMCNC: 33.7 G/DL (ref 31.4–37.4)
MCV RBC AUTO: 90 FL (ref 82–98)
MONOCYTES # BLD AUTO: 0.78 THOUSAND/ΜL (ref 0.17–1.22)
MONOCYTES NFR BLD AUTO: 9 % (ref 4–12)
NEUTROPHILS # BLD AUTO: 7.27 THOUSANDS/ΜL (ref 1.85–7.62)
NEUTS SEG NFR BLD AUTO: 79 % (ref 43–75)
NRBC BLD AUTO-RTO: 0 /100 WBCS
PLATELET # BLD AUTO: 191 THOUSANDS/UL (ref 149–390)
PMV BLD AUTO: 10.7 FL (ref 8.9–12.7)
POTASSIUM SERPL-SCNC: 3.6 MMOL/L (ref 3.5–5.3)
PROT SERPL-MCNC: 8.3 G/DL (ref 6.4–8.2)
RBC # BLD AUTO: 5.24 MILLION/UL (ref 3.81–5.12)
SODIUM SERPL-SCNC: 138 MMOL/L (ref 136–145)
WBC # BLD AUTO: 9.21 THOUSAND/UL (ref 4.31–10.16)

## 2020-05-04 PROCEDURE — 96374 THER/PROPH/DIAG INJ IV PUSH: CPT

## 2020-05-04 PROCEDURE — 96375 TX/PRO/DX INJ NEW DRUG ADDON: CPT

## 2020-05-04 PROCEDURE — 83690 ASSAY OF LIPASE: CPT

## 2020-05-04 PROCEDURE — 99284 EMERGENCY DEPT VISIT MOD MDM: CPT

## 2020-05-04 PROCEDURE — 74177 CT ABD & PELVIS W/CONTRAST: CPT

## 2020-05-04 PROCEDURE — 99203 OFFICE O/P NEW LOW 30 MIN: CPT | Performed by: INTERNAL MEDICINE

## 2020-05-04 PROCEDURE — 80053 COMPREHEN METABOLIC PANEL: CPT

## 2020-05-04 PROCEDURE — 85025 COMPLETE CBC W/AUTO DIFF WBC: CPT

## 2020-05-04 PROCEDURE — 96361 HYDRATE IV INFUSION ADD-ON: CPT

## 2020-05-04 PROCEDURE — 36415 COLL VENOUS BLD VENIPUNCTURE: CPT

## 2020-05-04 PROCEDURE — 99284 EMERGENCY DEPT VISIT MOD MDM: CPT | Performed by: EMERGENCY MEDICINE

## 2020-05-04 RX ORDER — HYDROMORPHONE HCL/PF 1 MG/ML
1 SYRINGE (ML) INJECTION ONCE
Status: COMPLETED | OUTPATIENT
Start: 2020-05-04 | End: 2020-05-04

## 2020-05-04 RX ORDER — AMOXICILLIN AND CLAVULANATE POTASSIUM 875; 125 MG/1; MG/1
1 TABLET, FILM COATED ORAL EVERY 8 HOURS
Qty: 30 TABLET | Refills: 0 | Status: SHIPPED | OUTPATIENT
Start: 2020-05-04 | End: 2020-05-04

## 2020-05-04 RX ORDER — DICYCLOMINE HCL 20 MG
20 TABLET ORAL 2 TIMES DAILY
Qty: 20 TABLET | Refills: 0 | Status: SHIPPED | OUTPATIENT
Start: 2020-05-04 | End: 2020-10-13 | Stop reason: ALTCHOICE

## 2020-05-04 RX ORDER — ONDANSETRON 2 MG/ML
4 INJECTION INTRAMUSCULAR; INTRAVENOUS ONCE
Status: COMPLETED | OUTPATIENT
Start: 2020-05-04 | End: 2020-05-04

## 2020-05-04 RX ORDER — ONDANSETRON 4 MG/1
4 TABLET, ORALLY DISINTEGRATING ORAL EVERY 6 HOURS PRN
Qty: 10 TABLET | Refills: 0 | Status: SHIPPED | OUTPATIENT
Start: 2020-05-04 | End: 2022-04-22

## 2020-05-04 RX ORDER — METRONIDAZOLE 500 MG/1
500 TABLET ORAL ONCE
Status: COMPLETED | OUTPATIENT
Start: 2020-05-04 | End: 2020-05-04

## 2020-05-04 RX ORDER — CIPROFLOXACIN 500 MG/1
500 TABLET, FILM COATED ORAL 2 TIMES DAILY
Qty: 20 TABLET | Refills: 0 | Status: SHIPPED | OUTPATIENT
Start: 2020-05-04 | End: 2020-05-14

## 2020-05-04 RX ORDER — METRONIDAZOLE 500 MG/1
500 TABLET ORAL EVERY 8 HOURS SCHEDULED
Qty: 30 TABLET | Refills: 0 | Status: SHIPPED | OUTPATIENT
Start: 2020-05-04 | End: 2020-05-14

## 2020-05-04 RX ORDER — AMOXICILLIN AND CLAVULANATE POTASSIUM 875; 125 MG/1; MG/1
1 TABLET, FILM COATED ORAL ONCE
Status: DISCONTINUED | OUTPATIENT
Start: 2020-05-04 | End: 2020-05-04

## 2020-05-04 RX ORDER — CIPROFLOXACIN 500 MG/1
500 TABLET, FILM COATED ORAL ONCE
Status: COMPLETED | OUTPATIENT
Start: 2020-05-04 | End: 2020-05-04

## 2020-05-04 RX ADMIN — CIPROFLOXACIN HYDROCHLORIDE 500 MG: 500 TABLET, FILM COATED ORAL at 03:29

## 2020-05-04 RX ADMIN — ONDANSETRON 4 MG: 2 INJECTION INTRAMUSCULAR; INTRAVENOUS at 01:57

## 2020-05-04 RX ADMIN — SODIUM CHLORIDE 1000 ML: 0.9 INJECTION, SOLUTION INTRAVENOUS at 02:02

## 2020-05-04 RX ADMIN — HYDROMORPHONE HYDROCHLORIDE 1 MG: 1 INJECTION, SOLUTION INTRAMUSCULAR; INTRAVENOUS; SUBCUTANEOUS at 01:59

## 2020-05-04 RX ADMIN — IOHEXOL 100 ML: 350 INJECTION, SOLUTION INTRAVENOUS at 02:31

## 2020-05-04 RX ADMIN — METRONIDAZOLE 500 MG: 500 TABLET ORAL at 03:29

## 2020-05-05 ENCOUNTER — TELEPHONE (OUTPATIENT)
Dept: GASTROENTEROLOGY | Facility: MEDICAL CENTER | Age: 65
End: 2020-05-05

## 2020-05-06 ENCOUNTER — TELEPHONE (OUTPATIENT)
Dept: FAMILY MEDICINE CLINIC | Facility: CLINIC | Age: 65
End: 2020-05-06

## 2020-05-06 DIAGNOSIS — B37.3 VAGINAL CANDIDIASIS: Primary | ICD-10-CM

## 2020-05-06 RX ORDER — FLUCONAZOLE 150 MG/1
150 TABLET ORAL ONCE
Qty: 1 TABLET | Refills: 0 | Status: SHIPPED | OUTPATIENT
Start: 2020-05-06 | End: 2020-05-06

## 2020-05-11 ENCOUNTER — TELEMEDICINE (OUTPATIENT)
Dept: FAMILY MEDICINE CLINIC | Facility: CLINIC | Age: 65
End: 2020-05-11
Payer: COMMERCIAL

## 2020-05-11 VITALS
OXYGEN SATURATION: 95 % | DIASTOLIC BLOOD PRESSURE: 80 MMHG | SYSTOLIC BLOOD PRESSURE: 120 MMHG | TEMPERATURE: 98.4 F | BODY MASS INDEX: 34.38 KG/M2 | WEIGHT: 191 LBS | HEART RATE: 73 BPM

## 2020-05-11 DIAGNOSIS — B37.3 VAGINAL CANDIDIASIS: ICD-10-CM

## 2020-05-11 DIAGNOSIS — I10 ESSENTIAL HYPERTENSION: ICD-10-CM

## 2020-05-11 DIAGNOSIS — K57.92 DIVERTICULITIS: Primary | ICD-10-CM

## 2020-05-11 DIAGNOSIS — I10 BENIGN ESSENTIAL HYPERTENSION: ICD-10-CM

## 2020-05-11 DIAGNOSIS — R91.8 PULMONARY NODULES: ICD-10-CM

## 2020-05-11 PROCEDURE — 99214 OFFICE O/P EST MOD 30 MIN: CPT | Performed by: FAMILY MEDICINE

## 2020-05-11 RX ORDER — FLUOROURACIL 50 MG/G
CREAM TOPICAL
COMMUNITY
Start: 2020-04-24 | End: 2022-05-19

## 2020-05-11 RX ORDER — LOSARTAN POTASSIUM 50 MG/1
TABLET ORAL
Qty: 90 TABLET | Refills: 3 | Status: SHIPPED | OUTPATIENT
Start: 2020-05-11 | End: 2021-05-17

## 2020-05-12 RX ORDER — FLUCONAZOLE 150 MG/1
TABLET ORAL
Qty: 1 TABLET | Refills: 0 | OUTPATIENT
Start: 2020-05-12

## 2020-05-19 DIAGNOSIS — B37.9 CANDIDIASIS: Primary | ICD-10-CM

## 2020-05-19 RX ORDER — FLUCONAZOLE 150 MG/1
150 TABLET ORAL ONCE
Qty: 1 TABLET | Refills: 0 | Status: SHIPPED | OUTPATIENT
Start: 2020-05-19 | End: 2020-05-19

## 2020-05-22 LAB
LEFT EYE DIABETIC RETINOPATHY: NORMAL
RIGHT EYE DIABETIC RETINOPATHY: NORMAL

## 2020-06-11 DIAGNOSIS — I10 ESSENTIAL HYPERTENSION: ICD-10-CM

## 2020-06-11 RX ORDER — HYDROCHLOROTHIAZIDE 25 MG/1
TABLET ORAL
Qty: 90 TABLET | Refills: 3 | OUTPATIENT
Start: 2020-06-11

## 2020-06-18 DIAGNOSIS — I10 ESSENTIAL HYPERTENSION: ICD-10-CM

## 2020-06-18 RX ORDER — METOPROLOL SUCCINATE 100 MG/1
TABLET, EXTENDED RELEASE ORAL
Qty: 90 TABLET | Refills: 3 | Status: SHIPPED | OUTPATIENT
Start: 2020-06-18 | End: 2021-06-18

## 2020-06-19 DIAGNOSIS — I10 ESSENTIAL HYPERTENSION: ICD-10-CM

## 2020-06-19 RX ORDER — HYDROCHLOROTHIAZIDE 25 MG/1
TABLET ORAL
Qty: 90 TABLET | Refills: 3 | Status: SHIPPED | OUTPATIENT
Start: 2020-06-19 | End: 2021-09-02

## 2020-06-29 ENCOUNTER — OFFICE VISIT (OUTPATIENT)
Dept: FAMILY MEDICINE CLINIC | Facility: CLINIC | Age: 65
End: 2020-06-29
Payer: COMMERCIAL

## 2020-06-29 VITALS
SYSTOLIC BLOOD PRESSURE: 130 MMHG | BODY MASS INDEX: 35.21 KG/M2 | TEMPERATURE: 98.9 F | DIASTOLIC BLOOD PRESSURE: 70 MMHG | HEART RATE: 85 BPM | WEIGHT: 195.6 LBS | OXYGEN SATURATION: 97 % | RESPIRATION RATE: 16 BRPM

## 2020-06-29 DIAGNOSIS — E78.00 HYPERCHOLESTEROLEMIA: ICD-10-CM

## 2020-06-29 DIAGNOSIS — Z20.828 EXPOSURE TO SARS-ASSOCIATED CORONAVIRUS: ICD-10-CM

## 2020-06-29 DIAGNOSIS — I10 BENIGN ESSENTIAL HYPERTENSION: ICD-10-CM

## 2020-06-29 DIAGNOSIS — L40.50 PSORIASIS WITH ARTHROPATHY (HCC): ICD-10-CM

## 2020-06-29 DIAGNOSIS — M06.9 RHEUMATOID ARTHRITIS INVOLVING MULTIPLE SITES, UNSPECIFIED RHEUMATOID FACTOR PRESENCE: ICD-10-CM

## 2020-06-29 DIAGNOSIS — E11.9 CONTROLLED TYPE 2 DIABETES MELLITUS WITHOUT COMPLICATION, WITHOUT LONG-TERM CURRENT USE OF INSULIN (HCC): Primary | ICD-10-CM

## 2020-06-29 PROCEDURE — 1036F TOBACCO NON-USER: CPT | Performed by: FAMILY MEDICINE

## 2020-06-29 PROCEDURE — 99214 OFFICE O/P EST MOD 30 MIN: CPT | Performed by: FAMILY MEDICINE

## 2020-06-29 PROCEDURE — 3078F DIAST BP <80 MM HG: CPT | Performed by: FAMILY MEDICINE

## 2020-06-29 PROCEDURE — 4040F PNEUMOC VAC/ADMIN/RCVD: CPT | Performed by: FAMILY MEDICINE

## 2020-06-29 PROCEDURE — 2022F DILAT RTA XM EVC RTNOPTHY: CPT | Performed by: FAMILY MEDICINE

## 2020-06-29 PROCEDURE — 1160F RVW MEDS BY RX/DR IN RCRD: CPT | Performed by: FAMILY MEDICINE

## 2020-06-29 PROCEDURE — 3075F SYST BP GE 130 - 139MM HG: CPT | Performed by: FAMILY MEDICINE

## 2020-08-03 ENCOUNTER — APPOINTMENT (OUTPATIENT)
Dept: LAB | Facility: AMBULARY SURGERY CENTER | Age: 65
End: 2020-08-03
Payer: COMMERCIAL

## 2020-08-03 DIAGNOSIS — E78.00 HYPERCHOLESTEROLEMIA: ICD-10-CM

## 2020-08-03 DIAGNOSIS — Z11.59 ENCOUNTER FOR HEPATITIS C SCREENING TEST FOR LOW RISK PATIENT: ICD-10-CM

## 2020-08-03 DIAGNOSIS — J06.9 ACUTE UPPER RESPIRATORY INFECTION: ICD-10-CM

## 2020-08-03 DIAGNOSIS — Z20.828 EXPOSURE TO SARS-ASSOCIATED CORONAVIRUS: ICD-10-CM

## 2020-08-03 DIAGNOSIS — I10 BENIGN ESSENTIAL HYPERTENSION: ICD-10-CM

## 2020-08-03 DIAGNOSIS — J20.9 ACUTE BRONCHITIS, UNSPECIFIED ORGANISM: ICD-10-CM

## 2020-08-03 DIAGNOSIS — E11.9 CONTROLLED TYPE 2 DIABETES MELLITUS WITHOUT COMPLICATION, WITHOUT LONG-TERM CURRENT USE OF INSULIN (HCC): ICD-10-CM

## 2020-08-03 LAB
ALBUMIN SERPL BCP-MCNC: 3.7 G/DL (ref 3.5–5)
ALP SERPL-CCNC: 103 U/L (ref 46–116)
ALT SERPL W P-5'-P-CCNC: 39 U/L (ref 12–78)
ANION GAP SERPL CALCULATED.3IONS-SCNC: 5 MMOL/L (ref 4–13)
AST SERPL W P-5'-P-CCNC: 24 U/L (ref 5–45)
BILIRUB SERPL-MCNC: 0.36 MG/DL (ref 0.2–1)
BUN SERPL-MCNC: 11 MG/DL (ref 5–25)
CALCIUM SERPL-MCNC: 9.6 MG/DL (ref 8.3–10.1)
CHLORIDE SERPL-SCNC: 104 MMOL/L (ref 100–108)
CHOLEST SERPL-MCNC: 208 MG/DL (ref 50–200)
CO2 SERPL-SCNC: 28 MMOL/L (ref 21–32)
CREAT SERPL-MCNC: 0.54 MG/DL (ref 0.6–1.3)
CREAT UR-MCNC: 33.3 MG/DL
ERYTHROCYTE [DISTWIDTH] IN BLOOD BY AUTOMATED COUNT: 12.7 % (ref 11.6–15.1)
EST. AVERAGE GLUCOSE BLD GHB EST-MCNC: 151 MG/DL
GFR SERPL CREATININE-BSD FRML MDRD: 98 ML/MIN/1.73SQ M
GLUCOSE P FAST SERPL-MCNC: 145 MG/DL (ref 65–99)
HBA1C MFR BLD: 6.9 %
HCT VFR BLD AUTO: 44.4 % (ref 34.8–46.1)
HCV AB SER QL: NORMAL
HDLC SERPL-MCNC: 43 MG/DL
HGB BLD-MCNC: 14.7 G/DL (ref 11.5–15.4)
LDLC SERPL CALC-MCNC: 114 MG/DL (ref 0–100)
MCH RBC QN AUTO: 29.9 PG (ref 26.8–34.3)
MCHC RBC AUTO-ENTMCNC: 33.1 G/DL (ref 31.4–37.4)
MCV RBC AUTO: 90 FL (ref 82–98)
MICROALBUMIN UR-MCNC: <5 MG/L (ref 0–20)
MICROALBUMIN/CREAT 24H UR: <15 MG/G CREATININE (ref 0–30)
PLATELET # BLD AUTO: 167 THOUSANDS/UL (ref 149–390)
PMV BLD AUTO: 11 FL (ref 8.9–12.7)
POTASSIUM SERPL-SCNC: 3.7 MMOL/L (ref 3.5–5.3)
PROT SERPL-MCNC: 8.1 G/DL (ref 6.4–8.2)
RBC # BLD AUTO: 4.91 MILLION/UL (ref 3.81–5.12)
SARS-COV-2 IGG+IGM SERPL QL IA: NORMAL
SODIUM SERPL-SCNC: 137 MMOL/L (ref 136–145)
TRIGL SERPL-MCNC: 255 MG/DL
TSH SERPL DL<=0.05 MIU/L-ACNC: 1.62 UIU/ML (ref 0.36–3.74)
WBC # BLD AUTO: 4.72 THOUSAND/UL (ref 4.31–10.16)

## 2020-08-03 PROCEDURE — 85027 COMPLETE CBC AUTOMATED: CPT

## 2020-08-03 PROCEDURE — 86803 HEPATITIS C AB TEST: CPT

## 2020-08-03 PROCEDURE — 86769 SARS-COV-2 COVID-19 ANTIBODY: CPT

## 2020-08-03 PROCEDURE — 80053 COMPREHEN METABOLIC PANEL: CPT

## 2020-08-03 PROCEDURE — 82570 ASSAY OF URINE CREATININE: CPT

## 2020-08-03 PROCEDURE — 83036 HEMOGLOBIN GLYCOSYLATED A1C: CPT

## 2020-08-03 PROCEDURE — 82043 UR ALBUMIN QUANTITATIVE: CPT

## 2020-08-03 PROCEDURE — 80061 LIPID PANEL: CPT

## 2020-08-03 PROCEDURE — 36415 COLL VENOUS BLD VENIPUNCTURE: CPT

## 2020-08-03 PROCEDURE — 84443 ASSAY THYROID STIM HORMONE: CPT

## 2020-08-20 DIAGNOSIS — E55.9 VITAMIN D DEFICIENCY: ICD-10-CM

## 2020-08-20 RX ORDER — ERGOCALCIFEROL 1.25 MG/1
CAPSULE ORAL
Qty: 12 CAPSULE | Refills: 3 | Status: SHIPPED | OUTPATIENT
Start: 2020-08-20 | End: 2021-08-27

## 2020-09-11 PROBLEM — K59.9 FUNCTIONAL BOWEL DISORDER: Status: RESOLVED | Noted: 2020-09-11 | Resolved: 2020-09-11

## 2020-09-11 PROBLEM — K59.9 FUNCTIONAL BOWEL DISORDER: Status: ACTIVE | Noted: 2020-09-11

## 2020-09-21 ENCOUNTER — TELEPHONE (OUTPATIENT)
Dept: FAMILY MEDICINE CLINIC | Facility: CLINIC | Age: 65
End: 2020-09-21

## 2020-10-13 ENCOUNTER — OFFICE VISIT (OUTPATIENT)
Dept: FAMILY MEDICINE CLINIC | Facility: CLINIC | Age: 65
End: 2020-10-13
Payer: COMMERCIAL

## 2020-10-13 ENCOUNTER — TELEPHONE (OUTPATIENT)
Dept: FAMILY MEDICINE CLINIC | Facility: CLINIC | Age: 65
End: 2020-10-13

## 2020-10-13 VITALS
HEIGHT: 63 IN | RESPIRATION RATE: 16 BRPM | TEMPERATURE: 97.4 F | BODY MASS INDEX: 34.55 KG/M2 | DIASTOLIC BLOOD PRESSURE: 70 MMHG | SYSTOLIC BLOOD PRESSURE: 124 MMHG | OXYGEN SATURATION: 98 % | WEIGHT: 195 LBS | HEART RATE: 78 BPM

## 2020-10-13 DIAGNOSIS — L25.5 RHUS DERMATITIS: Primary | ICD-10-CM

## 2020-10-13 PROCEDURE — 1036F TOBACCO NON-USER: CPT | Performed by: FAMILY MEDICINE

## 2020-10-13 PROCEDURE — 1160F RVW MEDS BY RX/DR IN RCRD: CPT | Performed by: FAMILY MEDICINE

## 2020-10-13 PROCEDURE — 3078F DIAST BP <80 MM HG: CPT | Performed by: FAMILY MEDICINE

## 2020-10-13 PROCEDURE — 99213 OFFICE O/P EST LOW 20 MIN: CPT | Performed by: FAMILY MEDICINE

## 2020-10-13 RX ORDER — PREDNISONE 10 MG/1
10 TABLET ORAL DAILY
Qty: 10 TABLET | Refills: 0 | Status: SHIPPED | OUTPATIENT
Start: 2020-10-13 | End: 2020-10-23

## 2020-10-13 RX ORDER — METHYLPREDNISOLONE SODIUM SUCCINATE 40 MG/ML
40 INJECTION, POWDER, LYOPHILIZED, FOR SOLUTION INTRAMUSCULAR; INTRAVENOUS ONCE
Status: DISCONTINUED | OUTPATIENT
Start: 2020-10-13 | End: 2020-10-13

## 2020-10-13 RX ORDER — PREDNISONE 10 MG/1
10 TABLET ORAL DAILY
Qty: 30 TABLET | Refills: 0 | Status: SHIPPED | OUTPATIENT
Start: 2020-10-13 | End: 2020-10-23

## 2020-10-13 RX ORDER — PREDNISONE 1 MG/1
TABLET ORAL
COMMUNITY
Start: 2020-10-05 | End: 2021-12-15

## 2020-10-13 RX ORDER — CLOBETASOL PROPIONATE 0.5 MG/G
CREAM TOPICAL
COMMUNITY
Start: 2020-10-05 | End: 2022-02-02 | Stop reason: DRUGHIGH

## 2020-10-13 RX ORDER — ADALIMUMAB 40MG/0.8ML
0.8 KIT SUBCUTANEOUS
COMMUNITY
End: 2021-12-15

## 2020-10-13 RX ORDER — METHYLPREDNISOLONE ACETATE 40 MG/ML
40 INJECTION, SUSPENSION INTRA-ARTICULAR; INTRALESIONAL; INTRAMUSCULAR; SOFT TISSUE ONCE
Status: COMPLETED | OUTPATIENT
Start: 2020-10-13 | End: 2020-10-13

## 2020-10-13 RX ADMIN — METHYLPREDNISOLONE ACETATE 40 MG: 40 INJECTION, SUSPENSION INTRA-ARTICULAR; INTRALESIONAL; INTRAMUSCULAR; SOFT TISSUE at 12:02

## 2020-11-16 PROBLEM — R10.84 GENERALIZED ABDOMINAL PAIN: Status: ACTIVE | Noted: 2020-11-16

## 2020-11-16 PROBLEM — K59.1 FUNCTIONAL DIARRHEA: Status: ACTIVE | Noted: 2020-11-16

## 2020-11-25 ENCOUNTER — IMMUNIZATIONS (OUTPATIENT)
Dept: FAMILY MEDICINE CLINIC | Facility: CLINIC | Age: 65
End: 2020-11-25
Payer: COMMERCIAL

## 2020-11-25 DIAGNOSIS — Z23 ENCOUNTER FOR IMMUNIZATION: ICD-10-CM

## 2020-11-25 PROCEDURE — 90471 IMMUNIZATION ADMIN: CPT

## 2020-11-25 PROCEDURE — 90662 IIV NO PRSV INCREASED AG IM: CPT

## 2020-12-04 ENCOUNTER — ANNUAL EXAM (OUTPATIENT)
Dept: OBGYN CLINIC | Facility: CLINIC | Age: 65
End: 2020-12-04
Payer: COMMERCIAL

## 2020-12-04 VITALS
WEIGHT: 191 LBS | BODY MASS INDEX: 35.15 KG/M2 | SYSTOLIC BLOOD PRESSURE: 124 MMHG | HEIGHT: 62 IN | DIASTOLIC BLOOD PRESSURE: 78 MMHG

## 2020-12-04 DIAGNOSIS — Z01.419 WOMEN'S ANNUAL ROUTINE GYNECOLOGICAL EXAMINATION: Primary | ICD-10-CM

## 2020-12-04 DIAGNOSIS — N94.10 DYSPAREUNIA, FEMALE: ICD-10-CM

## 2020-12-04 DIAGNOSIS — N95.2 POSTMENOPAUSAL ATROPHIC VAGINITIS: ICD-10-CM

## 2020-12-04 PROCEDURE — 99397 PER PM REEVAL EST PAT 65+ YR: CPT | Performed by: NURSE PRACTITIONER

## 2020-12-04 PROCEDURE — 1160F RVW MEDS BY RX/DR IN RCRD: CPT | Performed by: NURSE PRACTITIONER

## 2020-12-04 PROCEDURE — 3074F SYST BP LT 130 MM HG: CPT | Performed by: NURSE PRACTITIONER

## 2020-12-04 PROCEDURE — 3008F BODY MASS INDEX DOCD: CPT | Performed by: NURSE PRACTITIONER

## 2020-12-04 PROCEDURE — 3078F DIAST BP <80 MM HG: CPT | Performed by: NURSE PRACTITIONER

## 2020-12-04 PROCEDURE — 1036F TOBACCO NON-USER: CPT | Performed by: NURSE PRACTITIONER

## 2020-12-04 RX ORDER — OXYCODONE HYDROCHLORIDE 5 MG/1
TABLET ORAL
COMMUNITY
End: 2020-12-04 | Stop reason: ALTCHOICE

## 2020-12-04 RX ORDER — CLOBETASOL PROPIONATE 0.5 MG/G
CREAM TOPICAL 2 TIMES DAILY
Qty: 30 G | Refills: 0 | Status: SHIPPED | OUTPATIENT
Start: 2020-12-04 | End: 2020-12-18

## 2020-12-04 RX ORDER — ESTRADIOL 4 UG/1
INSERT VAGINAL
Qty: 8 EACH | Refills: 11 | Status: SHIPPED | OUTPATIENT
Start: 2020-12-04 | End: 2021-02-12 | Stop reason: SINTOL

## 2020-12-06 DIAGNOSIS — E78.2 MIXED HYPERLIPIDEMIA: ICD-10-CM

## 2020-12-07 RX ORDER — SIMVASTATIN 10 MG
TABLET ORAL
Qty: 90 TABLET | Refills: 3 | Status: SHIPPED | OUTPATIENT
Start: 2020-12-07 | End: 2021-12-02

## 2020-12-11 DIAGNOSIS — I10 ESSENTIAL HYPERTENSION: ICD-10-CM

## 2020-12-11 RX ORDER — AMLODIPINE BESYLATE 10 MG/1
TABLET ORAL
Qty: 90 TABLET | Refills: 3 | Status: SHIPPED | OUTPATIENT
Start: 2020-12-11 | End: 2021-05-13 | Stop reason: SDUPTHER

## 2020-12-21 ENCOUNTER — OFFICE VISIT (OUTPATIENT)
Dept: FAMILY MEDICINE CLINIC | Facility: CLINIC | Age: 65
End: 2020-12-21
Payer: COMMERCIAL

## 2020-12-21 VITALS
RESPIRATION RATE: 16 BRPM | WEIGHT: 196 LBS | OXYGEN SATURATION: 97 % | HEART RATE: 72 BPM | SYSTOLIC BLOOD PRESSURE: 120 MMHG | DIASTOLIC BLOOD PRESSURE: 78 MMHG | BODY MASS INDEX: 36.07 KG/M2 | TEMPERATURE: 96.7 F | HEIGHT: 62 IN

## 2020-12-21 DIAGNOSIS — I10 BENIGN ESSENTIAL HYPERTENSION: ICD-10-CM

## 2020-12-21 DIAGNOSIS — K57.30 DIVERTICULOSIS OF COLON: Primary | ICD-10-CM

## 2020-12-21 DIAGNOSIS — R91.8 PULMONARY NODULES: ICD-10-CM

## 2020-12-21 DIAGNOSIS — E11.9 CONTROLLED TYPE 2 DIABETES MELLITUS WITHOUT COMPLICATION, WITHOUT LONG-TERM CURRENT USE OF INSULIN (HCC): ICD-10-CM

## 2020-12-21 DIAGNOSIS — F41.1 GENERALIZED ANXIETY DISORDER: ICD-10-CM

## 2020-12-21 DIAGNOSIS — Z12.31 ENCOUNTER FOR SCREENING MAMMOGRAM FOR MALIGNANT NEOPLASM OF BREAST: ICD-10-CM

## 2020-12-21 DIAGNOSIS — E78.00 HYPERCHOLESTEROLEMIA: ICD-10-CM

## 2020-12-21 PROBLEM — R10.84 GENERALIZED ABDOMINAL PAIN: Status: RESOLVED | Noted: 2020-11-16 | Resolved: 2020-12-21

## 2020-12-21 PROBLEM — K57.92 DIVERTICULITIS: Status: RESOLVED | Noted: 2020-05-04 | Resolved: 2020-12-21

## 2020-12-21 PROBLEM — L25.5 RHUS DERMATITIS: Status: RESOLVED | Noted: 2020-10-13 | Resolved: 2020-12-21

## 2020-12-21 PROCEDURE — 99214 OFFICE O/P EST MOD 30 MIN: CPT | Performed by: FAMILY MEDICINE

## 2020-12-21 PROCEDURE — 3074F SYST BP LT 130 MM HG: CPT | Performed by: FAMILY MEDICINE

## 2020-12-21 PROCEDURE — 1160F RVW MEDS BY RX/DR IN RCRD: CPT | Performed by: FAMILY MEDICINE

## 2020-12-21 PROCEDURE — 3078F DIAST BP <80 MM HG: CPT | Performed by: FAMILY MEDICINE

## 2020-12-21 PROCEDURE — 3008F BODY MASS INDEX DOCD: CPT | Performed by: FAMILY MEDICINE

## 2020-12-21 RX ORDER — BUSPIRONE HYDROCHLORIDE 7.5 MG/1
7.5 TABLET ORAL 2 TIMES DAILY
Qty: 60 TABLET | Refills: 5 | Status: SHIPPED | OUTPATIENT
Start: 2020-12-21 | End: 2022-02-02 | Stop reason: DRUGHIGH

## 2020-12-22 ENCOUNTER — TELEPHONE (OUTPATIENT)
Dept: ADMINISTRATIVE | Facility: OTHER | Age: 65
End: 2020-12-22

## 2020-12-22 DIAGNOSIS — E11.9 CONTROLLED TYPE 2 DIABETES MELLITUS WITHOUT COMPLICATION, WITHOUT LONG-TERM CURRENT USE OF INSULIN (HCC): ICD-10-CM

## 2020-12-22 RX ORDER — METFORMIN HYDROCHLORIDE 500 MG/1
TABLET, EXTENDED RELEASE ORAL
Qty: 180 TABLET | Refills: 3 | Status: SHIPPED | OUTPATIENT
Start: 2020-12-22 | End: 2021-11-17 | Stop reason: SDUPTHER

## 2020-12-24 ENCOUNTER — TELEPHONE (OUTPATIENT)
Dept: GASTROENTEROLOGY | Facility: MEDICAL CENTER | Age: 65
End: 2020-12-24

## 2020-12-30 ENCOUNTER — TRANSCRIBE ORDERS (OUTPATIENT)
Dept: LAB | Facility: AMBULARY SURGERY CENTER | Age: 65
End: 2020-12-30

## 2020-12-30 ENCOUNTER — LAB (OUTPATIENT)
Dept: LAB | Facility: AMBULARY SURGERY CENTER | Age: 65
End: 2020-12-30
Payer: COMMERCIAL

## 2020-12-30 DIAGNOSIS — M05.79 SEROPOSITIVE RHEUMATOID ARTHRITIS OF MULTIPLE SITES (HCC): Primary | ICD-10-CM

## 2020-12-30 DIAGNOSIS — M05.79 SEROPOSITIVE RHEUMATOID ARTHRITIS OF MULTIPLE SITES (HCC): ICD-10-CM

## 2020-12-30 LAB
ALBUMIN SERPL BCP-MCNC: 3.7 G/DL (ref 3.5–5)
ALP SERPL-CCNC: 103 U/L (ref 46–116)
ALT SERPL W P-5'-P-CCNC: 32 U/L (ref 12–78)
ANION GAP SERPL CALCULATED.3IONS-SCNC: 5 MMOL/L (ref 4–13)
AST SERPL W P-5'-P-CCNC: 15 U/L (ref 5–45)
BACTERIA UR QL AUTO: ABNORMAL /HPF
BASOPHILS # BLD AUTO: 0.05 THOUSANDS/ΜL (ref 0–0.1)
BASOPHILS NFR BLD AUTO: 1 % (ref 0–1)
BILIRUB SERPL-MCNC: 0.23 MG/DL (ref 0.2–1)
BILIRUB UR QL STRIP: NEGATIVE
BUN SERPL-MCNC: 17 MG/DL (ref 5–25)
CALCIUM SERPL-MCNC: 9.6 MG/DL (ref 8.3–10.1)
CHLORIDE SERPL-SCNC: 105 MMOL/L (ref 100–108)
CLARITY UR: CLEAR
CO2 SERPL-SCNC: 29 MMOL/L (ref 21–32)
COLOR UR: YELLOW
CREAT SERPL-MCNC: 0.58 MG/DL (ref 0.6–1.3)
CRP SERPL QL: 3.3 MG/L
EOSINOPHIL # BLD AUTO: 0.22 THOUSAND/ΜL (ref 0–0.61)
EOSINOPHIL NFR BLD AUTO: 4 % (ref 0–6)
ERYTHROCYTE [DISTWIDTH] IN BLOOD BY AUTOMATED COUNT: 13.3 % (ref 11.6–15.1)
ERYTHROCYTE [SEDIMENTATION RATE] IN BLOOD: 28 MM/HOUR (ref 0–29)
GFR SERPL CREATININE-BSD FRML MDRD: 96 ML/MIN/1.73SQ M
GLUCOSE P FAST SERPL-MCNC: 147 MG/DL (ref 65–99)
GLUCOSE UR STRIP-MCNC: NEGATIVE MG/DL
HCT VFR BLD AUTO: 41 % (ref 34.8–46.1)
HGB BLD-MCNC: 13.5 G/DL (ref 11.5–15.4)
HGB UR QL STRIP.AUTO: NEGATIVE
HYALINE CASTS #/AREA URNS LPF: ABNORMAL /LPF
IMM GRANULOCYTES # BLD AUTO: 0.01 THOUSAND/UL (ref 0–0.2)
IMM GRANULOCYTES NFR BLD AUTO: 0 % (ref 0–2)
KETONES UR STRIP-MCNC: NEGATIVE MG/DL
LEUKOCYTE ESTERASE UR QL STRIP: ABNORMAL
LYMPHOCYTES # BLD AUTO: 1.87 THOUSANDS/ΜL (ref 0.6–4.47)
LYMPHOCYTES NFR BLD AUTO: 31 % (ref 14–44)
MCH RBC QN AUTO: 30.5 PG (ref 26.8–34.3)
MCHC RBC AUTO-ENTMCNC: 32.9 G/DL (ref 31.4–37.4)
MCV RBC AUTO: 93 FL (ref 82–98)
MONOCYTES # BLD AUTO: 0.38 THOUSAND/ΜL (ref 0.17–1.22)
MONOCYTES NFR BLD AUTO: 6 % (ref 4–12)
NEUTROPHILS # BLD AUTO: 3.43 THOUSANDS/ΜL (ref 1.85–7.62)
NEUTS SEG NFR BLD AUTO: 58 % (ref 43–75)
NITRITE UR QL STRIP: NEGATIVE
NON-SQ EPI CELLS URNS QL MICRO: ABNORMAL /HPF
NRBC BLD AUTO-RTO: 0 /100 WBCS
PH UR STRIP.AUTO: 7 [PH]
PLATELET # BLD AUTO: 200 THOUSANDS/UL (ref 149–390)
PMV BLD AUTO: 11 FL (ref 8.9–12.7)
POTASSIUM SERPL-SCNC: 3.5 MMOL/L (ref 3.5–5.3)
PROT SERPL-MCNC: 7.8 G/DL (ref 6.4–8.2)
PROT UR STRIP-MCNC: NEGATIVE MG/DL
RBC # BLD AUTO: 4.43 MILLION/UL (ref 3.81–5.12)
RBC #/AREA URNS AUTO: ABNORMAL /HPF
SODIUM SERPL-SCNC: 139 MMOL/L (ref 136–145)
SP GR UR STRIP.AUTO: 1.01 (ref 1–1.03)
UROBILINOGEN UR QL STRIP.AUTO: 0.2 E.U./DL
WBC # BLD AUTO: 5.96 THOUSAND/UL (ref 4.31–10.16)
WBC #/AREA URNS AUTO: ABNORMAL /HPF

## 2020-12-30 PROCEDURE — 80053 COMPREHEN METABOLIC PANEL: CPT

## 2020-12-30 PROCEDURE — 85025 COMPLETE CBC W/AUTO DIFF WBC: CPT

## 2020-12-30 PROCEDURE — 81001 URINALYSIS AUTO W/SCOPE: CPT

## 2020-12-30 PROCEDURE — 86140 C-REACTIVE PROTEIN: CPT

## 2020-12-30 PROCEDURE — 36415 COLL VENOUS BLD VENIPUNCTURE: CPT

## 2020-12-30 PROCEDURE — 85652 RBC SED RATE AUTOMATED: CPT

## 2021-01-11 DIAGNOSIS — J45.41 MODERATE PERSISTENT ASTHMA WITH EXACERBATION: Primary | ICD-10-CM

## 2021-02-09 ENCOUNTER — TELEPHONE (OUTPATIENT)
Dept: GASTROENTEROLOGY | Facility: MEDICAL CENTER | Age: 66
End: 2021-02-09

## 2021-02-12 ENCOUNTER — OFFICE VISIT (OUTPATIENT)
Dept: OBGYN CLINIC | Facility: CLINIC | Age: 66
End: 2021-02-12
Payer: COMMERCIAL

## 2021-02-12 VITALS
WEIGHT: 191 LBS | BODY MASS INDEX: 35.15 KG/M2 | SYSTOLIC BLOOD PRESSURE: 122 MMHG | DIASTOLIC BLOOD PRESSURE: 70 MMHG | HEIGHT: 62 IN

## 2021-02-12 DIAGNOSIS — N75.0 BARTHOLIN GLAND CYST: Primary | ICD-10-CM

## 2021-02-12 PROCEDURE — 3078F DIAST BP <80 MM HG: CPT | Performed by: NURSE PRACTITIONER

## 2021-02-12 PROCEDURE — 99213 OFFICE O/P EST LOW 20 MIN: CPT | Performed by: NURSE PRACTITIONER

## 2021-02-12 PROCEDURE — 3008F BODY MASS INDEX DOCD: CPT | Performed by: NURSE PRACTITIONER

## 2021-02-12 PROCEDURE — 1160F RVW MEDS BY RX/DR IN RCRD: CPT | Performed by: NURSE PRACTITIONER

## 2021-02-12 PROCEDURE — 1036F TOBACCO NON-USER: CPT | Performed by: NURSE PRACTITIONER

## 2021-02-12 PROCEDURE — 3074F SYST BP LT 130 MM HG: CPT | Performed by: NURSE PRACTITIONER

## 2021-02-12 NOTE — PROGRESS NOTES
[address] 57-year-old female who presents with c/o an vaginal lump that comes and goes  She currently does not have the lump  The lump was present last week  She describes it as a "blind pimple" - a lump on the left side in the entrance of the vagina  Denies tenderness, swelling  She did not start the Imvexxy prescribed in December due to side effects of potential stroke  ROS:  As indicated in HPI  All other ROS negative  Physical Exam  Constitutional:       Appearance: Normal appearance  Genitourinary:      Pelvic exam was performed with patient in the lithotomy position  No vulval condylomata, lesion, tenderness, ulcerations, Bartholin's cyst or rash noted  No signs of labial injury  No labial fusion  Genitourinary Comments: Patient points the area on the left side of the introitus consistent with the bartholin gland  HENT:      Head: Normocephalic and atraumatic  Neurological:      Mental Status: She is alert  Psychiatric:         Behavior: Behavior is cooperative  Vitals signs and nursing note reviewed  [facility] was seen today for follow-up  Diagnoses and all orders for this visit:    Bartholin gland cyst      Based on her description it appears she is experiencing a bartholin gland cyst that is self-limiting and resolves on it's own  Written information on bartholin gland provided

## 2021-02-12 NOTE — PATIENT INSTRUCTIONS
Bartholin Cyst   WHAT YOU NEED TO KNOW:   What is a Bartholin cyst?  A Bartholin cyst is a lump near the opening to your vagina  You may have pain in this area when you walk or have sex  A Bartholin cyst is caused by blockage of your Bartholin gland  You have a Bartholin gland on each side of your vagina  The glands produce fluid to moisten your vagina  Over time the fluid can build up in the gland and form a cyst  The cyst may become infected  You may be at risk for a Bartholin cyst if you have a sexually transmitted infection  An injury or surgery near your vagina may also increase you risk  How is a Bartholin cyst diagnosed and treated? Your healthcare provider will examine your vagina  A sample of fluid from your vagina may be collected  The fluid can be tested for sexually transmitted infections  Your healthcare provider may tell you how to treat your cyst at home  Instead, you may need any of the following:  · Medicine  may be given to treat or prevent an infection  Medicine may also be given to decrease pain and swelling  · Incision and drainage  is a procedure to drain the cyst  Your healthcare provider will make an opening in the cyst so it can drain  He or she may also place packing or a drain in your wound  The drain will help keep your gland open and drain extra fluid  The packing will be removed in 24 to 48 hours  The drain may be left in place for 4 to 6 weeks  · Surgery  may be needed if other treatments do not work  Surgery may be done to hold your gland open and prevent another blockage  Surgery may also be done to remove 1 or both of your Bartholin glands  What can I do to care for myself if my cyst is not drained? · Take a sitz bath  3 to 4 times each day or as directed  A sitz bath may help relieve swelling and pain  It will also help open your Bartholin glands so they drain normally  Place a clean towel on the bottom of your bath tub   Fill your bath tub with warm water up to your hips  You can also buy a sitz bath that fits in your toilet  Sit in the water for 10 minutes  · Apply a warm compress  to your cyst  This may relieve swelling and pain  A warm compress will also help open your Bartholin glands so they drain normally  Wet a washcloth in warm, but not hot, water  Apply the compress for 10 minutes  Repeat 4 times each day  · Keep the area around your vagina clean  Always wipe front to back  Shower once a day  Gently pat the area dry after a shower  What can I do to care for myself after my cyst is drained? · Take a sitz bath  in 24 to 48 hours or as directed  You may need to wait to take a sitz bath until after your packing is removed  A sitz bath may help relieve swelling and pain  Take a sitz bath 3 to 4 times each day for 3 days  Place a clean towel on the bottom of your bath tub  Fill your bath tub with warm water up to your hips  You can also buy a sitz bath that fits in your toilet  Sit in the water for 10 minutes  · Wear a sanitary pad  to absorb drainage from your wound  You may have drainage for a few weeks after your cyst is drained  · Ask your healthcare provider if it is okay to have sex  Sex may cause your drain to fall out  It may also increase your risk for an infection  · Keep the area around your vagina clean  Always wipe front to back  Shower once a day  Gently pat the area dry after a shower  When should I contact my gynecologist or healthcare provider? · You have a fever  · Your cyst gets larger or becomes more painful  · Your cyst returns after treatment  · Your drain falls out  · You have pus, redness, or swelling where the cyst was drained  · You have questions or concerns about your condition or care  CARE AGREEMENT:   You have the right to help plan your care  Learn about your health condition and how it may be treated   Discuss treatment options with your healthcare providers to decide what care you want to receive  You always have the right to refuse treatment  The above information is an  only  It is not intended as medical advice for individual conditions or treatments  Talk to your doctor, nurse or pharmacist before following any medical regimen to see if it is safe and effective for you  © Copyright [address] Information is for End User's use only and may not be sold, redistributed or otherwise used for commercial purposes   All illustrations and images included in CareNotes® are the copyrighted property of A D A M , Inc  or [address] [place] [address]

## 2021-02-22 DIAGNOSIS — B37.3 VAGINAL CANDIDA: Primary | ICD-10-CM

## 2021-02-22 RX ORDER — FLUCONAZOLE 100 MG/1
100 TABLET ORAL DAILY
Qty: 2 TABLET | Refills: 0 | Status: SHIPPED | OUTPATIENT
Start: 2021-02-22 | End: 2021-02-24

## 2021-03-06 DIAGNOSIS — E11.9 CONTROLLED TYPE 2 DIABETES MELLITUS WITHOUT COMPLICATION, WITHOUT LONG-TERM CURRENT USE OF INSULIN (HCC): ICD-10-CM

## 2021-03-08 RX ORDER — PIOGLITAZONEHYDROCHLORIDE 15 MG/1
TABLET ORAL
Qty: 90 TABLET | Refills: 3 | Status: SHIPPED | OUTPATIENT
Start: 2021-03-08 | End: 2021-05-13

## 2021-03-10 DIAGNOSIS — Z23 ENCOUNTER FOR IMMUNIZATION: ICD-10-CM

## 2021-03-11 ENCOUNTER — TELEPHONE (OUTPATIENT)
Dept: FAMILY MEDICINE CLINIC | Facility: CLINIC | Age: 66
End: 2021-03-11

## 2021-04-09 ENCOUNTER — OFFICE VISIT (OUTPATIENT)
Dept: GASTROENTEROLOGY | Facility: MEDICAL CENTER | Age: 66
End: 2021-04-09
Payer: COMMERCIAL

## 2021-04-09 VITALS
DIASTOLIC BLOOD PRESSURE: 70 MMHG | HEART RATE: 84 BPM | WEIGHT: 187 LBS | HEIGHT: 62 IN | TEMPERATURE: 97.6 F | BODY MASS INDEX: 34.41 KG/M2 | SYSTOLIC BLOOD PRESSURE: 118 MMHG

## 2021-04-09 DIAGNOSIS — K57.30 DIVERTICULOSIS OF COLON: Primary | ICD-10-CM

## 2021-04-09 PROCEDURE — 3008F BODY MASS INDEX DOCD: CPT | Performed by: INTERNAL MEDICINE

## 2021-04-09 PROCEDURE — 1036F TOBACCO NON-USER: CPT | Performed by: INTERNAL MEDICINE

## 2021-04-09 PROCEDURE — 1160F RVW MEDS BY RX/DR IN RCRD: CPT | Performed by: INTERNAL MEDICINE

## 2021-04-09 PROCEDURE — 3078F DIAST BP <80 MM HG: CPT | Performed by: INTERNAL MEDICINE

## 2021-04-09 PROCEDURE — 3074F SYST BP LT 130 MM HG: CPT | Performed by: INTERNAL MEDICINE

## 2021-04-09 PROCEDURE — 99213 OFFICE O/P EST LOW 20 MIN: CPT | Performed by: INTERNAL MEDICINE

## 2021-04-09 NOTE — PROGRESS NOTES
Outpatient Follow up  Yvrose Olivaresukaimee [address] [place] [id number]  Lalo Webber MD  Ph : [phone]  Fax : [phone]  Mobile : [phone]  Email : [email or web address]  Also available on [address] 77 y o  female MRN: [id number]    PCP: Ruy Jacinto DO  Referring: No referring provider defined for this encounter  Akiko Cerrato presented for a follow up visit  My recommendations are included  Please do not hesitate to contact me with any questions you may have  ASSESSMENT AND PLAN:      No problem-specific Assessment & Plan notes found for this encounter  Diagnoses and all orders for this visit:    Diverticulosis of colon       57-year-old with history of diverticulosis and diverticulitis  She has had her last episode last year  She has been seen by Colorectal surgery  No surgery is recommended at this time  She is doing well  Would recommend to continue fiber, increase water intake and exercise  She should have a repeat colonoscopy in 3 years since her last 1 was in 2019  Follow-up as needed  In the future she is requesting another opinion regarding her surgical options  I have recommended Dr Mike Adame or Dr Chaim Duvall    ______________________________________________________________________    SUBJECTIVE:  76 y/o lady with h/o diverticulitis last year and had to go to the hospital 2 times  She has been seen by colorectal surgery  She has been doing well and has increased fiber in her diet  She had a colonoscopy 5/19 with mild diverticulosis  Repeat recommended in 5 years  She is eating well and working with [employer]. and losing weight  She has lost about 14 lbs  REVIEW OF SYSTEMS IS OTHERWISE NEGATIVE        Historical Information   Past Medical History:   Diagnosis Date    Acute non-recurrent frontal sinusitis 5/15/2019    Acute non-recurrent maxillary sinusitis 3/10/2020    Anxiety disorder     Arthritis     OA    Asthma     exercise iniduced   Cancer (HCC)     Squamous Cell on back    Dependence on crutches     prn    Depression     ro       Diabetes mellitus ([facility] )     NIDDM; per Allscripts: Last Assessed: 7/15/15, New onset of type 2    Fibromyalgia     Hearing aid worn     mayra    Heart murmur     HL (hearing loss)     b/l - uses hearing aids    Hyperglycemia     Last Assessed:10/29/15    Hyperlipidemia     Hypertension     LLQ abdominal pain 3/30/2020    Psoriasis     red dry patch left side- waist area    Psoriatic arthritis ([facility] )     RA (rheumatoid arthritis) ([facility] )     Wears glasses     reading     Past Surgical History:   Procedure Laterality Date    ARTHROSCOPY KNEE Left 3/2/2016    Procedure: ARTHROSCOPY KNEE;  Surgeon: Doreen Livingston MD;  [facility];  Service:      SECTION      X2    COLONOSCOPY  2019    CYST REMOVAL Right     Cyst on bone     FRACTURE SURGERY      (Right) tibia/fibula Fx, and ankle    PILONIDAL CYST / SINUS EXCISION      NC KNEE SCOPE,MED/LAT MENISECTOMY Left 3/2/2016    Procedure: PARTIAL MEDIAL &  LATERAL MENISCECTOMIES, CHONDROPLASTY, REMOVAL OSTEOPHYTE, LIMITED SYNOVECTOMY;  Surgeon: Doreen Livingston MD;  [facility];  Service: Orthopedics    REPAIR KNEE LIGAMENT      TOOTH EXTRACTION       Social History   Social History     Substance and Sexual Activity   Alcohol Use No     Social History     Substance and Sexual Activity   Drug Use No     Social History     Tobacco Use   Smoking Status Former Smoker    Packs/day: 2 00    Quit date: 56    Years since quittin 3   Smokeless Tobacco Never Used     Family History   Problem Relation Age of Onset    Hypertension Mother     Heart attack Mother     Osteoarthritis Mother     Breast cancer Mother 80    Alcohol abuse Father     Hypertension Brother     Diabetes Brother     Other Brother         Dyslipidemia    Coronary artery disease Brother     No Known Problems Sister     No Known Problems Daughter     No Known Problems Maternal Grandmother     No Known Problems Maternal Grandfather     No Known Problems Paternal Grandmother     No Known Problems Paternal Grandfather     No Known Problems Daughter     No Known Problems Maternal Aunt     No Known Problems Paternal Aunt     No Known Problems Paternal Aunt     No Known Problems Paternal Aunt     No Known Problems Paternal Aunt     No Known Problems Paternal Aunt        Meds/Allergies       Current Outpatient Medications:     Adalimumab (Humira Pen) 40 MG/0 8ML PNKT    albuterol (2 5 mg/3 mL) 0 083 % nebulizer solution    albuterol (2 5 mg/3 mL) 0 083 % nebulizer solution    ALPRAZolam (XANAX) 0 5 mg tablet    amLODIPine (NORVASC) 10 mg tablet    busPIRone (BUSPAR) 7 5 mg tablet    clobetasol (TEMOVATE) 0 05 % cream    dicyclomine (BENTYL) 10 mg capsule    ergocalciferol (VITAMIN D2) 50,000 units    escitalopram (LEXAPRO) 10 mg tablet    etanercept (ENBREL) 25 MG    fluorouracil (EFUDEX) 5 % cream    folic acid (FOLVITE) 1 mg tablet    glucose blood (ONE TOUCH ULTRA TEST) test strip    hydrochlorothiazide (HYDRODIURIL) 25 mg tablet    Lancets (ONETOUCH ULTRASOFT) lancets    losartan (COZAAR) 50 mg tablet    metFORMIN (GLUCOPHAGE-XR) 500 mg 24 hr tablet    metoprolol succinate (TOPROL-XL) 100 mg 24 hr tablet    ondansetron (ZOFRAN-ODT) 4 mg disintegrating tablet    oxyCODONE-acetaminophen (PERCOCET) 5-325 mg per tablet    pioglitazone (ACTOS) 15 mg tablet    predniSONE 5 mg tablet    simvastatin (ZOCOR) 10 mg tablet    clobetasol (TEMOVATE) 0 05 % cream    Allergies   Allergen Reactions    Zithromax [Azithromycin] Hives    Lisinopril Cough           Objective     Blood pressure 118/70, pulse 84, temperature 97 6 °F (36 4 °C), temperature source Tympanic, height 5' 2" (1 575 m), weight 84 8 kg (187 lb), not currently breastfeeding   Body mass index is 34 2 kg/m²       PHYSICAL EXAM:      Physical Exam  Constitutional:       Appearance: Normal appearance  She is well-developed  HENT:      Head: Normocephalic and atraumatic  Eyes:      General: No scleral icterus  Conjunctiva/sclera: Conjunctivae normal       Pupils: Pupils are equal, round, and reactive to light  Neck:      Musculoskeletal: Normal range of motion  Cardiovascular:      Rate and Rhythm: Normal rate and regular rhythm  Heart sounds: Normal heart sounds  Pulmonary:      Effort: Pulmonary effort is normal  No respiratory distress  Breath sounds: Normal breath sounds  Abdominal:      General: Bowel sounds are normal  There is no distension  Palpations: Abdomen is soft  There is no mass  Tenderness: There is no abdominal tenderness  Hernia: No hernia is present  Musculoskeletal: Normal range of motion  Lymphadenopathy:      Cervical: No cervical adenopathy  Skin:     General: Skin is warm  Neurological:      Mental Status: She is alert and oriented to person, place, and time  Psychiatric:         Behavior: Behavior normal          Thought Content: Thought content normal          Lab Results:   No visits with results within 1 Day(s) from this visit     Latest known visit with results is:   Lab on 12/30/2020   Component Date Value    WBC 12/30/2020 5 96     RBC 12/30/2020 4 43     Hemoglobin 12/30/2020 13 5     Hematocrit 12/30/2020 41 0     MCV 12/30/2020 93     MCH 12/30/2020 30 5     MCHC 12/30/2020 32 9     RDW 12/30/2020 13 3     MPV 12/30/2020 11 0     Platelets 08/26/9959 200     nRBC 12/30/2020 0     Neutrophils Relative 12/30/2020 58     Immat GRANS % 12/30/2020 0     Lymphocytes Relative 12/30/2020 31     Monocytes Relative 12/30/2020 6     Eosinophils Relative 12/30/2020 4     Basophils Relative 12/30/2020 1     Neutrophils Absolute 12/30/2020 3 43     Immature Grans Absolute 12/30/2020 0 01     Lymphocytes Absolute 12/30/2020 1 87  Monocytes Absolute 12/30/2020 0 38     Eosinophils Absolute 12/30/2020 0 22     Basophils Absolute 12/30/2020 0 05     Sodium 12/30/2020 139     Potassium 12/30/2020 3 5     Chloride 12/30/2020 105     CO2 12/30/2020 29     ANION GAP 12/30/2020 5     BUN 12/30/2020 17     Creatinine 12/30/2020 0 58*    Glucose, Fasting 12/30/2020 147*    Calcium 12/30/2020 9 6     AST 12/30/2020 15     ALT 12/30/2020 32     Alkaline Phosphatase 12/30/2020 103     Total Protein 12/30/2020 7 8     Albumin 12/30/2020 3 7     Total Bilirubin 12/30/2020 0 23     eGFR 12/30/2020 96     CRP 12/30/2020 3 3*    Sed Rate 12/30/2020 28          Radiology Results:   No results found

## 2021-04-09 NOTE — PATIENT INSTRUCTIONS
High Fiber Diet   WHAT YOU NEED TO KNOW:   What is a high-fiber diet? A high-fiber diet includes foods that have a high amount of fiber  Fiber is the part of fruits, vegetables, and grains that is not broken down by your body  Fiber keeps your bowel movements regular  Fiber can also help lower your cholesterol level, control blood sugar in people with diabetes, and relieve constipation  Fiber can also help you control your weight because it helps you feel full faster  Most adults should eat 25 to 35 grams of fiber each day  Talk to your dietitian or healthcare provider about the amount of fiber you need  What foods are good sources of fiber? · Foods with at least 4 grams of fiber per serving:      ? ? to ½ cup of high-fiber cereal (check the nutrition label on the box)    ? ½ cup of blackberries or raspberries    ? 4 dried prunes    ? 1 cooked artichoke    ? ½ cup of cooked legumes, such as lentils, or red, kidney, and mccabe beans    · Foods with 1 to 3 grams of fiber per serving:      ? 1 slice of whole-wheat, pumpernickel, or rye bread    ? ½ cup of cooked brown rice    ? 4 whole-wheat crackers    ? 1 cup of oatmeal    ? ½ cup of cereal with 1 to 3 grams of fiber per serving (check the nutrition label on the box)    ? 1 small piece of fruit, such as an apple, banana, pear, kiwi, or orange    ? 3 dates    ? ½ cup of canned apricots, fruit cocktail, peaches, or pears    ? ½ cup of raw or cooked vegetables, such as carrots, cauliflower, cabbage, spinach, squash, or corn  What are some ways that I can increase fiber in my diet? · Choose brown or wild rice instead of white rice  · Use whole wheat flour in recipes instead of white or all-purpose flour  · Add beans and peas to casseroles or soups  · Choose fresh fruit and vegetables with peels or skins on instead of juices  What other guidelines should I follow? · Add fiber to your diet slowly    You may have abdominal discomfort, bloating, and gas if you add fiber to your diet too quickly  · Drink plenty of liquids as you add fiber to your diet  You may have nausea or develop constipation if you do not drink enough water  Ask how much liquid to drink each day and which liquids are best for you  CARE AGREEMENT:   You have the right to help plan your care  Discuss treatment options with your healthcare provider to decide what care you want to receive  You always have the right to refuse treatment  The above information is an  only  It is not intended as medical advice for individual conditions or treatments  Talk to your doctor, nurse or pharmacist before following any medical regimen to see if it is safe and effective for you  © Copyright [address] Information is for End User's use only and may not be sold, redistributed or otherwise used for commercial purposes   All illustrations and images included in CareNotes® are the copyrighted property of [employer]  or [address] [place] [address]

## 2021-04-21 DIAGNOSIS — F41.1 GENERALIZED ANXIETY DISORDER: ICD-10-CM

## 2021-04-21 RX ORDER — ESCITALOPRAM OXALATE 10 MG/1
TABLET ORAL
Qty: 90 TABLET | Refills: 3 | Status: SHIPPED | OUTPATIENT
Start: 2021-04-21 | End: 2022-05-19 | Stop reason: SDUPTHER

## 2021-04-22 DIAGNOSIS — M25.561 PAIN IN BOTH KNEES, UNSPECIFIED CHRONICITY: Primary | ICD-10-CM

## 2021-04-22 DIAGNOSIS — M25.562 PAIN IN BOTH KNEES, UNSPECIFIED CHRONICITY: Primary | ICD-10-CM

## 2021-05-07 ENCOUNTER — RA CDI HCC (OUTPATIENT)
Dept: OTHER | Facility: HOSPITAL | Age: 66
End: 2021-05-07

## 2021-05-07 NOTE — PROGRESS NOTES
[place] [address]  coding opportunities             Chart reviewed, (number of) suggestions sent to provider: 2     Problem listed updated  Provider Accepted, (number of) suggestions accepted: 2        Patients insurance company: [employer] (Medicare Advantage and [employer])           L40 50 - Psoriatic arthritis    M05 79 - Rheumatoid arthritis involving multiple sites with positive rheumatoid factor    Both conditions are documented in the Rheumatology progress note dated 12/31/2020  If this is correct, please document, assess, and code at your next visit on 5/13/2021    [place] [address]  coding opportunities             Chart reviewed, (number of) suggestions sent to provider: 2           Patients insurance company: [employer] (Medicare Advantage and [employer])

## 2021-05-10 ENCOUNTER — APPOINTMENT (OUTPATIENT)
Dept: LAB | Facility: AMBULARY SURGERY CENTER | Age: 66
End: 2021-05-10
Payer: COMMERCIAL

## 2021-05-10 ENCOUNTER — TRANSCRIBE ORDERS (OUTPATIENT)
Dept: LAB | Facility: AMBULARY SURGERY CENTER | Age: 66
End: 2021-05-10

## 2021-05-10 DIAGNOSIS — E11.9 CONTROLLED TYPE 2 DIABETES MELLITUS WITHOUT COMPLICATION, WITHOUT LONG-TERM CURRENT USE OF INSULIN (HCC): ICD-10-CM

## 2021-05-10 DIAGNOSIS — E78.00 HYPERCHOLESTEROLEMIA: ICD-10-CM

## 2021-05-10 DIAGNOSIS — I10 BENIGN ESSENTIAL HYPERTENSION: ICD-10-CM

## 2021-05-10 LAB
ALBUMIN SERPL BCP-MCNC: 3.6 G/DL (ref 3.5–5)
ALP SERPL-CCNC: 95 U/L (ref 46–116)
ALT SERPL W P-5'-P-CCNC: 21 U/L (ref 12–78)
ANION GAP SERPL CALCULATED.3IONS-SCNC: 6 MMOL/L (ref 4–13)
AST SERPL W P-5'-P-CCNC: 14 U/L (ref 5–45)
BILIRUB SERPL-MCNC: 0.26 MG/DL (ref 0.2–1)
BUN SERPL-MCNC: 10 MG/DL (ref 5–25)
CALCIUM SERPL-MCNC: 9.6 MG/DL (ref 8.3–10.1)
CHLORIDE SERPL-SCNC: 103 MMOL/L (ref 100–108)
CHOLEST SERPL-MCNC: 172 MG/DL (ref 50–200)
CO2 SERPL-SCNC: 27 MMOL/L (ref 21–32)
CREAT SERPL-MCNC: 0.51 MG/DL (ref 0.6–1.3)
CREAT UR-MCNC: 28.2 MG/DL
ERYTHROCYTE [DISTWIDTH] IN BLOOD BY AUTOMATED COUNT: 12.9 % (ref 11.6–15.1)
EST. AVERAGE GLUCOSE BLD GHB EST-MCNC: 114 MG/DL
GFR SERPL CREATININE-BSD FRML MDRD: 100 ML/MIN/1.73SQ M
GLUCOSE P FAST SERPL-MCNC: 90 MG/DL (ref 65–99)
HBA1C MFR BLD: 5.6 %
HCT VFR BLD AUTO: 42.3 % (ref 34.8–46.1)
HDLC SERPL-MCNC: 43 MG/DL
HGB BLD-MCNC: 14 G/DL (ref 11.5–15.4)
LDLC SERPL CALC-MCNC: 88 MG/DL (ref 0–100)
MCH RBC QN AUTO: 30.1 PG (ref 26.8–34.3)
MCHC RBC AUTO-ENTMCNC: 33.1 G/DL (ref 31.4–37.4)
MCV RBC AUTO: 91 FL (ref 82–98)
MICROALBUMIN UR-MCNC: <5 MG/L (ref 0–20)
MICROALBUMIN/CREAT 24H UR: <18 MG/G CREATININE (ref 0–30)
PLATELET # BLD AUTO: 178 THOUSANDS/UL (ref 149–390)
PMV BLD AUTO: 10.4 FL (ref 8.9–12.7)
POTASSIUM SERPL-SCNC: 3.8 MMOL/L (ref 3.5–5.3)
PROT SERPL-MCNC: 7.6 G/DL (ref 6.4–8.2)
RBC # BLD AUTO: 4.65 MILLION/UL (ref 3.81–5.12)
SODIUM SERPL-SCNC: 136 MMOL/L (ref 136–145)
TRIGL SERPL-MCNC: 206 MG/DL
TSH SERPL DL<=0.05 MIU/L-ACNC: 2.05 UIU/ML (ref 0.36–3.74)
WBC # BLD AUTO: 4.74 THOUSAND/UL (ref 4.31–10.16)

## 2021-05-10 PROCEDURE — 85027 COMPLETE CBC AUTOMATED: CPT

## 2021-05-10 PROCEDURE — 83036 HEMOGLOBIN GLYCOSYLATED A1C: CPT

## 2021-05-10 PROCEDURE — 3044F HG A1C LEVEL LT 7.0%: CPT | Performed by: PODIATRIST

## 2021-05-10 PROCEDURE — 36415 COLL VENOUS BLD VENIPUNCTURE: CPT

## 2021-05-10 PROCEDURE — 82043 UR ALBUMIN QUANTITATIVE: CPT

## 2021-05-10 PROCEDURE — 80061 LIPID PANEL: CPT

## 2021-05-10 PROCEDURE — 82570 ASSAY OF URINE CREATININE: CPT

## 2021-05-10 PROCEDURE — 80053 COMPREHEN METABOLIC PANEL: CPT

## 2021-05-10 PROCEDURE — 84443 ASSAY THYROID STIM HORMONE: CPT

## 2021-05-13 ENCOUNTER — OFFICE VISIT (OUTPATIENT)
Dept: FAMILY MEDICINE CLINIC | Facility: CLINIC | Age: 66
End: 2021-05-13
Payer: COMMERCIAL

## 2021-05-13 VITALS
DIASTOLIC BLOOD PRESSURE: 72 MMHG | WEIGHT: 181.6 LBS | HEART RATE: 73 BPM | TEMPERATURE: 99.7 F | SYSTOLIC BLOOD PRESSURE: 108 MMHG | HEIGHT: 62 IN | OXYGEN SATURATION: 98 % | BODY MASS INDEX: 33.42 KG/M2 | RESPIRATION RATE: 12 BRPM

## 2021-05-13 DIAGNOSIS — J45.41 MODERATE PERSISTENT ASTHMA WITH EXACERBATION: ICD-10-CM

## 2021-05-13 DIAGNOSIS — E78.00 HYPERCHOLESTEROLEMIA: ICD-10-CM

## 2021-05-13 DIAGNOSIS — E11.9 CONTROLLED TYPE 2 DIABETES MELLITUS WITHOUT COMPLICATION, WITHOUT LONG-TERM CURRENT USE OF INSULIN (HCC): Primary | ICD-10-CM

## 2021-05-13 DIAGNOSIS — I10 BENIGN ESSENTIAL HYPERTENSION: ICD-10-CM

## 2021-05-13 DIAGNOSIS — I10 ESSENTIAL HYPERTENSION: ICD-10-CM

## 2021-05-13 DIAGNOSIS — M05.79 RHEUMATOID ARTHRITIS INVOLVING MULTIPLE SITES WITH POSITIVE RHEUMATOID FACTOR (HCC): ICD-10-CM

## 2021-05-13 DIAGNOSIS — L40.50 PSORIASIS WITH ARTHROPATHY (HCC): ICD-10-CM

## 2021-05-13 PROCEDURE — 3008F BODY MASS INDEX DOCD: CPT | Performed by: PODIATRIST

## 2021-05-13 PROCEDURE — 99214 OFFICE O/P EST MOD 30 MIN: CPT | Performed by: FAMILY MEDICINE

## 2021-05-13 RX ORDER — AMLODIPINE BESYLATE 10 MG/1
5 TABLET ORAL DAILY
Qty: 90 TABLET | Refills: 3
Start: 2021-05-13 | End: 2021-12-02

## 2021-05-13 NOTE — PROGRESS NOTES
Assessment/Plan:    1  Controlled type 2 diabetes mellitus without complication, without long-term current use of insulin (HCC)  Assessment & Plan:  Stop PM metformin  Stop actos  Lab Results   Component Value Date    HGBA1C 5 6 05/10/2021       Orders:  -     Hemoglobin A1C; Future; Expected date: 09/01/2021    2  Benign essential hypertension  Assessment & Plan:  Cut norvasc to 5mg      3  Hypercholesterolemia  Assessment & Plan:  Stable on zocor      4  Rheumatoid arthritis involving multiple sites with positive rheumatoid factor (HCC)  Assessment & Plan:  Seeing Rheum  stable      5  Moderate persistent asthma with exacerbation    6  Psoriasis with arthropathy ([address] )  Assessment & Plan:  Seeing rheum      7  Essential hypertension  -     amLODIPine (NORVASC) 10 mg tablet; Take 0 5 tablets (5 mg total) by mouth daily    BMI Counseling: Body mass index is 33 22 kg/m²  The BMI is above normal  Nutrition recommendations include encouraging healthy choices of fruits and vegetables  Exercise recommendations include exercising 3-5 times per week  No pharmacotherapy was ordered  There are no Patient Instructions on file for this visit  No follow-ups on file  Subjective:      Patient ID: Moy Sánchez is a 77 y o  female  Chief Complaint   Patient presents with    Follow-up       Has lost weight  Labs reviewed  Using [employer]  Seen by GI  Cut out nighttime metformin      Hypertension  This is a chronic problem  The current episode started more than 1 year ago  The problem is unchanged  The problem is controlled  There are no associated agents to hypertension  Past treatments include angiotensin blockers and calcium channel blockers  The current treatment provides significant improvement  There are no compliance problems  Diabetes  She presents for her follow-up diabetic visit  She has type 2 diabetes mellitus  Her disease course has been stable  There are no hypoglycemic associated symptoms  There are no diabetic associated symptoms  There are no hypoglycemic complications  Symptoms are stable  Current diabetic treatment includes oral agent (dual therapy)  She is compliant with treatment all of the time  Her weight is stable  She is following a diabetic diet  She has not had a previous visit with a dietitian  She participates in exercise intermittently  There is no change in her home blood glucose trend  An ACE inhibitor/angiotensin II receptor blocker is being taken  She does not see a podiatrist Eye exam is current  The following portions of the patient's history were reviewed and updated as appropriate: allergies, current medications, past family history, past medical history, past social history, past surgical history and problem list     Review of Systems   Constitutional: Negative  HENT: Negative  Eyes: Negative  Respiratory: Negative  Cardiovascular: Negative  Gastrointestinal: Negative  Endocrine: Negative  Genitourinary: Negative  Musculoskeletal: Positive for arthralgias  Skin: Negative  Allergic/Immunologic: Negative  Neurological: Negative  Hematological: Negative  Psychiatric/Behavioral: Negative            Current Outpatient Medications   Medication Sig Dispense Refill    Adalimumab (Humira Pen) 40 MG/0 8ML PNKT 0 8 mL      albuterol (2 5 mg/3 mL) 0 083 % nebulizer solution Take 1 vial (2 5 mg total) by nebulization every 6 (six) hours as needed for wheezing or shortness of breath 25 vial 3    albuterol (2 5 mg/3 mL) 0 083 % nebulizer solution       ALPRAZolam (XANAX) 0 5 mg tablet Take 0 5 tablets (0 25 mg total) by mouth daily (Patient taking differently: Take 0 25 mg by mouth as needed ) 30 tablet 0    amLODIPine (NORVASC) 10 mg tablet Take 0 5 tablets (5 mg total) by mouth daily 90 tablet 3    busPIRone (BUSPAR) 7 5 mg tablet Take 1 tablet (7 5 mg total) by mouth 2 (two) times a day 60 tablet 5    clobetasol (TEMOVATE) 0 05 % cream APPLY TWICE A DAYTO RASH FOR 1 WEEK AS NEEDED FOR ITCHING  CAN REPEAT AFTER A 2 WEEK BREAK      clobetasol (TEMOVATE) 0 05 % cream Apply topically 2 (two) times a day for 14 days 30 g 0    dicyclomine (BENTYL) 10 mg capsule Take 1 capsule (10 mg total) by mouth 4 (four) times a day (before meals and at bedtime) 60 capsule 3    ergocalciferol (VITAMIN D2) 50,000 units take 1 capsule by mouth every week 12 capsule 3    escitalopram (LEXAPRO) 10 mg tablet take 1 tablet by mouth once daily 90 tablet 3    etanercept (ENBREL) 25 MG Inject 50 mg under the skin once a week Tuesday       fluorouracil (EFUDEX) 5 % cream APPLY TWICE A DAY TO ACTINIC KERATOSIS OVER RIGHT FOREHEAD FOR 2 WEEKS      folic acid (FOLVITE) 1 mg tablet Take 1 mg by mouth daily   glucose blood (ONE TOUCH ULTRA TEST) test strip Check Blood Sugar 1 time Daily  100 each 5    hydrochlorothiazide (HYDRODIURIL) 25 mg tablet take 1 tablet by mouth once daily 90 tablet 3    Lancets (ONETOUCH ULTRASOFT) lancets Check Blood Sugar 1 time daily 100 each 5    losartan (COZAAR) 50 mg tablet take 1 tablet once daily as directed 90 tablet 3    metFORMIN (GLUCOPHAGE-XR) 500 mg 24 hr tablet take 1 tablet by mouth twice a day 180 tablet 3    metoprolol succinate (TOPROL-XL) 100 mg 24 hr tablet take 1 tablet by mouth once daily 90 tablet 3    ondansetron (ZOFRAN-ODT) 4 mg disintegrating tablet Take 1 tablet (4 mg total) by mouth every 6 (six) hours as needed for nausea or vomiting 10 tablet 0    oxyCODONE-acetaminophen (PERCOCET) 5-325 mg per tablet Take 1 tablet by mouth every 8 (eight) hours as needed for moderate pain      predniSONE 5 mg tablet 8 TABS DAYS 1 AND 2,7 TABS DAYS 3 AND 4,6 TABS DAYS 5 AND 6,5 TAB     (REFER TO PRESCRIPTION NOTES)   simvastatin (ZOCOR) 10 mg tablet take 1 tablet by mouth once daily 90 tablet 3     No current facility-administered medications for this visit          Objective:    /72   Pulse 73   Temp 99 7 °F (37 6 °C) (Tympanic)   Resp 12   Ht 5' 2" (1 575 m)   Wt 82 4 kg (181 lb 9 6 oz)   SpO2 98%   BMI 33 22 kg/m²        Physical Exam  Vitals signs and nursing note reviewed  Constitutional:       Appearance: Normal appearance  HENT:      Head: Normocephalic and atraumatic  Eyes:      Extraocular Movements: Extraocular movements intact  Pupils: Pupils are equal, round, and reactive to light  Neck:      Musculoskeletal: Normal range of motion and neck supple  Cardiovascular:      Rate and Rhythm: Normal rate and regular rhythm  Pulses: Normal pulses  Heart sounds: Normal heart sounds  Pulmonary:      Effort: Pulmonary effort is normal       Breath sounds: Normal breath sounds  Abdominal:      General: Abdomen is flat  Palpations: Abdomen is soft  Musculoskeletal: Normal range of motion  Skin:     General: Skin is warm  Capillary Refill: Capillary refill takes less than 2 seconds  Neurological:      General: No focal deficit present  Mental Status: She is alert and oriented to person, place, and time  Psychiatric:         Mood and Affect: Mood normal          Behavior: Behavior normal          Thought Content:  Thought content normal          Judgment: Judgment normal                 Gavin Oneal DO

## 2021-05-17 DIAGNOSIS — I10 ESSENTIAL HYPERTENSION: ICD-10-CM

## 2021-05-17 PROCEDURE — 4010F ACE/ARB THERAPY RXD/TAKEN: CPT | Performed by: PODIATRIST

## 2021-05-17 RX ORDER — LOSARTAN POTASSIUM 50 MG/1
TABLET ORAL
Qty: 90 TABLET | Refills: 3 | Status: SHIPPED | OUTPATIENT
Start: 2021-05-17 | End: 2022-05-23

## 2021-05-19 ENCOUNTER — OFFICE VISIT (OUTPATIENT)
Dept: PODIATRY | Facility: CLINIC | Age: 66
End: 2021-05-19
Payer: COMMERCIAL

## 2021-05-19 VITALS
BODY MASS INDEX: 33.47 KG/M2 | SYSTOLIC BLOOD PRESSURE: 149 MMHG | HEART RATE: 77 BPM | DIASTOLIC BLOOD PRESSURE: 85 MMHG | WEIGHT: 183 LBS

## 2021-05-19 DIAGNOSIS — E11.9 CONTROLLED TYPE 2 DIABETES MELLITUS WITHOUT COMPLICATION, WITHOUT LONG-TERM CURRENT USE OF INSULIN (HCC): ICD-10-CM

## 2021-05-19 DIAGNOSIS — Q82.8 POROKERATOSIS: ICD-10-CM

## 2021-05-19 DIAGNOSIS — M77.41 METATARSALGIA, RIGHT FOOT: Primary | ICD-10-CM

## 2021-05-19 PROCEDURE — 1036F TOBACCO NON-USER: CPT | Performed by: PODIATRIST

## 2021-05-19 PROCEDURE — 3079F DIAST BP 80-89 MM HG: CPT | Performed by: PODIATRIST

## 2021-05-19 PROCEDURE — 99204 OFFICE O/P NEW MOD 45 MIN: CPT | Performed by: PODIATRIST

## 2021-05-19 PROCEDURE — 3077F SYST BP >= 140 MM HG: CPT | Performed by: PODIATRIST

## 2021-05-19 PROCEDURE — 1160F RVW MEDS BY RX/DR IN RCRD: CPT | Performed by: PODIATRIST

## 2021-05-19 NOTE — PROGRESS NOTES
This patient was seen on 5/19/21  My role is Foot , Ankle, and Wound Specialist    SUBJECTIVE    Chief Complaint:  Foot pain     Patient ID: Savanah Mehta is a 77 y o  female  Jovany Queen is here for the first time with a cc of foot pain  She notes pain under the second metatarsal head on her Right foot  This has been ongoing for years  She notes some callous medial to the great toe as well as the more tender area under the second metatarsal       The following portions of the patient's history were reviewed and updated as appropriate: allergies, current medications, past family history, past medical history, past social history, past surgical history and problem list     Review of Systems   Constitutional: Negative  Respiratory: Negative  Cardiovascular: Negative  Gastrointestinal: Negative  Musculoskeletal: Positive for arthralgias and gait problem  OBJECTIVE      /85   Pulse 77   Wt 83 kg (183 lb)   BMI 33 47 kg/m²     Foot/Ankle Musculoskeletal Exam    General      Neurological: alert      General additional comments: I note a bilateral global cavus foot type  Neurovascular      Neurovascular - Right        Dorsalis pedis: 2+      Posterior tibial: 2+      Neurovascular - Left        Dorsalis pedis: 2+      Posterior tibial: 2+       Physical Exam  Vitals signs and nursing note reviewed  Constitutional:       General: She is not in acute distress  Appearance: Normal appearance  She is not ill-appearing, toxic-appearing or diaphoretic  Cardiovascular:      Rate and Rhythm: Normal rate  Pulses: Normal pulses  Dorsalis pedis pulses are 2+ on the right side and 2+ on the left side  Posterior tibial pulses are 2+ on the right side and 2+ on the left side  Pulmonary:      Effort: Pulmonary effort is normal    Abdominal:      General: Bowel sounds are normal    Musculoskeletal: Normal range of motion        Comments: I note a bilateral global cavus foot type    Feet:      Right foot:      Protective Sensation: 10 sites tested  10 sites sensed  Left foot:      Protective Sensation: 10 sites tested  10 sites sensed  Skin:     Capillary Refill: Capillary refill takes less than 2 seconds  Comments: I note a medial "pinch callous" on the Right great toe  I note a series of three small  Nucleated keratosis plantar to the 2nd metatarsal head Right foot  Neurological:      General: No focal deficit present  Mental Status: She is alert  Psychiatric:         Mood and Affect: Mood normal              ASSESSMENT     Diagnoses and all orders for this visit:    Metatarsalgia, right foot  -     X-ray foot right 3+ views; Future    Controlled type 2 diabetes mellitus without complication, without long-term current use of insulin ([facility])    Porokeratosis         Problem List Items Addressed This Visit        Endocrine    Controlled diabetes mellitus type II without complication ([facility] [address] )       Musculoskeletal and Integument    Porokeratosis       Other    Metatarsalgia, right foot - Primary    Relevant Orders    X-ray foot right 3+ views            Problems:      Stable Chronic Problem Addressed:  1  Diabetes Mellitus    Chronic illness / Problem not at goal with exacerbation, progression or side effects of treatment  1  Painful keratosis  Rule out long second metatarsal    PLAN    Xrays ordered Right foot  I performed a wet read of the Right foot xrays noting a normal metatarsal parabola  I do not think isolated metatarsal osteotomy is warranted  Therefore, I recommend she return here as needed for simple callous paring  I did this for her today with a #15 blade and she noted immediate relief  She also told me she has her own scalpel and attempts to do this   I really tried to emphasize how dangerous this is in light of her diabetes and told her that any future callous removal she be by a professional     High risk  foot precautions reviewed with patient including the need to wear protective shoegear at all times when walking including in the home, the need to check feet all surfaces daily with a mirror and report and skin breaks to podiatry, the need to apply an emollient to skin of feet daily

## 2021-05-20 NOTE — PATIENT INSTRUCTIONS
Foot Care for People with Diabetes   WHAT YOU NEED TO KNOW:   · Foot care helps protect your feet and prevent foot ulcers or sores  Long-term high blood sugar levels can damage the blood vessels and nerves in your legs and feet  This damage makes it hard to feel pressure, pain, temperature, and touch  You may not be able to feel a cut or sore, or shoes that are too tight  Foot care is needed to prevent serious problems, such as an infection or amputation  · Diabetes may cause your toes to become crooked or curved under  These changes may affect the way you walk and can lead to increased pressure on your foot  The pressure can decrease blood flow to your feet  Lack of blood flow increases your risk for a foot ulcer  Do not ignore small problems, such as dry skin or small wounds  These can become life-threatening over time without proper care  DISCHARGE INSTRUCTIONS:   Call your care team provider if:   · Your feet become numb, weak, or hard to move  · You have pus draining from a sore on your foot  · You have a wound on your foot that gets bigger, deeper, or does not heal      · You see blisters, cuts, scratches, calluses, or sores on your foot  · You have a fever, and your feet become red, warm, and swollen  · Your toenails become thick, curled, or yellow  · You find it hard to check your feet because your vision is poor  · You have questions or concerns about your condition or care  Foot care:   · Check your feet each day  Look at your whole foot, including the bottom, and between and under your toes  Check for wounds, corns, and calluses  Use a mirror to see the bottom of your feet  The skin on your feet may be shiny, tight, or darker than normal  Your feet may also be cold and pale  Feel your feet by running your hands along the tops, bottoms, sides, and between your toes  Redness, swelling, and warmth are signs of blood flow problems that can lead to a foot ulcer   Do not try to remove corns or calluses yourself  · Wash your feet each day with soap and warm water  Do not use hot water, because this can injure your foot  Dry your feet gently with a towel after you wash them  Dry between and under your toes  · Apply lotion or a moisturizer on your dry feet  Ask your care team provider what lotions are best to use  Do not put lotion or moisturizer between your toes  Moisture between your toes could lead to skin breakdown  · Cut your toenails correctly  File or cut your toenails straight across  Use a soft brush to clean around your toenails  If your toenails are very thick, you may need to have a care team provider or specialist cut them  · Protect your feet  Do not walk barefoot or wear your shoes without socks  Check your shoes for rocks or other objects that can hurt your feet  Wear cotton socks to help keep your feet dry  Wear socks without toe seams, or wear them with the seams inside out  Change your socks each day  Do not wear socks that are dirty or damp  · Wear shoes that fit well  Wear shoes that do not rub against any area of your feet  Your shoes should be ½ to ¾ inch (1 to 2 centimeters) longer than your feet  Your shoes should also have extra space around the widest part of your feet  Walking or athletic shoes with laces or straps that adjust are best  Ask your care team provider for help to choose shoes that fit you best  Ask him or her if you need to wear an insert, orthotic, or bandage on your feet  · Do not smoke  Smoking can damage your blood vessels and put you at increased risk for foot ulcers  Ask your care team provider for information if you currently smoke and need help to quit  E-cigarettes or smokeless tobacco still contain nicotine  Talk to your care team provider before you use these products  Follow up with your diabetes care team provider or foot specialist as directed:   You will need to have your feet checked at least once a raven Westberto Les may need a foot exam more often if you have nerve damage, foot deformities, or ulcers  Write down your questions so you remember to ask them during your visits  © Copyright [address] Information is for End User's use only and may not be sold, redistributed or otherwise used for commercial purposes  All illustrations and images included in CareNotes® are the copyrighted property of [employer] Inc  or [facility] Milly Pacheco   The above information is an  only  It is not intended as medical advice for individual conditions or treatments  Talk to your doctor, nurse or pharmacist before following any medical regimen to see if it is safe and effective for you

## 2021-06-10 DIAGNOSIS — E11.9 CONTROLLED TYPE 2 DIABETES MELLITUS WITHOUT COMPLICATION, WITHOUT LONG-TERM CURRENT USE OF INSULIN (HCC): ICD-10-CM

## 2021-06-11 LAB
LEFT EYE DIABETIC RETINOPATHY: NORMAL
RIGHT EYE DIABETIC RETINOPATHY: NORMAL

## 2021-06-11 RX ORDER — BLOOD SUGAR DIAGNOSTIC
STRIP MISCELLANEOUS
Qty: 100 STRIP | Refills: 2 | Status: SHIPPED | OUTPATIENT
Start: 2021-06-11 | End: 2022-05-19

## 2021-06-18 DIAGNOSIS — I10 ESSENTIAL HYPERTENSION: ICD-10-CM

## 2021-06-18 RX ORDER — METOPROLOL SUCCINATE 100 MG/1
TABLET, EXTENDED RELEASE ORAL
Qty: 90 TABLET | Refills: 3 | Status: SHIPPED | OUTPATIENT
Start: 2021-06-18 | End: 2022-05-28

## 2021-08-27 DIAGNOSIS — E55.9 VITAMIN D DEFICIENCY: ICD-10-CM

## 2021-08-27 RX ORDER — ERGOCALCIFEROL 1.25 MG/1
CAPSULE ORAL
Qty: 12 CAPSULE | Refills: 3 | Status: SHIPPED | OUTPATIENT
Start: 2021-08-27

## 2021-08-31 ENCOUNTER — TELEPHONE (OUTPATIENT)
Dept: FAMILY MEDICINE CLINIC | Facility: CLINIC | Age: 66
End: 2021-08-31

## 2021-08-31 DIAGNOSIS — J45.41 MODERATE PERSISTENT ASTHMA WITH EXACERBATION: Primary | ICD-10-CM

## 2021-08-31 RX ORDER — BUDESONIDE AND FORMOTEROL FUMARATE DIHYDRATE 160; 4.5 UG/1; UG/1
2 AEROSOL RESPIRATORY (INHALATION) 2 TIMES DAILY
Qty: 10.2 G | Refills: 3 | Status: SHIPPED | OUTPATIENT
Start: 2021-08-31

## 2021-08-31 RX ORDER — ALBUTEROL SULFATE 90 UG/1
2 AEROSOL, METERED RESPIRATORY (INHALATION) EVERY 6 HOURS PRN
Qty: 54 G | Refills: 3 | Status: SHIPPED | OUTPATIENT
Start: 2021-08-31

## 2021-08-31 NOTE — TELEPHONE ENCOUNTER
Patient asking if she can have Symbicort and Ventolin reordered, as she has been having shortness of breath more frequently  [facility]    Please advise

## 2021-09-03 ENCOUNTER — RA CDI HCC (OUTPATIENT)
Dept: OTHER | Facility: HOSPITAL | Age: 66
End: 2021-09-03

## 2021-09-03 NOTE — PROGRESS NOTES
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

## 2021-09-10 PROBLEM — H25.13 CATARACT, NUCLEAR SCLEROTIC, BOTH EYES: Status: ACTIVE | Noted: 2021-09-10

## 2021-09-10 PROBLEM — E11.36 TYPE 2 DIABETES MELLITUS WITH DIABETIC CATARACT (HCC): Status: ACTIVE | Noted: 2021-09-10

## 2021-09-30 ENCOUNTER — APPOINTMENT (OUTPATIENT)
Dept: RADIOLOGY | Age: 66
End: 2021-09-30
Payer: COMMERCIAL

## 2021-09-30 ENCOUNTER — OFFICE VISIT (OUTPATIENT)
Dept: URGENT CARE | Age: 66
End: 2021-09-30
Payer: COMMERCIAL

## 2021-09-30 VITALS
OXYGEN SATURATION: 96 % | RESPIRATION RATE: 20 BRPM | SYSTOLIC BLOOD PRESSURE: 139 MMHG | TEMPERATURE: 96.9 F | HEART RATE: 82 BPM | DIASTOLIC BLOOD PRESSURE: 72 MMHG

## 2021-09-30 DIAGNOSIS — T14.90XA INJURY: ICD-10-CM

## 2021-09-30 DIAGNOSIS — S20.211A RIB CONTUSION, RIGHT, INITIAL ENCOUNTER: Primary | ICD-10-CM

## 2021-09-30 PROCEDURE — 99213 OFFICE O/P EST LOW 20 MIN: CPT | Performed by: NURSE PRACTITIONER

## 2021-09-30 PROCEDURE — 71101 X-RAY EXAM UNILAT RIBS/CHEST: CPT

## 2021-09-30 NOTE — PROGRESS NOTES
[facility] [facility]        NAME: Beth Villalobos is a 74 y o  female  : 1955    MRN: [id number]  DATE: 2021  TIME: 2:57 PM    Assessment and Plan   Rib contusion, right, initial encounter [J89 [id number]]  6  Rib contusion, right, initial encounter  XR ribs right w pa chest min 3 views         Patient Instructions     Aleve OTC prn  Rib x-rays   Follow up with PCP in 3-5 days  Proceed to  ER if symptoms worsen  Chief Complaint     Chief Complaint   Patient presents with    Rib Pain     pt states fell while walking up steps last week, doesn't remember hitting ribs but having increasing right rib pain         History of Present Illness       HPI   Reports pain in the right ribs  She fell last week but did not hit the right rib  Moderate pain with certain movements  Review of Systems   Review of Systems   Constitutional: Negative for chills and fever  Respiratory: Negative for shortness of breath and wheezing  Cardiovascular: Positive for chest pain (right rib)  Gastrointestinal: Negative for diarrhea, nausea and vomiting  Skin: Negative for rash and wound           Current Medications       Current Outpatient Medications:     Adalimumab (Humira Pen) 40 MG/0 8ML PNKT, 0 8 mL, Disp: , Rfl:     albuterol (2 5 mg/3 mL) 0 083 % nebulizer solution, Take 1 vial (2 5 mg total) by nebulization every 6 (six) hours as needed for wheezing or shortness of breath, Disp: 25 vial, Rfl: 3    albuterol (2 5 mg/3 mL) 0 083 % nebulizer solution, , Disp: , Rfl:     albuterol (Ventolin HFA) 90 mcg/act inhaler, Inhale 2 puffs every 6 (six) hours as needed for wheezing or shortness of breath, Disp: 54 g, Rfl: 3    ALPRAZolam (XANAX) 0 5 mg tablet, Take 0 5 tablets (0 25 mg total) by mouth daily (Patient taking differently: Take 0 25 mg by mouth as needed ), Disp: 30 tablet, Rfl: 0    amLODIPine (NORVASC) 10 mg tablet, Take 0 5 tablets (5 mg total) by mouth daily (Patient not taking: Reported on 5/19/2021), Disp: 90 tablet, Rfl: 3    budesonide-formoterol (Symbicort) 160-4 5 mcg/act inhaler, Inhale 2 puffs 2 (two) times a day Rinse mouth after use , Disp: 10 2 g, Rfl: 3    busPIRone (BUSPAR) 7 5 mg tablet, Take 1 tablet (7 5 mg total) by mouth 2 (two) times a day (Patient not taking: Reported on 5/19/2021), Disp: 60 tablet, Rfl: 5    clobetasol (TEMOVATE) 0 05 % cream, APPLY TWICE A DAYTO RASH FOR 1 WEEK AS NEEDED FOR ITCHING  CAN REPEAT AFTER A 2 WEEK BREAK, Disp: , Rfl:     clobetasol (TEMOVATE) 0 05 % cream, Apply topically 2 (two) times a day for 14 days, Disp: 30 g, Rfl: 0    dicyclomine (BENTYL) 10 mg capsule, Take 1 capsule (10 mg total) by mouth 4 (four) times a day (before meals and at bedtime) (Patient not taking: Reported on 5/19/2021), Disp: 60 capsule, Rfl: 3    ergocalciferol (VITAMIN D2) 50,000 units, take 1 capsule by mouth every week, Disp: 12 capsule, Rfl: 3    escitalopram (LEXAPRO) 10 mg tablet, take 1 tablet by mouth once daily (Patient taking differently: 0 5 mg ), Disp: 90 tablet, Rfl: 3    etanercept (ENBREL) 25 MG, Inject 50 mg under the skin once a week Tuesday , Disp: , Rfl:     fluorouracil (EFUDEX) 5 % cream, APPLY TWICE A DAY TO ACTINIC KERATOSIS OVER RIGHT FOREHEAD FOR 2 WEEKS, Disp: , Rfl:     folic acid (FOLVITE) 1 mg tablet, Take 1 mg by mouth daily  , Disp: , Rfl:     hydrochlorothiazide (HYDRODIURIL) 25 mg tablet, take 1 tablet by mouth once daily, Disp: 90 tablet, Rfl: 0    Lancets (ONETOUCH ULTRASOFT) lancets, Check Blood Sugar 1 time daily, Disp: 100 each, Rfl: 5    losartan (COZAAR) 50 mg tablet, take 1 tablet once daily as directed, Disp: 90 tablet, Rfl: 3    metFORMIN (GLUCOPHAGE-XR) 500 mg 24 hr tablet, take 1 tablet by mouth twice a day (Patient taking differently: Take 500 mg by mouth daily ), Disp: 180 tablet, Rfl: 3    metoprolol succinate (TOPROL-XL) 100 mg 24 hr tablet, take 1 tablet by mouth once daily, Disp: 90 tablet, Rfl: 3    ondansetron (ZOFRAN-ODT) 4 mg disintegrating tablet, Take 1 tablet (4 mg total) by mouth every 6 (six) hours as needed for nausea or vomiting (Patient not taking: Reported on 5/19/2021), Disp: 10 tablet, Rfl: 0    OneTouch Ultra test strip, CHECK BLOOD SUGAR 1 TIME DAILY, Disp: 100 strip, Rfl: 2    oxyCODONE-acetaminophen (PERCOCET) 5-325 mg per tablet, Take 1 tablet by mouth every 8 (eight) hours as needed for moderate pain, Disp: , Rfl:     predniSONE 5 mg tablet, 8 TABS DAYS 1 AND 2,7 TABS DAYS 3 AND 4,6 TABS DAYS 5 AND 6,5 TAB     (REFER TO PRESCRIPTION NOTES)  , Disp: , Rfl:     simvastatin (ZOCOR) 10 mg tablet, take 1 tablet by mouth once daily, Disp: 90 tablet, Rfl: 3    Current Allergies     Allergies as of 09/30/2021 - Reviewed 09/30/2021   Allergen Reaction Noted    Zithromax [azithromycin] Hives 12/04/2012    Lisinopril Cough 05/19/2017            The following portions of the patient's history were reviewed and updated as appropriate: allergies, current medications, past family history, past medical history, past social history, past surgical history and problem list      Past Medical History:   Diagnosis Date    Acute non-recurrent frontal sinusitis 5/15/2019    Acute non-recurrent maxillary sinusitis 3/10/2020    Anxiety disorder     Arthritis     OA    Asthma     exercise iniduced   Cancer (HCC)     Squamous Cell on back    Dependence on crutches     prn    Depression     ro       Diabetes mellitus ([address] )     NIDDM; per Allscripts: Last Assessed: 7/15/15, New onset of type 2    Fibromyalgia     Hearing aid worn     mayra    Heart murmur     HL (hearing loss)     b/l - uses hearing aids    Hyperglycemia     Last Assessed:10/29/15    Hyperlipidemia     Hypertension     LLQ abdominal pain 3/30/2020    Psoriasis     red dry patch left side- waist area    Psoriatic arthritis ([address] )     RA (rheumatoid arthritis) ([address] )     Wears glasses     reading       Past Surgical History:   Procedure Laterality Date    ARTHROSCOPY KNEE Left 3/2/2016    Procedure: ARTHROSCOPY KNEE;  Surgeon: Haley Bloom MD;  [facility];  Service:      SECTION      X2    COLONOSCOPY  2019    CYST REMOVAL Right     Cyst on bone     FRACTURE SURGERY      (Right) tibia/fibula Fx, and ankle    PILONIDAL CYST / SINUS EXCISION      SC KNEE SCOPE,MED/LAT MENISECTOMY Left 3/2/2016    Procedure: PARTIAL MEDIAL &  LATERAL MENISCECTOMIES, CHONDROPLASTY, REMOVAL OSTEOPHYTE, LIMITED SYNOVECTOMY;  Surgeon: Haley Bloom MD;  [facility];  Service: Orthopedics    REPAIR KNEE LIGAMENT      TOOTH EXTRACTION         Family History   Problem Relation Age of Onset    Hypertension Mother     Heart attack Mother     Osteoarthritis Mother     Breast cancer Mother 80    Alcohol abuse Father     Hypertension Brother     Diabetes Brother     Other Brother         Dyslipidemia    Coronary artery disease Brother     No Known Problems Sister     No Known Problems Daughter     No Known Problems Maternal Grandmother     No Known Problems Maternal Grandfather     No Known Problems Paternal Grandmother     No Known Problems Paternal Grandfather     No Known Problems Daughter     No Known Problems Maternal Aunt     No Known Problems Paternal Aunt     No Known Problems Paternal Aunt     No Known Problems Paternal Aunt     No Known Problems Paternal Aunt     No Known Problems Paternal Aunt          Medications have been verified  Objective   /72   Pulse 82   Temp (!) 96 9 °F (36 1 °C)   Resp 20   SpO2 96%   No LMP recorded  Patient is postmenopausal        Physical Exam     Physical Exam  Cardiovascular:      Rate and Rhythm: Regular rhythm  Heart sounds: Normal heart sounds  Pulmonary:      Effort: Pulmonary effort is normal       Breath sounds: Normal breath sounds  No wheezing     Musculoskeletal:         General: Tenderness (with palpation of thr eight rib in the right flank area) present  No swelling or deformity  Skin:     Findings: No bruising

## 2021-09-30 NOTE — PATIENT INSTRUCTIONS
Contusion in Adults   WHAT YOU NEED TO KNOW:   A contusion is a bruise that appears on your skin after an injury  A bruise happens when small blood vessels tear but skin does not  Blood leaks into nearby tissue, such as soft tissue or muscle  DISCHARGE INSTRUCTIONS:   Return to the emergency department if:   · You have new trouble moving the injured area  · You have tingling or numbness in or near the injured area  · Your hand or foot below the bruise gets cold or turns pale  Call your doctor if:   · You find a new lump in the injured area  · Your symptoms do not improve with treatment after 4 to 5 days  · You have questions or concerns about your condition or care  Medicines: You may need any of the following:  · NSAIDs  help decrease swelling and pain or fever  This medicine is available with or without a doctor's order  NSAIDs can cause stomach bleeding or kidney problems in certain people  If you take blood thinner medicine, always ask your healthcare provider if NSAIDs are safe for you  Always read the medicine label and follow directions  · Prescription pain medicine  may be given  Ask your healthcare provider how to take this medicine safely  Some prescription pain medicines contain acetaminophen  Do not take other medicines that contain acetaminophen without talking to your healthcare provider  Too much acetaminophen may cause liver damage  Prescription pain medicine may cause constipation  Ask your healthcare provider how to prevent or treat constipation  · Take your medicine as directed  Contact your healthcare provider if you think your medicine is not helping or if you have side effects  Tell him of her if you are allergic to any medicine  Keep a list of the medicines, vitamins, and herbs you take  Include the amounts, and when and why you take them  Bring the list or the pill bottles to follow-up visits  Carry your medicine list with you in case of an emergency      Help amy contusion heal:   · Rest the injured area  or use it less than usual  If you bruised your leg or foot, you may need crutches or a cane to help you walk  This will help you keep weight off your injured body part  · Apply ice  to decrease swelling and pain  Ice may also help prevent tissue damage  Use an ice pack, or put crushed ice in a plastic bag  Cover it with a towel and place it on your bruise for 15 to 20 minutes every hour or as directed  · Use compression  to support the area and decrease swelling  Wrap an elastic bandage around the area over the bruised muscle  Make sure the bandage is not too tight  You should be able to fit 1 finger between the bandage and your skin  · Elevate (raise) your injured body part  above the level of your heart to help decrease pain and swelling  Use pillows, blankets, or rolled towels to elevate the area as often as you can  · Do not drink alcohol  as directed  Alcohol may slow healing  · Do not stretch injured muscles  right after your injury  Ask your healthcare provider when and how you may safely stretch after your injury  Gentle stretches can help increase your flexibility  · Do not massage the area or put heating pads  on the bruise right after your injury  Heat and massage may slow healing  Your healthcare provider may tell you to apply heat after several days  At that time, heat will start to help the injury heal     Prevent another contusion:   · Stretch and warm up before you play sports or exercise  · Wear protective gear when you play sports  Examples are shin guards and padding  · If you begin a new physical activity, start slowly to give your body a chance to adjust     Follow up with your doctor as directed:  Write down your questions so you remember to ask them during your visits  © Copyright [employer] 2021 Information is for End User's use only and may not be sold, redistributed or otherwise used for commercial purposes   All illustrations and images included in CareNotes® are the copyrighted property of A [employer] Inc  or [employer]  The above information is an  only  It is not intended as medical advice for individual conditions or treatments  Talk to your doctor, nurse or pharmacist before following any medical regimen to see if it is safe and effective for you

## 2021-11-15 ENCOUNTER — APPOINTMENT (OUTPATIENT)
Dept: LAB | Facility: AMBULARY SURGERY CENTER | Age: 66
End: 2021-11-15
Payer: COMMERCIAL

## 2021-11-15 DIAGNOSIS — E11.9 CONTROLLED TYPE 2 DIABETES MELLITUS WITHOUT COMPLICATION, WITHOUT LONG-TERM CURRENT USE OF INSULIN (HCC): ICD-10-CM

## 2021-11-15 DIAGNOSIS — M05.79 SEROPOSITIVE RHEUMATOID ARTHRITIS OF MULTIPLE SITES (HCC): ICD-10-CM

## 2021-11-15 LAB
ALBUMIN SERPL BCP-MCNC: 3.4 G/DL (ref 3.5–5)
ALP SERPL-CCNC: 105 U/L (ref 46–116)
ALT SERPL W P-5'-P-CCNC: 19 U/L (ref 12–78)
ANION GAP SERPL CALCULATED.3IONS-SCNC: 5 MMOL/L (ref 4–13)
AST SERPL W P-5'-P-CCNC: 5 U/L (ref 5–45)
BASOPHILS # BLD AUTO: 0.06 THOUSANDS/ΜL (ref 0–0.1)
BASOPHILS NFR BLD AUTO: 1 % (ref 0–1)
BILIRUB SERPL-MCNC: 0.37 MG/DL (ref 0.2–1)
BUN SERPL-MCNC: 13 MG/DL (ref 5–25)
CALCIUM ALBUM COR SERPL-MCNC: 10.2 MG/DL (ref 8.3–10.1)
CALCIUM SERPL-MCNC: 9.7 MG/DL (ref 8.3–10.1)
CHLORIDE SERPL-SCNC: 102 MMOL/L (ref 100–108)
CO2 SERPL-SCNC: 28 MMOL/L (ref 21–32)
CREAT SERPL-MCNC: 0.51 MG/DL (ref 0.6–1.3)
CRP SERPL QL: 3.5 MG/L
EOSINOPHIL # BLD AUTO: 0.05 THOUSAND/ΜL (ref 0–0.61)
EOSINOPHIL NFR BLD AUTO: 1 % (ref 0–6)
ERYTHROCYTE [DISTWIDTH] IN BLOOD BY AUTOMATED COUNT: 12.3 % (ref 11.6–15.1)
ERYTHROCYTE [SEDIMENTATION RATE] IN BLOOD: 15 MM/HOUR (ref 0–29)
EST. AVERAGE GLUCOSE BLD GHB EST-MCNC: 174 MG/DL
GFR SERPL CREATININE-BSD FRML MDRD: 99 ML/MIN/1.73SQ M
GLUCOSE P FAST SERPL-MCNC: 183 MG/DL (ref 65–99)
HBA1C MFR BLD: 7.7 %
HCT VFR BLD AUTO: 43.9 % (ref 34.8–46.1)
HGB BLD-MCNC: 14.5 G/DL (ref 11.5–15.4)
IMM GRANULOCYTES # BLD AUTO: 0.01 THOUSAND/UL (ref 0–0.2)
IMM GRANULOCYTES NFR BLD AUTO: 0 % (ref 0–2)
LYMPHOCYTES # BLD AUTO: 1.95 THOUSANDS/ΜL (ref 0.6–4.47)
LYMPHOCYTES NFR BLD AUTO: 25 % (ref 14–44)
MCH RBC QN AUTO: 30.1 PG (ref 26.8–34.3)
MCHC RBC AUTO-ENTMCNC: 33 G/DL (ref 31.4–37.4)
MCV RBC AUTO: 91 FL (ref 82–98)
MONOCYTES # BLD AUTO: 0.38 THOUSAND/ΜL (ref 0.17–1.22)
MONOCYTES NFR BLD AUTO: 5 % (ref 4–12)
NEUTROPHILS # BLD AUTO: 5.43 THOUSANDS/ΜL (ref 1.85–7.62)
NEUTS SEG NFR BLD AUTO: 68 % (ref 43–75)
NRBC BLD AUTO-RTO: 0 /100 WBCS
PLATELET # BLD AUTO: 192 THOUSANDS/UL (ref 149–390)
PMV BLD AUTO: 10.6 FL (ref 8.9–12.7)
POTASSIUM SERPL-SCNC: 3.8 MMOL/L (ref 3.5–5.3)
PROT SERPL-MCNC: 7.4 G/DL (ref 6.4–8.2)
RBC # BLD AUTO: 4.81 MILLION/UL (ref 3.81–5.12)
SODIUM SERPL-SCNC: 135 MMOL/L (ref 136–145)
WBC # BLD AUTO: 7.88 THOUSAND/UL (ref 4.31–10.16)

## 2021-11-15 PROCEDURE — 80053 COMPREHEN METABOLIC PANEL: CPT

## 2021-11-15 PROCEDURE — 3051F HG A1C>EQUAL 7.0%<8.0%: CPT | Performed by: FAMILY MEDICINE

## 2021-11-15 PROCEDURE — 85652 RBC SED RATE AUTOMATED: CPT

## 2021-11-15 PROCEDURE — 83036 HEMOGLOBIN GLYCOSYLATED A1C: CPT

## 2021-11-15 PROCEDURE — 36415 COLL VENOUS BLD VENIPUNCTURE: CPT

## 2021-11-15 PROCEDURE — 86140 C-REACTIVE PROTEIN: CPT

## 2021-11-15 PROCEDURE — 85025 COMPLETE CBC W/AUTO DIFF WBC: CPT

## 2021-11-17 ENCOUNTER — OFFICE VISIT (OUTPATIENT)
Dept: FAMILY MEDICINE CLINIC | Facility: CLINIC | Age: 66
End: 2021-11-17
Payer: COMMERCIAL

## 2021-11-17 VITALS
OXYGEN SATURATION: 98 % | SYSTOLIC BLOOD PRESSURE: 120 MMHG | BODY MASS INDEX: 33.68 KG/M2 | HEART RATE: 76 BPM | DIASTOLIC BLOOD PRESSURE: 70 MMHG | WEIGHT: 183 LBS | HEIGHT: 62 IN | TEMPERATURE: 98.9 F

## 2021-11-17 DIAGNOSIS — M05.79 RHEUMATOID ARTHRITIS INVOLVING MULTIPLE SITES WITH POSITIVE RHEUMATOID FACTOR (HCC): ICD-10-CM

## 2021-11-17 DIAGNOSIS — E11.36 TYPE 2 DIABETES MELLITUS WITH DIABETIC CATARACT, WITHOUT LONG-TERM CURRENT USE OF INSULIN (HCC): Primary | ICD-10-CM

## 2021-11-17 DIAGNOSIS — Z23 NEED FOR VACCINATION: ICD-10-CM

## 2021-11-17 DIAGNOSIS — E78.00 HYPERCHOLESTEROLEMIA: ICD-10-CM

## 2021-11-17 DIAGNOSIS — F11.20 CONTINUOUS OPIOID DEPENDENCE (HCC): ICD-10-CM

## 2021-11-17 DIAGNOSIS — E11.9 CONTROLLED TYPE 2 DIABETES MELLITUS WITHOUT COMPLICATION, WITHOUT LONG-TERM CURRENT USE OF INSULIN (HCC): ICD-10-CM

## 2021-11-17 DIAGNOSIS — I10 BENIGN ESSENTIAL HYPERTENSION: ICD-10-CM

## 2021-11-17 PROCEDURE — 3008F BODY MASS INDEX DOCD: CPT | Performed by: FAMILY MEDICINE

## 2021-11-17 PROCEDURE — 3074F SYST BP LT 130 MM HG: CPT | Performed by: FAMILY MEDICINE

## 2021-11-17 PROCEDURE — 3725F SCREEN DEPRESSION PERFORMED: CPT | Performed by: FAMILY MEDICINE

## 2021-11-17 PROCEDURE — 3288F FALL RISK ASSESSMENT DOCD: CPT | Performed by: FAMILY MEDICINE

## 2021-11-17 PROCEDURE — 90662 IIV NO PRSV INCREASED AG IM: CPT

## 2021-11-17 PROCEDURE — 1036F TOBACCO NON-USER: CPT | Performed by: FAMILY MEDICINE

## 2021-11-17 PROCEDURE — 1160F RVW MEDS BY RX/DR IN RCRD: CPT | Performed by: FAMILY MEDICINE

## 2021-11-17 PROCEDURE — 3078F DIAST BP <80 MM HG: CPT | Performed by: FAMILY MEDICINE

## 2021-11-17 PROCEDURE — 1101F PT FALLS ASSESS-DOCD LE1/YR: CPT | Performed by: FAMILY MEDICINE

## 2021-11-17 PROCEDURE — 90471 IMMUNIZATION ADMIN: CPT

## 2021-11-17 PROCEDURE — 99214 OFFICE O/P EST MOD 30 MIN: CPT | Performed by: FAMILY MEDICINE

## 2021-11-17 RX ORDER — METFORMIN HYDROCHLORIDE 500 MG/1
500 TABLET, EXTENDED RELEASE ORAL 3 TIMES DAILY
Qty: 270 TABLET | Refills: 0
Start: 2021-11-17 | End: 2022-01-27 | Stop reason: SDUPTHER

## 2021-11-23 PROCEDURE — U0005 INFEC AGEN DETEC AMPLI PROBE: HCPCS | Performed by: FAMILY MEDICINE

## 2021-11-23 PROCEDURE — U0003 INFECTIOUS AGENT DETECTION BY NUCLEIC ACID (DNA OR RNA); SEVERE ACUTE RESPIRATORY SYNDROME CORONAVIRUS 2 (SARS-COV-2) (CORONAVIRUS DISEASE [COVID-19]), AMPLIFIED PROBE TECHNIQUE, MAKING USE OF HIGH THROUGHPUT TECHNOLOGIES AS DESCRIBED BY CMS-2020-01-R: HCPCS | Performed by: FAMILY MEDICINE

## 2021-12-02 DIAGNOSIS — I10 ESSENTIAL HYPERTENSION: ICD-10-CM

## 2021-12-02 DIAGNOSIS — E78.2 MIXED HYPERLIPIDEMIA: ICD-10-CM

## 2021-12-02 RX ORDER — SIMVASTATIN 10 MG
TABLET ORAL
Qty: 90 TABLET | Refills: 3 | Status: SHIPPED | OUTPATIENT
Start: 2021-12-02

## 2021-12-02 RX ORDER — AMLODIPINE BESYLATE 10 MG/1
TABLET ORAL
Qty: 90 TABLET | Refills: 3 | Status: SHIPPED | OUTPATIENT
Start: 2021-12-02

## 2021-12-02 RX ORDER — HYDROCHLOROTHIAZIDE 25 MG/1
TABLET ORAL
Qty: 90 TABLET | Refills: 0 | Status: SHIPPED | OUTPATIENT
Start: 2021-12-02 | End: 2022-03-04

## 2021-12-15 ENCOUNTER — CONSULT (OUTPATIENT)
Dept: PULMONOLOGY | Facility: CLINIC | Age: 66
End: 2021-12-15
Payer: COMMERCIAL

## 2021-12-15 VITALS
WEIGHT: 187 LBS | SYSTOLIC BLOOD PRESSURE: 125 MMHG | BODY MASS INDEX: 33.13 KG/M2 | HEART RATE: 76 BPM | RESPIRATION RATE: 20 BRPM | TEMPERATURE: 97.6 F | HEIGHT: 63 IN | DIASTOLIC BLOOD PRESSURE: 68 MMHG

## 2021-12-15 DIAGNOSIS — J45.41 MODERATE PERSISTENT ASTHMA WITH EXACERBATION: ICD-10-CM

## 2021-12-15 DIAGNOSIS — R06.00 DYSPNEA ON EXERTION: Primary | ICD-10-CM

## 2021-12-15 PROCEDURE — 3078F DIAST BP <80 MM HG: CPT | Performed by: INTERNAL MEDICINE

## 2021-12-15 PROCEDURE — 3008F BODY MASS INDEX DOCD: CPT | Performed by: INTERNAL MEDICINE

## 2021-12-15 PROCEDURE — 1036F TOBACCO NON-USER: CPT | Performed by: INTERNAL MEDICINE

## 2021-12-15 PROCEDURE — 99204 OFFICE O/P NEW MOD 45 MIN: CPT | Performed by: INTERNAL MEDICINE

## 2021-12-15 PROCEDURE — 3074F SYST BP LT 130 MM HG: CPT | Performed by: INTERNAL MEDICINE

## 2022-01-11 ENCOUNTER — TELEPHONE (OUTPATIENT)
Dept: FAMILY MEDICINE CLINIC | Facility: CLINIC | Age: 67
End: 2022-01-11

## 2022-01-11 NOTE — TELEPHONE ENCOUNTER
Called patient and spoke with her in regards to imaging results that Dr Adilene Pelayo had received and there was a recommendation for a follow up study  Patient thought it was a repeat for her abdominal issues, informed patient it was for the nodules seen in her lung  Patient is scheduled to see pulmonology tomorrow and will ask them if they can order it  Informed patient if she needs us to order the study to let us know

## 2022-01-12 ENCOUNTER — HOSPITAL ENCOUNTER (OUTPATIENT)
Dept: PULMONOLOGY | Facility: HOSPITAL | Age: 67
Discharge: HOME | End: 2022-01-12
Attending: INTERNAL MEDICINE
Payer: COMMERCIAL

## 2022-01-12 DIAGNOSIS — R06.00 DYSPNEA ON EXERTION: ICD-10-CM

## 2022-01-12 PROCEDURE — 94729 DIFFUSING CAPACITY: CPT

## 2022-01-12 PROCEDURE — 94729 DIFFUSING CAPACITY: CPT | Performed by: INTERNAL MEDICINE

## 2022-01-12 PROCEDURE — 94726 PLETHYSMOGRAPHY LUNG VOLUMES: CPT

## 2022-01-12 PROCEDURE — 94060 EVALUATION OF WHEEZING: CPT

## 2022-01-12 PROCEDURE — 94726 PLETHYSMOGRAPHY LUNG VOLUMES: CPT | Performed by: INTERNAL MEDICINE

## 2022-01-12 PROCEDURE — 94060 EVALUATION OF WHEEZING: CPT | Performed by: INTERNAL MEDICINE

## 2022-01-12 PROCEDURE — 94760 N-INVAS EAR/PLS OXIMETRY 1: CPT

## 2022-01-12 RX ORDER — ALBUTEROL SULFATE 2.5 MG/3ML
2.5 SOLUTION RESPIRATORY (INHALATION) ONCE
Status: COMPLETED | OUTPATIENT
Start: 2022-01-12 | End: 2022-01-12

## 2022-01-12 RX ADMIN — ALBUTEROL SULFATE 2.5 MG: 2.5 SOLUTION RESPIRATORY (INHALATION) at 16:47

## 2022-01-27 ENCOUNTER — APPOINTMENT (OUTPATIENT)
Dept: LAB | Facility: AMBULARY SURGERY CENTER | Age: 67
End: 2022-01-27
Payer: COMMERCIAL

## 2022-01-27 DIAGNOSIS — M05.79 RHEUMATOID ARTHRITIS INVOLVING MULTIPLE SITES WITH POSITIVE RHEUMATOID FACTOR (HCC): ICD-10-CM

## 2022-01-27 DIAGNOSIS — E11.9 CONTROLLED TYPE 2 DIABETES MELLITUS WITHOUT COMPLICATION, WITHOUT LONG-TERM CURRENT USE OF INSULIN (HCC): ICD-10-CM

## 2022-01-27 LAB
ALBUMIN SERPL BCP-MCNC: 4 G/DL (ref 3.5–5)
ALP SERPL-CCNC: 109 U/L (ref 46–116)
ALT SERPL W P-5'-P-CCNC: 41 U/L (ref 12–78)
ANION GAP SERPL CALCULATED.3IONS-SCNC: 5 MMOL/L (ref 4–13)
AST SERPL W P-5'-P-CCNC: 24 U/L (ref 5–45)
BASOPHILS # BLD AUTO: 0.07 THOUSANDS/ΜL (ref 0–0.1)
BASOPHILS NFR BLD AUTO: 1 % (ref 0–1)
BILIRUB SERPL-MCNC: 0.32 MG/DL (ref 0.2–1)
BUN SERPL-MCNC: 10 MG/DL (ref 5–25)
CALCIUM SERPL-MCNC: 9.8 MG/DL (ref 8.3–10.1)
CHLORIDE SERPL-SCNC: 99 MMOL/L (ref 100–108)
CO2 SERPL-SCNC: 27 MMOL/L (ref 21–32)
CREAT SERPL-MCNC: 0.61 MG/DL (ref 0.6–1.3)
EOSINOPHIL # BLD AUTO: 0.44 THOUSAND/ΜL (ref 0–0.61)
EOSINOPHIL NFR BLD AUTO: 8 % (ref 0–6)
ERYTHROCYTE [DISTWIDTH] IN BLOOD BY AUTOMATED COUNT: 12.3 % (ref 11.6–15.1)
ERYTHROCYTE [SEDIMENTATION RATE] IN BLOOD: 16 MM/HOUR (ref 0–29)
GFR SERPL CREATININE-BSD FRML MDRD: 93 ML/MIN/1.73SQ M
GLUCOSE SERPL-MCNC: 135 MG/DL (ref 65–140)
HCT VFR BLD AUTO: 42.6 % (ref 34.8–46.1)
HGB BLD-MCNC: 14.6 G/DL (ref 11.5–15.4)
IMM GRANULOCYTES # BLD AUTO: 0.02 THOUSAND/UL (ref 0–0.2)
IMM GRANULOCYTES NFR BLD AUTO: 0 % (ref 0–2)
LYMPHOCYTES # BLD AUTO: 1.88 THOUSANDS/ΜL (ref 0.6–4.47)
LYMPHOCYTES NFR BLD AUTO: 32 % (ref 14–44)
MCH RBC QN AUTO: 30.5 PG (ref 26.8–34.3)
MCHC RBC AUTO-ENTMCNC: 34.3 G/DL (ref 31.4–37.4)
MCV RBC AUTO: 89 FL (ref 82–98)
MONOCYTES # BLD AUTO: 0.57 THOUSAND/ΜL (ref 0.17–1.22)
MONOCYTES NFR BLD AUTO: 10 % (ref 4–12)
NEUTROPHILS # BLD AUTO: 2.86 THOUSANDS/ΜL (ref 1.85–7.62)
NEUTS SEG NFR BLD AUTO: 49 % (ref 43–75)
NRBC BLD AUTO-RTO: 0 /100 WBCS
PLATELET # BLD AUTO: 191 THOUSANDS/UL (ref 149–390)
PMV BLD AUTO: 10.8 FL (ref 8.9–12.7)
POTASSIUM SERPL-SCNC: 4.1 MMOL/L (ref 3.5–5.3)
PROT SERPL-MCNC: 8.1 G/DL (ref 6.4–8.2)
RBC # BLD AUTO: 4.79 MILLION/UL (ref 3.81–5.12)
SODIUM SERPL-SCNC: 131 MMOL/L (ref 136–145)
WBC # BLD AUTO: 5.84 THOUSAND/UL (ref 4.31–10.16)

## 2022-01-27 PROCEDURE — 85025 COMPLETE CBC W/AUTO DIFF WBC: CPT

## 2022-01-27 PROCEDURE — 85652 RBC SED RATE AUTOMATED: CPT

## 2022-01-27 PROCEDURE — 80053 COMPREHEN METABOLIC PANEL: CPT

## 2022-01-27 PROCEDURE — 36415 COLL VENOUS BLD VENIPUNCTURE: CPT

## 2022-01-27 RX ORDER — METFORMIN HYDROCHLORIDE 500 MG/1
500 TABLET, EXTENDED RELEASE ORAL 3 TIMES DAILY
Qty: 270 TABLET | Refills: 3 | Status: SHIPPED | OUTPATIENT
Start: 2022-01-27 | End: 2022-05-19

## 2022-02-02 ENCOUNTER — OFFICE VISIT (OUTPATIENT)
Dept: PULMONOLOGY | Facility: CLINIC | Age: 67
End: 2022-02-02
Payer: COMMERCIAL

## 2022-02-02 VITALS
WEIGHT: 184 LBS | BODY MASS INDEX: 32.6 KG/M2 | SYSTOLIC BLOOD PRESSURE: 126 MMHG | HEART RATE: 84 BPM | OXYGEN SATURATION: 96 % | HEIGHT: 63 IN | DIASTOLIC BLOOD PRESSURE: 80 MMHG | TEMPERATURE: 98.7 F

## 2022-02-02 DIAGNOSIS — R91.8 PULMONARY NODULES: Primary | ICD-10-CM

## 2022-02-02 DIAGNOSIS — J45.40 MODERATE PERSISTENT ASTHMA WITHOUT COMPLICATION: ICD-10-CM

## 2022-02-02 PROCEDURE — 3074F SYST BP LT 130 MM HG: CPT | Performed by: INTERNAL MEDICINE

## 2022-02-02 PROCEDURE — 1160F RVW MEDS BY RX/DR IN RCRD: CPT | Performed by: INTERNAL MEDICINE

## 2022-02-02 PROCEDURE — 99214 OFFICE O/P EST MOD 30 MIN: CPT | Performed by: INTERNAL MEDICINE

## 2022-02-02 PROCEDURE — 1036F TOBACCO NON-USER: CPT | Performed by: INTERNAL MEDICINE

## 2022-02-02 PROCEDURE — 3079F DIAST BP 80-89 MM HG: CPT | Performed by: INTERNAL MEDICINE

## 2022-02-02 PROCEDURE — 3008F BODY MASS INDEX DOCD: CPT | Performed by: INTERNAL MEDICINE

## 2022-02-02 RX ORDER — ETANERCEPT 50 MG/ML
SOLUTION SUBCUTANEOUS
COMMUNITY
Start: 2021-12-09

## 2022-02-02 NOTE — PROGRESS NOTES
Pulmonary Follow Up   Brittany Carey 74 y o  female MRN: [id number]  2/2/2022      Assessment and Plan: Moderate persistent asthma without acute exacerbation - controlled   - eosinophils 220 last year 12/2020  - most recent eosinophils of 440 on 1/2022  - CXR 9/2021 clear without infiltrate, possible mild RUL reticulation   - continue strict use of Symbicort 160-4 5mcg inhaler twice day   - PFTs 1/2022 show FEV1/FVC 83%, no significant bronchodilator response, possible small airway disease, normal DLCO      Two 4mm pulmonary nodules in LLL   -given she quit >25 yrs ago, I doubt these are of any concern, however, she did miss her 1 year follow up   - will obtain CT chest w/o contrast to assess     RA (RF+), Psoriatic Arthritis   - on Enbrel every other week   - following with Rheum   - ESR and CRP were negative     Preventive   Influenza vaccine: obtained 2021  Pneumonia vaccine: received   COVID vaccine x 3    1  Pulmonary nodules  -     CT chest without contrast; Future; Expected date: 02/02/2022    2  Moderate persistent asthma without complication    RTC in 6 months     History of Present Illness   HPI:  Brittany Carey is a 74 y o  female who has RA (+RF), psoriatic arthritis, Asthma,     Asthma history: Went to a pulmonologist 5 yrs ago and they said she had exercise induced asthma  She was never hospitalized for her asthma  She uses Ventolin and Symbicort but was unaware she needed to use it twice a day  Prior to that, she was only needing Symbicort as necessary  Two weeks ago she was restarted on it  She cannot sleep at night more because she is thinking too much  She is able to walk on flat surfaces but on hills, she has so stop and feels SOB  She denies any wheezing  She last used her nebulizer last winter likely during a cold  RA History: diagnosed 7-8 yrs ago - was on MTX -came off - was on Humira additionally but didn't work so she came off and started Enbrel and stopped MTX   Over the summer she needed a 6-week taper  In October, she flared again and required 6 weeks of tapered  She had an episode in [employer] where she had worsening SOB and bought an  OFF in a poorly insulated area  She had chest tightness and pain and SOB  She took old prednisone for a week and she did feel better  She did go back again but when she carried something she did feel SOB  Today's follow up: She has been using her symbicort regularly  She is able to catch her breath now as opposed to before  She is able to go 2-3x/week to the gym and walks on the treadmill  She doesn't hear herself wheeze  She is not using an albuterol pump, last time was over the summer  She always gets worse during summer  She is unable to sleep and has trouble falling asleep but once asleep she stays asleep  No nighttime awakenings due to cough or SOB  Review of Systems   Constitutional: Negative for appetite change and fever  HENT: Negative for ear pain, postnasal drip, rhinorrhea, sneezing, sore throat and trouble swallowing  Cardiovascular: Negative for chest pain  Neurological: Negative for headaches  All other systems reviewed and are negative  Historical Information   Past Medical History:   Diagnosis Date    Acute non-recurrent frontal sinusitis 5/15/2019    Acute non-recurrent maxillary sinusitis 3/10/2020    Anxiety disorder     Arthritis     OA    Asthma     exercise iniduced   Cancer (HCC)     Squamous Cell on back    Dependence on crutches     prn    Depression     ro       Diabetes mellitus ([facility] [address] )     NIDDM; per Allscripts: Last Assessed: 7/15/15, New onset of type 2    Fibromyalgia     Hearing aid worn     mayra    Heart murmur     HL (hearing loss)     b/l - uses hearing aids    Hyperglycemia     Last Assessed:10/29/15    Hyperlipidemia     Hypertension     LLQ abdominal pain 3/30/2020    Psoriasis     red dry patch left side- waist area    Psoriatic arthritis (HCC)     RA (rheumatoid arthritis) [address] [address]     Wears glasses     reading     Past Surgical History:   Procedure Laterality Date    ARTHROSCOPY KNEE Left 3/2/2016    Procedure: ARTHROSCOPY KNEE;  Surgeon: Corine Sandifer, MD;  [facility];  Service:      SECTION      X2    COLONOSCOPY  2019    CYST REMOVAL Right     Cyst on bone     FRACTURE SURGERY      (Right) tibia/fibula Fx, and ankle    PILONIDAL CYST / SINUS EXCISION      WA KNEE SCOPE,MED/LAT MENISECTOMY Left 3/2/2016    Procedure: PARTIAL MEDIAL &  LATERAL MENISCECTOMIES, CHONDROPLASTY, REMOVAL OSTEOPHYTE, LIMITED SYNOVECTOMY;  Surgeon: Corine Sandifer, MD;  [facility];  Service: Orthopedics    REPAIR KNEE LIGAMENT      TOOTH EXTRACTION       Family History   Problem Relation Age of Onset    Hypertension Mother     Heart attack Mother     Osteoarthritis Mother     Breast cancer Mother 80    Alcohol abuse Father     Hypertension Brother     Diabetes Brother     Other Brother         Dyslipidemia    Coronary artery disease Brother     No Known Problems Sister     No Known Problems Daughter     No Known Problems Maternal Grandmother     No Known Problems Maternal Grandfather     No Known Problems Paternal Grandmother     No Known Problems Paternal Grandfather     No Known Problems Daughter     No Known Problems Maternal Aunt     No Known Problems Paternal Aunt     No Known Problems Paternal Aunt     No Known Problems Paternal Aunt     No Known Problems Paternal Aunt     No Known Problems Paternal Aunt        Occupational History: Retired, publication consultant     Social History: no vaping   Social History     Tobacco Use   Smoking Status Former Smoker    Packs/day: 2 00    Quit date: 54    Years since quittin    Smokeless Tobacco Never Used       Meds/Allergies     Current Outpatient Medications:     albuterol (2 5 mg/3 mL) 0 083 % nebulizer solution, Take 1 vial (2 5 mg total) by nebulization every 6 (six) hours as needed for wheezing or shortness of breath, Disp: 25 vial, Rfl: 3    albuterol (2 5 mg/3 mL) 0 083 % nebulizer solution, , Disp: , Rfl:     ALPRAZolam (XANAX) 0 5 mg tablet, Take 0 5 tablets (0 25 mg total) by mouth daily (Patient taking differently: Take 0 25 mg by mouth as needed ), Disp: 30 tablet, Rfl: 0    amLODIPine (NORVASC) 10 mg tablet, take 1 tablet by mouth once daily, Disp: 90 tablet, Rfl: 3    budesonide-formoterol (Symbicort) 160-4 5 mcg/act inhaler, Inhale 2 puffs 2 (two) times a day Rinse mouth after use , Disp: 10 2 g, Rfl: 3    Enbrel 50 MG/ML TITI, , Disp: , Rfl:     ergocalciferol (VITAMIN D2) 50,000 units, take 1 capsule by mouth every week, Disp: 12 capsule, Rfl: 3    escitalopram (LEXAPRO) 10 mg tablet, take 1 tablet by mouth once daily (Patient taking differently: 0 5 mg ), Disp: 90 tablet, Rfl: 3    hydrochlorothiazide (HYDRODIURIL) 25 mg tablet, take 1 tablet by mouth once daily, Disp: 90 tablet, Rfl: 0    metFORMIN (GLUCOPHAGE-XR) 500 mg 24 hr tablet, Take 1 tablet (500 mg total) by mouth 3 (three) times a day, Disp: 270 tablet, Rfl: 3    metoprolol succinate (TOPROL-XL) 100 mg 24 hr tablet, take 1 tablet by mouth once daily, Disp: 90 tablet, Rfl: 3    oxyCODONE-acetaminophen (PERCOCET) 5-325 mg per tablet, Take 1 tablet by mouth every 8 (eight) hours as needed for moderate pain, Disp: , Rfl:     simvastatin (ZOCOR) 10 mg tablet, take 1 tablet by mouth once daily, Disp: 90 tablet, Rfl: 3    albuterol (Ventolin HFA) 90 mcg/act inhaler, Inhale 2 puffs every 6 (six) hours as needed for wheezing or shortness of breath, Disp: 54 g, Rfl: 3    fluorouracil (EFUDEX) 5 % cream, APPLY TWICE A DAY TO ACTINIC KERATOSIS OVER RIGHT FOREHEAD FOR 2 WEEKS (Patient not taking: Reported on 12/15/2021), Disp: , Rfl:     folic acid (FOLVITE) 1 mg tablet, Take 1 mg by mouth daily   (Patient not taking: Reported on 12/15/2021 ), Disp: , Rfl:     Lancets (ONETOUCH ULTRASOFT) lancets, Check Blood Sugar 1 time daily, Disp: 100 each, Rfl: 5    losartan (COZAAR) 50 mg tablet, take 1 tablet once daily as directed, Disp: 90 tablet, Rfl: 3    ondansetron (ZOFRAN-ODT) 4 mg disintegrating tablet, Take 1 tablet (4 mg total) by mouth every 6 (six) hours as needed for nausea or vomiting (Patient not taking: Reported on 5/19/2021), Disp: 10 tablet, Rfl: 0    OneTouch Ultra test strip, CHECK BLOOD SUGAR 1 TIME DAILY, Disp: 100 strip, Rfl: 2  Allergies   Allergen Reactions    Zithromax [Azithromycin] Hives    Lisinopril Cough       Vitals: Blood pressure 126/80, pulse 84, temperature 98 7 °F (37 1 °C), height 5' 2 5" (1 588 m), weight 83 5 kg (184 lb), SpO2 96 %, not currently breastfeeding , Body mass index is 33 12 kg/m²  Oxygen Therapy  SpO2: 96 %  Oxygen Therapy: None (Room air)    Physical Exam  Physical Exam  Vitals and nursing note reviewed  Constitutional:       General: She is not in acute distress  Appearance: She is well-developed  HENT:      Head: Normocephalic and atraumatic  Eyes:      Conjunctiva/sclera: Conjunctivae normal    Cardiovascular:      Rate and Rhythm: Normal rate and regular rhythm  Heart sounds: No murmur heard  Pulmonary:      Effort: Pulmonary effort is normal  No respiratory distress  Breath sounds: Normal breath sounds  Abdominal:      Palpations: Abdomen is soft  Tenderness: There is no abdominal tenderness  Musculoskeletal:      Cervical back: Neck supple  Comments: Nonpitting edema b/l in LE   Skin:     General: Skin is warm and dry  Neurological:      General: No focal deficit present  Mental Status: She is alert and oriented to person, place, and time  Labs: I have personally reviewed pertinent lab results    Lab Results   Component Value Date    WBC 5 84 01/27/2022    HGB 14 6 01/27/2022    HCT 42 6 01/27/2022    MCV 89 01/27/2022     01/27/2022     Lab Results   Component Value Date    GLUCOSE 100 2015    CALCIUM 9 8 2022     2015    K 4 1 2022    CO2 27 2022    CL 99 (L) 2022    BUN 10 2022    CREATININE 0 61 2022     No results found for: IGE  Lab Results   Component Value Date    ALT 41 2022    AST 24 2022    ALKPHOS 109 2022    BILITOT 0 28 2015         Imaging and other studies: I have personally reviewed pertinent reports  and I have personally reviewed pertinent films in PACS      Pulmonary function testin2022: Results:  FEV1/FVC Ratio:  83 %   Forced Vital Capacity:  2 29 L   (80 % predicted)  FEV1:  1 89 L (82 % predicted)  After administration of bronchodilator FEV1:  1 88 (+ 0 %)     Lung volumes by body plethysmography:   Total Lung Capacity 95 % predicted   Residual volume 117 % predicted    RV/TLC ratio 123 %     DLCO for patients hemoglobin level:  77 % (75 % when corrected for Hb)     Interpretation:  · Normal spirometry  · Normal lung volume testing  · Normal diffusing capacity  · Possible small airways disease on flow volume loop       EKG, Pathology, and Other Studies: I have personally reviewed pertinent reports     and I have personally reviewed pertinent films in PACS      Mayte Sam MD  Pulmonary and Critical Care Fellow   [facility]

## 2022-03-21 ENCOUNTER — TELEPHONE (OUTPATIENT)
Dept: GASTROENTEROLOGY | Facility: MEDICAL CENTER | Age: 67
End: 2022-03-21

## 2022-03-21 NOTE — TELEPHONE ENCOUNTER
Needed to be scheduled before she will be leaving for [employer] work the end of April to [place]  She states she is been having issues controlling her bowel movement and had as she stated a few incidents  She wanted to schedule with Dr Dominique Wiggins but unfortunately we did not have any openings before she leaves to [place] for [employer]  Scheduled for 03/29/2022 at [facility] office with Dr Cleve Hammer  Address has been provided to patient

## 2022-04-13 ENCOUNTER — NURSE TRIAGE (OUTPATIENT)
Dept: OTHER | Facility: OTHER | Age: 67
End: 2022-04-13

## 2022-04-13 ENCOUNTER — TELEPHONE (OUTPATIENT)
Dept: OTHER | Facility: OTHER | Age: 67
End: 2022-04-13

## 2022-04-13 NOTE — TELEPHONE ENCOUNTER
Regarding:  fever 101 (head), congestion, body aches, and a cough- vomiting  ----- Message from [facility] sent at 4/13/2022 12:28 PM EDT -----  "My wife is currently experiencing a fever 101 (head), congestion, body aches, and a cough  I gave her one of my pills (amoxicillin-clavulanate (AUGMENTIN) 875-125 mg per tablet) at 0800    She is now experiencing vomiting "     Contacted poison control @ [phone]

## 2022-04-13 NOTE — TELEPHONE ENCOUNTER
Triage RN had attempted to reach patient and spouse on three attempts  Spouse was calling Poison Control at 12:25 PM today  Please follow up with patient  Spouse called in reporting patient with fever, cough, headache, body aches and gave patient one Augmentin tablet and patient starting vomiting  Please follow up with patient  Reason for Disposition   Second attempt to contact family AND no contact made  Phone number verified      Protocols used: NO CONTACT OR DUPLICATE CONTACT CALL-PEDIATRIC-OH

## 2022-04-15 ENCOUNTER — NURSE TRIAGE (OUTPATIENT)
Dept: OTHER | Facility: OTHER | Age: 67
End: 2022-04-15

## 2022-04-15 ENCOUNTER — TELEMEDICINE (OUTPATIENT)
Dept: FAMILY MEDICINE CLINIC | Facility: CLINIC | Age: 67
End: 2022-04-15
Payer: COMMERCIAL

## 2022-04-15 VITALS — TEMPERATURE: 99 F

## 2022-04-15 DIAGNOSIS — R11.0 NAUSEA: ICD-10-CM

## 2022-04-15 DIAGNOSIS — J01.01 ACUTE RECURRENT MAXILLARY SINUSITIS: Primary | ICD-10-CM

## 2022-04-15 PROCEDURE — 1036F TOBACCO NON-USER: CPT | Performed by: FAMILY MEDICINE

## 2022-04-15 PROCEDURE — 1160F RVW MEDS BY RX/DR IN RCRD: CPT | Performed by: FAMILY MEDICINE

## 2022-04-15 PROCEDURE — 99213 OFFICE O/P EST LOW 20 MIN: CPT | Performed by: FAMILY MEDICINE

## 2022-04-15 RX ORDER — ONDANSETRON 4 MG/1
4 TABLET, FILM COATED ORAL EVERY 8 HOURS PRN
Qty: 20 TABLET | Refills: 0 | Status: SHIPPED | OUTPATIENT
Start: 2022-04-15 | End: 2022-05-19

## 2022-04-15 RX ORDER — GUAIFENESIN AND CODEINE PHOSPHATE 100; 10 MG/5ML; MG/5ML
5 SOLUTION ORAL 3 TIMES DAILY PRN
Qty: 180 ML | Refills: 0 | Status: SHIPPED | OUTPATIENT
Start: 2022-04-15 | End: 2022-05-19

## 2022-04-15 RX ORDER — DOXYCYCLINE 100 MG/1
100 CAPSULE ORAL 2 TIMES DAILY
Qty: 20 CAPSULE | Refills: 0 | Status: SHIPPED | OUTPATIENT
Start: 2022-04-15 | End: 2022-04-25

## 2022-04-15 NOTE — TELEPHONE ENCOUNTER
Patient exposed to grandson who tested positive for flu  Has been feeling unwell since Monday; patient preferred virtual visit but would be able to come into the office if PCP would prefer  Scheduled for this afternoon with Dr Ivette Gil

## 2022-04-15 NOTE — TELEPHONE ENCOUNTER
Reason for Disposition   Fever > 100 0 F (37 8 C) and diabetes mellitus or weak immune system (e g , HIV positive, cancer chemo, splenectomy, organ transplant, chronic steroids)    Answer Assessment - Initial Assessment Questions  1  WORST SYMPTOM: "What is your worst symptom?" (e g , cough, runny nose, muscle aches, headache, sore throat, fever)       Fever, some coughing, congestion, aching  2  ONSET: "When did your flu symptoms start?"       Monday  3  COUGH: "How bad is the cough?"        Sore from the coughing  4  RESPIRATORY DISTRESS: "Describe your breathing "       Denies-used nebulizer yesterday; has hx of asthma  5  FEVER: "Do you have a fever?" If Yes, ask: "What is your temperature, how was it measured, and when did it start?"      101 was last   6  EXPOSURE: "Were you exposed to someone with influenza?"        Yes, grandson had the flu  7  FLU VACCINE: "Did you get a flu shot this year?"      Yes got flu vaccine  8  HIGH RISK DISEASE: "Do you any chronic medical problems?" (e g , heart or lung disease, asthma, weak immune system, or other HIGH RISK conditions)      Asthma, HTN, Type 2 diabetes  9  PREGNANCY: "Is there any chance you are pregnant?" "When was your last menstrual period?"      N/A  10  OTHER SYMPTOMS: "Do you have any other symptoms?"  (e g , runny nose, muscle aches, headache, sore throat)        Muscle aches    Patient stated she took 's Augmentin and had a reaction to it; was vomiting on Tuesday/Wednesday and called poison control  Patient vomiting 4 times and was told it was ok per poison control      Protocols used: INFLUENZA - SEASONAL-ADULT-OH

## 2022-04-15 NOTE — PROGRESS NOTES
COVID-19 Outpatient Progress Note    Assessment/Plan:    Problem List Items Addressed This Visit     None      Visit Diagnoses     Acute recurrent maxillary sinusitis    -  Primary    Pt stable  Treat with Doxycycline, OTC Cold Preps PRN, Guai-tuss PRN severe cough, rest, & good hydration  Precautions given  Relevant Medications    doxycycline monohydrate (MONODOX) 100 mg capsule    guaifenesin-codeine (GUAIFENESIN AC) 100-10 MG/5ML liquid    Nausea        Secondary to Augmentin (stopped; was 's)  Nash diet x 2-3 days, advance slowly; good hydration  Precautions given  Ondansetron PRN  Relevant Medications    ondansetron (ZOFRAN) 4 mg tablet         Disposition:     After clarifying the patient's history, my suspicion for COVID-19 infection is very low  I have spent 15 minutes directly with the patient  Greater than 50% of this time was spent in counseling/coordination of care regarding: diagnostic results, prognosis, risks and benefits of treatment options, instructions for management, patient and family education, importance of treatment compliance, risk factor reductions and impressions  Encounter provider Marsh Rubinstein, DO    Provider located at [address] [address]    Recent Visits  No visits were found meeting these conditions  Showing recent visits within past 7 days and meeting all other requirements  Today's Visits  Date Type Provider Dept   04/15/22 Telemedicine Tomás Pope DO  Cyndie Akbar   Showing today's visits and meeting all other requirements  Future Appointments  No visits were found meeting these conditions  Showing future appointments within next 150 days and meeting all other requirements       Patient agrees to participate in a virtual check in via telephone or video visit instead of presenting to the office to address urgent/immediate medical needs   Patient is aware this is a billable service  After connecting through [facility], the patient was identified by name and date of birth  Margaux Mendoza was informed that this was a telemedicine visit and that the exam was being conducted confidentially over secure lines  My office door was closed  No one else was in the room  Margaux Mendoza acknowledged consent and understanding of privacy and security of the telemedicine visit  I informed the patient that I have reviewed her record in Epic and presented the opportunity for her to ask any questions regarding the visit today  The patient agreed to participate  Verification of patient location:  Patient is located in the following state in which I hold an active license: [place]    Subjective:   Margaux Mendoza is a 74 y o  female who is concerned about COVID-19  Patient's symptoms include fatigue, nasal congestion, cough, nausea and myalgias  - Date of symptom onset: 4/8/2022      COVID-19 vaccination status: Fully vaccinated with booster    Pt and  sick after exposure to grandchildren  Both she and her  tested negative for COV-19 at home  [place] tried one of her 's antibiotics (Augmentin; she has had GI upset with before by her report), leading to N/V for her  She has taken no further  This was added to her EMR Allergy List today  [place] PDMP was checked today  Lab Results   Component Value Date    SARSCOV2 Negative 11/23/2021    [facility] Not Detected 07/11/2021     Past Medical History:   Diagnosis Date    Acute non-recurrent frontal sinusitis 5/15/2019    Acute non-recurrent maxillary sinusitis 3/10/2020    Anxiety     Anxiety disorder     Arthritis     OA    Asthma     exercise iniduced   Cancer (HCC)     Squamous Cell on back    Dependence on crutches     prn    Depression     ro       Diabetes mellitus ([facility] )     NIDDM; per Allscripts: Last Assessed: 7/15/15, New onset of type 2    Diverticulitis of colon     Fibromyalgia     Hearing aid worn     mayra  Heart murmur     HL (hearing loss)     b/l - uses hearing aids    Hyperglycemia     Last Assessed:10/29/15    Hyperlipidemia     Hypertension     LLQ abdominal pain 3/30/2020    Psoriasis     red dry patch left side- waist area    Psoriatic arthritis ([address] )     RA (rheumatoid arthritis) ([facility] )     Wears glasses     reading     Past Surgical History:   Procedure Laterality Date    ARTHROSCOPY KNEE Left 3/2/2016    Procedure: ARTHROSCOPY KNEE;  Surgeon: Flakito Vela MD;  [facility]: [facility];  Service:      SECTION      X2    COLONOSCOPY  2019    CYST REMOVAL Right     Cyst on bone     FRACTURE SURGERY      (Right) tibia/fibula Fx, and ankle    PILONIDAL CYST / SINUS EXCISION      [place] KNEE SCOPE,MED/LAT MENISECTOMY Left 3/2/2016    Procedure: PARTIAL MEDIAL &  LATERAL MENISCECTOMIES, CHONDROPLASTY, REMOVAL OSTEOPHYTE, LIMITED SYNOVECTOMY;  Surgeon: Flakito Vela MD;  [facility];  Service: Orthopedics    REPAIR KNEE LIGAMENT      TOOTH EXTRACTION       Current Outpatient Medications   Medication Sig Dispense Refill    albuterol (2 5 mg/3 mL) 0 083 % nebulizer solution Take 1 vial (2 5 mg total) by nebulization every 6 (six) hours as needed for wheezing or shortness of breath 25 vial 3    albuterol (2 5 mg/3 mL) 0 083 % nebulizer solution       albuterol (Ventolin HFA) 90 mcg/act inhaler Inhale 2 puffs every 6 (six) hours as needed for wheezing or shortness of breath 54 g 3    ALPRAZolam (XANAX) 0 5 mg tablet Take 0 5 tablets (0 25 mg total) by mouth daily (Patient taking differently: Take 0 25 mg by mouth as needed ) 30 tablet 0    amLODIPine (NORVASC) 10 mg tablet take 1 tablet by mouth once daily 90 tablet 3    budesonide-formoterol (Symbicort) 160-4 5 mcg/act inhaler Inhale 2 puffs 2 (two) times a day Rinse mouth after use   10 2 g 3    Enbrel 50 MG/ML SOSY       ergocalciferol (VITAMIN D2) 50,000 units take 1 capsule by mouth every week 12 capsule 3    escitalopram (LEXAPRO) 10 mg tablet take 1 tablet by mouth once daily (Patient taking differently: 0 5 mg ) 90 tablet 3    hydrochlorothiazide (HYDRODIURIL) 25 mg tablet take 1 tablet by mouth once daily 90 tablet 3    losartan (COZAAR) 50 mg tablet take 1 tablet once daily as directed 90 tablet 3    metFORMIN (GLUCOPHAGE-XR) 500 mg 24 hr tablet Take 1 tablet (500 mg total) by mouth 3 (three) times a day 270 tablet 3    metoprolol succinate (TOPROL-XL) 100 mg 24 hr tablet take 1 tablet by mouth once daily 90 tablet 3    oxyCODONE-acetaminophen (PERCOCET) 5-325 mg per tablet Take 1 tablet by mouth every 8 (eight) hours as needed for moderate pain      simvastatin (ZOCOR) 10 mg tablet take 1 tablet by mouth once daily 90 tablet 3    doxycycline monohydrate (MONODOX) 100 mg capsule Take 1 capsule (100 mg total) by mouth 2 (two) times a day for 10 days 20 capsule 0    fluorouracil (EFUDEX) 5 % cream APPLY TWICE A DAY TO ACTINIC KERATOSIS OVER RIGHT FOREHEAD FOR 2 WEEKS (Patient not taking: Reported on 55/17/6196)      folic acid (FOLVITE) 1 mg tablet Take 1 mg by mouth daily  (Patient not taking: Reported on 12/15/2021 )      guaifenesin-codeine (GUAIFENESIN AC) 100-10 MG/5ML liquid Take 5 mL by mouth 3 (three) times a day as needed for cough or congestion 180 mL 0    Lancets (ONETOUCH ULTRASOFT) lancets Check Blood Sugar 1 time daily 100 each 5    ondansetron (ZOFRAN) 4 mg tablet Take 1 tablet (4 mg total) by mouth every 8 (eight) hours as needed for nausea or vomiting 20 tablet 0    ondansetron (ZOFRAN-ODT) 4 mg disintegrating tablet Take 1 tablet (4 mg total) by mouth every 6 (six) hours as needed for nausea or vomiting (Patient not taking: Reported on 4/15/2022 ) 10 tablet 0    OneTouch Ultra test strip CHECK BLOOD SUGAR 1 TIME DAILY 100 strip 2     No current facility-administered medications for this visit       Allergies   Allergen Reactions    Zithromax [Azithromycin] Hives    Augmentin [Amoxicillin-Pot Clavulanate] Vomiting    Lisinopril Cough       Review of Systems   Constitutional: Positive for fatigue  Negative for activity change  HENT: Positive for congestion  Respiratory: Positive for cough  Gastrointestinal: Positive for nausea  Musculoskeletal: Positive for myalgias  Aches with cough  Objective:    Vitals:    04/15/22 1349   Temp: 99 °F (37 2 °C)   TempSrc: Temporal       Physical Exam  Vitals reviewed  Constitutional:       General: She is not in acute distress  Appearance: Normal appearance  She is ill-appearing  She is not toxic-appearing or diaphoretic  Comments: Vijay Jimenez appears tired and nauseated today, and has some nasal congestion -> she is non-toxic appearing though, speaking in full sentences without pauses  HENT:      Head: Normocephalic and atraumatic  Nose: Congestion present  Eyes:      General: No scleral icterus  Conjunctiva/sclera: Conjunctivae normal    Pulmonary:      Effort: Pulmonary effort is normal  No respiratory distress  Neurological:      Mental Status: She is alert and oriented to person, place, and time  Psychiatric:         Mood and Affect: Mood normal          Behavior: Behavior normal          Thought Content: Thought content normal          Judgment: Judgment normal      Vijay Jimenez was seen today for generalized body aches, fever, cough, nasal congestion and covid-19  Diagnoses and all orders for this visit:    Acute recurrent maxillary sinusitis  Comments:  Pt stable  Treat with Doxycycline, OTC Cold Preps PRN, Guai-tuss PRN severe cough, rest, & good hydration  Precautions given  Orders:  -     doxycycline monohydrate (MONODOX) 100 mg capsule; Take 1 capsule (100 mg total) by mouth 2 (two) times a day for 10 days  -     guaifenesin-codeine (GUAIFENESIN AC) 100-10 MG/5ML liquid; Take 5 mL by mouth 3 (three) times a day as needed for cough or congestion    Nausea  Comments:  Secondary to Augmentin (stopped; was 's)  Hayes diet x 2-3 days, advance slowly; good hydration  Precautions given  Ondansetron PRN  Orders:  -     ondansetron (ZOFRAN) 4 mg tablet; Take 1 tablet (4 mg total) by mouth every 8 (eight) hours as needed for nausea or vomiting        VIRTUAL VISIT Evette Daniels [address] verbally agrees to participate in [employer]  Pt is aware that [employer] could be limited without vital signs or the ability to perform a full hands-on physical Perez Radha understands she or the provider may request at any time to terminate the video visit and request the patient to seek care or treatment in person

## 2022-04-15 NOTE — TELEPHONE ENCOUNTER
Regarding: Sick Appt: fever, cough, body aches  ----- Message from Tone Dsouza sent at 4/15/2022  7:38 AM EDT -----  "My wife has a fever of 101, some coughing, congestion, and aching   This started on Monday "

## 2022-05-12 ENCOUNTER — RA CDI HCC (OUTPATIENT)
Dept: OTHER | Facility: HOSPITAL | Age: 67
End: 2022-05-12

## 2022-05-12 NOTE — PROGRESS NOTES
[facility]  coding opportunities       Chart reviewed, no opportunity found: CHART REVIEWED, NO OPPORTUNITY FOUND        Patients Insurance        Commercial Insurance: [employer]
Self

## 2022-05-19 ENCOUNTER — OFFICE VISIT (OUTPATIENT)
Dept: FAMILY MEDICINE CLINIC | Facility: CLINIC | Age: 67
End: 2022-05-19
Payer: COMMERCIAL

## 2022-05-19 VITALS
TEMPERATURE: 97.7 F | BODY MASS INDEX: 33.42 KG/M2 | HEIGHT: 62 IN | WEIGHT: 181.6 LBS | SYSTOLIC BLOOD PRESSURE: 134 MMHG | DIASTOLIC BLOOD PRESSURE: 62 MMHG | RESPIRATION RATE: 16 BRPM | HEART RATE: 84 BPM | OXYGEN SATURATION: 96 %

## 2022-05-19 DIAGNOSIS — R05.9 COUGH: ICD-10-CM

## 2022-05-19 DIAGNOSIS — E11.36 TYPE 2 DIABETES MELLITUS WITH DIABETIC CATARACT, WITHOUT LONG-TERM CURRENT USE OF INSULIN (HCC): ICD-10-CM

## 2022-05-19 DIAGNOSIS — Z00.00 ANNUAL PHYSICAL EXAM: Primary | ICD-10-CM

## 2022-05-19 DIAGNOSIS — B37.49 CANDIDIASIS OF GENITALIA: ICD-10-CM

## 2022-05-19 DIAGNOSIS — F41.1 GENERALIZED ANXIETY DISORDER: ICD-10-CM

## 2022-05-19 DIAGNOSIS — H66.92 OTITIS OF LEFT EAR: ICD-10-CM

## 2022-05-19 DIAGNOSIS — F11.20 CONTINUOUS OPIOID DEPENDENCE (HCC): ICD-10-CM

## 2022-05-19 DIAGNOSIS — E11.9 CONTROLLED TYPE 2 DIABETES MELLITUS WITHOUT COMPLICATION, WITHOUT LONG-TERM CURRENT USE OF INSULIN (HCC): ICD-10-CM

## 2022-05-19 DIAGNOSIS — L40.50 PSORIATIC ARTHRITIS (HCC): ICD-10-CM

## 2022-05-19 LAB — SL AMB POCT HEMOGLOBIN AIC: 6.7 (ref ?–6.5)

## 2022-05-19 PROCEDURE — 1160F RVW MEDS BY RX/DR IN RCRD: CPT | Performed by: FAMILY MEDICINE

## 2022-05-19 PROCEDURE — 99397 PER PM REEVAL EST PAT 65+ YR: CPT | Performed by: FAMILY MEDICINE

## 2022-05-19 PROCEDURE — 83036 HEMOGLOBIN GLYCOSYLATED A1C: CPT | Performed by: FAMILY MEDICINE

## 2022-05-19 PROCEDURE — 1036F TOBACCO NON-USER: CPT | Performed by: FAMILY MEDICINE

## 2022-05-19 PROCEDURE — 3008F BODY MASS INDEX DOCD: CPT | Performed by: FAMILY MEDICINE

## 2022-05-19 PROCEDURE — 3044F HG A1C LEVEL LT 7.0%: CPT | Performed by: FAMILY MEDICINE

## 2022-05-19 RX ORDER — CEFPROZIL 500 MG/1
500 TABLET, FILM COATED ORAL 2 TIMES DAILY
Qty: 20 TABLET | Refills: 0 | Status: SHIPPED | OUTPATIENT
Start: 2022-05-19 | End: 2022-05-29

## 2022-05-19 RX ORDER — FLUCONAZOLE 150 MG/1
150 TABLET ORAL ONCE
Qty: 1 TABLET | Refills: 0 | Status: SHIPPED | OUTPATIENT
Start: 2022-05-19 | End: 2022-05-19

## 2022-05-19 RX ORDER — ESCITALOPRAM OXALATE 10 MG/1
TABLET ORAL
Qty: 90 TABLET | Refills: 3 | Status: SHIPPED | OUTPATIENT
Start: 2022-05-19

## 2022-05-19 NOTE — PATIENT INSTRUCTIONS

## 2022-05-19 NOTE — PROGRESS NOTES
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)       Nervous and Auditory    Otitis of left ear    Relevant Medications    cefprosil (CEFZIL) 500 MG tablet       Other    Continuous opioid dependence ([facility] )     On percocet           Annual physical exam - Primary    Cough    Relevant Orders    XR chest pa & lateral      Other Visit Diagnoses     Psoriatic arthritis ([facility] )        Candidiasis of genitalia        Relevant Medications    fluconazole (DIFLUCAN) 150 mg tablet          Immunizations and preventive care screenings were discussed with patient today  Appropriate education was printed on patient's after visit summary  Counseling:  Dental Health: discussed importance of regular tooth brushing, flossing, and dental visits  BMI Counseling: Body mass index is 33 22 kg/m²  The BMI is above normal  Nutrition recommendations include encouraging healthy choices of fruits and vegetables  Exercise recommendations include exercising 3-5 times per week  No pharmacotherapy was ordered  Rationale for BMI follow-up plan is due to patient being overweight or obese  Return in 6 months (on 2022) for Recheck  Chief Complaint:     Chief Complaint   Patient presents with    Annual Exam     Patient here for annual wellness exam      History of Present Illness:     Adult Annual Physical   Patient here for a comprehensive physical exam  The patient reports problems - chest tightness      Diet and Physical Activity  Diet/Nutrition: well balanced diet  Exercise: walking  Depression Screening  PHQ-2/9 Depression Screening         General Health  Sleep: sleeps well  Hearing: normal - bilateral   Vision: wears glasses  Dental: brushes teeth twice daily  /GYN Health  Patient is: postmenopausal  Last menstrual period: n/a  Contraceptive method: n/a  Review of Systems:     Review of Systems   Constitutional: Positive for fatigue  HENT: Positive for congestion  Eyes: Negative  Respiratory: Positive for chest tightness  Cardiovascular: Negative  Gastrointestinal: Negative  Endocrine: Negative  Genitourinary: Negative  Musculoskeletal: Negative  Skin: Negative  Allergic/Immunologic: Negative  Neurological: Negative  Hematological: Negative  Psychiatric/Behavioral: Negative  Past Medical History:     Past Medical History:   Diagnosis Date    Acute non-recurrent frontal sinusitis 5/15/2019    Acute non-recurrent maxillary sinusitis 3/10/2020    Anxiety     Anxiety disorder     Arthritis     OA    Asthma     exercise iniduced   Cancer (HCC)     Squamous Cell on back    Dependence on crutches     prn    Depression     ro       Diabetes mellitus ([facility] [address] )     NIDDM; per Allscripts: Last Assessed: 7/15/15, New onset of type 2    Diverticulitis of colon     Fibromyalgia     Hearing aid worn     mayra    Heart murmur     HL (hearing loss)     b/l - uses hearing aids    Hyperglycemia     Last Assessed:10/29/15    Hyperlipidemia     Hypertension     LLQ abdominal pain 3/30/2020    Psoriasis     red dry patch left side- waist area    Psoriatic arthritis ([address] )     RA (rheumatoid arthritis) ([facility] )     Wears glasses     reading      Past Surgical History:     Past Surgical History:   Procedure Laterality Date    ARTHROSCOPY KNEE Left 3/2/2016    Procedure: ARTHROSCOPY KNEE;  Surgeon: Marrianne Bernheim, MD;  [facility];  Service:    [address] [place] [address] X2    COLONOSCOPY  2019    CYST REMOVAL Right     Cyst on bone     FRACTURE SURGERY      (Right) tibia/fibula Fx, and ankle    PILONIDAL CYST / SINUS EXCISION      [place] KNEE SCOPE,MED/LAT MENISECTOMY Left 3/2/2016    Procedure: PARTIAL MEDIAL &  LATERAL MENISCECTOMIES, CHONDROPLASTY, REMOVAL OSTEOPHYTE, LIMITED SYNOVECTOMY;  Surgeon: Mgehann Martinez MD;  [facility];  Service: Orthopedics    REPAIR KNEE LIGAMENT      TOOTH EXTRACTION        Social History:     Social History     Socioeconomic History    Marital status: /Civil Union     Spouse name: None    Number of children: None    Years of education: None    Highest education level: None   Occupational History    None   Tobacco Use    Smoking status: Former Smoker     Packs/day: 2 00     Years: 25 00     Pack years: 50 00     Types: Cigarettes     Quit date:      Years since quittin 3    Smokeless tobacco: Never Used   Vaping Use    Vaping Use: Never used   Substance and Sexual Activity    Alcohol use: No    Drug use: No    Sexual activity: Yes     Partners: Male     Birth control/protection: Post-menopausal   Other Topics Concern    None   Social History Narrative    None     Social Determinants of Health     Financial Resource Strain: Not on file   Food Insecurity: Not on file   Transportation Needs: Not on file   Physical Activity: Not on file   Stress: Not on file   Social Connections: Not on file   Intimate Partner Violence: Not on file   Housing Stability: Not on file      Family History:     Family History   Problem Relation Age of Onset    Hypertension Mother     Heart attack Mother     Osteoarthritis Mother     Breast cancer Mother 80    Arthritis Mother     Alcohol abuse Father     Hypertension Brother     Diabetes Brother     Other Brother         Dyslipidemia    Coronary artery disease Brother     No Known Problems Sister     No Known Problems Daughter     No Known Problems Maternal Grandmother     No Known Problems Maternal Grandfather     No Known Problems Paternal Grandmother     No Known Problems Paternal Grandfather     No Known Problems Daughter     No Known Problems Maternal Aunt     No Known Problems Paternal Aunt     No Known Problems Paternal Aunt     No Known Problems Paternal Aunt     No Known Problems Paternal Aunt     No Known Problems Paternal Aunt       Current Medications:     Current Outpatient Medications   Medication Sig Dispense Refill    albuterol (2 5 mg/3 mL) 0 083 % nebulizer solution Take 1 vial (2 5 mg total) by nebulization every 6 (six) hours as needed for wheezing or shortness of breath 25 vial 3    albuterol (Ventolin HFA) 90 mcg/act inhaler Inhale 2 puffs every 6 (six) hours as needed for wheezing or shortness of breath 54 g 3    ALPRAZolam (XANAX) 0 5 mg tablet Take 0 5 tablets (0 25 mg total) by mouth daily (Patient taking differently: Take 0 25 mg by mouth as needed in the morning ) 30 tablet 0    amLODIPine (NORVASC) 10 mg tablet take 1 tablet by mouth once daily 90 tablet 3    budesonide-formoterol (Symbicort) 160-4 5 mcg/act inhaler Inhale 2 puffs 2 (two) times a day Rinse mouth after use  10 2 g 3    cefprosil (CEFZIL) 500 MG tablet Take 1 tablet (500 mg total) by mouth in the morning and 1 tablet (500 mg total) in the evening  Do all this for 10 days   20 tablet 0    Enbrel 50 MG/ML SOSY       ergocalciferol (VITAMIN D2) 50,000 units take 1 capsule by mouth every week 12 capsule 3    escitalopram (LEXAPRO) 10 mg tablet Take 1/2 tablet daily 90 tablet 3    fluconazole (DIFLUCAN) 150 mg tablet Take 1 tablet (150 mg total) by mouth once for 1 dose 1 tablet 0    hydrochlorothiazide (HYDRODIURIL) 25 mg tablet take 1 tablet by mouth once daily 90 tablet 3    losartan (COZAAR) 50 mg tablet take 1 tablet once daily as directed 90 tablet 3    metFORMIN (GLUCOPHAGE-XR) 500 mg 24 hr tablet Take 1 tablet (500 mg total) by mouth 3 (three) times a day 270 tablet 3    metoprolol succinate (TOPROL-XL) 100 mg 24 hr tablet take 1 tablet by mouth once daily 90 tablet 3    oxyCODONE-acetaminophen (PERCOCET) 5-325 mg per tablet Take 1 tablet by mouth every 8 (eight) hours as needed for moderate pain      simvastatin (ZOCOR) 10 mg tablet take 1 tablet by mouth once daily 90 tablet 3    albuterol (2 5 mg/3 mL) 0 083 % nebulizer solution  (Patient not taking: Reported on 5/19/2022)       No current facility-administered medications for this visit  Allergies: Allergies   Allergen Reactions    Zithromax [Azithromycin] Hives    Augmentin [Amoxicillin-Pot Clavulanate] Vomiting    Lisinopril Cough      Physical Exam:     /62 (BP Location: Left arm, Patient Position: Sitting, Cuff Size: Large)   Pulse 84   Temp 97 7 °F (36 5 °C)   Resp 16   Ht 5' 2" (1 575 m)   Wt 82 4 kg (181 lb 9 6 oz)   SpO2 96%   BMI 33 22 kg/m²     Physical Exam  Vitals and nursing note reviewed  Constitutional:       Appearance: She is well-developed  HENT:      Head: Normocephalic and atraumatic  Right Ear: External ear normal       Left Ear: External ear normal       Nose: Nose normal    Eyes:      Conjunctiva/sclera: Conjunctivae normal       Pupils: Pupils are equal, round, and reactive to light  Cardiovascular:      Rate and Rhythm: Normal rate and regular rhythm  Heart sounds: Normal heart sounds  Pulmonary:      Effort: Pulmonary effort is normal       Breath sounds: Normal breath sounds  Abdominal:      General: Bowel sounds are normal       Palpations: Abdomen is soft  Musculoskeletal:         General: Normal range of motion  Cervical back: Normal range of motion and neck supple  Skin:     General: Skin is warm and dry  Capillary Refill: Capillary refill takes less than 2 seconds  Neurological:      Mental Status: She is alert and oriented to person, place, and time     Psychiatric:         Behavior: Behavior normal  Thought Content:  Thought content normal          Judgment: Judgment normal           Yen Rogel, DO  [address] [facility]

## 2022-05-23 DIAGNOSIS — I10 ESSENTIAL HYPERTENSION: ICD-10-CM

## 2022-05-23 PROCEDURE — 4010F ACE/ARB THERAPY RXD/TAKEN: CPT | Performed by: FAMILY MEDICINE

## 2022-05-23 RX ORDER — LOSARTAN POTASSIUM 50 MG/1
TABLET ORAL
Qty: 90 TABLET | Refills: 3 | Status: SHIPPED | OUTPATIENT
Start: 2022-05-23

## 2022-05-28 DIAGNOSIS — I10 ESSENTIAL HYPERTENSION: ICD-10-CM

## 2022-05-28 RX ORDER — METOPROLOL SUCCINATE 100 MG/1
TABLET, EXTENDED RELEASE ORAL
Qty: 90 TABLET | Refills: 3 | Status: SHIPPED | OUTPATIENT
Start: 2022-05-28

## 2022-06-02 ENCOUNTER — OFFICE VISIT (OUTPATIENT)
Dept: FAMILY MEDICINE CLINIC | Facility: CLINIC | Age: 67
End: 2022-06-02
Payer: COMMERCIAL

## 2022-06-02 VITALS
BODY MASS INDEX: 32.83 KG/M2 | SYSTOLIC BLOOD PRESSURE: 122 MMHG | OXYGEN SATURATION: 96 % | HEART RATE: 82 BPM | RESPIRATION RATE: 16 BRPM | WEIGHT: 178.4 LBS | DIASTOLIC BLOOD PRESSURE: 80 MMHG | TEMPERATURE: 96.5 F | HEIGHT: 62 IN

## 2022-06-02 DIAGNOSIS — R19.7 DIARRHEA OF PRESUMED INFECTIOUS ORIGIN: Primary | ICD-10-CM

## 2022-06-02 DIAGNOSIS — R11.0 NAUSEA: ICD-10-CM

## 2022-06-02 DIAGNOSIS — K13.79 MOUTH SORES: ICD-10-CM

## 2022-06-02 PROBLEM — R05.9 COUGH: Status: RESOLVED | Noted: 2022-05-19 | Resolved: 2022-06-02

## 2022-06-02 PROCEDURE — 99213 OFFICE O/P EST LOW 20 MIN: CPT | Performed by: NURSE PRACTITIONER

## 2022-06-02 RX ORDER — ONDANSETRON 4 MG/1
4 TABLET, FILM COATED ORAL EVERY 8 HOURS PRN
Qty: 20 TABLET | Refills: 0 | Status: SHIPPED | OUTPATIENT
Start: 2022-06-02 | End: 2022-06-20 | Stop reason: SDUPTHER

## 2022-06-02 NOTE — PROGRESS NOTES
FAMILY PRACTICE OFFICE VISIT       NAME: Waylon Holcomb  AGE: 74 y o  SEX: female       : 1955        MRN: [id number]    DATE: 2022  TIME: 8:19 PM    Assessment and Plan   1  Diarrhea of presumed infectious origin  -     Clostridium difficile toxin by PCR; Future    2  Nausea  -     ondansetron (ZOFRAN) 4 mg tablet; Take 1 tablet (4 mg total) by mouth every 8 (eight) hours as needed for nausea or vomiting    3  Mouth sores  -     al mag oxide-diphenhydramine-lidocaine viscous (MAGIC MOUTHWASH) 1:1:1 suspension; Swish and spit 10 mL every 4 (four) hours as needed for mouth pain or discomfort               Chief Complaint     Chief Complaint   Patient presents with    same day sick     Diarrhea, mouth sores, nausea       History of Present Illness   Waylon Holcomb is a 74y o -year-old female who is here for f/u to not feeling well  Saw dr Delisa Carlos for left ear infection  Took cefprozil  Then on day 6 developed nausea and diarrhea  Also had reaction to augmentin in recent past as well  Developed mouth sores as well  Took last pill of antibiotics last  night  [facility] no further diarrhea  Stomach slowly improving  Cramping and nausea still  Takes acidophilus with antibiotics but did not take daily through antibiotics      Review of Systems   Review of Systems   Constitutional: Negative for fatigue and fever  HENT: Positive for mouth sores  Negative for congestion, drooling, ear pain, postnasal drip, rhinorrhea, sore throat, trouble swallowing and voice change  Respiratory: Negative for cough, shortness of breath and wheezing  Cardiovascular: Negative for chest pain and palpitations  Gastrointestinal: Positive for diarrhea and nausea  Genitourinary: Negative for dysuria and frequency  Musculoskeletal: Negative for arthralgias and myalgias  Skin: Negative for rash  Neurological: Negative for dizziness, light-headedness and headaches  Hematological: Negative for adenopathy  Psychiatric/Behavioral: Negative for dysphoric mood and sleep disturbance  The patient is not nervous/anxious  Active Problem List     Patient Active Problem List   Diagnosis    Hypercholesterolemia    Fibromyalgia    Psoriasis with arthropathy (HCC)    Rheumatoid arthritis involving multiple sites with positive rheumatoid factor (HCC)    Chronic pain of left knee    Primary osteoarthritis of left knee    Allergy to pollen    Anxiety disorder    Benign essential hypertension    Controlled diabetes mellitus type II without complication (HCC)    Generalized osteoarthritis of multiple sites    Hearing loss    Psoriasis    Squamous cell carcinoma of skin    Vitamin D deficiency    Moderate persistent asthma    Chronic neck pain    Diverticulosis of colon    Pulmonary nodules    Functional diarrhea    Encounter for screening mammogram for malignant neoplasm of breast    Metatarsalgia, right foot    Porokeratosis    Type 2 diabetes mellitus with diabetic cataract ([address] )    Cataract, nuclear sclerotic, both eyes    Continuous opioid dependence ([address] )    Annual physical exam    Otitis of left ear    Diarrhea of presumed infectious origin    Nausea    Mouth sores         Past Medical History:  Past Medical History:   Diagnosis Date    Acute non-recurrent frontal sinusitis 5/15/2019    Acute non-recurrent maxillary sinusitis 3/10/2020    Anxiety     Anxiety disorder     Arthritis     OA    Asthma     exercise iniduced   Cancer (HCC)     Squamous Cell on back    Dependence on crutches     prn    Depression     ro       Diabetes mellitus ([address] )     NIDDM; per [employer]: Last Assessed: 7/15/15, New onset of type 2    Diverticulitis of colon     Fibromyalgia     Hearing aid worn     mayra    Heart murmur     HL (hearing loss)     b/l - uses hearing aids    Hyperglycemia     Last Assessed:10/29/15    Hyperlipidemia     Hypertension     LLQ abdominal pain 3/30/2020    Psoriasis     red dry patch left side- waist area    Psoriatic arthritis (HCC)     RA (rheumatoid arthritis) ([address] )     Wears glasses     reading       Past Surgical History:  Past Surgical History:   Procedure Laterality Date    ARTHROSCOPY KNEE Left 3/2/2016    Procedure: ARTHROSCOPY KNEE;  Surgeon: Rozena Cogan, MD;  Location: [facility];  Service:      SECTION      X2    COLONOSCOPY  2019    CYST REMOVAL Right     Cyst on bone     FRACTURE SURGERY      (Right) tibia/fibula Fx, and ankle    PILONIDAL CYST / SINUS EXCISION      CA KNEE SCOPE,MED/LAT MENISECTOMY Left 3/2/2016    Procedure: PARTIAL MEDIAL &  LATERAL MENISCECTOMIES, CHONDROPLASTY, REMOVAL OSTEOPHYTE, LIMITED SYNOVECTOMY;  Surgeon: Rozena Cogan, MD;  Location: [facility];  Service: Orthopedics    REPAIR KNEE LIGAMENT      TOOTH EXTRACTION         Family History:  Family History   Problem Relation Age of Onset    Hypertension Mother     Heart attack Mother     Osteoarthritis Mother     Breast cancer Mother 80    Arthritis Mother     Alcohol abuse Father     Hypertension Brother     Diabetes Brother     Other Brother         Dyslipidemia    Coronary artery disease Brother     No Known Problems Sister     No Known Problems Daughter     No Known Problems Maternal Grandmother     No Known Problems Maternal Grandfather     No Known Problems Paternal Grandmother     No Known Problems Paternal Grandfather     No Known Problems Daughter     No Known Problems Maternal Aunt     No Known Problems Paternal Aunt     No Known Problems Paternal Aunt     No Known Problems Paternal Aunt     No Known Problems Paternal Aunt     No Known Problems Paternal Aunt        Social History:  Social History     Socioeconomic History    Marital status: /Civil Union     Spouse name: Not on file    Number of children: Not on file    Years of education: Not on file    Highest education level: Not on file   Occupational History  Not on file   Tobacco Use    Smoking status: Former Smoker     Packs/day: 2 00     Years: 25 00     Pack years: 50 00     Types: Cigarettes     Quit date:      Years since quittin 4    Smokeless tobacco: Never Used   Vaping Use    Vaping Use: Never used   Substance and Sexual Activity    Alcohol use: No    Drug use: No    Sexual activity: Yes     Partners: Male     Birth control/protection: Post-menopausal   Other Topics Concern    Not on file   Social History Narrative    Not on file     Social Determinants of Health     Financial Resource Strain: Not on file   Food Insecurity: Not on file   Transportation Needs: Not on file   Physical Activity: Not on file   Stress: Not on file   Social Connections: Not on file   Intimate Partner Violence: Not on file   Housing Stability: Not on file       Objective     Vitals:    22 1619   BP: 122/80   Pulse: 82   Resp: 16   Temp: (!) 96 5 °F (35 8 °C)   SpO2: 96%     Wt Readings from Last 3 Encounters:   22 80 9 kg (178 lb 6 4 oz)   22 82 4 kg (181 lb 9 6 oz)   22 83 5 kg (184 lb)       Physical Exam  Vitals and nursing note reviewed  Constitutional:       Appearance: Normal appearance  HENT:      Head: Normocephalic and atraumatic  Right Ear: Tympanic membrane, ear canal and external ear normal       Left Ear: Tympanic membrane, ear canal and external ear normal       Nose: Nose normal       Mouth/Throat:      Mouth: Mucous membranes are moist       Comments: apthous ulcers buccal mucosa    Eyes:      Conjunctiva/sclera: Conjunctivae normal    Neck:      Vascular: No carotid bruit  Cardiovascular:      Rate and Rhythm: Normal rate and regular rhythm  Heart sounds: Normal heart sounds  Pulmonary:      Effort: Pulmonary effort is normal       Breath sounds: Normal breath sounds  Abdominal:      General: Bowel sounds are increased  Palpations: Abdomen is soft     Musculoskeletal:         General: Normal range of motion  Cervical back: Normal range of motion and neck supple  Skin:     General: Skin is warm and dry  Capillary Refill: Capillary refill takes less than 2 seconds  Neurological:      Mental Status: She is alert and oriented to person, place, and time  Psychiatric:         Mood and Affect: Mood normal          Behavior: Behavior normal          Thought Content:  Thought content normal          Judgment: Judgment normal          Pertinent Laboratory/Diagnostic Studies:  Lab Results   Component Value Date    GLUCOSE 100 09/14/2015    BUN 10 01/27/2022    CREATININE 0 61 01/27/2022    CALCIUM 9 8 01/27/2022     09/14/2015    K 4 1 01/27/2022    CO2 27 01/27/2022    CL 99 (L) 01/27/2022     Lab Results   Component Value Date    ALT 41 01/27/2022    AST 24 01/27/2022    ALKPHOS 109 01/27/2022    BILITOT 0 28 09/14/2015       Lab Results   Component Value Date    WBC 5 84 01/27/2022    HGB 14 6 01/27/2022    HCT 42 6 01/27/2022    MCV 89 01/27/2022     01/27/2022       No results found for: TSH    Lab Results   Component Value Date    CHOL 170 07/06/2015     Lab Results   Component Value Date    TRIG 206 (H) 05/10/2021     Lab Results   Component Value Date    HDL 43 05/10/2021     Lab Results   Component Value Date    LDLCALC 88 05/10/2021     Lab Results   Component Value Date    HGBA1C 6 7 (A) 05/19/2022       Results for orders placed or performed in visit on 05/19/22   POCT hemoglobin A1c   Result Value Ref Range    Hemoglobin A1C 6 7 (A) 6 5       Orders Placed This Encounter   Procedures    Clostridium difficile toxin by PCR       ALLERGIES:  Allergies   Allergen Reactions    Zithromax [Azithromycin] Hives    Augmentin [Amoxicillin-Pot Clavulanate] Vomiting    Lisinopril Cough    Cefprozil Diarrhea       Current Medications     Current Outpatient Medications   Medication Sig Dispense Refill    al mag oxide-diphenhydramine-lidocaine viscous (MAGIC MOUTHWASH) 1:1:1 suspension Swish and spit 10 mL every 4 (four) hours as needed for mouth pain or discomfort 90 mL 1    albuterol (2 5 mg/3 mL) 0 083 % nebulizer solution Take 1 vial (2 5 mg total) by nebulization every 6 (six) hours as needed for wheezing or shortness of breath 25 vial 3    albuterol (Ventolin HFA) 90 mcg/act inhaler Inhale 2 puffs every 6 (six) hours as needed for wheezing or shortness of breath 54 g 3    ALPRAZolam (XANAX) 0 5 mg tablet Take 0 5 tablets (0 25 mg total) by mouth daily (Patient taking differently: Take 0 25 mg by mouth as needed) 30 tablet 0    amLODIPine (NORVASC) 10 mg tablet take 1 tablet by mouth once daily 90 tablet 3    budesonide-formoterol (Symbicort) 160-4 5 mcg/act inhaler Inhale 2 puffs 2 (two) times a day Rinse mouth after use  10 2 g 3    Enbrel 50 MG/ML SOSY       ergocalciferol (VITAMIN D2) 50,000 units take 1 capsule by mouth every week 12 capsule 3    escitalopram (LEXAPRO) 10 mg tablet Take 1/2 tablet daily 90 tablet 3    hydrochlorothiazide (HYDRODIURIL) 25 mg tablet take 1 tablet by mouth once daily 90 tablet 3    losartan (COZAAR) 50 mg tablet take 1 tablet once daily as directed 90 tablet 3    metoprolol succinate (TOPROL-XL) 100 mg 24 hr tablet take 1 tablet by mouth once daily 90 tablet 3    ondansetron (ZOFRAN) 4 mg tablet Take 1 tablet (4 mg total) by mouth every 8 (eight) hours as needed for nausea or vomiting 20 tablet 0    oxyCODONE-acetaminophen (PERCOCET) 5-325 mg per tablet Take 1 tablet by mouth every 8 (eight) hours as needed for moderate pain      simvastatin (ZOCOR) 10 mg tablet take 1 tablet by mouth once daily 90 tablet 3    albuterol (2 5 mg/3 mL) 0 083 % nebulizer solution  (Patient not taking: No sig reported)      metFORMIN (GLUCOPHAGE-XR) 500 mg 24 hr tablet Take 1 tablet (500 mg total) by mouth 3 (three) times a day 270 tablet 3     No current facility-administered medications for this visit           Health Maintenance     Health Maintenance   Topic Date Due    Osteoporosis Screening  Never done    Breast Cancer Screening: Mammogram  02/04/2021    COVID-19 Vaccine (4 - Booster for Moderna series) 11/26/2021    Pneumococcal Vaccine: 65+ Years (4 - PPSV23 or PCV20) 12/28/2021    Depression Screening  11/17/2022    Fall Risk  11/17/2022    Diabetic Foot Exam  11/17/2022    HEMOGLOBIN A1C  11/19/2022    BMI: Followup Plan  05/19/2023    Annual Physical  05/19/2023    BMI: Adult  06/02/2023    DM Eye Exam  06/11/2023    Colorectal Cancer Screening  05/14/2024    DTaP,Tdap,and Td Vaccines (3 - Td or Tdap) 08/26/2031    Hepatitis C Screening  Completed    Influenza Vaccine  Completed    HIB Vaccine  Aged Out    Hepatitis B Vaccine  Aged Out    IPV Vaccine  Aged Out    Hepatitis A Vaccine  Aged Out    Meningococcal ACWY Vaccine  Aged Out    HPV Vaccine  Aged Out     Immunization History   Administered Date(s) Administered    COVID-19 MODERNA VACC 0 5 ML IM 01/21/2021, 02/19/2021, 08/26/2021    INFLUENZA 01/01/2005, 10/16/2008, 09/23/2009, 10/29/2015, 10/01/2016, 11/21/2016, 10/02/2017, 09/07/2018    Influenza Quadrivalent Preservative Free 3 years and older IM 10/29/2015, 10/29/2015, 11/21/2016, 10/02/2017    Influenza, high dose seasonal 0 7 mL 10/15/2019, 11/25/2020, 11/17/2021    Influenza, seasonal, injectable 1955, 01/01/2014    Pneumococcal Conjugate 13-Valent 01/03/2018    Pneumococcal Polysaccharide PPV23 10/29/2015, 12/28/2016    Tdap 07/07/2018, 08/26/2021    Zoster Vaccine Recombinant 06/28/2019, 10/04/2019          GABRIELE Crowley

## 2022-06-03 ENCOUNTER — APPOINTMENT (OUTPATIENT)
Dept: LAB | Facility: CLINIC | Age: 67
End: 2022-06-03
Payer: COMMERCIAL

## 2022-06-03 DIAGNOSIS — R19.7 DIARRHEA OF PRESUMED INFECTIOUS ORIGIN: ICD-10-CM

## 2022-06-03 PROCEDURE — 87493 C DIFF AMPLIFIED PROBE: CPT

## 2022-06-04 LAB — C DIFF TOX B TCDB STL QL NAA+PROBE: NEGATIVE

## 2022-06-07 ENCOUNTER — NURSE TRIAGE (OUTPATIENT)
Dept: OTHER | Facility: OTHER | Age: 67
End: 2022-06-07

## 2022-06-07 NOTE — TELEPHONE ENCOUNTER
I think she left  She will have to take pepto bismol while over in [facility]  Or see provider in [facility]

## 2022-06-07 NOTE — TELEPHONE ENCOUNTER
Regarding: nauseated and have stomach pain-diarrhea  ----- Message from Beau Jerome sent at 6/7/2022 10:24 AM EDT -----  " I am still nauseated and have stomach pain and diarrhea, we are leaving on vacation and I am don't know what else to do "

## 2022-06-07 NOTE — TELEPHONE ENCOUNTER
Patient would like a call back ASAP to advise  She is not improving and is supposed to be leaving her house at 1100 to drive to [place]  Reason for Disposition   MILD pain (e g , does not interfere with normal activities) and pain comes and goes (cramps) lasts > 48 hours (Exception: this same abdominal pain is a chronic symptom recurrent or ongoing AND present > 4 weeks)    Answer Assessment - Initial Assessment Questions  1  LOCATION: "Where does it hurt?"       Stomach   2  RADIATION: "Does the pain shoot anywhere else?" (e g , chest, back)      Denies   3  ONSET: "When did the pain begin?" (e g , minutes, hours or days ago)       2 weeks ago  4  SUDDEN: "Gradual or sudden onset?"      Sudden  5  PATTERN "Does the pain come and go, or is it constant?"     - If constant: "Is it getting better, staying the same, or worsening?"       (Note: Constant means the pain never goes away completely; most serious pain is constant and it progresses)      - If intermittent: "How long does it last?" "Do you have pain now?"      (Note: Intermittent means the pain goes away completely between bouts)      Comes and goes   6  SEVERITY: "How bad is the pain?"  (e g , Scale 1-10; mild, moderate, or severe)    - MILD (1-3): doesn't interfere with normal activities, abdomen soft and not tender to touch     - MODERATE (4-7): interferes with normal activities or awakens from sleep, tender to touch     - SEVERE (8-10): excruciating pain, doubled over, unable to do any normal activities       Mild (distress)   7  RECURRENT SYMPTOM: "Have you ever had this type of stomach pain before?" If Yes, ask: "When was the last time?" and "What happened that time?"       Unaware   8  CAUSE: "What do you think is causing the stomach pain?"      Medication reaction   9  RELIEVING/AGGRAVATING FACTORS: "What makes it better or worse?" (e g , movement, antacids, bowel movement)      Better-medication Worse-eating    10   OTHER SYMPTOMS: "Has there been any vomiting, diarrhea, constipation, or urine problems?"        Diarrhea, nausea   11   PREGNANCY: "Is there any chance you are pregnant?" "When was your last menstrual period?"        N/A    Protocols used: ABDOMINAL PAIN - [facility] - [facility]

## 2022-06-07 NOTE — TELEPHONE ENCOUNTER
Spoke to patient still having diarrhea a little bit but the stomach distress is still there hearing a lot of gurgling in her stomach no blood in stool no nausea  Yesterday had a regular BM and then diarrhea  Will be leaving in an hour to go to the [facility] she is leaving to go to [facility]  All of this seems to come and go

## 2022-06-20 ENCOUNTER — OFFICE VISIT (OUTPATIENT)
Dept: GASTROENTEROLOGY | Facility: CLINIC | Age: 67
End: 2022-06-20
Payer: COMMERCIAL

## 2022-06-20 VITALS
RESPIRATION RATE: 18 BRPM | SYSTOLIC BLOOD PRESSURE: 118 MMHG | HEART RATE: 88 BPM | OXYGEN SATURATION: 98 % | DIASTOLIC BLOOD PRESSURE: 70 MMHG | HEIGHT: 62 IN | TEMPERATURE: 97.4 F | WEIGHT: 175 LBS | BODY MASS INDEX: 32.2 KG/M2

## 2022-06-20 DIAGNOSIS — R14.0 BLOATING: ICD-10-CM

## 2022-06-20 DIAGNOSIS — K52.9 CHRONIC DIARRHEA: ICD-10-CM

## 2022-06-20 DIAGNOSIS — Z86.010 PERSONAL HISTORY OF COLONIC POLYPS: ICD-10-CM

## 2022-06-20 DIAGNOSIS — R10.13 EPIGASTRIC DISCOMFORT: ICD-10-CM

## 2022-06-20 DIAGNOSIS — R11.0 NAUSEA: Primary | ICD-10-CM

## 2022-06-20 PROCEDURE — 1036F TOBACCO NON-USER: CPT | Performed by: PHYSICIAN ASSISTANT

## 2022-06-20 PROCEDURE — 3008F BODY MASS INDEX DOCD: CPT | Performed by: PHYSICIAN ASSISTANT

## 2022-06-20 PROCEDURE — 1160F RVW MEDS BY RX/DR IN RCRD: CPT | Performed by: PHYSICIAN ASSISTANT

## 2022-06-20 PROCEDURE — 99214 OFFICE O/P EST MOD 30 MIN: CPT | Performed by: PHYSICIAN ASSISTANT

## 2022-06-20 RX ORDER — ONDANSETRON 4 MG/1
4 TABLET, FILM COATED ORAL EVERY 6 HOURS PRN
Qty: 60 TABLET | Refills: 2 | Status: SHIPPED | OUTPATIENT
Start: 2022-06-20

## 2022-06-20 NOTE — PROGRESS NOTES
[facility] - Outpatient Follow-up Note  Dilip Webb 74 y o  female MRN: [id number]  Encounter: [id number]          ASSESSMENT AND PLAN:      1  Nausea  2  Epigastric discomfort  3  Bloating  Patient reports longstanding symptoms of dyspepsia including postprandial epigastric discomfort, nausea, bloating, fullness  Her predominant complaint is nausea  She has controlled type 2 diabetes mellitus and RA on Enbrel and Motrin as needed  Recent CBC and CMP unremarkable except for mild hyponatremia  CT abdomen pelvis with IV contrast from May 2020 showed acute diverticulitis and fatty liver but no other significant GI abnormalities  Differential includes GERD, gastritis, peptic ulcer disease, H pylori infection, gastroparesis, celiac disease, symptomatic cholelithiasis, functional dyspepsia  - Schedule EGD with gastric and duodenal biopsies to rule out H pylori and celiac disease  - NM gastric emptying to rule out gastroparesis  - US right upper quadrant to assess for cholelithiasis  - Start ondansetron (ZOFRAN) 4 mg tablet; Take 1 tablet (4 mg total) by mouth every 6 (six) hours as needed for nausea or vomiting  Dispense: 60 tablet; Refill: 2  - Consider PPI therapy if Zofran is ineffective  - Discussed avoiding NSAIDs    4  Chronic diarrhea  She likely has irritable bowel syndrome given her intermittent diarrhea, abdominal pain, bloating  Recent testing negative for C diff  We will check for celiac disease as discussed above and stool studies to rule out parasites given [place]  Previous colonoscopy showed no evidence of IBD, but if diarrhea worsens, we can repeat colonoscopy with random biopsies to assess for microscopic colitis  - Giardia antigen  - Ova and parasite examination; Future    5  Personal history of colon polyps  She is due for repeat colonoscopy in 2024 due to personal history of polyps      Follow-up in few months  ______________________________________________________________________    SUBJECTIVE:  56-year-old female with type 2 diabetes mellitus, rheumatoid arthritis, asthma, hypertension, hypercholesterolemia, obesity, anxiety presenting for evaluation of nausea, abdominal discomfort, diarrhea  Of note, she saw Dr Benita Shea in April 2021 for history of diverticulosis and diverticulitis  She had a colonoscopy in May 2019 with Dr Amara Verma of Colorectal surgery which showed mild diverticulosis otherwise normal colon  She was recommended repeat in 5 years  She reports multiple chronic symptoms which have worsened over the past few weeks  She has chronic nausea which occurs with and without eating, but does seem to worsen with eating  Her nausea is exacerbated by large meals  She has epigastric discomfort which she describes as "distress" after eating  She also has bloating and fullness  The symptoms are distressing and she is teary in the office  She denies typical reflux symptoms of heartburn and regurgitation  Overall, she is tired of feeling sick most days  She has tried Pepto-Bismol and Tums without relief  She has been avoiding eating spicy and acidic foods  She has never had EGD  She has been on Enbrel for the past 7 years or so for rheumatoid arthritis  She takes Motrin as needed, no more than 800 mg daily  She does not take this continuously  She takes Percocet occasionally  She was recently on a course of antibiotics (cefprozil) for ear infection which caused a severe reaction including horrible vomiting and diarrhea  She also reports intermittent diarrhea for the past 20 years which she had previously attributed to IBS  Lately, she actually reports some improvement in diarrhea  She has semi-solid or normal stools in between her episodes of diarrhea  This is sometimes associated with cramping lower abdominal pain which radiates towards her back  She denies any blood in the stool   She was taking acidophilus and her daughter recommended drinking Kombucha tea  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[place] so she is unsure if she was exposed to a parasite  REVIEW OF SYSTEMS IS OTHERWISE NEGATIVE  Historical Information   Past Medical History:   Diagnosis Date    Acute non-recurrent frontal sinusitis 5/15/2019    Acute non-recurrent maxillary sinusitis 3/10/2020    Anxiety     Anxiety disorder     Arthritis     OA    Asthma     exercise iniduced   Cancer (HCC)     Squamous Cell on back    Dependence on crutches     prn    Depression     ro       Diabetes mellitus ([address] )     NIDDM; per Allscripts: Last Assessed: 7/15/15, New onset of type 2    Diverticulitis of colon     Fibromyalgia     Hearing aid worn     mayra    Heart murmur     HL (hearing loss)     b/l - uses hearing aids    Hyperglycemia     Last Assessed:10/29/15    Hyperlipidemia     Hypertension     LLQ abdominal pain 3/30/2020    Psoriasis     red dry patch left side- waist area    Psoriatic arthritis ([address] )     RA (rheumatoid arthritis) [address] [address]     Wears glasses     reading     Past Surgical History:   Procedure Laterality Date    ARTHROSCOPY KNEE Left 3/2/2016    Procedure: ARTHROSCOPY KNEE;  Surgeon: Sharmila Gomez MD;  [facility];  Service:      SECTION      X2    COLONOSCOPY  2019    CYST REMOVAL Right     Cyst on bone     FRACTURE SURGERY      (Right) tibia/fibula Fx, and ankle    PILONIDAL CYST / SINUS EXCISION      [place] KNEE SCOPE,MED/LAT MENISECTOMY Left 3/2/2016    Procedure: PARTIAL MEDIAL &  LATERAL MENISCECTOMIES, CHONDROPLASTY, REMOVAL OSTEOPHYTE, LIMITED SYNOVECTOMY;  Surgeon: Sharmila Gomez MD;  [facility];  Service: Orthopedics    REPAIR KNEE LIGAMENT      TOOTH EXTRACTION       Social History   Social History     Substance and Sexual Activity   Alcohol Use No     Social History     Substance and Sexual Activity   Drug Use No     Social History     Tobacco Use   Smoking Status Former Smoker    Packs/day: 2 00    Years: 25 00    Pack years: 50 00    Types: Cigarettes    Quit date: 56    Years since quittin 4   Smokeless Tobacco Never Used     Family History   Problem Relation Age of Onset    Hypertension Mother     Heart attack Mother     Osteoarthritis Mother     Breast cancer Mother 80    Arthritis Mother     Alcohol abuse Father     Hypertension Brother     Diabetes Brother     Other Brother         Dyslipidemia    Coronary artery disease Brother     No Known Problems Sister     No Known Problems Daughter     No Known Problems Maternal Grandmother     No Known Problems Maternal Grandfather     No Known Problems Paternal Grandmother     No Known Problems Paternal Grandfather     No Known Problems Daughter     No Known Problems Maternal Aunt     No Known Problems Paternal Aunt     No Known Problems Paternal Aunt     No Known Problems Paternal Aunt     No Known Problems Paternal Aunt     No Known Problems Paternal Aunt        Meds/Allergies       Current Outpatient Medications:     al mag oxide-diphenhydramine-lidocaine viscous (MAGIC MOUTHWASH) 1:1:1 suspension    albuterol (2 5 mg/3 mL) 0 083 % nebulizer solution    albuterol (2 5 mg/3 mL) 0 083 % nebulizer solution    albuterol (Ventolin HFA) 90 mcg/act inhaler    ALPRAZolam (XANAX) 0 5 mg tablet    amLODIPine (NORVASC) 10 mg tablet    budesonide-formoterol (Symbicort) 160-4 5 mcg/act inhaler    Enbrel 50 MG/ML SOSY    ergocalciferol (VITAMIN D2) 50,000 units    hydrochlorothiazide (HYDRODIURIL) 25 mg tablet    losartan (COZAAR) 50 mg tablet    metoprolol succinate (TOPROL-XL) 100 mg 24 hr tablet    ondansetron (ZOFRAN) 4 mg tablet    oxyCODONE-acetaminophen (PERCOCET) 5-325 mg per tablet    simvastatin (ZOCOR) 10 mg tablet    escitalopram (LEXAPRO) 10 mg tablet    metFORMIN (GLUCOPHAGE-XR) 500 mg 24 hr tablet    Allergies   Allergen Reactions  Zithromax [Azithromycin] Hives    Augmentin [Amoxicillin-Pot Clavulanate] Vomiting    Lisinopril Cough    Cefprozil Diarrhea           Objective     Blood pressure 118/70, pulse 88, temperature (!) 97 4 °F (36 3 °C), temperature source Tympanic, resp  rate 18, height 5' 2" (1 575 m), weight 79 4 kg (175 lb), SpO2 98 %, not currently breastfeeding  Body mass index is 32 01 kg/m²  PHYSICAL EXAM:      General Appearance:   Alert, cooperative, no distress   HEENT:   Normocephalic, atraumatic, anicteric      Neck:  Supple, symmetrical, trachea midline   Lungs:   Clear to auscultation bilaterally; no rales, rhonchi or wheezing; respirations unlabored    Heart[de-identified]   Regular rate and rhythm; no murmur, rub, or gallop  Abdomen:   Soft, non-tender, non-distended; normal bowel sounds; no masses, no organomegaly    Genitalia:   Deferred    Rectal:   Deferred    Extremities:  No cyanosis, clubbing or edema    Pulses:  2+ and symmetric    Skin:  No jaundice, rashes, or lesions    Lymph nodes:  No palpable cervical lymphadenopathy        Lab Results:   No visits with results within 1 Day(s) from this visit  Latest known visit with results is:   Appointment on 06/03/2022   Component Date Value     C difficile toxin by PC* 06/03/2022 Negative          Radiology Results:   No results found

## 2022-06-20 NOTE — PATIENT INSTRUCTIONS
Scheduled date of EGD(as of today):06 30 22  Physician performing EGD:DR DOUGHERTY [facility]  Location of EGD:[facility]  Instructions reviewed with patient by:ISMAEL  Clearances:  N/A    U/S scheduled 06 28 22 @Riki  Gastric Emptying Study scheduled 08 11 22 @Riki

## 2022-06-20 NOTE — LETTER
June 20, 2022     Isaías Muro, [address] [address]    Patient: Rush Moon   YOB: 1955   Date of Visit: 6/20/2022       Dear Dr Chasity Cai:    Thank you for referring Jason Velarde to me for evaluation  Below are my notes for this consultation  If you have questions, please do not hesitate to call me  I look forward to following your patient along with you  Sincerely,        Jamie Russell PA-C        CC: No Recipients  Jamie Russell PA-C  6/20/2022  3:53 PM  Sign when Signing Visit  [facility] - Outpatient Follow-up Note  Rush Moon 74 y o  female MRN: [id number]  Encounter: [id number]          ASSESSMENT AND PLAN:      1  Nausea  2  Epigastric discomfort  3  Bloating  Patient reports longstanding symptoms of dyspepsia including postprandial epigastric discomfort, nausea, bloating, fullness  Her predominant complaint is nausea  She has controlled type 2 diabetes mellitus and RA on Enbrel and Motrin as needed  Recent CBC and CMP unremarkable except for mild hyponatremia  CT abdomen pelvis with IV contrast from May 2020 showed acute diverticulitis and fatty liver but no other significant GI abnormalities  Differential includes GERD, gastritis, peptic ulcer disease, H pylori infection, gastroparesis, celiac disease, symptomatic cholelithiasis, functional dyspepsia  - Schedule EGD with gastric and duodenal biopsies to rule out H pylori and celiac disease  - NM gastric emptying to rule out gastroparesis  - US right upper quadrant to assess for cholelithiasis  - Start ondansetron (ZOFRAN) 4 mg tablet; Take 1 tablet (4 mg total) by mouth every 6 (six) hours as needed for nausea or vomiting  Dispense: 60 tablet; Refill: 2  - Consider PPI therapy if Zofran is ineffective  - Discussed avoiding NSAIDs    4  Chronic diarrhea  She likely has irritable bowel syndrome given her intermittent diarrhea, abdominal pain, bloating  Recent testing negative for C diff  We will check for celiac disease as discussed above and stool studies to rule out parasites given her [place]  Previous colonoscopy showed no evidence of IBD, but if diarrhea worsens, we can repeat colonoscopy with random biopsies to assess for microscopic colitis  - Giardia antigen  - Ova and parasite examination; Future    5  Personal history of colon polyps  She is due for repeat colonoscopy in 2024 due to personal history of polyps  Follow-up in few months  ______________________________________________________________________    SUBJECTIVE:  59-year-old female with type 2 diabetes mellitus, rheumatoid arthritis, asthma, hypertension, hypercholesterolemia, obesity, anxiety presenting for evaluation of nausea, abdominal discomfort, diarrhea  Of note, she saw Dr Orion Jeffery in April 2021 for history of diverticulosis and diverticulitis  She had a colonoscopy in May 2019 with Dr Harjinder Ashraf of Colorectal surgery which showed mild diverticulosis otherwise normal colon  She was recommended repeat in 5 years  She reports multiple chronic symptoms which have worsened over the past few weeks  She has chronic nausea which occurs with and without eating, but does seem to worsen with eating  Her nausea is exacerbated by large meals  She has epigastric discomfort which she describes as "distress" after eating  She also has bloating and fullness  The symptoms are distressing and she is teary in the office  She denies typical reflux symptoms of heartburn and regurgitation  Overall, she is tired of feeling sick most days  She has tried Pepto-Bismol and Tums without relief  She has been avoiding eating spicy and acidic foods  She has never had EGD  She has been on Enbrel for the past 7 years or so for rheumatoid arthritis  She takes Motrin as needed, no more than 800 mg daily  She does not take this continuously  She takes Percocet occasionally      She was recently on a course of antibiotics (cefprozil) for ear infection which caused a severe reaction including horrible vomiting and diarrhea  She also reports intermittent diarrhea for the past 20 years which she had previously attributed to IBS  Lately, she actually reports some improvement in diarrhea  She has semi-solid or normal stools in between her episodes of diarrhea  This is sometimes associated with cramping lower abdominal pain which radiates towards her back  She denies any blood in the stool  She was taking acidophilus and her daughter recommended drinking Kombucha tea  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[place] so she is unsure if she was exposed to a parasite  REVIEW OF SYSTEMS IS OTHERWISE NEGATIVE  Historical Information   Past Medical History:   Diagnosis Date    Acute non-recurrent frontal sinusitis 5/15/2019    Acute non-recurrent maxillary sinusitis 3/10/2020    Anxiety     Anxiety disorder     Arthritis     OA    Asthma     exercise iniduced   Cancer (HCC)     Squamous Cell on back    Dependence on crutches     prn    Depression     ro       Diabetes mellitus ([address] )     NIDDM; per Allscripts: Last Assessed: 7/15/15, New onset of type 2    Diverticulitis of colon     Fibromyalgia     Hearing aid worn     mayra    Heart murmur     HL (hearing loss)     b/l - uses hearing aids    Hyperglycemia     Last Assessed:10/29/15    Hyperlipidemia     Hypertension     LLQ abdominal pain 3/30/2020    Psoriasis     red dry patch left side- waist area    Psoriatic arthritis ([address] )     RA (rheumatoid arthritis) ([address] )     Wears glasses     reading     Past Surgical History:   Procedure Laterality Date    ARTHROSCOPY KNEE Left 3/2/2016    Procedure: ARTHROSCOPY KNEE;  Surgeon: Humble Manriquez MD;  [facility];  Service:      SECTION      X2    COLONOSCOPY  2019    CYST REMOVAL Right     Cyst on bone     FRACTURE SURGERY      (Right) tibia/fibula Fx, and ankle    PILONIDAL CYST / SINUS EXCISION      [place] KNEE SCOPE,MED/LAT MENISECTOMY Left 3/2/2016    Procedure: PARTIAL MEDIAL &  LATERAL MENISCECTOMIES, CHONDROPLASTY, REMOVAL OSTEOPHYTE, LIMITED SYNOVECTOMY;  Surgeon: Albania Foote MD;  [facility];  Service: Orthopedics    REPAIR KNEE LIGAMENT      TOOTH EXTRACTION       Social History   Social History     Substance and Sexual Activity   Alcohol Use No     Social History     Substance and Sexual Activity   Drug Use No     Social History     Tobacco Use   Smoking Status Former Smoker    Packs/day: 2 00    Years: 25 00    Pack years: 50 00    Types: Cigarettes    Quit date: 56    Years since quittin 4   Smokeless Tobacco Never Used     Family History   Problem Relation Age of Onset    Hypertension Mother     Heart attack Mother     Osteoarthritis Mother     Breast cancer Mother 80    Arthritis Mother     Alcohol abuse Father     Hypertension Brother     Diabetes Brother     Other Brother         Dyslipidemia    Coronary artery disease Brother     No Known Problems Sister     No Known Problems Daughter     No Known Problems Maternal Grandmother     No Known Problems Maternal Grandfather     No Known Problems Paternal Grandmother     No Known Problems Paternal Grandfather     No Known Problems Daughter     No Known Problems Maternal Aunt     No Known Problems Paternal Aunt     No Known Problems Paternal Aunt     No Known Problems Paternal Aunt     No Known Problems Paternal Aunt     No Known Problems Paternal Aunt        Meds/Allergies       Current Outpatient Medications:     al mag oxide-diphenhydramine-lidocaine viscous (MAGIC MOUTHWASH) 1:1:1 suspension    albuterol (2 5 mg/3 mL) 0 083 % nebulizer solution    albuterol (2 5 mg/3 mL) 0 083 % nebulizer solution    albuterol (Ventolin HFA) 90 mcg/act inhaler    ALPRAZolam (XANAX) 0 5 mg tablet    amLODIPine (NORVASC) 10 mg tablet   budesonide-formoterol (Symbicort) 160-4 5 mcg/act inhaler    Enbrel 50 MG/ML SOSY    ergocalciferol (VITAMIN D2) 50,000 units    hydrochlorothiazide (HYDRODIURIL) 25 mg tablet    losartan (COZAAR) 50 mg tablet    metoprolol succinate (TOPROL-XL) 100 mg 24 hr tablet    ondansetron (ZOFRAN) 4 mg tablet    oxyCODONE-acetaminophen (PERCOCET) 5-325 mg per tablet    simvastatin (ZOCOR) 10 mg tablet    escitalopram (LEXAPRO) 10 mg tablet    metFORMIN (GLUCOPHAGE-XR) 500 mg 24 hr tablet    Allergies   Allergen Reactions    Zithromax [Azithromycin] Hives    Augmentin [Amoxicillin-Pot Clavulanate] Vomiting    Lisinopril Cough    Cefprozil Diarrhea           Objective     Blood pressure 118/70, pulse 88, temperature (!) 97 4 °F (36 3 °C), temperature source Tympanic, resp  rate 18, height 5' 2" (1 575 m), weight 79 4 kg (175 lb), SpO2 98 %, not currently breastfeeding  Body mass index is 32 01 kg/m²  PHYSICAL EXAM:      General Appearance:   Alert, cooperative, no distress   HEENT:   Normocephalic, atraumatic, anicteric      Neck:  Supple, symmetrical, trachea midline   Lungs:   Clear to auscultation bilaterally; no rales, rhonchi or wheezing; respirations unlabored    Heart[de-identified]   Regular rate and rhythm; no murmur, rub, or gallop  Abdomen:   Soft, non-tender, non-distended; normal bowel sounds; no masses, no organomegaly    Genitalia:   Deferred    Rectal:   Deferred    Extremities:  No cyanosis, clubbing or edema    Pulses:  2+ and symmetric    Skin:  No jaundice, rashes, or lesions    Lymph nodes:  No palpable cervical lymphadenopathy        Lab Results:   No visits with results within 1 Day(s) from this visit  Latest known visit with results is:   Appointment on 06/03/2022   Component Date Value     C difficile toxin by PC* 06/03/2022 Negative          Radiology Results:   No results found

## 2022-07-05 ENCOUNTER — TELEPHONE (OUTPATIENT)
Dept: GASTROENTEROLOGY | Facility: CLINIC | Age: 67
End: 2022-07-05

## 2022-07-12 ENCOUNTER — HOSPITAL ENCOUNTER (OUTPATIENT)
Dept: RADIOLOGY | Age: 67
Discharge: HOME | End: 2022-07-12
Payer: COMMERCIAL

## 2022-07-12 DIAGNOSIS — R10.13 EPIGASTRIC DISCOMFORT: ICD-10-CM

## 2022-07-12 DIAGNOSIS — R11.0 NAUSEA: ICD-10-CM

## 2022-07-12 DIAGNOSIS — R14.0 BLOATING: ICD-10-CM

## 2022-07-12 PROCEDURE — G1004 CDSM NDSC: HCPCS

## 2022-07-12 PROCEDURE — A9541 TC99M SULFUR COLLOID: HCPCS

## 2022-07-12 PROCEDURE — 78264 GASTRIC EMPTYING IMG STUDY: CPT

## 2022-07-15 NOTE — TELEPHONE ENCOUNTER
TC to pt to r/s EGD  Scheduled date of EGD(as of today):  8/25/22  Physician performing EGD:  Dr Kamini Howard  [facility] of [facility]  Instructions reviewed with patient by:  Allen Torres  Clearances: n/a    Pt also requested to r/s f/u appt with Dr Pierce Screws  Opted to schedule with Dr Kamini Howard d/t better availability

## 2022-07-20 ENCOUNTER — APPOINTMENT (OUTPATIENT)
Dept: LAB | Age: 67
End: 2022-07-20
Payer: COMMERCIAL

## 2022-07-20 ENCOUNTER — HOSPITAL ENCOUNTER (OUTPATIENT)
Dept: RADIOLOGY | Age: 67
Discharge: HOME | End: 2022-07-20
Payer: COMMERCIAL

## 2022-07-20 DIAGNOSIS — R11.0 NAUSEA: ICD-10-CM

## 2022-07-20 DIAGNOSIS — R14.0 BLOATING: ICD-10-CM

## 2022-07-20 DIAGNOSIS — I10 BENIGN ESSENTIAL HYPERTENSION: ICD-10-CM

## 2022-07-20 DIAGNOSIS — E11.9 CONTROLLED TYPE 2 DIABETES MELLITUS WITHOUT COMPLICATION, WITHOUT LONG-TERM CURRENT USE OF INSULIN (HCC): ICD-10-CM

## 2022-07-20 DIAGNOSIS — E78.00 HYPERCHOLESTEROLEMIA: ICD-10-CM

## 2022-07-20 DIAGNOSIS — R10.13 EPIGASTRIC DISCOMFORT: ICD-10-CM

## 2022-07-20 LAB
ALBUMIN SERPL BCP-MCNC: 3.8 G/DL (ref 3.5–5)
ALP SERPL-CCNC: 100 U/L (ref 46–116)
ALT SERPL W P-5'-P-CCNC: 32 U/L (ref 12–78)
ANION GAP SERPL CALCULATED.3IONS-SCNC: 8 MMOL/L (ref 4–13)
AST SERPL W P-5'-P-CCNC: 22 U/L (ref 5–45)
BILIRUB SERPL-MCNC: 0.44 MG/DL (ref 0.2–1)
BUN SERPL-MCNC: 13 MG/DL (ref 5–25)
CALCIUM SERPL-MCNC: 9.4 MG/DL (ref 8.3–10.1)
CHLORIDE SERPL-SCNC: 103 MMOL/L (ref 96–108)
CHOLEST SERPL-MCNC: 173 MG/DL
CO2 SERPL-SCNC: 27 MMOL/L (ref 21–32)
CREAT SERPL-MCNC: 0.6 MG/DL (ref 0.6–1.3)
CREAT UR-MCNC: 53.7 MG/DL
GFR SERPL CREATININE-BSD FRML MDRD: 93 ML/MIN/1.73SQ M
GLUCOSE P FAST SERPL-MCNC: 132 MG/DL (ref 65–99)
HDLC SERPL-MCNC: 43 MG/DL
LDLC SERPL CALC-MCNC: 95 MG/DL (ref 0–100)
MICROALBUMIN UR-MCNC: <5 MG/L (ref 0–20)
MICROALBUMIN/CREAT 24H UR: <9 MG/G CREATININE (ref 0–30)
POTASSIUM SERPL-SCNC: 3.9 MMOL/L (ref 3.5–5.3)
PROT SERPL-MCNC: 7.6 G/DL (ref 6.4–8.4)
SODIUM SERPL-SCNC: 138 MMOL/L (ref 135–147)
TRIGL SERPL-MCNC: 177 MG/DL
TSH SERPL DL<=0.05 MIU/L-ACNC: 1.85 UIU/ML (ref 0.45–4.5)

## 2022-07-20 PROCEDURE — 80061 LIPID PANEL: CPT

## 2022-07-20 PROCEDURE — 82043 UR ALBUMIN QUANTITATIVE: CPT

## 2022-07-20 PROCEDURE — 36415 COLL VENOUS BLD VENIPUNCTURE: CPT

## 2022-07-20 PROCEDURE — 84443 ASSAY THYROID STIM HORMONE: CPT

## 2022-07-20 PROCEDURE — 80053 COMPREHEN METABOLIC PANEL: CPT

## 2022-07-20 PROCEDURE — 82570 ASSAY OF URINE CREATININE: CPT

## 2022-07-20 PROCEDURE — 76705 ECHO EXAM OF ABDOMEN: CPT

## 2022-08-03 ENCOUNTER — HOSPITAL ENCOUNTER (OUTPATIENT)
Dept: RADIOLOGY | Age: 67
Discharge: HOME | End: 2022-08-03
Payer: COMMERCIAL

## 2022-08-03 DIAGNOSIS — Z13.820 SCREENING FOR OSTEOPOROSIS: ICD-10-CM

## 2022-08-03 PROCEDURE — 77080 DXA BONE DENSITY AXIAL: CPT

## 2022-08-15 DIAGNOSIS — E55.9 VITAMIN D DEFICIENCY: ICD-10-CM

## 2022-08-16 ENCOUNTER — TELEPHONE (OUTPATIENT)
Dept: PULMONOLOGY | Facility: CLINIC | Age: 67
End: 2022-08-16

## 2022-08-16 RX ORDER — ERGOCALCIFEROL 1.25 MG/1
CAPSULE ORAL
Qty: 12 CAPSULE | Refills: 3 | Status: SHIPPED | OUTPATIENT
Start: 2022-08-16

## 2022-08-16 NOTE — TELEPHONE ENCOUNTER
LM for patient to give a call back to set up a follow up Appt with Dr Shannan Mane in [facility]

## 2022-08-24 RX ORDER — SODIUM CHLORIDE, SODIUM LACTATE, POTASSIUM CHLORIDE, CALCIUM CHLORIDE 600; 310; 30; 20 MG/100ML; MG/100ML; MG/100ML; MG/100ML
75 INJECTION, SOLUTION INTRAVENOUS CONTINUOUS
Status: CANCELLED | OUTPATIENT
Start: 2022-08-24

## 2022-08-25 ENCOUNTER — HOSPITAL ENCOUNTER (OUTPATIENT)
Dept: GASTROENTEROLOGY | Facility: AMBULARY SURGERY CENTER | Age: 67
Setting detail: OUTPATIENT SURGERY
End: 2022-08-25
Payer: COMMERCIAL

## 2022-08-25 ENCOUNTER — ANESTHESIA EVENT (OUTPATIENT)
Dept: GASTROENTEROLOGY | Facility: AMBULARY SURGERY CENTER | Age: 67
End: 2022-08-25

## 2022-08-25 ENCOUNTER — ANESTHESIA (OUTPATIENT)
Dept: GASTROENTEROLOGY | Facility: AMBULARY SURGERY CENTER | Age: 67
End: 2022-08-25

## 2022-08-25 VITALS
WEIGHT: 176.37 LBS | DIASTOLIC BLOOD PRESSURE: 50 MMHG | RESPIRATION RATE: 18 BRPM | SYSTOLIC BLOOD PRESSURE: 104 MMHG | HEART RATE: 60 BPM | OXYGEN SATURATION: 95 % | BODY MASS INDEX: 32.46 KG/M2 | HEIGHT: 62 IN | TEMPERATURE: 97 F

## 2022-08-25 DIAGNOSIS — R10.13 EPIGASTRIC DISCOMFORT: ICD-10-CM

## 2022-08-25 DIAGNOSIS — R11.0 NAUSEA: ICD-10-CM

## 2022-08-25 DIAGNOSIS — R14.0 BLOATING: ICD-10-CM

## 2022-08-25 LAB — GLUCOSE SERPL-MCNC: 157 MG/DL (ref 65–140)

## 2022-08-25 PROCEDURE — 88305 TISSUE EXAM BY PATHOLOGIST: CPT | Performed by: PATHOLOGY

## 2022-08-25 PROCEDURE — 43239 EGD BIOPSY SINGLE/MULTIPLE: CPT | Performed by: INTERNAL MEDICINE

## 2022-08-25 PROCEDURE — 82948 REAGENT STRIP/BLOOD GLUCOSE: CPT

## 2022-08-25 RX ORDER — SODIUM CHLORIDE, SODIUM LACTATE, POTASSIUM CHLORIDE, CALCIUM CHLORIDE 600; 310; 30; 20 MG/100ML; MG/100ML; MG/100ML; MG/100ML
INJECTION, SOLUTION INTRAVENOUS CONTINUOUS PRN
Status: DISCONTINUED | OUTPATIENT
Start: 2022-08-25 | End: 2022-08-25

## 2022-08-25 RX ORDER — LIDOCAINE HYDROCHLORIDE 20 MG/ML
INJECTION, SOLUTION EPIDURAL; INFILTRATION; INTRACAUDAL; PERINEURAL AS NEEDED
Status: DISCONTINUED | OUTPATIENT
Start: 2022-08-25 | End: 2022-08-25

## 2022-08-25 RX ORDER — PROPOFOL 10 MG/ML
INJECTION, EMULSION INTRAVENOUS AS NEEDED
Status: DISCONTINUED | OUTPATIENT
Start: 2022-08-25 | End: 2022-08-25

## 2022-08-25 RX ADMIN — SODIUM CHLORIDE, SODIUM LACTATE, POTASSIUM CHLORIDE, AND CALCIUM CHLORIDE: .6; .31; .03; .02 INJECTION, SOLUTION INTRAVENOUS at 08:47

## 2022-08-25 RX ADMIN — PROPOFOL 100 MG: 10 INJECTION, EMULSION INTRAVENOUS at 09:32

## 2022-08-25 RX ADMIN — LIDOCAINE HYDROCHLORIDE 100 MG: 20 INJECTION, SOLUTION EPIDURAL; INFILTRATION; INTRACAUDAL; PERINEURAL at 09:32

## 2022-08-25 RX ADMIN — PROPOFOL 50 MG: 10 INJECTION, EMULSION INTRAVENOUS at 09:36

## 2022-08-25 NOTE — ANESTHESIA PREPROCEDURE EVALUATION
Procedure:  EGD    Relevant Problems   CARDIO   (+) Benign essential hypertension   (+) Hypercholesterolemia      ENDO   (+) Controlled diabetes mellitus type II without complication (HCC)   (+) Type 2 diabetes mellitus with diabetic cataract (HCC)      MUSCULOSKELETAL  right ankle hardware   (+) Fibromyalgia   (+) Generalized osteoarthritis of multiple sites   (+) Primary osteoarthritis of left knee   (+) Psoriasis with arthropathy (HCC)   (+) Rheumatoid arthritis involving multiple sites with positive rheumatoid factor (HCC)      NEURO/PSYCH   (+) Anxiety disorder   (+) Chronic neck pain   (+) Continuous opioid dependence (HCC)   (+) Fibromyalgia      PULMONARY   (+) Moderate persistent asthma        Physical Exam    Airway    Mallampati score: II  TM Distance: >3 FB  Neck ROM: full     Dental       Cardiovascular  Rhythm: regular, Rate: normal,     Pulmonary  Breath sounds clear to auscultation,     Other Findings        Anesthesia Plan  ASA Score- 2     Anesthesia Type- IV sedation with anesthesia with ASA Monitors  Additional Monitors:   Airway Plan:           Plan Factors-    Chart reviewed  Patient is not a current smoker  Induction- intravenous  Postoperative Plan-     Informed Consent- Anesthetic plan and risks discussed with patient  I personally reviewed this patient with the CRNA  Discussed and agreed on the Anesthesia Plan with the WANDA Erazo

## 2022-08-25 NOTE — ANESTHESIA POSTPROCEDURE EVALUATION
Post-Op Assessment Note    CV Status:  Stable  Pain Score: 0    Pain management: adequate     Mental Status:  Alert and awake   Hydration Status:  Euvolemic   PONV Controlled:  Controlled   Airway Patency:  Patent      Post Op Vitals Reviewed: Yes      Staff: Anesthesiologist, CRNA         No complications documented      BP   114/56   Temp  97 0   Pulse  70   Resp   16   SpO2   96%

## 2022-08-31 PROCEDURE — 88305 TISSUE EXAM BY PATHOLOGIST: CPT | Performed by: PATHOLOGY

## 2022-09-14 LAB
LEFT EYE DIABETIC RETINOPATHY: NORMAL
RIGHT EYE DIABETIC RETINOPATHY: NORMAL

## 2022-09-14 PROCEDURE — 2023F DILAT RTA XM W/O RTNOPTHY: CPT | Performed by: INTERNAL MEDICINE

## 2022-09-15 ENCOUNTER — OFFICE VISIT (OUTPATIENT)
Dept: GASTROENTEROLOGY | Facility: CLINIC | Age: 67
End: 2022-09-15
Payer: COMMERCIAL

## 2022-09-15 VITALS
DIASTOLIC BLOOD PRESSURE: 70 MMHG | SYSTOLIC BLOOD PRESSURE: 114 MMHG | WEIGHT: 177 LBS | HEIGHT: 62 IN | TEMPERATURE: 97.1 F | BODY MASS INDEX: 32.57 KG/M2

## 2022-09-15 DIAGNOSIS — R14.0 BLOATING: ICD-10-CM

## 2022-09-15 DIAGNOSIS — R10.13 EPIGASTRIC DISCOMFORT: ICD-10-CM

## 2022-09-15 DIAGNOSIS — R11.0 NAUSEA: Primary | ICD-10-CM

## 2022-09-15 PROCEDURE — 99213 OFFICE O/P EST LOW 20 MIN: CPT | Performed by: INTERNAL MEDICINE

## 2022-09-15 PROCEDURE — 1160F RVW MEDS BY RX/DR IN RCRD: CPT | Performed by: INTERNAL MEDICINE

## 2022-09-15 NOTE — PROGRESS NOTES
[facility] - Outpatient Follow-up Note  Diallo West 69 y o  female MRN: [id number]  Encounter: [id number]          ASSESSMENT AND PLAN:      1  Nausea  2  Bloating  3  Epigastric discomfort    Patient implemented dietary changes and has had improvement in her symptoms  Continue low FODMAP diet and avoiding trigger foods  Will have her follow-up in 1 year   ______________________________________________________________________    SUBJECTIVE:  Patient here for follow-up visit  She underwent upper endoscopy on 08/25/2022  Mild gastritis was found  She had biopsies of the stomach and small intestine negative for H pylori and celiac disease  Currently she is feeling fine with minimal complaints of bloating and abdominal discomfort  She has changed her diet quite a bit which has improved her symptoms  No complaints today  REVIEW OF SYSTEMS IS OTHERWISE NEGATIVE  Historical Information   Past Medical History:   Diagnosis Date    Acute non-recurrent frontal sinusitis 5/15/2019    Acute non-recurrent maxillary sinusitis 3/10/2020    Anxiety     Anxiety disorder     Arthritis     OA    Asthma     exercise iniduced   Cancer (HCC)     Squamous Cell on back    Dependence on crutches     prn    Depression     ro       Diabetes mellitus ([address] )     NIDDM; per Allscripts: Last Assessed: 7/15/15, New onset of type 2    Diverticulitis of colon     Fibromyalgia     Hearing aid worn     mayra    Heart murmur     HL (hearing loss)     b/l - uses hearing aids    Hyperglycemia     Last Assessed:10/29/15    Hyperlipidemia     Hypertension     LLQ abdominal pain 3/30/2020    Psoriasis     red dry patch left side- waist area    Psoriatic arthritis ([address] )     RA (rheumatoid arthritis) ([address] )     Wears glasses     reading     Past Surgical History:   Procedure Laterality Date    ARTHROSCOPY KNEE Left 3/2/2016    Procedure: ARTHROSCOPY KNEE;  Surgeon: Uriel Hewitt MD; Location: [facility];  Service:      SECTION      X2    COLONOSCOPY  2019    CYST REMOVAL Right     Cyst on bone     FRACTURE SURGERY      (Right) tibia/fibula Fx, and ankle    PILONIDAL CYST / SINUS EXCISION      WA KNEE SCOPE,MED/LAT MENISECTOMY Left 3/2/2016    Procedure: PARTIAL MEDIAL &  LATERAL MENISCECTOMIES, CHONDROPLASTY, REMOVAL OSTEOPHYTE, LIMITED SYNOVECTOMY;  Surgeon: Wilner Irizarry MD;  Location: [facility];  Service: Orthopedics    REPAIR KNEE LIGAMENT      TOOTH EXTRACTION       Social History   Social History     Substance and Sexual Activity   Alcohol Use No     Social History     Substance and Sexual Activity   Drug Use No     Social History     Tobacco Use   Smoking Status Former Smoker    Packs/day: 2 00    Years: 25 00    Pack years: 50 00    Types: Cigarettes    Quit date: 56    Years since quittin 7   Smokeless Tobacco Never Used     Family History   Problem Relation Age of Onset    Hypertension Mother     Heart attack Mother     Osteoarthritis Mother     Breast cancer Mother 80    Arthritis Mother     Alcohol abuse Father     Hypertension Brother     Diabetes Brother     Other Brother         Dyslipidemia    Coronary artery disease Brother     No Known Problems Sister     No Known Problems Daughter     No Known Problems Maternal Grandmother     No Known Problems Maternal Grandfather     No Known Problems Paternal Grandmother     No Known Problems Paternal Grandfather     No Known Problems Daughter     No Known Problems Maternal Aunt     No Known Problems Paternal Aunt     No Known Problems Paternal Aunt     No Known Problems Paternal Aunt     No Known Problems Paternal Aunt     No Known Problems Paternal Aunt        Meds/Allergies       Current Outpatient Medications:     al mag oxide-diphenhydramine-lidocaine viscous (MAGIC MOUTHWASH) 1:1:1 suspension    albuterol (2 5 mg/3 mL) 0 083 % nebulizer solution    albuterol (2 5 mg/3 mL) 0  083 % nebulizer solution    albuterol (Ventolin HFA) 90 mcg/act inhaler    ALPRAZolam (XANAX) 0 5 mg tablet    amLODIPine (NORVASC) 10 mg tablet    budesonide-formoterol (Symbicort) 160-4 5 mcg/act inhaler    Enbrel 50 MG/ML SOSY    ergocalciferol (VITAMIN D2) 50,000 units    escitalopram (LEXAPRO) 10 mg tablet    hydrochlorothiazide (HYDRODIURIL) 25 mg tablet    losartan (COZAAR) 50 mg tablet    metFORMIN (GLUCOPHAGE-XR) 500 mg 24 hr tablet    metoprolol succinate (TOPROL-XL) 100 mg 24 hr tablet    ondansetron (ZOFRAN) 4 mg tablet    oxyCODONE-acetaminophen (PERCOCET) 5-325 mg per tablet    simvastatin (ZOCOR) 10 mg tablet    Allergies   Allergen Reactions    Zithromax [Azithromycin] Hives    Augmentin [Amoxicillin-Pot Clavulanate] Vomiting    Lisinopril Cough    Cefprozil Diarrhea           Objective     Blood pressure 114/70, temperature (!) 97 1 °F (36 2 °C), temperature source Tympanic, height 5' 2" (1 575 m), weight 80 3 kg (177 lb), not currently breastfeeding  Body mass index is 32 37 kg/m²  PHYSICAL EXAM:      General Appearance:   Alert, cooperative, no distress   HEENT:   Normocephalic, atraumatic, anicteric  Lungs:   Equal chest rise and unlabored breathing, normal cough   Heart:   No visualized JVD   Abdomen:   Soft, non-tender, non-distended; no masses, no organomegaly    Genitalia:   Deferred    Rectal:   Deferred    Extremities:  No cyanosis, clubbing or edema    Pulses:  Musculoskeletal:  2+ and symmetric  Normal range of motion visualized    Skin:  Neuro:  No jaundice, rashes, or lesions   Alert and appropriate           Lab Results:   No visits with results within 1 Day(s) from this visit     Latest known visit with results is:   Hospital Outpatient Visit on 08/25/2022   Component Date Value    POC Glucose 08/25/2022 157 (A)    Case Report 08/25/2022                      Value:Surgical Pathology Report                         Case: [id number] Authorizing Provider:  Isiah Waggoner MD         Collected:           08/25/2022 0936              Ordering [facility]:     [facility]        Received:            08/25/2022 Leonel Diez 3                                                    Pathologist:           Kim Reyes MD                                                                 Specimens:   A) - Duodenum, RO celiac                                                                            B) - Stomach, RO Hpylori                                                                   Final Diagnosis 08/25/2022                      Value: This result contains rich text formatting which cannot be displayed here   Additional Information 08/25/2022                      Value: This result contains rich text formatting which cannot be displayed here  Arlet Mcgee Description 08/25/2022                      Value: This result contains rich text formatting which cannot be displayed here  Radiology Results:   EGD    Result Date: 8/25/2022  Narrative: Tammy Huynh [address] [place] [address] Road [address] [place] [address] [phone] DATE OF SERVICE: 8/25/22 PHYSICIAN(S): Attending: Isiah Waggoner MD Fellow: No Staff Documented INDICATION: Nausea, Bloating, Epigastric discomfort POST-OP DIAGNOSIS: See the impression below  PREPROCEDURE: Informed consent was obtained for the procedure, including sedation  Risks of perforation, hemorrhage, adverse drug reaction and aspiration were discussed  The patient was placed in the left lateral decubitus position  Patient was explained about the risks and benefits of the procedure  Risks including but not limited to bleeding, infection, and perforation were explained in detail   Also explained about less than 100% sensitivity with the exam and other alternatives  DETAILS OF PROCEDURE: Patient was taken to the procedure room where a time out was performed to confirm correct patient and correct procedure  The patient underwent monitored anesthesia care, which was administered by an anesthesia professional  The patient's blood pressure, heart rate, level of consciousness, respirations and oxygen were monitored throughout the procedure  The scope was advanced to the second part of the duodenum  Retroflexion was performed in the fundus  The patient experienced no blood loss  The procedure was not difficult  The patient tolerated the procedure well  There were no apparent complications  ANESTHESIA INFORMATION: ASA: II Anesthesia Type: IV Sedation with Anesthesia MEDICATIONS: No administrations occurring from 0930 to 0939 on 08/25/22 FINDINGS: The esophagus appeared normal  Mild erythematous mucosa in the body of the stomach and antrum; performed cold forceps biopsy  Biopsies obtained to rule out H  Pylori The duodenum appeared normal  Performed random biopsy using biopsy forceps  Biopsies were obtained of the duodenal bulb and 2nd portion of the duodenum to rule out celiac disease  SPECIMENS: ID Type Source Tests Collected by Time Destination 1 : RO celiac Tissue Duodenum TISSUE EXAM Isiah Waggoner MD 8/25/2022  9:36 AM  2 : RO Hpylori Tissue Stomach TISSUE EXAM Isiah Waggoner MD 8/25/2022  9:36 AM      Impression: Normal esophagus Mild gastritis  Biopsied for h pylori gastritis Normal duodenum  Biopsied for celiac disease   RECOMMENDATION: Follow up biopsy results Consider low FODMAP diet Follow up in the office  Isiah Waggoner MD

## 2022-10-11 PROBLEM — R19.7 DIARRHEA OF PRESUMED INFECTIOUS ORIGIN: Status: RESOLVED | Noted: 2022-06-02 | Resolved: 2022-10-11

## 2022-10-20 ENCOUNTER — ANNUAL EXAM (OUTPATIENT)
Dept: OBGYN CLINIC | Facility: CLINIC | Age: 67
End: 2022-10-20
Payer: COMMERCIAL

## 2022-10-20 VITALS
SYSTOLIC BLOOD PRESSURE: 130 MMHG | BODY MASS INDEX: 33.09 KG/M2 | WEIGHT: 179.8 LBS | DIASTOLIC BLOOD PRESSURE: 70 MMHG | HEIGHT: 62 IN

## 2022-10-20 DIAGNOSIS — Z01.419 WOMEN'S ANNUAL ROUTINE GYNECOLOGICAL EXAMINATION: Primary | ICD-10-CM

## 2022-10-20 PROCEDURE — S0612 ANNUAL GYNECOLOGICAL EXAMINA: HCPCS | Performed by: NURSE PRACTITIONER

## 2022-10-20 NOTE — PROGRESS NOTES
Subjective    HPI:     Gerald Chu is a 69 y o  female  She is a  3 Para 2, with C/S x 2 She complains pain with intimacy  States over of the past 3 months it has worsened  They are using a generous amount of [place] Jelly with no relief  They have not been able to be intimate in for about a month  She denies  and Gyn complaints  She is seeing a GI provided for her GI issues  She feels safe at home  She denies depression/anxiety  Medical, surgical and family history reviewed  Her dental care is up-to-date  She eats a healthy diet and exercises  Gynecologic History    No LMP recorded  Patient is postmenopausal      Last Pap: 2016  Results were: normal  Last mammogram: 2020  Results were: normal  Colonoscopy: 17 - diverticulosis repeat in   DXA scan: 8/3/22 - Osteopenia    Obstetric History    OB History    Para Term  AB Living   3 2 2   1 2   SAB IAB Ectopic Multiple Live Births   1       2      # Outcome Date GA Lbr Arcadio/2nd Weight Sex Delivery Anes PTL Lv   3 SAB            2 Term      CS-Unspec   [facility]IA   1 Term      CS-Unspec   [facility]IA       The following portions of the patient's history were reviewed and updated as appropriate: allergies, current medications, past family history, past medical history, past social history, past surgical history and problem list     Review of Systems    Pertinent items are noted in HPI  Objective    Physical Exam  Constitutional:       Appearance: Normal appearance  She is well-developed  Genitourinary:      Vulva, bladder and urethral meatus normal       No lesions in the vagina  Right Labia: No rash, tenderness, lesions, skin changes or Bartholin's cyst      Left Labia: No tenderness, lesions, skin changes, Bartholin's cyst or rash  No labial fusion noted  No inguinal adenopathy present in the right or left side  No vaginal discharge, erythema, tenderness, bleeding or granulation tissue        No vaginal prolapse present  Moderate vaginal atrophy present  Right Adnexa: not tender, not full and no mass present  Left Adnexa: not tender, not full and no mass present  Cervix is not parous  No cervical motion tenderness, discharge, friability, lesion, polyp or nabothian cyst       Uterus is not enlarged, tender, irregular or prolapsed  No uterine mass detected  Uterus is anteverted  Pelvic exam was performed with patient in the lithotomy position  Breasts: Breasts are symmetrical       Right: No inverted nipple, mass, nipple discharge, skin change, tenderness, axillary adenopathy or supraclavicular adenopathy  Left: No inverted nipple, mass, nipple discharge, skin change, tenderness, axillary adenopathy or supraclavicular adenopathy  HENT:      Head: Normocephalic and atraumatic  Neck:      Thyroid: No thyromegaly  Cardiovascular:      Rate and Rhythm: Normal rate and regular rhythm  Heart sounds: Normal heart sounds, S1 normal and S2 normal    Pulmonary:      Effort: Pulmonary effort is normal       Breath sounds: Normal breath sounds  Abdominal:      General: Bowel sounds are normal  There is no distension  Palpations: Abdomen is soft  There is no mass  Tenderness: There is no abdominal tenderness  There is no guarding  Hernia: There is no hernia in the left inguinal area or right inguinal area  Musculoskeletal:      Cervical back: Neck supple  Lymphadenopathy:      Cervical: No cervical adenopathy  Upper Body:      Right upper body: No supraclavicular or axillary adenopathy  Left upper body: No supraclavicular or axillary adenopathy  Lower Body: No right inguinal adenopathy  No left inguinal adenopathy  Neurological:      Mental Status: She is alert  Skin:     General: Skin is warm and dry  Findings: No rash     Psychiatric:         Attention and Perception: Attention and perception normal          Mood and Affect: Mood and affect normal          Speech: Speech normal          Behavior: Behavior is cooperative  Thought Content: Thought content normal          Cognition and Memory: Cognition and memory normal          Judgment: Judgment normal    Vitals and nursing note reviewed  Assessment and Plan    Yolanda Carmichael was seen today for gynecologic exam     Diagnoses and all orders for this visit:    Women's annual routine gynecological examination  -     Liquid-based pap, screening      Patient informed of a Stable GYN exam  A pap smear was not performed due to age and ASCCP guidelines  I have discussed the importance of exercise and healthy diet as well as adequate intake of calcium and vitamin D  The current ASCCP guidelines were reviewed  The low risk patient will receive pap smear screening every 3 years until the age of 34 and then every 3 to 5 years with HPV co-testing from the ages of 33-67  I emphasized the importance of an annual pelvic and breast exam  A yearly mammogram is due  She has been trying to schedule but is kept on hold for a long time  Reviewed the option of [facility] which is a vaginal hydrating gel and vaginal moisturizing suppositories  Printed information provided  Results will be released to [facility], if abnormal will call to review and discuss treatment plan  All questions have been answered to her satisfaction  Follow up in: 1 year or sooner if needed

## 2022-11-07 ENCOUNTER — APPOINTMENT (OUTPATIENT)
Dept: LAB | Facility: AMBULARY SURGERY CENTER | Age: 67
End: 2022-11-07

## 2022-11-07 DIAGNOSIS — L40.50 PSORIATIC ARTHROPATHY (HCC): ICD-10-CM

## 2022-11-07 LAB
CRP SERPL QL: <3 MG/L
ERYTHROCYTE [SEDIMENTATION RATE] IN BLOOD: 20 MM/HOUR (ref 0–29)
EST. AVERAGE GLUCOSE BLD GHB EST-MCNC: 137 MG/DL
HBA1C MFR BLD: 6.4 %

## 2022-11-21 ENCOUNTER — OFFICE VISIT (OUTPATIENT)
Dept: FAMILY MEDICINE CLINIC | Facility: CLINIC | Age: 67
End: 2022-11-21

## 2022-11-21 VITALS
RESPIRATION RATE: 18 BRPM | DIASTOLIC BLOOD PRESSURE: 60 MMHG | SYSTOLIC BLOOD PRESSURE: 100 MMHG | TEMPERATURE: 99.4 F | OXYGEN SATURATION: 97 % | WEIGHT: 182 LBS | HEIGHT: 62 IN | BODY MASS INDEX: 33.49 KG/M2 | HEART RATE: 76 BPM

## 2022-11-21 DIAGNOSIS — Z23 ENCOUNTER FOR IMMUNIZATION: ICD-10-CM

## 2022-11-21 DIAGNOSIS — Z12.31 ENCOUNTER FOR SCREENING MAMMOGRAM FOR BREAST CANCER: ICD-10-CM

## 2022-11-21 DIAGNOSIS — I10 BENIGN ESSENTIAL HYPERTENSION: ICD-10-CM

## 2022-11-21 DIAGNOSIS — E11.36 TYPE 2 DIABETES MELLITUS WITH DIABETIC CATARACT, WITHOUT LONG-TERM CURRENT USE OF INSULIN (HCC): Primary | ICD-10-CM

## 2022-11-21 DIAGNOSIS — M62.838 MUSCLE SPASM: ICD-10-CM

## 2022-11-21 DIAGNOSIS — E78.00 HYPERCHOLESTEROLEMIA: ICD-10-CM

## 2022-11-21 PROBLEM — H66.92 OTITIS OF LEFT EAR: Status: RESOLVED | Noted: 2022-05-19 | Resolved: 2022-11-21

## 2022-11-21 RX ORDER — CYCLOBENZAPRINE HCL 10 MG
10 TABLET ORAL
Qty: 30 TABLET | Refills: 5 | Status: SHIPPED | OUTPATIENT
Start: 2022-11-21

## 2022-11-21 NOTE — PROGRESS NOTES
Patient's shoes and socks removed  Right Foot/Ankle   Right Foot Inspection  Skin Exam: dry skin  Toe Exam: swelling  Sensory   Monofilament testing: intact    Vascular  The right DP pulse is 2+  The right PT pulse is 2+  Left Foot/Ankle  Left Foot Inspection  Skin Exam: dry skin  Toe Exam: swelling  Sensory   Monofilament testing: intact    Vascular  The left DP pulse is 2+  The left PT pulse is 2+  Assign Risk Category  No deformity present  No loss of protective sensation  No weak pulses  Risk: 0    Assessment/Plan:    1  Type 2 diabetes mellitus with diabetic cataract, without long-term current use of insulin (HCC)  Assessment & Plan:  improved  Lab Results   Component Value Date    HGBA1C 6 4 (H) 11/07/2022       Orders:  -     Hemoglobin A1C; Future; Expected date: 05/01/2023  -     Microalbumin / creatinine urine ratio; Future; Expected date: 05/01/2023    2  Benign essential hypertension  Assessment & Plan:  Stable on current meds    Orders:  -     CBC; Future; Expected date: 05/01/2023  -     Comprehensive metabolic panel; Future; Expected date: 05/01/2023    3  Encounter for screening mammogram for breast cancer  -     Mammo screening bilateral w 3d & cad; Future; Expected date: 11/21/2022    4  Hypercholesterolemia  Assessment & Plan:  Stable on current meds    Orders:  -     Lipid Panel with Direct LDL reflex; Future; Expected date: 05/01/2023  -     TSH, 3rd generation with Free T4 reflex; Future; Expected date: 05/01/2023    5  Encounter for immunization    6  Muscle spasm  -     cyclobenzaprine (FLEXERIL) 10 mg tablet; Take 1 tablet (10 mg total) by mouth daily at bedtime            There are no Patient Instructions on file for this visit  Return for Annual physical     Subjective:      Patient ID: Colleen Bejarano is a 69 y o  female      Chief Complaint   Patient presents with   • Follow-up     Patient here for follow up       Here for follow up  Neck tense from stress  Using flexeril in the past  Labs reviewed    Diabetes  She presents for her follow-up diabetic visit  She has type 2 diabetes mellitus  Her disease course has been improving  There are no hypoglycemic associated symptoms  There are no diabetic associated symptoms  There are no hypoglycemic complications  Symptoms are stable  There are no diabetic complications  Risk factors for coronary artery disease include diabetes mellitus, dyslipidemia and hypertension  She is compliant with treatment all of the time  She is following a diabetic diet  When asked about meal planning, she reported none  She has not had a previous visit with a dietitian  She rarely participates in exercise  There is no change in her home blood glucose trend  An ACE inhibitor/angiotensin II receptor blocker is being taken  She does not see a podiatrist Eye exam is current  The following portions of the patient's history were reviewed and updated as appropriate: allergies, current medications, past family history, past medical history, past social history, past surgical history and problem list     Review of Systems   Constitutional: Negative  HENT: Negative  Eyes: Negative  Respiratory: Negative  Cardiovascular: Negative  Gastrointestinal: Negative  Endocrine: Negative  Genitourinary: Negative  Musculoskeletal: Positive for arthralgias, back pain and neck pain  Allergic/Immunologic: Negative  Neurological: Negative  Hematological: Negative  Psychiatric/Behavioral: Positive for sleep disturbance           Current Outpatient Medications   Medication Sig Dispense Refill   • albuterol (2 5 mg/3 mL) 0 083 % nebulizer solution Take 1 vial (2 5 mg total) by nebulization every 6 (six) hours as needed for wheezing or shortness of breath 25 vial 3   • albuterol (Ventolin HFA) 90 mcg/act inhaler Inhale 2 puffs every 6 (six) hours as needed for wheezing or shortness of breath 54 g 3   • ALPRAZolam (XANAX) 0 5 mg tablet Take 0 5 tablets (0 25 mg total) by mouth daily (Patient taking differently: Take 0 25 mg by mouth as needed) 30 tablet 0   • amLODIPine (NORVASC) 10 mg tablet take 1 tablet by mouth once daily 90 tablet 3   • budesonide-formoterol (Symbicort) 160-4 5 mcg/act inhaler Inhale 2 puffs 2 (two) times a day Rinse mouth after use  10 2 g 3   • cyclobenzaprine (FLEXERIL) 10 mg tablet Take 1 tablet (10 mg total) by mouth daily at bedtime 30 tablet 5   • ergocalciferol (VITAMIN D2) 50,000 units take 1 capsule by mouth every week 12 capsule 3   • escitalopram (LEXAPRO) 10 mg tablet Take 1/2 tablet daily 90 tablet 3   • hydrochlorothiazide (HYDRODIURIL) 25 mg tablet take 1 tablet by mouth once daily 90 tablet 3   • losartan (COZAAR) 50 mg tablet take 1 tablet once daily as directed 90 tablet 3   • metFORMIN (GLUCOPHAGE-XR) 500 mg 24 hr tablet Take 1 tablet (500 mg total) by mouth 3 (three) times a day (Patient taking differently: Take 500 mg by mouth 2 (two) times a day with meals) 270 tablet 3   • metoprolol succinate (TOPROL-XL) 100 mg 24 hr tablet take 1 tablet by mouth once daily 90 tablet 3   • ondansetron (ZOFRAN) 4 mg tablet Take 1 tablet (4 mg total) by mouth every 6 (six) hours as needed for nausea or vomiting 60 tablet 2   • oxyCODONE-acetaminophen (PERCOCET) 5-325 mg per tablet Take 1 tablet by mouth every 8 (eight) hours as needed for moderate pain     • simvastatin (ZOCOR) 10 mg tablet take 1 tablet by mouth once daily 90 tablet 3   • al mag oxide-diphenhydramine-lidocaine viscous (MAGIC MOUTHWASH) 1:1:1 suspension Swish and spit 10 mL every 4 (four) hours as needed for mouth pain or discomfort (Patient not taking: Reported on 10/20/2022) 90 mL 1   • albuterol (2 5 mg/3 mL) 0 083 % nebulizer solution  (Patient not taking: Reported on 11/21/2022)     • Enbrel 50 MG/ML SOSY        No current facility-administered medications for this visit         Objective:    /60 (BP Location: Left arm, Patient Position: Sitting, Cuff Size: Large)   Pulse 76   Temp 99 4 °F (37 4 °C) (Tympanic)   Resp 18   Ht 5' 2 13" (1 578 m)   Wt 82 6 kg (182 lb)   SpO2 97%   BMI 33 15 kg/m²        Physical Exam  Vitals and nursing note reviewed  Constitutional:       Appearance: Normal appearance  HENT:      Head: Normocephalic and atraumatic  Eyes:      Extraocular Movements: Extraocular movements intact  Pupils: Pupils are equal, round, and reactive to light  Cardiovascular:      Rate and Rhythm: Normal rate and regular rhythm  Pulses: Normal pulses  no weak pulses          Dorsalis pedis pulses are 2+ on the right side and 2+ on the left side  Posterior tibial pulses are 2+ on the right side and 2+ on the left side  Heart sounds: Normal heart sounds  Pulmonary:      Effort: Pulmonary effort is normal       Breath sounds: Normal breath sounds  Abdominal:      General: Abdomen is flat  Palpations: Abdomen is soft  Musculoskeletal:      Cervical back: Normal range of motion and neck supple  Feet:      Right foot:      Skin integrity: Dry skin present  Left foot:      Skin integrity: Dry skin present  Skin:     General: Skin is warm  Capillary Refill: Capillary refill takes less than 2 seconds  Neurological:      General: No focal deficit present  Mental Status: She is alert and oriented to person, place, and time  Psychiatric:         Mood and Affect: Mood normal          Behavior: Behavior normal          Thought Content:  Thought content normal          Judgment: Judgment normal                 Vik Bell DO

## 2022-11-26 DIAGNOSIS — I10 ESSENTIAL HYPERTENSION: ICD-10-CM

## 2022-11-26 DIAGNOSIS — E78.2 MIXED HYPERLIPIDEMIA: ICD-10-CM

## 2022-11-26 RX ORDER — AMLODIPINE BESYLATE 10 MG/1
TABLET ORAL
Qty: 90 TABLET | Refills: 3 | Status: SHIPPED | OUTPATIENT
Start: 2022-11-26

## 2022-11-26 RX ORDER — SIMVASTATIN 10 MG
TABLET ORAL
Qty: 90 TABLET | Refills: 3 | Status: SHIPPED | OUTPATIENT
Start: 2022-11-26

## 2022-11-30 ENCOUNTER — CLINICAL SUPPORT (OUTPATIENT)
Dept: FAMILY MEDICINE CLINIC | Facility: CLINIC | Age: 67
End: 2022-11-30

## 2022-11-30 DIAGNOSIS — Z23 ENCOUNTER FOR IMMUNIZATION: Primary | ICD-10-CM

## 2022-11-30 NOTE — PROGRESS NOTES
Patient is being seen for a Pneumococcal Vaccine as, it is due  Patient was unable to receive it at her last office visit due to lack of supply      -Mabel Hebert MA

## 2022-12-12 NOTE — H&P
History and Physical - [facility]  Feliciano Kennedy 69 y o  female MRN: [id number]                  HPI: Feliciano Kennedy is a 69y o  year old female who presents for EGD      REVIEW OF SYSTEMS: Per the HPI, and otherwise unremarkable  Historical Information   Past Medical History:   Diagnosis Date    Acute non-recurrent frontal sinusitis 5/15/2019    Acute non-recurrent maxillary sinusitis 3/10/2020    Anxiety     Anxiety disorder     Arthritis     OA    Asthma     exercise iniduced   Cancer (HCC)     Squamous Cell on back    Dependence on crutches     prn    Depression     ro       Diabetes mellitus ([address] )     NIDDM; per Allscripts: Last Assessed: 7/15/15, New onset of type 2    Diverticulitis of colon     Fibromyalgia     Hearing aid worn     mayra    Heart murmur     HL (hearing loss)     b/l - uses hearing aids    Hyperglycemia     Last Assessed:10/29/15    Hyperlipidemia     Hypertension     LLQ abdominal pain 3/30/2020    Psoriasis     red dry patch left side- waist area    Psoriatic arthritis ([address] )     RA [facility]rheumatoid arthritis) [facility]     Wears glasses     reading     Past Surgical History:   Procedure Laterality Date    ARTHROSCOPY KNEE Left 3/2/2016    Procedure: ARTHROSCOPY KNEE;  Surgeon: Chary Hurtado MD;  Location: [facility];  Service:      SECTION      X2    COLONOSCOPY  2019    CYST REMOVAL Right     Cyst on bone     FRACTURE SURGERY      (Right) tibia/fibula Fx, and ankle    PILONIDAL CYST / SINUS EXCISION      AK KNEE SCOPE,MED/LAT MENISECTOMY Left 3/2/2016    Procedure: PARTIAL MEDIAL &  LATERAL MENISCECTOMIES, CHONDROPLASTY, REMOVAL OSTEOPHYTE, LIMITED SYNOVECTOMY;  Surgeon: Chary Hurtado MD;  Location: [facility];  Service: Orthopedics    REPAIR KNEE LIGAMENT      TOOTH EXTRACTION       Social History   Social History     Substance and Sexual Activity   Alcohol Use No     Social History     Substance and Sexual Activity   Drug Use No Social History     Tobacco Use   Smoking Status Former Smoker    Packs/day: 2 00    Years: 25 00    Pack years: 50 00    Types: Cigarettes    Quit date: 56    Years since quittin 6   Smokeless Tobacco Never Used     Family History   Problem Relation Age of Onset    Hypertension Mother     Heart attack Mother     Osteoarthritis Mother     Breast cancer Mother 80    Arthritis Mother     Alcohol abuse Father     Hypertension Brother     Diabetes Brother     Other Brother         Dyslipidemia    Coronary artery disease Brother     No Known Problems Sister     No Known Problems Daughter     No Known Problems Maternal Grandmother     No Known Problems Maternal Grandfather     No Known Problems Paternal Grandmother     No Known Problems Paternal Grandfather     No Known Problems Daughter     No Known Problems Maternal Aunt     No Known Problems Paternal Aunt     No Known Problems Paternal Aunt     No Known Problems Paternal Aunt     No Known Problems Paternal Aunt     No Known Problems Paternal Aunt        Meds/Allergies       Current Outpatient Medications:     ALPRAZolam (XANAX) 0 5 mg tablet    amLODIPine (NORVASC) 10 mg tablet    escitalopram (LEXAPRO) 10 mg tablet    hydrochlorothiazide (HYDRODIURIL) 25 mg tablet    losartan (COZAAR) 50 mg tablet    metFORMIN (GLUCOPHAGE-XR) 500 mg 24 hr tablet    metoprolol succinate (TOPROL-XL) 100 mg 24 hr tablet    oxyCODONE-acetaminophen (PERCOCET) 5-325 mg per tablet    simvastatin (ZOCOR) 10 mg tablet    al mag oxide-diphenhydramine-lidocaine viscous (MAGIC MOUTHWASH) 1:1:1 suspension    albuterol (2 5 mg/3 mL) 0 083 % nebulizer solution    albuterol (2 5 mg/3 mL) 0 083 % nebulizer solution    albuterol (Ventolin HFA) 90 mcg/act inhaler    budesonide-formoterol (Symbicort) 160-4 5 mcg/act inhaler    Enbrel 50 MG/ML SOSY    ergocalciferol (VITAMIN D2) 50,000 units    ondansetron (ZOFRAN) 4 mg tablet  No current facility-administered medications for this encounter  Facility-Administered Medications Ordered in Other Encounters:     lactated ringers infusion, , Intravenous, Continuous PRN, New Bag at 08/25/22 0847    Allergies   Allergen Reactions    Zithromax [Azithromycin] Hives    Augmentin [Amoxicillin-Pot Clavulanate] Vomiting    Lisinopril Cough    Cefprozil Diarrhea       Objective     /60   Pulse 74   Temp (!) 96 8 °F (36 °C) (Temporal)   Resp 18   Ht 5' 2" (1 575 m)   Wt 80 kg (176 lb 5 9 oz)   SpO2 96%   BMI 32 26 kg/m²       PHYSICAL EXAM    Gen: NAD  Head: NCAT  CV: RRR  CHEST: Clear  ABD: soft, NT/ND  EXT: no edema      ASSESSMENT/PLAN:  This is a 69y o  year old female here for EGD, and she is stable and optimized for her procedure  [place] Border Text: The closure involved the vermilion border.

## 2023-01-29 DIAGNOSIS — E11.9 CONTROLLED TYPE 2 DIABETES MELLITUS WITHOUT COMPLICATION, WITHOUT LONG-TERM CURRENT USE OF INSULIN (HCC): ICD-10-CM

## 2023-01-30 ENCOUNTER — OFFICE VISIT (OUTPATIENT)
Dept: FAMILY MEDICINE CLINIC | Facility: CLINIC | Age: 68
End: 2023-01-30

## 2023-01-30 VITALS
BODY MASS INDEX: 33.15 KG/M2 | TEMPERATURE: 97.1 F | OXYGEN SATURATION: 96 % | HEART RATE: 75 BPM | SYSTOLIC BLOOD PRESSURE: 142 MMHG | HEIGHT: 62 IN | RESPIRATION RATE: 13 BRPM | DIASTOLIC BLOOD PRESSURE: 80 MMHG

## 2023-01-30 DIAGNOSIS — J40 BRONCHITIS: Primary | ICD-10-CM

## 2023-01-30 RX ORDER — METFORMIN HYDROCHLORIDE 500 MG/1
TABLET, EXTENDED RELEASE ORAL
Qty: 270 TABLET | Refills: 3 | Status: SHIPPED | OUTPATIENT
Start: 2023-01-30

## 2023-01-30 RX ORDER — DOXYCYCLINE HYCLATE 100 MG/1
100 CAPSULE ORAL EVERY 12 HOURS SCHEDULED
Qty: 14 CAPSULE | Refills: 0 | Status: SHIPPED | OUTPATIENT
Start: 2023-01-30 | End: 2023-02-06

## 2023-01-30 RX ORDER — FLUCONAZOLE 150 MG/1
150 TABLET ORAL ONCE
Qty: 1 TABLET | Refills: 0 | Status: SHIPPED | OUTPATIENT
Start: 2023-01-30 | End: 2023-01-30

## 2023-01-30 NOTE — PROGRESS NOTES
Name: Lucie Ruelas      : 1955      MRN: [id number]  Encounter Provider: GABRIELE Gutierrez  Encounter Date: 2023   Encounter department: [place] [address]  Bronchitis  Assessment & Plan:  URI sx for about 2 weeks, productive cough, wheezing, congestion, fatigue  Trace wheezes and rhonchi in bases of lungs  Vitals stable  Start doxycycline 100 mg bid  Pt feels she is starting to get a bit better the last couple of days, she may defer starting abx to see if she continues to improve  Continue otc meds, nebulizer prn  Pt instructed to call for reevaluation if sx worsen or persist       Orders:  -     doxycycline hyclate (VIBRAMYCIN) 100 mg capsule; Take 1 capsule (100 mg total) by mouth every 12 (twelve) hours for 7 days  -     fluconazole (DIFLUCAN) 150 mg tablet; Take 1 tablet (150 mg total) by mouth once for 1 dose           Subjective      Pt is a 69 y o  y/o female who is seen today for evaluation of cold sx  PMH includes DM, HTN, OA, porokeratosis, psoriasis, RA, anxiety, opioid dependence, fibromyalgia  She states she has had sx for about 2 weeks  Started with runny nose, cough, and just generally feeling not well, wheezing  She has continued with a productive cough and wheezing, congestion, nasal drainage on and off, fatigue  She has postnasal drip  Denies fever, chills, sore throat  She was negative for covid  Appetite is fine, denies n/v  She has been taking mucinex, dayquil, nyquil, albuterol neb  Review of Systems   Constitutional: Positive for fatigue  Negative for appetite change, chills and fever  HENT: Positive for congestion and postnasal drip  Negative for ear pain and sinus pressure  Respiratory: Positive for cough and wheezing  Negative for chest tightness and shortness of breath  Cardiovascular: Negative for chest pain, palpitations and leg swelling  Gastrointestinal: Negative for diarrhea, nausea and vomiting  Musculoskeletal: Negative for myalgias  Neurological: Negative for dizziness and headaches  Hematological: Negative for adenopathy  Psychiatric/Behavioral: Negative for sleep disturbance  Current Outpatient Medications on File Prior to Visit   Medication Sig   • albuterol (2 5 mg/3 mL) 0 083 % nebulizer solution Take 1 vial (2 5 mg total) by nebulization every 6 (six) hours as needed for wheezing or shortness of breath   • albuterol (Ventolin HFA) 90 mcg/act inhaler Inhale 2 puffs every 6 (six) hours as needed for wheezing or shortness of breath   • ALPRAZolam (XANAX) 0 5 mg tablet Take 0 5 tablets (0 25 mg total) by mouth daily (Patient taking differently: Take 0 25 mg by mouth as needed)   • amLODIPine (NORVASC) 10 mg tablet take 1 tablet by mouth once daily   • budesonide-formoterol (Symbicort) 160-4 5 mcg/act inhaler Inhale 2 puffs 2 (two) times a day Rinse mouth after use     • cyclobenzaprine (FLEXERIL) 10 mg tablet Take 1 tablet (10 mg total) by mouth daily at bedtime   • Enbrel 50 MG/ML SOSY    • ergocalciferol (VITAMIN D2) 50,000 units take 1 capsule by mouth every week   • escitalopram (LEXAPRO) 10 mg tablet Take 1/2 tablet daily   • hydrochlorothiazide (HYDRODIURIL) 25 mg tablet take 1 tablet by mouth once daily   • losartan (COZAAR) 50 mg tablet take 1 tablet once daily as directed   • metFORMIN (GLUCOPHAGE-XR) 500 mg 24 hr tablet take 1 tablet by mouth three times a day   • metoprolol succinate (TOPROL-XL) 100 mg 24 hr tablet take 1 tablet by mouth once daily   • ondansetron (ZOFRAN) 4 mg tablet Take 1 tablet (4 mg total) by mouth every 6 (six) hours as needed for nausea or vomiting   • oxyCODONE-acetaminophen (PERCOCET) 5-325 mg per tablet Take 1 tablet by mouth every 8 (eight) hours as needed for moderate pain   • simvastatin (ZOCOR) 10 mg tablet take 1 tablet by mouth once daily   • al mag oxide-diphenhydramine-lidocaine viscous (MAGIC MOUTHWASH) 1:1:1 suspension Swish and spit 10 mL every 4 (four) hours as needed for mouth pain or discomfort (Patient not taking: Reported on 10/20/2022)   • [DISCONTINUED] albuterol (2 5 mg/3 mL) 0 083 % nebulizer solution  (Patient not taking: Reported on 11/21/2022)   • [DISCONTINUED] metFORMIN (GLUCOPHAGE-XR) 500 mg 24 hr tablet Take 1 tablet (500 mg total) by mouth 3 (three) times a day (Patient taking differently: Take 500 mg by mouth 2 (two) times a day with meals)       Objective     /80   Pulse 75   Temp (!) 97 1 °F (36 2 °C)   Resp 13   Ht 5' 2 13" (1 578 m)   SpO2 96%   BMI 33 15 kg/m²     Physical Exam  Vitals reviewed  Constitutional:       General: She is awake  She is not in acute distress  Appearance: Normal appearance  She is well-developed, well-groomed and normal weight  She is not ill-appearing  HENT:      Head: Normocephalic  Right Ear: Hearing, tympanic membrane, ear canal and external ear normal  No middle ear effusion  Tympanic membrane is not bulging  Left Ear: Hearing, tympanic membrane, ear canal and external ear normal   No middle ear effusion  Tympanic membrane is not bulging  Nose: Mucosal edema and congestion present  No rhinorrhea  Mouth/Throat:      Mouth: Mucous membranes are not dry  Pharynx: No oropharyngeal exudate (post nasal drainage) or posterior oropharyngeal erythema  Tonsils: No tonsillar abscesses  Eyes:      Conjunctiva/sclera: Conjunctivae normal    Cardiovascular:      Rate and Rhythm: Normal rate and regular rhythm  Heart sounds: Normal heart sounds  No murmur heard  Pulmonary:      Effort: Pulmonary effort is normal  No accessory muscle usage or respiratory distress  Breath sounds: Examination of the right-lower field reveals wheezing  Examination of the left-lower field reveals wheezing and rhonchi  Wheezing and rhonchi present  No decreased breath sounds or rales     Lymphadenopathy:      Head:      Right side of head: No submental, submandibular, tonsillar, preauricular, posterior auricular or occipital adenopathy  Left side of head: No submental, submandibular, tonsillar, preauricular, posterior auricular or occipital adenopathy  Cervical: No cervical adenopathy  Skin:     General: Skin is warm and dry  Neurological:      Mental Status: She is alert and oriented to person, place, and time  Psychiatric:         Speech: Speech normal          Behavior: Behavior normal  Behavior is cooperative  Thought Content:  Thought content normal          Judgment: Judgment normal        GABRIELE Alonso

## 2023-01-30 NOTE — ASSESSMENT & PLAN NOTE
URI sx for about 2 weeks, productive cough, wheezing, congestion, fatigue  Trace wheezes and rhonchi in bases of lungs  Vitals stable  Start doxycycline 100 mg bid  Pt feels she is starting to get a bit better the last couple of days, she may defer starting abx to see if she continues to improve  Continue otc meds, nebulizer prn    Pt instructed to call for reevaluation if sx worsen or persist

## 2023-02-18 DIAGNOSIS — I10 ESSENTIAL HYPERTENSION: ICD-10-CM

## 2023-02-18 RX ORDER — HYDROCHLOROTHIAZIDE 25 MG/1
TABLET ORAL
Qty: 90 TABLET | Refills: 3 | Status: SHIPPED | OUTPATIENT
Start: 2023-02-18

## 2023-03-06 NOTE — PLAN OF CARE
CARDIOVASCULAR - ADULT     Maintains optimal cardiac output and hemodynamic stability Progressing     Absence of cardiac dysrhythmias or at baseline rhythm Progressing        DISCHARGE PLANNING     Discharge to home or other facility with appropriate resources Progressing        Knowledge Deficit     Patient/family/caregiver demonstrates understanding of disease process, treatment plan, medications, and discharge instructions Progressing        PAIN - ADULT     Verbalizes/displays adequate comfort level or baseline comfort level Progressing        Potential for Falls     Patient will remain free of falls Progressing        SAFETY ADULT     Maintain or return to baseline ADL function Progressing     Maintain or return mobility status to optimal level Progressing seated/contact guard

## 2023-03-09 ENCOUNTER — OFFICE VISIT (OUTPATIENT)
Dept: FAMILY MEDICINE CLINIC | Facility: CLINIC | Age: 68
End: 2023-03-09

## 2023-03-09 VITALS
OXYGEN SATURATION: 99 % | HEART RATE: 82 BPM | WEIGHT: 185.4 LBS | DIASTOLIC BLOOD PRESSURE: 76 MMHG | BODY MASS INDEX: 34.12 KG/M2 | RESPIRATION RATE: 16 BRPM | HEIGHT: 62 IN | TEMPERATURE: 100.5 F | SYSTOLIC BLOOD PRESSURE: 132 MMHG

## 2023-03-09 DIAGNOSIS — H66.91 OTITIS OF RIGHT EAR: Primary | ICD-10-CM

## 2023-03-09 DIAGNOSIS — E11.36 TYPE 2 DIABETES MELLITUS WITH DIABETIC CATARACT, WITHOUT LONG-TERM CURRENT USE OF INSULIN (HCC): ICD-10-CM

## 2023-03-09 DIAGNOSIS — M05.79 RHEUMATOID ARTHRITIS INVOLVING MULTIPLE SITES WITH POSITIVE RHEUMATOID FACTOR (HCC): ICD-10-CM

## 2023-03-09 DIAGNOSIS — F11.20 CONTINUOUS OPIOID DEPENDENCE (HCC): ICD-10-CM

## 2023-03-09 DIAGNOSIS — B37.49 CANDIDIASIS OF GENITALIA: ICD-10-CM

## 2023-03-09 RX ORDER — FLUCONAZOLE 150 MG/1
150 TABLET ORAL ONCE
Qty: 1 TABLET | Refills: 0 | Status: SHIPPED | OUTPATIENT
Start: 2023-03-09 | End: 2023-03-09

## 2023-03-09 RX ORDER — FLUCONAZOLE 150 MG/1
TABLET ORAL
COMMUNITY
Start: 2023-01-30

## 2023-03-09 RX ORDER — SULFAMETHOXAZOLE AND TRIMETHOPRIM 800; 160 MG/1; MG/1
1 TABLET ORAL EVERY 12 HOURS SCHEDULED
Qty: 20 TABLET | Refills: 0 | Status: SHIPPED | OUTPATIENT
Start: 2023-03-09 | End: 2023-03-19

## 2023-03-09 NOTE — PROGRESS NOTES
Assessment/Plan:    1  Otitis of right ear  -     sulfamethoxazole-trimethoprim (BACTRIM DS) 800-160 mg per tablet; Take 1 tablet by mouth every 12 (twelve) hours for 10 days    2  Candidiasis of genitalia  -     fluconazole (DIFLUCAN) 150 mg tablet; Take 1 tablet (150 mg total) by mouth once for 1 dose    3  Rheumatoid arthritis involving multiple sites with positive rheumatoid factor ([facility])  Assessment & Plan:  stable      4  Continuous opioid dependence ([address] )  Assessment & Plan:  Stable  Pain meds from rheum      5  Type 2 diabetes mellitus with diabetic cataract, without long-term current use of insulin ([facility])  Assessment & Plan:  stable  Lab Results   Component Value Date    HGBA1C 6 4 (H) 11/07/2022           BMI Counseling: Body mass index is 33 77 kg/m²  The BMI is above normal  Nutrition recommendations include encouraging healthy choices of fruits and vegetables  Exercise recommendations include exercising 3-5 times per week  No pharmacotherapy was ordered  Rationale for BMI follow-up plan is due to patient being overweight or obese  There are no Patient Instructions on file for this visit  Return if symptoms worsen or fail to improve  Subjective:      Patient ID: Emilia Murillo is a 69 y o  female  Chief Complaint   Patient presents with   • Sore Throat     Patient is being seen for a sore throat, earache and nausea  • Earache   • Nausea       This week, ear pain, gland pain   Fever last night  Vertigo  Nausea  Took dramamine  Finish antibiotic    Earache   There is pain in the right ear  The current episode started in the past 7 days  The maximum temperature recorded prior to her arrival was 100 4 - 100 9 F  Associated symptoms include coughing and rhinorrhea  Pertinent negatives include no headaches or sore throat  She has tried nothing for the symptoms         The following portions of the patient's history were reviewed and updated as appropriate:  past social history    Review of Systems   Constitutional: Positive for fatigue and fever  HENT: Positive for ear pain and rhinorrhea  Negative for sore throat  Eyes: Negative  Respiratory: Positive for cough  Gastrointestinal: Positive for nausea  Endocrine: Negative  Genitourinary: Negative  Musculoskeletal: Negative  Allergic/Immunologic: Negative  Neurological: Negative for headaches  Hematological: Negative  Psychiatric/Behavioral: Negative  Current Outpatient Medications   Medication Sig Dispense Refill   • albuterol (2 5 mg/3 mL) 0 083 % nebulizer solution Take 1 vial (2 5 mg total) by nebulization every 6 (six) hours as needed for wheezing or shortness of breath 25 vial 3   • albuterol (Ventolin HFA) 90 mcg/act inhaler Inhale 2 puffs every 6 (six) hours as needed for wheezing or shortness of breath 54 g 3   • ALPRAZolam (XANAX) 0 5 mg tablet Take 0 5 tablets (0 25 mg total) by mouth daily (Patient taking differently: Take 0 25 mg by mouth as needed) 30 tablet 0   • amLODIPine (NORVASC) 10 mg tablet take 1 tablet by mouth once daily 90 tablet 3   • budesonide-formoterol (Symbicort) 160-4 5 mcg/act inhaler Inhale 2 puffs 2 (two) times a day Rinse mouth after use   10 2 g 3   • Enbrel 50 MG/ML SOSY      • ergocalciferol (VITAMIN D2) 50,000 units take 1 capsule by mouth every week 12 capsule 3   • escitalopram (LEXAPRO) 10 mg tablet Take 1/2 tablet daily 90 tablet 3   • fluconazole (DIFLUCAN) 150 mg tablet take 1 tablet by mouth AS A ONE TIME DOSE     • fluconazole (DIFLUCAN) 150 mg tablet Take 1 tablet (150 mg total) by mouth once for 1 dose 1 tablet 0   • hydrochlorothiazide (HYDRODIURIL) 25 mg tablet take 1 tablet by mouth once daily 90 tablet 3   • losartan (COZAAR) 50 mg tablet take 1 tablet once daily as directed 90 tablet 3   • metFORMIN (GLUCOPHAGE-XR) 500 mg 24 hr tablet take 1 tablet by mouth three times a day (Patient taking differently: 500 mg 2 (two) times a day) 270 tablet 3   • metoprolol succinate (TOPROL-XL) 100 mg 24 hr tablet take 1 tablet by mouth once daily 90 tablet 3   • ondansetron (ZOFRAN) 4 mg tablet Take 1 tablet (4 mg total) by mouth every 6 (six) hours as needed for nausea or vomiting 60 tablet 2   • oxyCODONE-acetaminophen (PERCOCET) 5-325 mg per tablet Take 1 tablet by mouth every 8 (eight) hours as needed for moderate pain     • simvastatin (ZOCOR) 10 mg tablet take 1 tablet by mouth once daily 90 tablet 3   • sulfamethoxazole-trimethoprim (BACTRIM DS) 800-160 mg per tablet Take 1 tablet by mouth every 12 (twelve) hours for 10 days 20 tablet 0     No current facility-administered medications for this visit  Objective:    /76 (BP Location: Other (Comment), Patient Position: Sitting, Cuff Size: Adult) Comment (BP Location): forearm per Pt request  Pulse 82   Temp 100 5 °F (38 1 °C) (Tympanic)   Resp 16   Ht 5' 2 13" (1 578 m)   Wt 84 1 kg (185 lb 6 4 oz)   SpO2 99%   BMI 33 77 kg/m²      Physical Exam  Vitals and nursing note reviewed  Constitutional:       Appearance: She is well-developed  HENT:      Head: Normocephalic and atraumatic  Right Ear: Tympanic membrane is erythematous  Mouth/Throat:      Mouth: Mucous membranes are moist    Eyes:      Conjunctiva/sclera: Conjunctivae normal       Pupils: Pupils are equal, round, and reactive to light  Musculoskeletal:      Cervical back: Normal range of motion and neck supple  Lymphadenopathy:      Cervical: Cervical adenopathy present  Skin:     Capillary Refill: Capillary refill takes less than 2 seconds  Neurological:      General: No focal deficit present  Mental Status: She is alert and oriented to person, place, and time     Psychiatric:         Mood and Affect: Mood normal          Behavior: Behavior normal              Sukhdeep Sage DO

## 2023-03-17 ENCOUNTER — APPOINTMENT (OUTPATIENT)
Dept: LAB | Facility: AMBULARY SURGERY CENTER | Age: 68
End: 2023-03-17

## 2023-03-17 DIAGNOSIS — L40.50 PSORIATIC ARTHROPATHY (HCC): ICD-10-CM

## 2023-03-17 LAB
ALBUMIN SERPL BCP-MCNC: 3.9 G/DL (ref 3.5–5)
ALP SERPL-CCNC: 94 U/L (ref 46–116)
ALT SERPL W P-5'-P-CCNC: 41 U/L (ref 12–78)
ANION GAP SERPL CALCULATED.3IONS-SCNC: 4 MMOL/L (ref 4–13)
AST SERPL W P-5'-P-CCNC: 29 U/L (ref 5–45)
BASOPHILS # BLD AUTO: 0.1 THOUSANDS/ÂΜL (ref 0–0.1)
BASOPHILS NFR BLD AUTO: 2 % (ref 0–1)
BILIRUB SERPL-MCNC: 0.28 MG/DL (ref 0.2–1)
BUN SERPL-MCNC: 12 MG/DL (ref 5–25)
CALCIUM SERPL-MCNC: 9.7 MG/DL (ref 8.3–10.1)
CHLORIDE SERPL-SCNC: 101 MMOL/L (ref 96–108)
CO2 SERPL-SCNC: 27 MMOL/L (ref 21–32)
CREAT SERPL-MCNC: 0.58 MG/DL (ref 0.6–1.3)
CRP SERPL QL: <3 MG/L
EOSINOPHIL # BLD AUTO: 0.45 THOUSAND/ÂΜL (ref 0–0.61)
EOSINOPHIL NFR BLD AUTO: 8 % (ref 0–6)
ERYTHROCYTE [DISTWIDTH] IN BLOOD BY AUTOMATED COUNT: 12.5 % (ref 11.6–15.1)
ERYTHROCYTE [SEDIMENTATION RATE] IN BLOOD: 24 MM/HOUR (ref 0–29)
GFR SERPL CREATININE-BSD FRML MDRD: 94 ML/MIN/1.73SQ M
GLUCOSE P FAST SERPL-MCNC: 124 MG/DL (ref 65–99)
HCT VFR BLD AUTO: 41.1 % (ref 34.8–46.1)
HGB BLD-MCNC: 14 G/DL (ref 11.5–15.4)
IMM GRANULOCYTES # BLD AUTO: 0.02 THOUSAND/UL (ref 0–0.2)
IMM GRANULOCYTES NFR BLD AUTO: 0 % (ref 0–2)
LYMPHOCYTES # BLD AUTO: 2.32 THOUSANDS/ÂΜL (ref 0.6–4.47)
LYMPHOCYTES NFR BLD AUTO: 40 % (ref 14–44)
MCH RBC QN AUTO: 30 PG (ref 26.8–34.3)
MCHC RBC AUTO-ENTMCNC: 34.1 G/DL (ref 31.4–37.4)
MCV RBC AUTO: 88 FL (ref 82–98)
MONOCYTES # BLD AUTO: 0.48 THOUSAND/ÂΜL (ref 0.17–1.22)
MONOCYTES NFR BLD AUTO: 8 % (ref 4–12)
NEUTROPHILS # BLD AUTO: 2.37 THOUSANDS/ÂΜL (ref 1.85–7.62)
NEUTS SEG NFR BLD AUTO: 42 % (ref 43–75)
NRBC BLD AUTO-RTO: 0 /100 WBCS
PLATELET # BLD AUTO: 192 THOUSANDS/UL (ref 149–390)
PMV BLD AUTO: 10.7 FL (ref 8.9–12.7)
POTASSIUM SERPL-SCNC: 4.1 MMOL/L (ref 3.5–5.3)
PROT SERPL-MCNC: 7.7 G/DL (ref 6.4–8.4)
RBC # BLD AUTO: 4.66 MILLION/UL (ref 3.81–5.12)
SODIUM SERPL-SCNC: 132 MMOL/L (ref 135–147)
WBC # BLD AUTO: 5.74 THOUSAND/UL (ref 4.31–10.16)

## 2023-03-21 ENCOUNTER — OFFICE VISIT (OUTPATIENT)
Dept: FAMILY MEDICINE CLINIC | Facility: CLINIC | Age: 68
End: 2023-03-21

## 2023-03-21 VITALS
RESPIRATION RATE: 16 BRPM | SYSTOLIC BLOOD PRESSURE: 112 MMHG | OXYGEN SATURATION: 98 % | DIASTOLIC BLOOD PRESSURE: 70 MMHG | TEMPERATURE: 98.2 F | HEART RATE: 84 BPM | HEIGHT: 62 IN | BODY MASS INDEX: 34.01 KG/M2 | WEIGHT: 184.8 LBS

## 2023-03-21 DIAGNOSIS — K04.7 DENTAL ABSCESS: Primary | ICD-10-CM

## 2023-03-21 DIAGNOSIS — J01.41 ACUTE RECURRENT PANSINUSITIS: ICD-10-CM

## 2023-03-21 RX ORDER — DOXYCYCLINE HYCLATE 100 MG/1
100 CAPSULE ORAL EVERY 12 HOURS SCHEDULED
Qty: 14 CAPSULE | Refills: 0 | Status: SHIPPED | OUTPATIENT
Start: 2023-03-21 | End: 2023-03-28

## 2023-03-21 NOTE — PROGRESS NOTES
Name: Savanah Mehta      : 1955      MRN: [id number]  Encounter Provider: Chantal Fermin MD  Encounter Date: 3/21/2023   Encounter department: [facility]   Recurrent sinusitis since   Treated with doxy then bactrim  Current sx do not suggest bacterial sinusitis but rather inflammation  Given the gingival lesion, will treat with doxy  Pt advised to schedule an appt with dentist  Once treated, may consider steroid for sinus symptoms  1  Dental abscess  -     doxycycline hyclate (VIBRAMYCIN) 100 mg capsule; Take 1 capsule (100 mg total) by mouth every 12 (twelve) hours for 7 days    2  Acute recurrent pansinusitis       Subjective      Here with sore throat, swollen glands, right ear pain, and gum pain  Patient reports several illness since January  She was first treated with doxy and then 1 month later, she was given bactrim  Painful to swallow  Throat pain radiates to the right ear and down the right side of the neck  TMAX 99 5  No facial tenderness, foul taste, or body ache  Occasional nausea and bloating  Review of Systems   Per HPI     Current Outpatient Medications on File Prior to Visit   Medication Sig   • albuterol (2 5 mg/3 mL) 0 083 % nebulizer solution Take 1 vial (2 5 mg total) by nebulization every 6 (six) hours as needed for wheezing or shortness of breath   • albuterol (Ventolin HFA) 90 mcg/act inhaler Inhale 2 puffs every 6 (six) hours as needed for wheezing or shortness of breath   • ALPRAZolam (XANAX) 0 5 mg tablet Take 0 5 tablets (0 25 mg total) by mouth daily (Patient taking differently: Take 0 25 mg by mouth as needed)   • amLODIPine (NORVASC) 10 mg tablet take 1 tablet by mouth once daily   • budesonide-formoterol (Symbicort) 160-4 5 mcg/act inhaler Inhale 2 puffs 2 (two) times a day Rinse mouth after use     • ergocalciferol (VITAMIN D2) 50,000 units take 1 capsule by mouth every week   • escitalopram (LEXAPRO) 10 mg tablet Take 1/2 tablet daily   • hydrochlorothiazide (HYDRODIURIL) 25 mg tablet take 1 tablet by mouth once daily   • losartan (COZAAR) 50 mg tablet take 1 tablet once daily as directed   • metFORMIN (GLUCOPHAGE-XR) 500 mg 24 hr tablet take 1 tablet by mouth three times a day (Patient taking differently: 500 mg 2 (two) times a day)   • metoprolol succinate (TOPROL-XL) 100 mg 24 hr tablet take 1 tablet by mouth once daily   • ondansetron (ZOFRAN) 4 mg tablet Take 1 tablet (4 mg total) by mouth every 6 (six) hours as needed for nausea or vomiting   • oxyCODONE-acetaminophen (PERCOCET) 5-325 mg per tablet Take 1 tablet by mouth every 8 (eight) hours as needed for moderate pain   • simvastatin (ZOCOR) 10 mg tablet take 1 tablet by mouth once daily   • Enbrel 50 MG/ML SOSY  (Patient not taking: Reported on 3/21/2023)   • fluconazole (DIFLUCAN) 150 mg tablet take 1 tablet by mouth AS A ONE TIME DOSE (Patient not taking: Reported on 3/21/2023)       Objective     /70 (BP Location: Left arm, Patient Position: Sitting, Cuff Size: Standard)   Pulse 84   Temp 98 2 °F (36 8 °C) (Tympanic)   Resp 16   Ht 5' 2" (1 575 m)   Wt 83 8 kg (184 lb 12 8 oz)   SpO2 98%   BMI 33 80 kg/m²     Physical Exam  Vitals reviewed  Constitutional:       General: She is not in acute distress  Appearance: Normal appearance  She is not ill-appearing or toxic-appearing  HENT:      Head: Normocephalic and atraumatic  Mouth/Throat:      Mouth: Mucous membranes are moist       Dentition: Dental tenderness, gingival swelling and dental abscesses present  Cardiovascular:      Rate and Rhythm: Normal rate and regular rhythm  Heart sounds: Murmur heard  Pulmonary:      Effort: Pulmonary effort is normal       Breath sounds: Normal breath sounds  Lymphadenopathy:      Cervical: Cervical adenopathy (right side, tender ) present  Skin:     General: Skin is warm  Findings: No rash     Neurological:      Mental Status: She is alert and oriented to person, place, and time     Psychiatric:         Mood and Affect: Mood normal          Behavior: Behavior normal        Piero Gutierrez MD

## 2023-03-27 ENCOUNTER — PATIENT MESSAGE (OUTPATIENT)
Dept: FAMILY MEDICINE CLINIC | Facility: CLINIC | Age: 68
End: 2023-03-27

## 2023-03-27 DIAGNOSIS — J01.90 SUBACUTE SINUSITIS, UNSPECIFIED LOCATION: Primary | ICD-10-CM

## 2023-03-27 RX ORDER — PREDNISONE 20 MG/1
20 TABLET ORAL DAILY
Qty: 5 TABLET | Refills: 0 | Status: SHIPPED | OUTPATIENT
Start: 2023-03-27 | End: 2023-04-01

## 2023-05-03 ENCOUNTER — OFFICE VISIT (OUTPATIENT)
Dept: GASTROENTEROLOGY | Facility: CLINIC | Age: 68
End: 2023-05-03

## 2023-05-03 VITALS
HEIGHT: 62 IN | WEIGHT: 180 LBS | BODY MASS INDEX: 33.13 KG/M2 | SYSTOLIC BLOOD PRESSURE: 145 MMHG | OXYGEN SATURATION: 98 % | DIASTOLIC BLOOD PRESSURE: 72 MMHG | HEART RATE: 86 BPM

## 2023-05-03 DIAGNOSIS — K58.1 IRRITABLE BOWEL SYNDROME WITH CONSTIPATION: Primary | ICD-10-CM

## 2023-05-03 DIAGNOSIS — K57.90 DIVERTICULAR DISEASE: ICD-10-CM

## 2023-05-03 NOTE — PROGRESS NOTES
"[facility] - Outpatient Consultation  Teetee Aguilar 69 y o  female MRN: [id number]  Encounter: [id number]      PCP: April Wright DO  Referring: April Wright DO  [address] [address]  [address]       ASSESSMENT AND PLAN:      1  Irritable bowel syndrome with constipation  2  Diverticular disease  GI evaluation including EGD, colonoscopy, CT, right upper quadrant ultrasound, gastric emptying study have been performed  Suspect symptoms of chronic left lower quadrant abdominal pain, with irregular bowel habits is related to irritable bowel syndrome versus symptomatic diverticular disease  We discussed that there was no objective evidence for alternative diagnoses sees including inflammatory bowel disease  Given her recent worsening of symptoms, would recommend she follow a brat diet, and continue to slowly advance this  Long term dietary should increase low processed, dietary fiber intake  Recommend start antispasmodic as needed  Start psyllium fiber daily with transition to miralax if ineffective    ______________________________________________________________________    CC:  Chief Complaint   Patient presents with   • Abdominal Pain   • Bloated   • Diverticulitis   • Nausea       HPI:     Patient is a 68-year-old female who is seen in office visit [id number] previously following with Dr Gladys Encinas at her [place] office  She was previously seen for nausea, bloating, epigastric discomfort  Diagnostic evaluation includes- EGD August 2022 with mild H  pylori negative gastritis, biopsies negative for celiac disease  RUQ ultrasound with hepatomegaly and steatosis  Normal gastric emptying study  Last CT abdomen/pelvis demonstrated colonic diverticular disease with focal acute sigmoid diverticulitis  Her last colonoscopy was in 2019 with diverticular disease       She recently describes a flare of \"diverticulitis\" over the weekend, during which she had left lower " quadrant abdominal pain, and difficulty with her bowel habits  She has had intermittent episodes like this over the past several years  She describes recurrent antibiotic use from dental interventions  REVIEW OF SYSTEMS:    CONSTITUTIONAL: Denies any fever, chills, rigors, and weight loss  HEENT: No earache or tinnitus  Denies hearing loss or visual disturbances  CARDIOVASCULAR: No chest pain or palpitations  RESPIRATORY: Denies any cough, hemoptysis, shortness of breath or dyspnea on exertion  GASTROINTESTINAL: As noted in the History of Present Illness  GENITOURINARY: No problems with urination  Denies any hematuria or dysuria  NEUROLOGIC: No dizziness or vertigo, denies headaches  MUSCULOSKELETAL: Denies any muscle or joint pain  SKIN: Denies skin rashes or itching  ENDOCRINE: Denies excessive thirst  Denies intolerance to heat or cold  PSYCHOSOCIAL: Denies depression or anxiety  Denies any recent memory loss  Historical Information   Past Medical History:   Diagnosis Date   • Acute non-recurrent frontal sinusitis 5/15/2019   • Acute non-recurrent maxillary sinusitis 3/10/2020   • Anxiety    • Anxiety disorder    • Arthritis     OA   • Asthma     exercise iniduced  • Cancer ([facility] )     Squamous Cell on back   • Dependence on crutches     prn   • Depression     ro      • Diabetes mellitus ([facility] )     NIDDM; per Allscripts: Last Assessed: 7/15/15, New onset of type 2   • Diverticulitis of colon    • Fibromyalgia    • Hearing aid worn     mayra   • Heart murmur    • HL (hearing loss)     b/l - uses hearing aids   • Hyperglycemia     Last Assessed:10/29/15   • Hyperlipidemia    • Hypertension    • LLQ abdominal pain 3/30/2020   • Psoriasis     red dry patch left side- waist area   • Psoriatic arthritis ([facility] )    • RA (rheumatoid arthritis) ([facility] )    • Wears glasses     reading     Past Surgical History:   Procedure Laterality Date   • ARTHROSCOPY KNEE Left 3/2/2016    Procedure: ARTHROSCOPY KNEE; Surgeon: Irvin Cai MD;  [facility];  Service:    •  SECTION      X2   • COLONOSCOPY  2019   • CYST REMOVAL Right     Cyst on bone    • FRACTURE SURGERY      (Right) tibia/fibula Fx, and ankle   • PILONIDAL CYST / SINUS EXCISION     • WA ARTHRS KNE SURG W/MENISCECTOMY MED/LAT W/SHVG Left 3/2/2016    Procedure: PARTIAL MEDIAL &  LATERAL MENISCECTOMIES, CHONDROPLASTY, REMOVAL OSTEOPHYTE, LIMITED SYNOVECTOMY;  Surgeon: Irvin Cai MD;  [facility];  Service: Orthopedics   • REPAIR KNEE LIGAMENT     • TOOTH EXTRACTION       Social History   Social History     Substance and Sexual Activity   Alcohol Use No     Social History     Substance and Sexual Activity   Drug Use No     Social History     Tobacco Use   Smoking Status Former   • Packs/day: 2 00   • Years: 25 00   • Pack years: 50 00   • Types: Cigarettes   • Quit date:    • Years since quittin 3   • Passive exposure: Past   Smokeless Tobacco Never     Family History   Problem Relation Age of Onset   • Hypertension Mother    • Heart attack Mother    • Osteoarthritis Mother    • Breast cancer Mother 80   • Arthritis Mother    • Alcohol abuse Father    • Hypertension Brother    • Diabetes Brother    • Other Brother         Dyslipidemia   • Coronary artery disease Brother    • No Known Problems Sister    • No Known Problems Daughter    • No Known Problems Maternal Grandmother    • No Known Problems Maternal Grandfather    • No Known Problems Paternal Grandmother    • No Known Problems Paternal Grandfather    • No Known Problems Daughter    • No Known Problems Maternal Aunt    • No Known Problems Paternal Aunt    • No Known Problems Paternal Aunt    • No Known Problems Paternal Aunt    • No Known Problems Paternal Aunt    • No Known Problems Paternal Aunt        Meds/Allergies       Current Outpatient Medications:   •  albuterol (2 5 mg/3 mL) 0 083 % nebulizer solution  •  albuterol (Ventolin HFA) 90 mcg/act inhaler  • "ALPRAZolam (XANAX) 0 5 mg tablet  •  amLODIPine (NORVASC) 10 mg tablet  •  budesonide-formoterol (Symbicort) 160-4 5 mcg/act inhaler  •  Enbrel 50 MG/ML SOSY  •  ergocalciferol (VITAMIN D2) 50,000 units  •  escitalopram (LEXAPRO) 10 mg tablet  •  hydrochlorothiazide (HYDRODIURIL) 25 mg tablet  •  losartan (COZAAR) 50 mg tablet  •  metFORMIN (GLUCOPHAGE-XR) 500 mg 24 hr tablet  •  metoprolol succinate (TOPROL-XL) 100 mg 24 hr tablet  •  ondansetron (ZOFRAN) 4 mg tablet  •  oxyCODONE-acetaminophen (PERCOCET) 5-325 mg per tablet  •  simvastatin (ZOCOR) 10 mg tablet  •  fluconazole (DIFLUCAN) 150 mg tablet    Allergies   Allergen Reactions   • Zithromax [Azithromycin] Hives   • Augmentin [Amoxicillin-Pot Clavulanate] Vomiting   • Lisinopril Cough   • Cefprozil Diarrhea           Objective     Blood pressure 145/72, pulse 86, height 5' 2\" (1 575 m), weight 81 6 kg (180 lb), SpO2 98 %, not currently breastfeeding  Body mass index is 32 92 kg/m²  PHYSICAL EXAM:      General Appearance:   Alert, cooperative, no distress   HEENT:   Normocephalic, atraumatic, anicteric  Neck:  Supple, symmetrical, trachea midline   Lungs:   Clear to auscultation bilaterally; no rales, rhonchi or wheezing; respirations unlabored    Heart[de-identified]   Regular rate and rhythm; no murmur, rub, or gallop     Abdomen:   Soft, non-tender, non-distended; normal bowel sounds; no masses, no organomegaly    Genitalia:   Deferred    Rectal:   Deferred    Extremities:  No cyanosis, clubbing or edema    Pulses:  2+ and symmetric    Skin:  No jaundice, rashes, or lesions    Lymph nodes:  No palpable cervical lymphadenopathy        Lab Results:     Lab Results   Component Value Date    WBC 5 74 03/17/2023    HGB 14 0 03/17/2023    HCT 41 1 03/17/2023    MCV 88 03/17/2023     03/17/2023       Lab Results   Component Value Date     09/14/2015    K 4 1 03/17/2023     03/17/2023    CO2 27 03/17/2023    ANIONGAP 9 09/14/2015    BUN 12 03/17/2023 " "   CREATININE 0 58 (L) 03/17/2023    GLUCOSE 100 09/14/2015    GLUF 124 (H) 03/17/2023    CALCIUM 9 7 03/17/2023    CORRECTEDCA 10 2 (H) 11/15/2021    AST 29 03/17/2023    ALT 41 03/17/2023    ALKPHOS 94 03/17/2023    PROT 7 6 09/14/2015    BILITOT 0 28 09/14/2015    EGFR 94 03/17/2023       Lab Results   Component Value Date    INR 0 99 05/03/2018    INR 0 91 06/28/2016    PROTIME 13 1 05/03/2018    PROTIME 12 3 06/28/2016         Radiology Results:   No results found  Portions of the record may have been created with voice recognition software  Occasional wrong word or \"sound a like\" substitutions may have occurred due to the inherent limitations of voice recognition software  Read the chart carefully and recognize, using context, where substitutions have occurred          "

## 2023-05-07 ENCOUNTER — HOSPITAL ENCOUNTER (OUTPATIENT)
Dept: MAMMOGRAPHY | Facility: HOSPITAL | Age: 68
Discharge: HOME | End: 2023-05-07

## 2023-05-07 DIAGNOSIS — Z12.31 ENCOUNTER FOR SCREENING MAMMOGRAM FOR BREAST CANCER: ICD-10-CM

## 2023-05-07 DIAGNOSIS — Z12.31 ENCOUNTER FOR SCREENING MAMMOGRAM FOR MALIGNANT NEOPLASM OF BREAST: ICD-10-CM

## 2023-05-09 ENCOUNTER — APPOINTMENT (OUTPATIENT)
Dept: LAB | Facility: AMBULARY SURGERY CENTER | Age: 68
End: 2023-05-09

## 2023-05-09 DIAGNOSIS — E11.36 TYPE 2 DIABETES MELLITUS WITH DIABETIC CATARACT, WITHOUT LONG-TERM CURRENT USE OF INSULIN (HCC): ICD-10-CM

## 2023-05-09 DIAGNOSIS — I10 BENIGN ESSENTIAL HYPERTENSION: ICD-10-CM

## 2023-05-09 DIAGNOSIS — E78.00 HYPERCHOLESTEROLEMIA: ICD-10-CM

## 2023-05-09 LAB
ALBUMIN SERPL BCP-MCNC: 3.7 G/DL (ref 3.5–5)
ALP SERPL-CCNC: 103 U/L (ref 46–116)
ALT SERPL W P-5'-P-CCNC: 36 U/L (ref 12–78)
ANION GAP SERPL CALCULATED.3IONS-SCNC: 3 MMOL/L (ref 4–13)
AST SERPL W P-5'-P-CCNC: 30 U/L (ref 5–45)
BILIRUB SERPL-MCNC: 0.4 MG/DL (ref 0.2–1)
BUN SERPL-MCNC: 7 MG/DL (ref 5–25)
CALCIUM SERPL-MCNC: 9.8 MG/DL (ref 8.3–10.1)
CHLORIDE SERPL-SCNC: 101 MMOL/L (ref 96–108)
CHOLEST SERPL-MCNC: 128 MG/DL
CO2 SERPL-SCNC: 28 MMOL/L (ref 21–32)
CREAT SERPL-MCNC: 0.63 MG/DL (ref 0.6–1.3)
CREAT UR-MCNC: 67.5 MG/DL
ERYTHROCYTE [DISTWIDTH] IN BLOOD BY AUTOMATED COUNT: 12.2 % (ref 11.6–15.1)
EST. AVERAGE GLUCOSE BLD GHB EST-MCNC: 166 MG/DL
GFR SERPL CREATININE-BSD FRML MDRD: 91 ML/MIN/1.73SQ M
GLUCOSE P FAST SERPL-MCNC: 157 MG/DL (ref 65–99)
HBA1C MFR BLD: 7.4 %
HCT VFR BLD AUTO: 41.1 % (ref 34.8–46.1)
HDLC SERPL-MCNC: 44 MG/DL
HGB BLD-MCNC: 14.1 G/DL (ref 11.5–15.4)
LDLC SERPL CALC-MCNC: 60 MG/DL (ref 0–100)
MCH RBC QN AUTO: 30.1 PG (ref 26.8–34.3)
MCHC RBC AUTO-ENTMCNC: 34.3 G/DL (ref 31.4–37.4)
MCV RBC AUTO: 88 FL (ref 82–98)
MICROALBUMIN UR-MCNC: 10.5 MG/L (ref 0–20)
MICROALBUMIN/CREAT 24H UR: 16 MG/G CREATININE (ref 0–30)
PLATELET # BLD AUTO: 171 THOUSANDS/UL (ref 149–390)
PMV BLD AUTO: 10.7 FL (ref 8.9–12.7)
POTASSIUM SERPL-SCNC: 3.9 MMOL/L (ref 3.5–5.3)
PROT SERPL-MCNC: 7.7 G/DL (ref 6.4–8.4)
RBC # BLD AUTO: 4.68 MILLION/UL (ref 3.81–5.12)
SODIUM SERPL-SCNC: 132 MMOL/L (ref 135–147)
TRIGL SERPL-MCNC: 121 MG/DL
TSH SERPL DL<=0.05 MIU/L-ACNC: 1.06 UIU/ML (ref 0.45–4.5)
WBC # BLD AUTO: 9 THOUSAND/UL (ref 4.31–10.16)

## 2023-05-14 ENCOUNTER — OFFICE VISIT (OUTPATIENT)
Dept: URGENT CARE | Age: 68
End: 2023-05-14

## 2023-05-14 VITALS
TEMPERATURE: 97.8 F | OXYGEN SATURATION: 95 % | RESPIRATION RATE: 12 BRPM | DIASTOLIC BLOOD PRESSURE: 73 MMHG | HEART RATE: 85 BPM | SYSTOLIC BLOOD PRESSURE: 144 MMHG

## 2023-05-14 DIAGNOSIS — I10 ESSENTIAL HYPERTENSION: ICD-10-CM

## 2023-05-14 DIAGNOSIS — J20.9 ACUTE BRONCHITIS, UNSPECIFIED ORGANISM: Primary | ICD-10-CM

## 2023-05-14 DIAGNOSIS — R06.2 WHEEZING: ICD-10-CM

## 2023-05-14 RX ORDER — METOPROLOL SUCCINATE 100 MG/1
TABLET, EXTENDED RELEASE ORAL
Qty: 90 TABLET | Refills: 3 | Status: SHIPPED | OUTPATIENT
Start: 2023-05-14

## 2023-05-14 RX ORDER — LOSARTAN POTASSIUM 50 MG/1
TABLET ORAL
Qty: 90 TABLET | Refills: 3 | Status: SHIPPED | OUTPATIENT
Start: 2023-05-14

## 2023-05-14 RX ORDER — PREDNISONE 20 MG/1
20 TABLET ORAL DAILY
Qty: 5 TABLET | Refills: 0 | Status: SHIPPED | OUTPATIENT
Start: 2023-05-14 | End: 2023-05-19

## 2023-05-14 NOTE — PATIENT INSTRUCTIONS
Please take prednisone daily for the next 5 days  Albuterol as needed for chest tightness  Symbicort as prescribed by your PCP

## 2023-05-14 NOTE — PROGRESS NOTES
[facility] [employer] [facility]        NAME: Deep Brooks is a 69 y o  female  : 1955    MRN: [id number]  DATE: May 14, 2023  TIME: 7:09 PM    Assessment and Plan   Acute bronchitis, unspecified organism [J20 9]  1  Acute bronchitis, unspecified organism        2  Wheezing  predniSONE 20 mg tablet            Patient Instructions      Please take prednisone daily for the next 5 days  Albuterol as needed for chest tightness  Symbicort as prescribed by your PCP  Follow up with PCP in 3-5 days  Proceed to  ER if symptoms worsen  Chief Complaint     Chief Complaint   Patient presents with   • Nasal Congestion     Head and chest congestion; started yesterday   • Cough     nonproductive   • Earache     Right ear pain         History of Present Illness       Patient presenting for evaluation of upper respiratory symptoms  Patient states that over the past 24 hours she has been having worsening congestion, sore throat, cough and chest tightness  She states that her cough is nonproductive  She also complains of right-sided ear pain  Patient states that she has been using albuterol nebulizer treatments as well as NyQuil, Motrin and Zicam with minimal improvement  Patient denies any known sick contacts  She denies any fevers, chills or chest pain at this time  Cough  Associated symptoms include ear pain and a sore throat  Pertinent negatives include no chest pain, chills, fever or shortness of breath  Earache   Associated symptoms include coughing and a sore throat  Pertinent negatives include no diarrhea or vomiting  Review of Systems   Review of Systems   Constitutional: Negative for chills and fever  HENT: Positive for congestion, ear pain and sore throat  Respiratory: Positive for cough and chest tightness  Negative for shortness of breath  Cardiovascular: Negative for chest pain  Gastrointestinal: Negative for diarrhea, nausea and vomiting     All other systems reviewed and are negative          Current Medications       Current Outpatient Medications:   •  predniSONE 20 mg tablet, Take 1 tablet (20 mg total) by mouth daily for 5 days, Disp: 5 tablet, Rfl: 0  •  albuterol (2 5 mg/3 mL) 0 083 % nebulizer solution, Take 1 vial (2 5 mg total) by nebulization every 6 (six) hours as needed for wheezing or shortness of breath, Disp: 25 vial, Rfl: 3  •  albuterol (Ventolin HFA) 90 mcg/act inhaler, Inhale 2 puffs every 6 (six) hours as needed for wheezing or shortness of breath, Disp: 54 g, Rfl: 3  •  ALPRAZolam (XANAX) 0 5 mg tablet, Take 0 5 tablets (0 25 mg total) by mouth daily (Patient taking differently: Take 0 25 mg by mouth as needed), Disp: 30 tablet, Rfl: 0  •  amLODIPine (NORVASC) 10 mg tablet, take 1 tablet by mouth once daily, Disp: 90 tablet, Rfl: 3  •  budesonide-formoterol (Symbicort) 160-4 5 mcg/act inhaler, Inhale 2 puffs 2 (two) times a day Rinse mouth after use , Disp: 10 2 g, Rfl: 3  •  Enbrel 50 MG/ML SOSY, When needed, Disp: , Rfl:   •  ergocalciferol (VITAMIN D2) 50,000 units, take 1 capsule by mouth every week, Disp: 12 capsule, Rfl: 3  •  escitalopram (LEXAPRO) 10 mg tablet, Take 1/2 tablet daily, Disp: 90 tablet, Rfl: 3  •  fluconazole (DIFLUCAN) 150 mg tablet, take 1 tablet by mouth AS A ONE TIME DOSE (Patient not taking: Reported on 3/21/2023), Disp: , Rfl:   •  hydrochlorothiazide (HYDRODIURIL) 25 mg tablet, take 1 tablet by mouth once daily, Disp: 90 tablet, Rfl: 3  •  losartan (COZAAR) 50 mg tablet, take 1 tablet once daily as directed, Disp: 90 tablet, Rfl: 3  •  metFORMIN (GLUCOPHAGE-XR) 500 mg 24 hr tablet, take 1 tablet by mouth three times a day (Patient taking differently: 500 mg 2 (two) times a day), Disp: 270 tablet, Rfl: 3  •  metoprolol succinate (TOPROL-XL) 100 mg 24 hr tablet, take 1 tablet by mouth once daily, Disp: 90 tablet, Rfl: 3  •  ondansetron (ZOFRAN) 4 mg tablet, Take 1 tablet (4 mg total) by mouth every 6 (six) hours as needed for nausea or vomiting, Disp: 60 tablet, Rfl: 2  •  oxyCODONE-acetaminophen (PERCOCET) 5-325 mg per tablet, Take 1 tablet by mouth every 8 (eight) hours as needed for moderate pain, Disp: , Rfl:   •  simvastatin (ZOCOR) 10 mg tablet, take 1 tablet by mouth once daily, Disp: 90 tablet, Rfl: 3    Current Allergies     Allergies as of 2023 - Reviewed 2023   Allergen Reaction Noted   • Zithromax [azithromycin] Hives 2012   • Augmentin [amoxicillin-pot clavulanate] Vomiting 04/15/2022   • Lisinopril Cough 2017   • Cefprozil Diarrhea 2022            The following portions of the patient's history were reviewed and updated as appropriate: allergies, current medications, past family history, past medical history, past social history, past surgical history and problem list      Past Medical History:   Diagnosis Date   • Acute non-recurrent frontal sinusitis 5/15/2019   • Acute non-recurrent maxillary sinusitis 3/10/2020   • Anxiety    • Anxiety disorder    • Arthritis     OA   • Asthma     exercise iniduced  • Cancer ([address] )     Squamous Cell on back   • Dependence on crutches     prn   • Depression     ro      • Diabetes mellitus ([address] )     NIDDM; per Allscripts: Last Assessed: 7/15/15, New onset of type 2   • Diverticulitis of colon    • Fibromyalgia    • Hearing aid worn     mayra   • Heart murmur    • HL (hearing loss)     b/l - uses hearing aids   • Hyperglycemia     Last Assessed:10/29/15   • Hyperlipidemia    • Hypertension    • LLQ abdominal pain 3/30/2020   • Psoriasis     red dry patch left side- waist area   • Psoriatic arthritis ([address] )    • RA (rheumatoid arthritis) ([address] )    • Wears glasses     reading       Past Surgical History:   Procedure Laterality Date   • ARTHROSCOPY KNEE Left 3/2/2016    Procedure: ARTHROSCOPY KNEE;  Surgeon: Anoop Heredia MD;  [facility];  Service:    •  SECTION      X2   • COLONOSCOPY  2019   • CYST REMOVAL Right     Cyst on bone    • FRACTURE SURGERY (Right) tibia/fibula Fx, and ankle   • PILONIDAL CYST / SINUS EXCISION     • CO ARTHRS KNE SURG W/MENISCECTOMY MED/LAT W/SHVG Left 3/2/2016    Procedure: PARTIAL MEDIAL &  LATERAL MENISCECTOMIES, CHONDROPLASTY, REMOVAL OSTEOPHYTE, LIMITED SYNOVECTOMY;  Surgeon: Gregoria Buchanan MD;  [facility];  Service: Orthopedics   • REPAIR KNEE LIGAMENT     • TOOTH EXTRACTION         Family History   Problem Relation Age of Onset   • Hypertension Mother    • Heart attack Mother    • Osteoarthritis Mother    • Breast cancer Mother 80   • Arthritis Mother    • Alcohol abuse Father    • No Known Problems Sister    • No Known Problems Daughter    • No Known Problems Daughter    • No Known Problems Maternal Grandmother    • No Known Problems Maternal Grandfather    • No Known Problems Paternal Grandmother    • No Known Problems Paternal Grandfather    • Hypertension Brother    • Diabetes Brother    • Other Brother         Dyslipidemia   • Coronary artery disease Brother    • No Known Problems Maternal Aunt    • No Known Problems Paternal Aunt    • No Known Problems Paternal Aunt    • No Known Problems Paternal Aunt    • No Known Problems Paternal Aunt    • No Known Problems Paternal Aunt          Medications have been verified  Objective   /73 (BP Location: Left arm, Patient Position: Sitting, Cuff Size: Standard)   Pulse 85   Temp 97 8 °F (36 6 °C) (Temporal)   Resp 12   SpO2 95%        Physical Exam     Physical Exam  Vitals and nursing note reviewed  Constitutional:       General: She is not in acute distress  Appearance: Normal appearance  She is normal weight  She is not ill-appearing, toxic-appearing or diaphoretic  HENT:      Head: Normocephalic and atraumatic  Right Ear: Tympanic membrane normal       Left Ear: Tympanic membrane normal       Nose: Congestion present  No rhinorrhea  Mouth/Throat:      Mouth: Mucous membranes are moist       Pharynx: Oropharynx is clear   Posterior oropharyngeal erythema present  No oropharyngeal exudate  Eyes:      General:         Right eye: No discharge  Left eye: No discharge  Cardiovascular:      Rate and Rhythm: Normal rate and regular rhythm  Pulses: Normal pulses  Heart sounds: Normal heart sounds  No murmur heard  No friction rub  No gallop  Pulmonary:      Effort: Pulmonary effort is normal  No respiratory distress  Breath sounds: No stridor  Examination of the right-upper field reveals wheezing  Examination of the left-upper field reveals wheezing  Examination of the right-middle field reveals wheezing  Examination of the right-lower field reveals wheezing  Wheezing present  No rhonchi or rales  Chest:      Chest wall: No tenderness  Abdominal:      General: Bowel sounds are normal       Palpations: Abdomen is soft  Tenderness: There is no abdominal tenderness  Skin:     General: Skin is warm and dry  Neurological:      Mental Status: She is alert     Psychiatric:         Mood and Affect: Mood normal          Behavior: Behavior normal

## 2023-05-22 ENCOUNTER — RA CDI HCC (OUTPATIENT)
Dept: OTHER | Facility: HOSPITAL | Age: 68
End: 2023-05-22

## 2023-05-22 NOTE — PROGRESS NOTES
[facility]  coding opportunities          Chart Reviewed number of suggestions sent to Provider: 1  [phone]     Patients Insurance        Commercial Insurance: [employer]

## 2023-05-30 ENCOUNTER — OFFICE VISIT (OUTPATIENT)
Dept: FAMILY MEDICINE CLINIC | Facility: CLINIC | Age: 68
End: 2023-05-30

## 2023-05-30 VITALS
RESPIRATION RATE: 18 BRPM | BODY MASS INDEX: 32.54 KG/M2 | SYSTOLIC BLOOD PRESSURE: 138 MMHG | TEMPERATURE: 99 F | HEIGHT: 62 IN | WEIGHT: 176.8 LBS | DIASTOLIC BLOOD PRESSURE: 80 MMHG | OXYGEN SATURATION: 94 % | HEART RATE: 87 BPM

## 2023-05-30 DIAGNOSIS — J40 BRONCHITIS: ICD-10-CM

## 2023-05-30 DIAGNOSIS — J01.00 ACUTE NON-RECURRENT MAXILLARY SINUSITIS: Primary | ICD-10-CM

## 2023-05-30 DIAGNOSIS — J45.41 MODERATE PERSISTENT ASTHMA WITH EXACERBATION: ICD-10-CM

## 2023-05-30 PROBLEM — K13.79 MOUTH SORES: Status: RESOLVED | Noted: 2022-06-02 | Resolved: 2023-05-30

## 2023-05-30 RX ORDER — PREDNISONE 10 MG/1
TABLET ORAL
Qty: 30 TABLET | Refills: 0 | Status: SHIPPED | OUTPATIENT
Start: 2023-05-30

## 2023-05-30 RX ORDER — ALBUTEROL SULFATE 2.5 MG/3ML
2.5 SOLUTION RESPIRATORY (INHALATION) EVERY 6 HOURS PRN
Qty: 25 ML | Refills: 5 | Status: SHIPPED | OUTPATIENT
Start: 2023-05-30

## 2023-05-30 RX ORDER — LEVOFLOXACIN 500 MG/1
500 TABLET, FILM COATED ORAL EVERY 24 HOURS
Qty: 10 TABLET | Refills: 0 | Status: SHIPPED | OUTPATIENT
Start: 2023-05-30 | End: 2023-06-09

## 2023-05-30 NOTE — ASSESSMENT & PLAN NOTE
Start antibiotic  Many allergies  Start levaquin  Did discuss interaction with steroid  Will call if develops joint pain

## 2023-05-30 NOTE — PROGRESS NOTES
Assessment/Plan:    1  Acute non-recurrent maxillary sinusitis  Assessment & Plan:  Start antibiotic  Many allergies  Start levaquin  Did discuss interaction with steroid  Will call if develops joint pain    Orders:  -     levofloxacin (LEVAQUIN) 500 mg tablet; Take 1 tablet (500 mg total) by mouth every 24 hours for 10 days  -     predniSONE 10 mg tablet; Take 4 daily for 4 days, then 3 daily for 3 days, then 2 daily for 2 days, then 1 daily for 1 days  Take with food  2  Bronchitis  Assessment & Plan:  Start steorids    Orders:  -     levofloxacin (LEVAQUIN) 500 mg tablet; Take 1 tablet (500 mg total) by mouth every 24 hours for 10 days  -     predniSONE 10 mg tablet; Take 4 daily for 4 days, then 3 daily for 3 days, then 2 daily for 2 days, then 1 daily for 1 days  Take with food  3  Moderate persistent asthma with exacerbation  -     predniSONE 10 mg tablet; Take 4 daily for 4 days, then 3 daily for 3 days, then 2 daily for 2 days, then 1 daily for 1 days  Take with food  -     albuterol (2 5 mg/3 mL) 0 083 % nebulizer solution; Take 3 mL (2 5 mg total) by nebulization every 6 (six) hours as needed for wheezing or shortness of breath        Depression Screening and Follow-up Plan: Patient was screened for depression during today's encounter  They screened negative with a PHQ-2 score of 0  There are no Patient Instructions on file for this visit  No follow-ups on file  Subjective:      Patient ID: Renzo Frey is a 69 y o  female  Chief Complaint   Patient presents with   • Cough     Patient being seen for cough    • Shortness of Breath     Patient being seen for SOB       2 weeks ago was sick prior to leaving for [place]  Was sick and went to urgent care  Diagnosed with bronchitis  Took steroids  No change  Coughed and SOB  Just retruned from [place]    Cough  This is a new problem  The current episode started 1 to 4 weeks ago  The problem occurs constantly   The cough is productive of sputum  Associated symptoms include postnasal drip, rhinorrhea (yellow/green) and shortness of breath  Pertinent negatives include no ear pain or fever  Shortness of Breath  Associated symptoms include coughing and rhinorrhea (yellow/green)  The following portions of the patient's history were reviewed and updated as appropriate:  past social history    Review of Systems   Constitutional: Negative for fever  HENT: Positive for postnasal drip and rhinorrhea (yellow/green)  Negative for ear pain  Respiratory: Positive for cough and shortness of breath  Current Outpatient Medications   Medication Sig Dispense Refill   • albuterol (2 5 mg/3 mL) 0 083 % nebulizer solution Take 3 mL (2 5 mg total) by nebulization every 6 (six) hours as needed for wheezing or shortness of breath 25 mL 5   • albuterol (Ventolin HFA) 90 mcg/act inhaler Inhale 2 puffs every 6 (six) hours as needed for wheezing or shortness of breath 54 g 3   • ALPRAZolam (XANAX) 0 5 mg tablet Take 0 5 tablets (0 25 mg total) by mouth daily (Patient taking differently: Take 0 25 mg by mouth as needed) 30 tablet 0   • amLODIPine (NORVASC) 10 mg tablet take 1 tablet by mouth once daily 90 tablet 3   • budesonide-formoterol (Symbicort) 160-4 5 mcg/act inhaler Inhale 2 puffs 2 (two) times a day Rinse mouth after use   10 2 g 3   • ergocalciferol (VITAMIN D2) 50,000 units take 1 capsule by mouth every week 12 capsule 3   • escitalopram (LEXAPRO) 10 mg tablet Take 1/2 tablet daily 90 tablet 3   • hydrochlorothiazide (HYDRODIURIL) 25 mg tablet take 1 tablet by mouth once daily 90 tablet 3   • levofloxacin (LEVAQUIN) 500 mg tablet Take 1 tablet (500 mg total) by mouth every 24 hours for 10 days 10 tablet 0   • losartan (COZAAR) 50 mg tablet take 1 tablet once daily as directed 90 tablet 3   • metFORMIN (GLUCOPHAGE-XR) 500 mg 24 hr tablet take 1 tablet by mouth three times a day (Patient taking differently: 500 mg 2 (two) times a day) 270 tablet 3 "  • metoprolol succinate (TOPROL-XL) 100 mg 24 hr tablet take 1 tablet by mouth once daily 90 tablet 3   • ondansetron (ZOFRAN) 4 mg tablet Take 1 tablet (4 mg total) by mouth every 6 (six) hours as needed for nausea or vomiting 60 tablet 2   • oxyCODONE-acetaminophen (PERCOCET) 5-325 mg per tablet Take 1 tablet by mouth every 8 (eight) hours as needed for moderate pain     • predniSONE 10 mg tablet Take 4 daily for 4 days, then 3 daily for 3 days, then 2 daily for 2 days, then 1 daily for 1 days  Take with food  30 tablet 0   • simvastatin (ZOCOR) 10 mg tablet take 1 tablet by mouth once daily 90 tablet 3   • Enbrel 50 MG/ML SOSY When needed     • fluconazole (DIFLUCAN) 150 mg tablet take 1 tablet by mouth AS A ONE TIME DOSE (Patient not taking: Reported on 3/21/2023)       No current facility-administered medications for this visit  Objective:    /80 (BP Location: Left arm, Patient Position: Sitting, Cuff Size: Standard) Comment (BP Location): LFA  Pulse 87   Temp 99 °F (37 2 °C) (Tympanic)   Resp 18   Ht 5' 1 89\" (1 572 m)   Wt 80 2 kg (176 lb 12 8 oz)   SpO2 94%   BMI 32 45 kg/m²      Physical Exam  Vitals and nursing note reviewed  Constitutional:       Appearance: She is well-developed  HENT:      Head: Normocephalic and atraumatic  Eyes:      Extraocular Movements: Extraocular movements intact  Pupils: Pupils are equal, round, and reactive to light  Cardiovascular:      Rate and Rhythm: Normal rate and regular rhythm  Pulmonary:      Effort: Respiratory distress (wheezing, ) present  Musculoskeletal:         General: Normal range of motion  Cervical back: Normal range of motion and neck supple  Neurological:      General: No focal deficit present  Mental Status: She is alert     Psychiatric:         Mood and Affect: Mood normal          Behavior: Behavior normal              Lady Tamayo, DO  "

## 2023-06-16 DIAGNOSIS — B37.31 VAGINAL CANDIDA: Primary | ICD-10-CM

## 2023-06-16 RX ORDER — FLUCONAZOLE 150 MG/1
150 TABLET ORAL DAILY
Qty: 2 TABLET | Refills: 0 | Status: SHIPPED | OUTPATIENT
Start: 2023-06-16 | End: 2023-06-18

## 2023-07-10 ENCOUNTER — OFFICE VISIT (OUTPATIENT)
Dept: GASTROENTEROLOGY | Facility: AMBULARY SURGERY CENTER | Age: 68
End: 2023-07-10
Payer: COMMERCIAL

## 2023-07-10 VITALS
OXYGEN SATURATION: 99 % | DIASTOLIC BLOOD PRESSURE: 78 MMHG | RESPIRATION RATE: 18 BRPM | SYSTOLIC BLOOD PRESSURE: 128 MMHG | BODY MASS INDEX: 32.65 KG/M2 | WEIGHT: 177.4 LBS | HEIGHT: 62 IN | HEART RATE: 88 BPM

## 2023-07-10 DIAGNOSIS — K58.1 IRRITABLE BOWEL SYNDROME WITH CONSTIPATION: Primary | ICD-10-CM

## 2023-07-10 DIAGNOSIS — K57.90 DIVERTICULAR DISEASE: ICD-10-CM

## 2023-07-10 DIAGNOSIS — R10.32 LLQ PAIN: ICD-10-CM

## 2023-07-10 PROCEDURE — 99214 OFFICE O/P EST MOD 30 MIN: CPT | Performed by: INTERNAL MEDICINE

## 2023-07-10 NOTE — PROGRESS NOTES
[facility]iam Gaming [facility] - Outpatient Consultation  Delfin Notch 77 y.o. female MRN: [id number]  Encounter: [id number]      PCP: Mary Brooks DO  Referring: Mary Brooks DO  [address] [place] [address] 100  [place],  [address]      ASSESSMENT AND PLAN:      1. Irritable bowel syndrome with constipation  2. Diverticular disease  3. LLQ pain  GI evaluation including EGD, colonoscopy, CT, right upper quadrant ultrasound, gastric emptying study have been performed  Symptoms are improved with less processed food diet, and low FODMAP diet  She is also improved with addition of psyllium fiber  Can consider as needed use of antispasmodic    ______________________________________________________________________    CC:  Chief Complaint   Patient presents with   • Follow-up   • Irritable Bowel Syndrome       HPI:      Patient is a 78-year-old female who sees me in follow-up for IBS with constipation, diverticular disease and left lower quadrant abdominal pain. Diagnostic evaluation includes- EGD August 2022 with mild H. pylori negative gastritis, biopsies negative for celiac disease. RUQ ultrasound with hepatomegaly and steatosis. Normal gastric emptying study. Last CT abdomen/pelvis demonstrated colonic diverticular disease with focal acute sigmoid diverticulitis. Her last colonoscopy was in 2019 with diverticular disease. She has been feeling well since her last office visit, following a low processed food diet. She is having regular bowel movements. She does recognize when she eats too much watermelon that sometimes this causes some lower abdominal discomfort, however this has been less frequent. REVIEW OF SYSTEMS:    CONSTITUTIONAL: Denies any fever, chills, rigors, and weight loss. HEENT: No earache or tinnitus. Denies hearing loss or visual disturbances. CARDIOVASCULAR: No chest pain or palpitations.    RESPIRATORY: Denies any cough, hemoptysis, shortness of breath or dyspnea on exertion. GASTROINTESTINAL: As noted in the History of Present Illness. GENITOURINARY: No problems with urination. Denies any hematuria or dysuria. NEUROLOGIC: No dizziness or vertigo, denies headaches. MUSCULOSKELETAL: Denies any muscle or joint pain. SKIN: Denies skin rashes or itching. ENDOCRINE: Denies excessive thirst. Denies intolerance to heat or cold. PSYCHOSOCIAL: Denies depression or anxiety. Denies any recent memory loss. Historical Information   Past Medical History:   Diagnosis Date   • Acute non-recurrent frontal sinusitis 5/15/2019   • Acute non-recurrent maxillary sinusitis 3/10/2020   • Anxiety    • Anxiety disorder    • Arthritis     OA   • Asthma     exercise iniduced. • Cancer ([address])     Squamous Cell on back   • Dependence on crutches     prn   • Depression     ro.     • Diabetes mellitus ([address])     NIDDM; per Allscripts: Last Assessed: 7/15/15, New onset of type 2   • Diverticulitis of colon    • Fibromyalgia    • Hearing aid worn     mayra   • Heart murmur    • HL (hearing loss)     b/l - uses hearing aids   • Hyperglycemia     Last Assessed:10/29/15   • Hyperlipidemia    • Hypertension    • LLQ abdominal pain 3/30/2020   • Psoriasis     red dry patch left side- waist area   • Psoriatic arthritis ([address])    • RA (rheumatoid arthritis) ([address])    • Wears glasses     reading     Past Surgical History:   Procedure Laterality Date   • ARTHROSCOPY KNEE Left 3/2/2016    Procedure: ARTHROSCOPY KNEE;  Surgeon: William Pablo MD;  [facility];  Service:    •  SECTION      X2   • COLONOSCOPY  2019   • CYST REMOVAL Right     Cyst on bone    • FRACTURE SURGERY      (Right) tibia/fibula Fx, and ankle   • PILONIDAL CYST / SINUS EXCISION     • RI ARTHRS KNE SURG W/MENISCECTOMY MED/LAT W/SHVG Left 3/2/2016    Procedure: PARTIAL MEDIAL &  LATERAL MENISCECTOMIES, CHONDROPLASTY, REMOVAL OSTEOPHYTE, LIMITED SYNOVECTOMY;  Surgeon: William Pablo MD;  [facility];  Service: Orthopedics   • REPAIR KNEE LIGAMENT     • TOOTH EXTRACTION       Social History   Social History     Substance and Sexual Activity   Alcohol Use No     Social History     Substance and Sexual Activity   Drug Use No     Social History     Tobacco Use   Smoking Status Former   • Packs/day: 2.00   • Years: 25.00   • Total pack years: 50.00   • Types: Cigarettes   • Quit date:    • Years since quittin.5   • Passive exposure: Past   Smokeless Tobacco Never     Family History   Problem Relation Age of Onset   • Hypertension Mother    • Heart attack Mother    • Osteoarthritis Mother    • Breast cancer Mother 80   • Arthritis Mother    • Alcohol abuse Father    • No Known Problems Sister    • No Known Problems Daughter    • No Known Problems Daughter    • No Known Problems Maternal Grandmother    • No Known Problems Maternal Grandfather    • No Known Problems Paternal Grandmother    • No Known Problems Paternal Grandfather    • Hypertension Brother    • Diabetes Brother    • Other Brother         Dyslipidemia   • Coronary artery disease Brother    • No Known Problems Maternal Aunt    • No Known Problems Paternal Aunt    • No Known Problems Paternal Aunt    • No Known Problems Paternal Aunt    • No Known Problems Paternal Aunt    • No Known Problems Paternal Aunt        Meds/Allergies       Current Outpatient Medications:   •  albuterol (2.5 mg/3 mL) 0.083 % nebulizer solution  •  albuterol (Ventolin HFA) 90 mcg/act inhaler  •  ALPRAZolam (XANAX) 0.5 mg tablet  •  amLODIPine (NORVASC) 10 mg tablet  •  budesonide-formoterol (Symbicort) 160-4.5 mcg/act inhaler  •  ergocalciferol (VITAMIN D2) 50,000 units  •  escitalopram (LEXAPRO) 10 mg tablet  •  hydrochlorothiazide (HYDRODIURIL) 25 mg tablet  •  losartan (COZAAR) 50 mg tablet  •  metFORMIN (GLUCOPHAGE-XR) 500 mg 24 hr tablet  •  metoprolol succinate (TOPROL-XL) 100 mg 24 hr tablet  •  ondansetron (ZOFRAN) 4 mg tablet  •  oxyCODONE-acetaminophen (PERCOCET) 5-325 mg per tablet  •  simvastatin (ZOCOR) 10 mg tablet  •  Enbrel 50 MG/ML SOSY  •  fluconazole (DIFLUCAN) 150 mg tablet  •  predniSONE 10 mg tablet    Allergies   Allergen Reactions   • Zithromax [Azithromycin] Hives   • Augmentin [Amoxicillin-Pot Clavulanate] Vomiting   • Lisinopril Cough   • Cefprozil Diarrhea           Objective     Blood pressure 128/78, pulse 88, resp. rate 18, height 5' 2" (1.575 m), weight 80.5 kg (177 lb 6.4 oz), SpO2 99 %, not currently breastfeeding. Body mass index is 32.45 kg/m². PHYSICAL EXAM:      General Appearance:   Alert, cooperative, no distress   HEENT:   Normocephalic, atraumatic, anicteric. Neck:  Supple, symmetrical, trachea midline   Lungs:   Clear to auscultation bilaterally; no rales, rhonchi or wheezing; respirations unlabored    Heart[de-identified]   Regular rate and rhythm; no murmur, rub, or gallop.    Abdomen:   Soft, non-tender, non-distended; normal bowel sounds; no masses, no organomegaly    Genitalia:   Deferred    Rectal:   Deferred    Extremities:  No cyanosis, clubbing or edema    Pulses:  2+ and symmetric    Skin:  No jaundice, rashes, or lesions    Lymph nodes:  No palpable cervical lymphadenopathy        Lab Results:     Lab Results   Component Value Date    WBC 9.00 05/09/2023    HGB 14.1 05/09/2023    HCT 41.1 05/09/2023    MCV 88 05/09/2023     05/09/2023       Lab Results   Component Value Date     09/14/2015    K 3.9 05/09/2023     05/09/2023    CO2 28 05/09/2023    ANIONGAP 9 09/14/2015    BUN 7 05/09/2023    CREATININE 0.63 05/09/2023    GLUCOSE 100 09/14/2015    GLUF 157 (H) 05/09/2023    CALCIUM 9.8 05/09/2023    CORRECTEDCA 10.2 (H) 11/15/2021    AST 30 05/09/2023    ALT 36 05/09/2023    ALKPHOS 103 05/09/2023    PROT 7.6 09/14/2015    BILITOT 0.28 09/14/2015    EGFR 91 05/09/2023       Lab Results   Component Value Date    INR 0.99 05/03/2018    INR 0.91 06/28/2016    PROTIME 13.1 05/03/2018    PROTIME 12.3 06/28/2016         Radiology Results:   No results found. Portions of the record may have been created with voice recognition software. Occasional wrong word or "sound a like" substitutions may have occurred due to the inherent limitations of voice recognition software. Read the chart carefully and recognize, using context, where substitutions have occurred.

## 2023-07-25 DIAGNOSIS — R11.0 NAUSEA: ICD-10-CM

## 2023-07-26 RX ORDER — ONDANSETRON 4 MG/1
TABLET, FILM COATED ORAL
Qty: 60 TABLET | Refills: 2 | Status: SHIPPED | OUTPATIENT
Start: 2023-07-26

## 2023-07-29 PROBLEM — J01.00 ACUTE NON-RECURRENT MAXILLARY SINUSITIS: Status: RESOLVED | Noted: 2023-05-30 | Resolved: 2023-07-29

## 2023-08-02 DIAGNOSIS — E55.9 VITAMIN D DEFICIENCY: ICD-10-CM

## 2023-08-02 RX ORDER — ERGOCALCIFEROL 1.25 MG/1
CAPSULE ORAL
Qty: 12 CAPSULE | Refills: 3 | Status: SHIPPED | OUTPATIENT
Start: 2023-08-02

## 2023-08-22 ENCOUNTER — OFFICE VISIT (OUTPATIENT)
Dept: FAMILY MEDICINE CLINIC | Facility: CLINIC | Age: 68
End: 2023-08-22
Payer: COMMERCIAL

## 2023-08-22 VITALS
SYSTOLIC BLOOD PRESSURE: 128 MMHG | HEART RATE: 75 BPM | RESPIRATION RATE: 16 BRPM | OXYGEN SATURATION: 96 % | DIASTOLIC BLOOD PRESSURE: 80 MMHG | WEIGHT: 178.8 LBS | HEIGHT: 62 IN | TEMPERATURE: 96.8 F | BODY MASS INDEX: 32.9 KG/M2

## 2023-08-22 DIAGNOSIS — Z00.00 ANNUAL PHYSICAL EXAM: Primary | ICD-10-CM

## 2023-08-22 DIAGNOSIS — E11.9 CONTROLLED TYPE 2 DIABETES MELLITUS WITHOUT COMPLICATION, WITHOUT LONG-TERM CURRENT USE OF INSULIN (HCC): ICD-10-CM

## 2023-08-22 PROBLEM — J40 BRONCHITIS: Status: RESOLVED | Noted: 2023-01-30 | Resolved: 2023-08-22

## 2023-08-22 PROBLEM — H66.91 OTITIS OF RIGHT EAR: Status: RESOLVED | Noted: 2023-03-09 | Resolved: 2023-08-22

## 2023-08-22 PROCEDURE — 99397 PER PM REEVAL EST PAT 65+ YR: CPT | Performed by: FAMILY MEDICINE

## 2023-08-22 RX ORDER — NITROFURANTOIN 25; 75 MG/1; MG/1
100 CAPSULE ORAL 2 TIMES DAILY
COMMUNITY
Start: 2023-08-17 | End: 2023-08-22

## 2023-08-22 NOTE — PROGRESS NOTES
[facility]    NAME: Kristy Reis  AGE: 77 y.o. SEX: female  : 1955     DATE: 2023     Assessment and Plan:     Problem List Items Addressed This Visit        Endocrine    Controlled diabetes mellitus type II without complication ([address])    Relevant Orders    Hemoglobin A1C    Ambulatory referral to clinical pharmacy       Other    Annual physical exam - Primary       Immunizations and preventive care screenings were discussed with patient today. Appropriate education was printed on patient's after visit summary. Counseling:  Dental Health: discussed importance of regular tooth brushing, flossing, and dental visits. Depression Screening and Follow-up Plan: Patient was screened for depression during today's encounter. They screened negative with a PHQ-2 score of 0. Return in 6 months (on 2024). Chief Complaint:     Chief Complaint   Patient presents with   • Annual Exam     Patient here for annual wellness exam       History of Present Illness:     Adult Annual Physical   Patient here for a comprehensive physical exam. The patient reports problems - dental issue- tooth is loose. seeing Dr. Nadeem Ashraf. Diet and Physical Activity  Diet/Nutrition: well balanced diet. Exercise: no formal exercise. Depression Screening  PHQ-2/9 Depression Screening    Little interest or pleasure in doing things: 0 - not at all  Feeling down, depressed, or hopeless: 0 - not at all  PHQ-2 Score: 0  PHQ-2 Interpretation: Negative depression screen       General Health  Sleep: sleeps poorly. Hearing: significantly decreased - bilateral.  Vision: no vision problems. Dental: regular dental visits. /GYN Health  Patient is: postmenopausal  Last menstrual period: n/a  Contraceptive method: n/a. Review of Systems:     Review of Systems   Constitutional: Negative. HENT: Positive for dental problem.     Eyes: Negative. Respiratory: Negative. Cardiovascular: Negative. Gastrointestinal: Negative. Endocrine: Negative. Genitourinary: Negative. Musculoskeletal: Positive for arthralgias and gait problem. Allergic/Immunologic: Negative. Hematological: Negative. Psychiatric/Behavioral: Negative. Past Medical History:     Past Medical History:   Diagnosis Date   • Acute non-recurrent frontal sinusitis 5/15/2019   • Acute non-recurrent maxillary sinusitis 3/10/2020   • Anxiety    • Anxiety disorder    • Arthritis     OA   • Asthma     exercise iniduced. • Cancer ([address])     Squamous Cell on back   • Dependence on crutches     prn   • Depression     ro.     • Diabetes mellitus ([address])     NIDDM; per Allscripts: Last Assessed: 7/15/15, New onset of type 2   • Diverticulitis of colon    • Fibromyalgia    • Hearing aid worn     mayra   • Heart murmur    • HL (hearing loss)     b/l - uses hearing aids   • Hyperglycemia     Last Assessed:10/29/15   • Hyperlipidemia    • Hypertension    • LLQ abdominal pain 3/30/2020   • Psoriasis     red dry patch left side- waist area   • Psoriatic arthritis ([address])    • RA (rheumatoid arthritis) ([address])    • Wears glasses     reading      Past Surgical History:     Past Surgical History:   Procedure Laterality Date   • ARTHROSCOPY KNEE Left 3/2/2016    Procedure: ARTHROSCOPY KNEE;  Surgeon: Hien Weiss MD;  [facility];  Service:    •  SECTION      X2   • COLONOSCOPY  2019   • CYST REMOVAL Right     Cyst on bone    • FRACTURE SURGERY      (Right) tibia/fibula Fx, and ankle   • PILONIDAL CYST / SINUS EXCISION     • NE ARTHRS KNE SURG W/MENISCECTOMY MED/LAT W/SHVG Left 3/2/2016    Procedure: PARTIAL MEDIAL &  LATERAL MENISCECTOMIES, CHONDROPLASTY, REMOVAL OSTEOPHYTE, LIMITED SYNOVECTOMY;  Surgeon: Hien Weiss MD;  [facility];  Service: Orthopedics   • REPAIR KNEE LIGAMENT     • TOOTH EXTRACTION        Social History:     Social History Socioeconomic History   • Marital status: /Civil Union     Spouse name: None   • Number of children: None   • Years of education: None   • Highest education level: None   Occupational History   • None   Tobacco Use   • Smoking status: Former     Packs/day: 2.00     Years: 25.00     Total pack years: 50.00     Types: Cigarettes     Quit date:      Years since quittin.6     Passive exposure: Past   • Smokeless tobacco: Never   Vaping Use   • Vaping Use: Never used   Substance and Sexual Activity   • Alcohol use: No   • Drug use: No   • Sexual activity: Yes     Partners: Male     Birth control/protection: Post-menopausal   Other Topics Concern   • None   Social History Narrative   • None     Social Determinants of Health     Financial Resource Strain: Not on file   Food Insecurity: Not on file   Transportation Needs: Not on file   Physical Activity: Not on file   Stress: Not on file   Social Connections: Not on file   Intimate Partner Violence: Not on file   Housing Stability: Not on file      Family History:     Family History   Problem Relation Age of Onset   • Hypertension Mother    • Heart attack Mother    • Osteoarthritis Mother    • Breast cancer Mother 80   • Arthritis Mother    • Alcohol abuse Father    • No Known Problems Sister    • No Known Problems Daughter    • No Known Problems Daughter    • No Known Problems Maternal Grandmother    • No Known Problems Maternal Grandfather    • No Known Problems Paternal Grandmother    • No Known Problems Paternal Grandfather    • Hypertension Brother    • Diabetes Brother    • Other Brother         Dyslipidemia   • Coronary artery disease Brother    • No Known Problems Maternal Aunt    • No Known Problems Paternal Aunt    • No Known Problems Paternal Aunt    • No Known Problems Paternal Aunt    • No Known Problems Paternal Aunt    • No Known Problems Paternal Aunt       Current Medications:     Current Outpatient Medications   Medication Sig Dispense Refill   • albuterol (2.5 mg/3 mL) 0.083 % nebulizer solution Take 3 mL (2.5 mg total) by nebulization every 6 (six) hours as needed for wheezing or shortness of breath 25 mL 5   • albuterol (Ventolin HFA) 90 mcg/act inhaler Inhale 2 puffs every 6 (six) hours as needed for wheezing or shortness of breath 54 g 3   • ALPRAZolam (XANAX) 0.5 mg tablet Take 0.5 tablets (0.25 mg total) by mouth daily (Patient taking differently: Take 0.25 mg by mouth as needed) 30 tablet 0   • amLODIPine (NORVASC) 10 mg tablet take 1 tablet by mouth once daily 90 tablet 3   • budesonide-formoterol (Symbicort) 160-4.5 mcg/act inhaler Inhale 2 puffs 2 (two) times a day Rinse mouth after use. 10.2 g 3   • ergocalciferol (VITAMIN D2) 50,000 units take 1 capsule by mouth every week 12 capsule 3   • escitalopram (LEXAPRO) 10 mg tablet Take 1/2 tablet daily 90 tablet 3   • hydrochlorothiazide (HYDRODIURIL) 25 mg tablet take 1 tablet by mouth once daily 90 tablet 3   • losartan (COZAAR) 50 mg tablet take 1 tablet once daily as directed 90 tablet 3   • metFORMIN (GLUCOPHAGE-XR) 500 mg 24 hr tablet take 1 tablet by mouth three times a day (Patient taking differently: 500 mg 2 (two) times a day) 270 tablet 3   • metoprolol succinate (TOPROL-XL) 100 mg 24 hr tablet take 1 tablet by mouth once daily 90 tablet 3   • Multiple Vitamin (MULTIVITAMIN ADULT PO) multivitamin     • nitrofurantoin (MACROBID) 100 mg capsule Take 100 mg by mouth 2 (two) times a day     • ondansetron (ZOFRAN) 4 mg tablet take 1 tablet by mouth every 6 hours AS NEEDED FOR NAUSE OR VOMITING 60 tablet 2   • oxyCODONE-acetaminophen (PERCOCET) 5-325 mg per tablet Take 1 tablet by mouth every 8 (eight) hours as needed for moderate pain     • simvastatin (ZOCOR) 10 mg tablet take 1 tablet by mouth once daily 90 tablet 3     No current facility-administered medications for this visit. Allergies:      Allergies   Allergen Reactions   • Zithromax [Azithromycin] Hives   • Augmentin [Amoxicillin-Pot Clavulanate] Vomiting   • Lisinopril Cough   • Cefprozil Diarrhea      Physical Exam:     /80 (BP Location: Left arm, Patient Position: Sitting, Cuff Size: Large)   Pulse 75   Temp (!) 96.8 °F (36 °C) (Tympanic)   Resp 16   Ht 5' 2.05" (1.576 m)   Wt 81.1 kg (178 lb 12.8 oz)   SpO2 96%   BMI 32.65 kg/m²     Physical Exam  Vitals and nursing note reviewed. Constitutional:       Appearance: She is well-developed. HENT:      Head: Normocephalic and atraumatic. Right Ear: External ear normal.      Left Ear: External ear normal.      Nose: Nose normal.   Eyes:      Conjunctiva/sclera: Conjunctivae normal.      Pupils: Pupils are equal, round, and reactive to light. Cardiovascular:      Rate and Rhythm: Normal rate and regular rhythm. Heart sounds: Normal heart sounds. Pulmonary:      Effort: Pulmonary effort is normal.      Breath sounds: Normal breath sounds. Abdominal:      General: Bowel sounds are normal.      Palpations: Abdomen is soft. Musculoskeletal:         General: Normal range of motion. Cervical back: Normal range of motion and neck supple. Skin:     General: Skin is warm and dry. Capillary Refill: Capillary refill takes less than 2 seconds. Neurological:      Mental Status: She is alert and oriented to person, place, and time. Psychiatric:         Behavior: Behavior normal.         Thought Content:  Thought content normal.         Judgment: Judgment normal.          Pamela Owen, DO  [address]

## 2023-08-23 DIAGNOSIS — F41.1 GENERALIZED ANXIETY DISORDER: ICD-10-CM

## 2023-08-23 RX ORDER — ESCITALOPRAM OXALATE 10 MG/1
TABLET ORAL
Qty: 90 TABLET | Refills: 1 | Status: SHIPPED | OUTPATIENT
Start: 2023-08-23

## 2023-08-31 ENCOUNTER — TELEPHONE (OUTPATIENT)
Dept: FAMILY MEDICINE CLINIC | Facility: CLINIC | Age: 68
End: 2023-08-31

## 2023-08-31 NOTE — TELEPHONE ENCOUNTER
Patient called and stated that she has had a UTI for the last three weeks, her GYN but her on Macrobid, and within 2 days it was back and she stated that she was given another round of Eliud Porch and again it was back with in 2 days, so they did another UA yesterday and she still has the UTI they want to put her on bacterium but there is an interaction with the losartan she is on, where it can effect the potassium, the pharmacist wanted to know if she can go off her losartan for a few days.  Please advise       *GYN is part of [facility]

## 2023-09-05 ENCOUNTER — HOSPITAL ENCOUNTER (OUTPATIENT)
Dept: RADIOLOGY | Facility: HOSPITAL | Age: 68
Discharge: HOME | End: 2023-09-05
Attending: ORTHOPAEDIC SURGERY
Payer: COMMERCIAL

## 2023-09-05 ENCOUNTER — OFFICE VISIT (OUTPATIENT)
Dept: OBGYN CLINIC | Facility: HOSPITAL | Age: 68
End: 2023-09-05
Payer: COMMERCIAL

## 2023-09-05 VITALS
WEIGHT: 176.6 LBS | DIASTOLIC BLOOD PRESSURE: 76 MMHG | HEART RATE: 85 BPM | SYSTOLIC BLOOD PRESSURE: 148 MMHG | BODY MASS INDEX: 32.5 KG/M2 | HEIGHT: 62 IN

## 2023-09-05 DIAGNOSIS — M17.12 ARTHRITIS OF LEFT KNEE: ICD-10-CM

## 2023-09-05 DIAGNOSIS — M25.562 LEFT KNEE PAIN, UNSPECIFIED CHRONICITY: ICD-10-CM

## 2023-09-05 DIAGNOSIS — M25.562 LEFT KNEE PAIN, UNSPECIFIED CHRONICITY: Primary | ICD-10-CM

## 2023-09-05 PROCEDURE — 73562 X-RAY EXAM OF KNEE 3: CPT

## 2023-09-05 PROCEDURE — 99203 OFFICE O/P NEW LOW 30 MIN: CPT | Performed by: ORTHOPAEDIC SURGERY

## 2023-09-05 NOTE — PROGRESS NOTES
Subjective; Adult female patient known to the practice and it is been quite sometime since she was seen. She has a problematic left knee which exhibits pain more laterally than medial.  Previous x-rays of the left knee, are obsolete therefore new x-rays were taken today with a standing erect PA film. She points to the lateral compartment and lateral femoral condyle as well as the adjacent patellofemoral joint, is more severe    She is ready to contemplate definitive treatment for her left knee pain  States that she has satisfied numerous injections of varied medications, as well as bracing and therapy. She wishes to discuss tentative treatment options for her left knee arthritis. Is currently being treated for Lisa Burow is visible in epic    Past Medical History:   Diagnosis Date   • Acute non-recurrent frontal sinusitis 5/15/2019   • Acute non-recurrent maxillary sinusitis 3/10/2020   • Anxiety    • Anxiety disorder    • Arthritis     OA   • Asthma     exercise iniduced. • Cancer ([address])     Squamous Cell on back   • Dependence on crutches     prn   • Depression     ro.     • Diabetes mellitus ([address])     NIDDM; per Allscripts: Last Assessed: 7/15/15, New onset of type 2   • Diverticulitis of colon    • Fibromyalgia    • Hearing aid worn     mayra   • Heart murmur    • HL (hearing loss)     b/l - uses hearing aids   • Hyperglycemia     Last Assessed:10/29/15   • Hyperlipidemia    • Hypertension    • LLQ abdominal pain 3/30/2020   • Psoriasis     red dry patch left side- waist area   • Psoriatic arthritis ([address])    • RA (rheumatoid arthritis) ([address])    • Wears glasses     reading       Past Surgical History:   Procedure Laterality Date   • ARTHROSCOPY KNEE Left 3/2/2016    Procedure: ARTHROSCOPY KNEE;  Surgeon: Fredrick Shah MD;  [facility];  Service:    •  SECTION      X2   • COLONOSCOPY  2019   • CYST REMOVAL Right     Cyst on bone    • FRACTURE SURGERY      (Right) tibia/fibula Fx, and ankle   • PILONIDAL CYST / SINUS EXCISION     • CA ARTHRS KNE SURG W/MENISCECTOMY MED/LAT W/SHVG Left 3/2/2016    Procedure: PARTIAL MEDIAL &  LATERAL MENISCECTOMIES, CHONDROPLASTY, REMOVAL OSTEOPHYTE, LIMITED SYNOVECTOMY;  Surgeon: Maci Rosenthal MD;  [facility];  Service: Orthopedics   • REPAIR KNEE LIGAMENT     • TOOTH EXTRACTION         Family History   Problem Relation Age of Onset   • Hypertension Mother    • Heart attack Mother    • Osteoarthritis Mother    • Breast cancer Mother 80   • Arthritis Mother    • Alcohol abuse Father    • No Known Problems Sister    • No Known Problems Daughter    • No Known Problems Daughter    • No Known Problems Maternal Grandmother    • No Known Problems Maternal Grandfather    • No Known Problems Paternal Grandmother    • No Known Problems Paternal Grandfather    • Hypertension Brother    • Diabetes Brother    • Other Brother         Dyslipidemia   • Coronary artery disease Brother    • No Known Problems Maternal Aunt    • No Known Problems Paternal Aunt    • No Known Problems Paternal Aunt    • No Known Problems Paternal Aunt    • No Known Problems Paternal Aunt    • No Known Problems Paternal Aunt        Social History     Tobacco Use   • Smoking status: Former     Packs/day: 2.00     Years: 25.00     Total pack years: 50.00     Types: Cigarettes     Quit date:      Years since quittin.6     Passive exposure: Past   • Smokeless tobacco: Never   Vaping Use   • Vaping Use: Never used   Substance Use Topics   • Alcohol use: No   • Drug use: No     Exam;    Pleasant conversive adult female  Varus left knee. Left knee pain of both the medial and lateral compartment to palpation. Significant patellofemoral grating through active assisted range of motion. Left knee exhibits no contracture  Left Knee has full extension of 90 degrees. .    X-rays; left knee series 3 views, shows bone-on-bone opposition of the medial compartment varus alignment, subchondral sclerosis of the proximal medial tibia, and exuberant or excessive patellofemoral arthritic changes seen both on the merchant view as well as the lateral projection. Impression; Left knee pain  Left knee arthritis  Current treatment for UTI    Plan;    Arthritic painful knee would benefit most definitively by consideration of elective total knee replacement arthroplasty. Is recommended that her UTI is treated successfully and has closure put to it. He informs us that she has an appointment with a urologist in the near future. By leapfrog guidelines her hemoglobin A1c of 7.4 in May 2023, needs improvement before she can be consented, and surgery performed. This will be at the direction of her practitioner that provides her treatment of her diabetes. We recommend a follow-up appointment of 6 additional weeks by the attending surgeon and clinical follow-up. Patient informed us that she will be going away off and on in the future,..  And this appointment can occur around her needs. Her entire experience today was supervised formulated by the attending surgeon, was my privilege to assist him in the delivery of her care.

## 2023-09-08 ENCOUNTER — PATIENT MESSAGE (OUTPATIENT)
Dept: FAMILY MEDICINE CLINIC | Facility: CLINIC | Age: 68
End: 2023-09-08

## 2023-09-08 ENCOUNTER — TELEPHONE (OUTPATIENT)
Dept: FAMILY MEDICINE CLINIC | Facility: CLINIC | Age: 68
End: 2023-09-08

## 2023-10-04 ENCOUNTER — TELEPHONE (OUTPATIENT)
Age: 68
End: 2023-10-04

## 2023-10-04 ENCOUNTER — TELEMEDICINE (OUTPATIENT)
Dept: FAMILY MEDICINE CLINIC | Facility: CLINIC | Age: 68
End: 2023-10-04
Payer: COMMERCIAL

## 2023-10-04 VITALS — OXYGEN SATURATION: 98 % | TEMPERATURE: 98.5 F | HEART RATE: 74 BPM

## 2023-10-04 DIAGNOSIS — U07.1 COVID-19: Primary | ICD-10-CM

## 2023-10-04 LAB — SARS-COV-2 AG UPPER RESP QL IA: ABNORMAL

## 2023-10-04 PROCEDURE — 99213 OFFICE O/P EST LOW 20 MIN: CPT | Performed by: FAMILY MEDICINE

## 2023-10-04 RX ORDER — NIRMATRELVIR AND RITONAVIR 300-100 MG
3 KIT ORAL 2 TIMES DAILY
Qty: 30 TABLET | Refills: 0 | Status: SHIPPED | OUTPATIENT
Start: 2023-10-04 | End: 2023-10-09

## 2023-10-04 NOTE — TELEPHONE ENCOUNTER
Patient called her  has covid  She tested positive today    ----she has head congestion she has a non productive cough  ---yesterday the patient had a headache   ---no fever    ---she also has fatigue    She would like to do a face time with dr Layo banks    Please contact the patient thank you

## 2023-10-04 NOTE — PROGRESS NOTES
COVID-19 Outpatient Progress Note    Assessment/Plan:    Problem List Items Addressed This Visit        Other    COVID-19 - Primary     Day 1  Start paxlovid    Hold simvastatin  Start nbulizer         Relevant Medications    nirmatrelvir & ritonavir (Paxlovid, 300/100,) tablet therapy pack    Other Relevant Orders    [facility] Covid home test flowsheet    Covid at Home Test Kit (Completed)      Disposition:     Discussed symptom directed medication options with patient. Discussed vitamin D, vitamin C, and/or zinc supplementation with patient. Patient meets criteria for Paxlovid and they have been counseled appropriately regarding risks, benefits, side effects, and alternative treatment options. After discussion, patient agrees to treatment. Possible side effects of Paxlovid? Possible side effects of Paxlovid are:  - Liver Problems. Notify us right away if you start to experience loss of appetite, yellowing of your skin and the whites of eyes (jaundice), dark-colored urine, pale colored stools and itchy skin, stomach area (abdominal) pain. - Resistance to HIV Medicines. If you have untreated HIV infection, Paxlovid may lead to some HIV medicines not working as well in the future. - Other possible side effects include: altered sense of taste, diarrhea, high blood pressure, or muscle aches. I have spent a total time of 15 minutes on the day of the encounter for this patient including       Encounter provider: David Goins DO     Provider located at: [address] [address]     Recent Visits  No visits were found meeting these conditions.   Showing recent visits within past 7 days and meeting all other requirements  Today's Visits  Date Type Provider Dept   10/04/23 Telemedicine David Goins DO [facility]   Showing today's visits and meeting all other requirements  Future Appointments  No visits were found meeting these conditions. Showing future appointments within next 150 days and meeting all other requirements     This virtual check-in was done via pocketfungames and patient was informed that this is a secure, HIPAA-compliant platform. She agrees to proceed. Patient agrees to participate in a virtual check in via telephone or video visit instead of presenting to the office to address urgent/immediate medical needs. Patient is aware this is a billable service. She acknowledged consent and understanding of privacy and security of the video platform. The patient has agreed to participate and understands they can discontinue the visit at any time. After connecting through [facility], the patient was identified by name and date of birth. Shay Borden was informed that this was a telemedicine visit and that the exam was being conducted confidentially over secure lines. My office door was closed. No one else was in the room. Shay Borden acknowledged consent and understanding of privacy and security of the telemedicine visit. I informed the patient that I have reviewed her record in Epic and presented the opportunity for her to ask any questions regarding the visit today. The patient agreed to participate. Verification of patient location:  Patient is located in the following state in which I hold an active license: [place]    Subjective:   Shay Borden is a 77 y.o. female who is concerned about COVID-19. Patient's symptoms include fever, fatigue, nasal congestion, cough, chest tightness and headache. Patient denies nausea, vomiting and diarrhea.      - Date of symptom onset: 10/3/2023      COVID-19 vaccination status: Fully vaccinated with booster    Exposure:   Contact with a person who is under investigation (PUI) for or who is positive for COVID-19 within the last 14 days?: Yes    Lab Results   Component Value Date    SARSCOV2 Negative 11/23/2021    [address] [facility] Not Detected 07/11/2021    [address] Positive (Patient Reported) (A) 10/04/2023       Review of Systems   Constitutional: Positive for fatigue and fever. HENT: Positive for congestion. Eyes: Negative. Respiratory: Positive for cough and chest tightness. Cardiovascular: Negative. Gastrointestinal: Negative for diarrhea, nausea and vomiting. Endocrine: Negative. Genitourinary: Negative. Neurological: Positive for headaches. Current Outpatient Medications on File Prior to Visit   Medication Sig   • albuterol (2.5 mg/3 mL) 0.083 % nebulizer solution Take 3 mL (2.5 mg total) by nebulization every 6 (six) hours as needed for wheezing or shortness of breath   • albuterol (Ventolin HFA) 90 mcg/act inhaler Inhale 2 puffs every 6 (six) hours as needed for wheezing or shortness of breath   • amLODIPine (NORVASC) 10 mg tablet take 1 tablet by mouth once daily   • budesonide-formoterol (Symbicort) 160-4.5 mcg/act inhaler Inhale 2 puffs 2 (two) times a day Rinse mouth after use.    • ergocalciferol (VITAMIN D2) 50,000 units take 1 capsule by mouth every week   • escitalopram (LEXAPRO) 10 mg tablet take 1/2 tablet by mouth once daily   • hydrochlorothiazide (HYDRODIURIL) 25 mg tablet take 1 tablet by mouth once daily   • losartan (COZAAR) 50 mg tablet take 1 tablet once daily as directed   • metFORMIN (GLUCOPHAGE-XR) 500 mg 24 hr tablet take 1 tablet by mouth three times a day (Patient taking differently: 500 mg 2 (two) times a day)   • metoprolol succinate (TOPROL-XL) 100 mg 24 hr tablet take 1 tablet by mouth once daily   • Multiple Vitamin (MULTIVITAMIN ADULT PO) multivitamin   • ondansetron (ZOFRAN) 4 mg tablet take 1 tablet by mouth every 6 hours AS NEEDED FOR NAUSE OR VOMITING   • oxyCODONE-acetaminophen (PERCOCET) 5-325 mg per tablet Take 1 tablet by mouth every 8 (eight) hours as needed for moderate pain   • simvastatin (ZOCOR) 10 mg tablet take 1 tablet by mouth once daily   • ALPRAZolam (XANAX) 0.5 mg tablet Take 0.5 tablets (0.25 mg total) by mouth daily (Patient not taking: Reported on 10/4/2023)       Objective:    Pulse 74   Temp 98.5 °F (36.9 °C)   SpO2 98%        Physical Exam  Vitals reviewed. Constitutional:       Appearance: Normal appearance. HENT:      Head: Normocephalic and atraumatic. Pulmonary:      Effort: No respiratory distress. Breath sounds: No stridor. Neurological:      General: No focal deficit present. Mental Status: She is alert and oriented to person, place, and time. Psychiatric:         Mood and Affect: Mood normal.         Behavior: Behavior normal.         Thought Content:  Thought content normal.         Judgment: Judgment normal.       Millie Gayle, DO

## 2023-10-04 NOTE — TELEPHONE ENCOUNTER
Maritza (pharmacist) at [facility] aid called for verification on Paxlovid and Zocor if can be taken together. Tried calling office for verification and no answer.  Shannan Castañeda can be reached at [phone]

## 2023-10-16 ENCOUNTER — APPOINTMENT (OUTPATIENT)
Dept: LAB | Facility: AMBULARY SURGERY CENTER | Age: 68
End: 2023-10-16
Payer: COMMERCIAL

## 2023-10-16 DIAGNOSIS — E11.9 CONTROLLED TYPE 2 DIABETES MELLITUS WITHOUT COMPLICATION, WITHOUT LONG-TERM CURRENT USE OF INSULIN (HCC): ICD-10-CM

## 2023-10-16 LAB
EST. AVERAGE GLUCOSE BLD GHB EST-MCNC: 174 MG/DL
HBA1C MFR BLD: 7.7 %

## 2023-10-16 PROCEDURE — 83036 HEMOGLOBIN GLYCOSYLATED A1C: CPT

## 2023-10-16 PROCEDURE — 36415 COLL VENOUS BLD VENIPUNCTURE: CPT

## 2023-11-06 DIAGNOSIS — E78.2 MIXED HYPERLIPIDEMIA: ICD-10-CM

## 2023-11-06 DIAGNOSIS — I10 ESSENTIAL HYPERTENSION: ICD-10-CM

## 2023-11-06 RX ORDER — SIMVASTATIN 10 MG
TABLET ORAL
Qty: 90 TABLET | Refills: 1 | Status: SHIPPED | OUTPATIENT
Start: 2023-11-06

## 2023-11-06 RX ORDER — AMLODIPINE BESYLATE 10 MG/1
TABLET ORAL
Qty: 90 TABLET | Refills: 1 | Status: SHIPPED | OUTPATIENT
Start: 2023-11-06

## 2023-11-17 LAB
LEFT EYE DIABETIC RETINOPATHY: NORMAL
RIGHT EYE DIABETIC RETINOPATHY: NORMAL
SEVERITY (EYE EXAM): NORMAL

## 2023-11-30 ENCOUNTER — OFFICE VISIT (OUTPATIENT)
Dept: FAMILY MEDICINE CLINIC | Facility: CLINIC | Age: 68
End: 2023-11-30
Payer: COMMERCIAL

## 2023-11-30 VITALS
SYSTOLIC BLOOD PRESSURE: 127 MMHG | OXYGEN SATURATION: 98 % | WEIGHT: 182 LBS | TEMPERATURE: 97.3 F | DIASTOLIC BLOOD PRESSURE: 61 MMHG | RESPIRATION RATE: 16 BRPM | HEIGHT: 62 IN | BODY MASS INDEX: 33.49 KG/M2 | HEART RATE: 71 BPM

## 2023-11-30 DIAGNOSIS — J06.9 UPPER RESPIRATORY TRACT INFECTION, UNSPECIFIED TYPE: Primary | ICD-10-CM

## 2023-11-30 DIAGNOSIS — M54.50 ACUTE LOW BACK PAIN WITHOUT SCIATICA, UNSPECIFIED BACK PAIN LATERALITY: ICD-10-CM

## 2023-11-30 PROCEDURE — 99214 OFFICE O/P EST MOD 30 MIN: CPT | Performed by: NURSE PRACTITIONER

## 2023-11-30 RX ORDER — LEFLUNOMIDE 10 MG/1
10 TABLET ORAL DAILY
COMMUNITY

## 2023-11-30 NOTE — ASSESSMENT & PLAN NOTE
Gradually improving with current management. Continue home meds, heat, movement. Discussed referral to PT or chiropractor. She says she would prefer PT but will give it a few days to see how she progress.

## 2023-11-30 NOTE — ASSESSMENT & PLAN NOTE
Likely viral, pt advised to continue to monitor symptoms and start flonase to help with nasal congestion/swelling. If symptoms worsen or persist she is advised to call back Monday for instructions.

## 2023-12-22 ENCOUNTER — IMMUNIZATIONS (OUTPATIENT)
Dept: FAMILY MEDICINE CLINIC | Facility: CLINIC | Age: 68
End: 2023-12-22
Payer: COMMERCIAL

## 2023-12-22 DIAGNOSIS — Z23 ENCOUNTER FOR IMMUNIZATION: Primary | ICD-10-CM

## 2023-12-22 PROCEDURE — 90662 IIV NO PRSV INCREASED AG IM: CPT

## 2023-12-22 PROCEDURE — 90471 IMMUNIZATION ADMIN: CPT

## 2024-01-17 ENCOUNTER — APPOINTMENT (OUTPATIENT)
Dept: LAB | Facility: HOSPITAL | Age: 69
End: 2024-01-17
Payer: COMMERCIAL

## 2024-01-17 ENCOUNTER — TELEPHONE (OUTPATIENT)
Dept: FAMILY MEDICINE CLINIC | Facility: CLINIC | Age: 69
End: 2024-01-17

## 2024-01-17 ENCOUNTER — TELEPHONE (OUTPATIENT)
Age: 69
End: 2024-01-17

## 2024-01-17 ENCOUNTER — VBI (OUTPATIENT)
Dept: ADMINISTRATIVE | Facility: OTHER | Age: 69
End: 2024-01-17

## 2024-01-17 ENCOUNTER — OFFICE VISIT (OUTPATIENT)
Dept: FAMILY MEDICINE CLINIC | Facility: CLINIC | Age: 69
End: 2024-01-17
Payer: COMMERCIAL

## 2024-01-17 ENCOUNTER — HOSPITAL ENCOUNTER (OUTPATIENT)
Dept: CT IMAGING | Facility: HOSPITAL | Age: 69
Discharge: HOME | End: 2024-01-17
Payer: COMMERCIAL

## 2024-01-17 VITALS
SYSTOLIC BLOOD PRESSURE: 120 MMHG | HEART RATE: 100 BPM | OXYGEN SATURATION: 96 % | DIASTOLIC BLOOD PRESSURE: 68 MMHG | HEIGHT: 62 IN | TEMPERATURE: 98.6 F | BODY MASS INDEX: 33.01 KG/M2 | RESPIRATION RATE: 16 BRPM | WEIGHT: 179.4 LBS

## 2024-01-17 DIAGNOSIS — J02.9 SORE THROAT: ICD-10-CM

## 2024-01-17 DIAGNOSIS — J03.90 TONSILLITIS: Primary | ICD-10-CM

## 2024-01-17 DIAGNOSIS — J02.9 SORE THROAT: Primary | ICD-10-CM

## 2024-01-17 DIAGNOSIS — E87.6 LOW SERUM POTASSIUM: ICD-10-CM

## 2024-01-17 DIAGNOSIS — K43.9 VENTRAL HERNIA WITHOUT OBSTRUCTION OR GANGRENE: Primary | ICD-10-CM

## 2024-01-17 LAB
ANION GAP SERPL CALCULATED.3IONS-SCNC: 8 MMOL/L
BUN SERPL-MCNC: 8 MG/DL (ref 5–25)
CALCIUM SERPL-MCNC: 9.4 MG/DL (ref 8.4–10.2)
CHLORIDE SERPL-SCNC: 97 MMOL/L (ref 96–108)
CO2 SERPL-SCNC: 26 MMOL/L (ref 21–32)
CREAT SERPL-MCNC: 0.59 MG/DL (ref 0.6–1.3)
GFR SERPL CREATININE-BSD FRML MDRD: 93 ML/MIN/1.73SQ M
GLUCOSE SERPL-MCNC: 303 MG/DL (ref 65–140)
POTASSIUM SERPL-SCNC: 3.4 MMOL/L (ref 3.5–5.3)
S PYO AG THROAT QL: NEGATIVE
S PYO DNA THROAT QL NAA+PROBE: NOT DETECTED
SODIUM SERPL-SCNC: 131 MMOL/L (ref 135–147)

## 2024-01-17 PROCEDURE — G1004 CDSM NDSC: HCPCS

## 2024-01-17 PROCEDURE — 87070 CULTURE OTHR SPECIMN AEROBIC: CPT | Performed by: NURSE PRACTITIONER

## 2024-01-17 PROCEDURE — 36415 COLL VENOUS BLD VENIPUNCTURE: CPT

## 2024-01-17 PROCEDURE — 99214 OFFICE O/P EST MOD 30 MIN: CPT | Performed by: NURSE PRACTITIONER

## 2024-01-17 PROCEDURE — 87651 STREP A DNA AMP PROBE: CPT | Performed by: NURSE PRACTITIONER

## 2024-01-17 PROCEDURE — 70491 CT SOFT TISSUE NECK W/DYE: CPT

## 2024-01-17 PROCEDURE — 80048 BASIC METABOLIC PNL TOTAL CA: CPT

## 2024-01-17 PROCEDURE — 87147 CULTURE TYPE IMMUNOLOGIC: CPT | Performed by: NURSE PRACTITIONER

## 2024-01-17 RX ORDER — CALCIUM POLYCARBOPHIL 625 MG
625 TABLET ORAL DAILY
COMMUNITY

## 2024-01-17 RX ORDER — CEPHALEXIN 500 MG/1
500 CAPSULE ORAL 2 TIMES DAILY
Qty: 20 CAPSULE | Refills: 0 | Status: SHIPPED | OUTPATIENT
Start: 2024-01-17 | End: 2024-01-27

## 2024-01-17 RX ADMIN — IOHEXOL 85 ML: 350 INJECTION, SOLUTION INTRAVENOUS at 15:16

## 2024-01-17 NOTE — PROGRESS NOTES
Name: Davina Allen      : 1955      MRN: [id number]  Encounter Provider: GABRIELE Rich  Encounter Date: 2024   Encounter department: [facility]    Assessment & Plan     1. Sore throat  Assessment & Plan:  Symptoms and exam concerning for possible abscess.  Tenderness would make US a challenge.  CT neck ordered.  Send out throat culture.  Follow up pending result.    Orders:  -     POCT rapid PCR strepA  -     Throat culture; Future  -     CT soft tissue neck wo contrast; Future; Expected date: 2024  -     Throat culture           Subjective      Pt is a 68 y.o. y/o female who is seen today for evaluation of sore throat.  She started Monday with body aches and then had sore throat all night.  She had a subjective fever Monday.  She continues with body aches, fatigue, malaise, R side of throat and neck are extremely painful.  Pain radiates to ear, she has trouble swallowing and opening her mouth wide.   She denies headache, cough, congestion.  Appetite is fair, she has baseline n/v/d.  She is taking ibuprofen and zycam.        Review of Systems   Constitutional:  Positive for appetite change and fatigue. Negative for chills and fever.   HENT:  Positive for ear pain, sore throat and trouble swallowing. Negative for congestion, postnasal drip and sinus pressure.    Respiratory:  Positive for wheezing. Negative for cough, chest tightness and shortness of breath.    Cardiovascular:  Negative for chest pain, palpitations and leg swelling.   Musculoskeletal:  Negative for myalgias.   Neurological:  Negative for dizziness, light-headedness and headaches.   Hematological:  Negative for adenopathy.   Psychiatric/Behavioral:  Negative for sleep disturbance.        Current Outpatient Medications on File Prior to Visit   Medication Sig    albuterol (2.5 mg/3 mL) 0.083 % nebulizer solution Take 3 mL (2.5 mg total) by nebulization every 6 (six) hours as needed for wheezing or  "shortness of breath    albuterol (Ventolin HFA) 90 mcg/act inhaler Inhale 2 puffs every 6 (six) hours as needed for wheezing or shortness of breath    ALPRAZolam (XANAX) 0.5 mg tablet Take 0.5 tablets (0.25 mg total) by mouth daily    amLODIPine (NORVASC) 10 mg tablet take 1 tablet by mouth once daily    budesonide-formoterol (Symbicort) 160-4.5 mcg/act inhaler Inhale 2 puffs 2 (two) times a day Rinse mouth after use.    ergocalciferol (VITAMIN D2) 50,000 units take 1 capsule by mouth every week    escitalopram (LEXAPRO) 10 mg tablet take 1/2 tablet by mouth once daily    hydrochlorothiazide (HYDRODIURIL) 25 mg tablet take 1 tablet by mouth once daily    leflunomide (ARAVA) 10 MG tablet Take 10 mg by mouth daily    losartan (COZAAR) 50 mg tablet take 1 tablet once daily as directed    metFORMIN (GLUCOPHAGE-XR) 500 mg 24 hr tablet take 1 tablet by mouth three times a day (Patient taking differently: 500 mg 2 (two) times a day)    metoprolol succinate (TOPROL-XL) 100 mg 24 hr tablet take 1 tablet by mouth once daily    Multiple Vitamin (MULTIVITAMIN ADULT PO) multivitamin    ondansetron (ZOFRAN) 4 mg tablet take 1 tablet by mouth every 6 hours AS NEEDED FOR NAUSE OR VOMITING    oxyCODONE-acetaminophen (PERCOCET) 5-325 mg per tablet Take 1 tablet by mouth every 8 (eight) hours as needed for moderate pain    simvastatin (ZOCOR) 10 mg tablet take 1 tablet by mouth once daily    Calcium Polycarbophil (Fiber) 625 MG TABS Take 625 mg by mouth daily       Objective     /68 (BP Location: Left arm, Patient Position: Sitting, Cuff Size: Standard)   Pulse 100   Temp 98.6 °F (37 °C) (Tympanic)   Resp 16   Ht 5' 2\" (1.575 m)   Wt 81.4 kg (179 lb 6.4 oz)   SpO2 96%   BMI 32.81 kg/m²     Physical Exam  Vitals reviewed.   Constitutional:       General: She is awake. She is not in acute distress.     Appearance: Normal appearance. She is well-developed and well-groomed. She is not ill-appearing.   HENT:      Head: " Normocephalic.      Jaw: No swelling.      Right Ear: Hearing, tympanic membrane, ear canal and external ear normal. No middle ear effusion. Tympanic membrane is not bulging.      Left Ear: Hearing, tympanic membrane, ear canal and external ear normal.  No middle ear effusion. Tympanic membrane is not bulging.      Nose: Nose normal. No mucosal edema or rhinorrhea.      Mouth/Throat:      Lips: Pink.      Mouth: Mucous membranes are moist. Mucous membranes are not dry.      Pharynx: Oropharynx is clear. No oropharyngeal exudate or posterior oropharyngeal erythema.      Tonsils: No tonsillar exudate or tonsillar abscesses (in question). 1+ on the right. 1+ on the left.   Eyes:      Conjunctiva/sclera: Conjunctivae normal.   Cardiovascular:      Rate and Rhythm: Normal rate and regular rhythm.      Pulses: Normal pulses.      Heart sounds: Normal heart sounds. No murmur heard.  Pulmonary:      Effort: Pulmonary effort is normal. No accessory muscle usage or respiratory distress.      Breath sounds: Normal breath sounds. No decreased breath sounds, wheezing, rhonchi or rales.   Lymphadenopathy:      Head:      Right side of head: Submandibular and tonsillar adenopathy present. No submental, preauricular, posterior auricular or occipital adenopathy.      Left side of head: Submandibular and tonsillar adenopathy present. No submental, preauricular, posterior auricular or occipital adenopathy.      Cervical: No cervical adenopathy.   Skin:     General: Skin is warm and dry.   Neurological:      Mental Status: She is alert and oriented to person, place, and time.   Psychiatric:         Attention and Perception: Attention normal.         Mood and Affect: Mood normal.         Speech: Speech normal.         Behavior: Behavior normal. Behavior is cooperative.         Thought Content: Thought content normal.         Cognition and Memory: Cognition normal.         Judgment: Judgment normal.       GABRIELE Rich

## 2024-01-17 NOTE — TELEPHONE ENCOUNTER
Patient is currently at [facility] for STAT CT neck scan and they have a question regarding the order.  Parveen tried to tiger text Dr. Reynoso to clarify.

## 2024-01-17 NOTE — TELEPHONE ENCOUNTER
Reading room called and stated there is significant finding on patients CT scan. Please review and advise

## 2024-01-17 NOTE — ASSESSMENT & PLAN NOTE
Symptoms and exam concerning for possible abscess.  Tenderness would make US a challenge.  CT neck ordered.  Send out throat culture.  Follow up pending result.

## 2024-01-17 NOTE — TELEPHONE ENCOUNTER
Spoke with pt to review lab and CT scan.  Negative for abscess.  Will start pt on antibiotic for the tonsillitis though given the severity of her symptoms.  Allergy to amoxicillin, azithromycin, cefprozil.  Since the reaction to augmentin and cefprozil are gi intolerances, would like to have her try cephalexin 500 mg bid x 10 days.  She is aware that it can also cause GI side effects so as long as they are not too severe we will have her use this.  Will defer a steroid at this point but will consider it if pain is not alleviated.  Pt also made aware of the klippel-Feil deformity, she says her rheumatologist has discussed this with her.  Pt advised of low K 3.4, she has no palpitations.  Most likely due to decreased PO intake and advised she have foods/drinks that are high in K such as coconut water, bananas, potatoes.  Check bmp again Friday morning.  Pt instructed to call for reevaluation if sx worsen or persist.

## 2024-01-20 LAB — BACTERIA THROAT CULT: ABNORMAL

## 2024-01-22 ENCOUNTER — APPOINTMENT (EMERGENCY)
Dept: CT IMAGING | Facility: HOSPITAL | Age: 69
End: 2024-01-22
Payer: COMMERCIAL

## 2024-01-22 ENCOUNTER — APPOINTMENT (OUTPATIENT)
Dept: NON INVASIVE DIAGNOSTICS | Facility: HOSPITAL | Age: 69
End: 2024-01-22
Payer: COMMERCIAL

## 2024-01-22 ENCOUNTER — HOSPITAL ENCOUNTER (OUTPATIENT)
Facility: HOSPITAL | Age: 69
Setting detail: OBSERVATION
Discharge: HOME | End: 2024-01-23
Attending: EMERGENCY MEDICINE | Admitting: HOSPITALIST
Payer: COMMERCIAL

## 2024-01-22 ENCOUNTER — APPOINTMENT (EMERGENCY)
Dept: MRI IMAGING | Facility: HOSPITAL | Age: 69
End: 2024-01-22
Payer: COMMERCIAL

## 2024-01-22 DIAGNOSIS — R47.01 APHASIA: ICD-10-CM

## 2024-01-22 DIAGNOSIS — M05.79 RHEUMATOID ARTHRITIS INVOLVING MULTIPLE SITES WITH POSITIVE RHEUMATOID FACTOR (HCC): ICD-10-CM

## 2024-01-22 DIAGNOSIS — R29.90 STROKE-LIKE SYMPTOMS: ICD-10-CM

## 2024-01-22 DIAGNOSIS — R41.82 AMS (ALTERED MENTAL STATUS): Primary | ICD-10-CM

## 2024-01-22 PROBLEM — J32.9 SINUSITIS: Status: ACTIVE | Noted: 2024-01-22

## 2024-01-22 LAB
25(OH)D3 SERPL-MCNC: 39.5 NG/ML (ref 30–100)
2HR DELTA HS TROPONIN: -1 NG/L
4HR DELTA HS TROPONIN: 1 NG/L
ANION GAP SERPL CALCULATED.3IONS-SCNC: 8 MMOL/L
AORTIC ROOT: 2.2 CM
AORTIC VALVE MEAN VELOCITY: 15.9 M/S
APICAL FOUR CHAMBER EJECTION FRACTION: 75 %
APTT PPP: 31 SECONDS (ref 23–37)
ASCENDING AORTA: 2.8 CM
ATRIAL RATE: 93 BPM
AV MEAN GRADIENT: 11 MMHG
AV PEAK GRADIENT: 19 MMHG
AV VALVE AREA: 1.52 CM2
AV VELOCITY RATIO: 0.55
BACTERIA UR QL AUTO: ABNORMAL /HPF
BILIRUB UR QL STRIP: NEGATIVE
BSA FOR ECHO PROCEDURE: 1.83 M2
BUN SERPL-MCNC: 10 MG/DL (ref 5–25)
CALCIUM SERPL-MCNC: 9.2 MG/DL (ref 8.4–10.2)
CARDIAC TROPONIN I PNL SERPL HS: 2 NG/L
CARDIAC TROPONIN I PNL SERPL HS: 3 NG/L
CARDIAC TROPONIN I PNL SERPL HS: 4 NG/L
CHLORIDE SERPL-SCNC: 101 MMOL/L (ref 96–108)
CHOLEST SERPL-MCNC: 147 MG/DL
CLARITY UR: CLEAR
CO2 SERPL-SCNC: 27 MMOL/L (ref 21–32)
COLOR UR: ABNORMAL
CREAT SERPL-MCNC: 0.5 MG/DL (ref 0.6–1.3)
DOP CALC AO PEAK VEL: 2.19 M/S
DOP CALC AO VTI: 52.08 CM
DOP CALC LVOT AREA: 2.83 CM2
DOP CALC LVOT DIAMETER: 1.9 CM
DOP CALC LVOT PEAK VEL VTI: 28 CM
DOP CALC LVOT PEAK VEL: 1.2 M/S
DOP CALC LVOT STROKE VOLUME: 79.35 CM3
DOP CALC MV VTI: 48.16 CM
E WAVE DECELERATION TIME: 221 MS
E/A RATIO: 1.07
ERYTHROCYTE [DISTWIDTH] IN BLOOD BY AUTOMATED COUNT: 12.5 % (ref 11.6–15.1)
EST. AVERAGE GLUCOSE BLD GHB EST-MCNC: 186 MG/DL
FLUAV RNA RESP QL NAA+PROBE: NEGATIVE
FLUBV RNA RESP QL NAA+PROBE: NEGATIVE
FOLATE SERPL-MCNC: >22.3 NG/ML
FRACTIONAL SHORTENING: 34 (ref 28–44)
GFR SERPL CREATININE-BSD FRML MDRD: 98 ML/MIN/1.73SQ M
GLUCOSE SERPL-MCNC: 122 MG/DL (ref 65–140)
GLUCOSE SERPL-MCNC: 136 MG/DL (ref 65–140)
GLUCOSE SERPL-MCNC: 180 MG/DL (ref 65–140)
GLUCOSE SERPL-MCNC: 216 MG/DL (ref 65–140)
GLUCOSE SERPL-MCNC: 234 MG/DL (ref 65–140)
GLUCOSE UR STRIP-MCNC: NEGATIVE MG/DL
HBA1C MFR BLD: 8.1 %
HCT VFR BLD AUTO: 41.5 % (ref 34.8–46.1)
HDLC SERPL-MCNC: 39 MG/DL
HGB BLD-MCNC: 14.2 G/DL (ref 11.5–15.4)
HGB UR QL STRIP.AUTO: NEGATIVE
INR PPP: 1 (ref 0.84–1.19)
INTERVENTRICULAR SEPTUM IN DIASTOLE (PARASTERNAL SHORT AXIS VIEW): 1.1 CM
INTERVENTRICULAR SEPTUM: 1.1 CM (ref 0.6–1.1)
KETONES UR STRIP-MCNC: NEGATIVE MG/DL
LAAS-AP2: 14.7 CM2
LAAS-AP4: 9.1 CM2
LDLC SERPL CALC-MCNC: 75 MG/DL (ref 0–100)
LEFT ATRIUM SIZE: 4 CM
LEFT ATRIUM VOLUME (MOD BIPLANE): 26 ML
LEFT ATRIUM VOLUME INDEX (MOD BIPLANE): 14.2 ML/M2
LEFT INTERNAL DIMENSION IN SYSTOLE: 2.9 CM (ref 2.1–4)
LEFT VENTRICULAR INTERNAL DIMENSION IN DIASTOLE: 4.4 CM (ref 3.5–6)
LEFT VENTRICULAR POSTERIOR WALL IN END DIASTOLE: 0.9 CM
LEFT VENTRICULAR STROKE VOLUME: 56 ML
LEUKOCYTE ESTERASE UR QL STRIP: NEGATIVE
LVSV (TEICH): 56 ML
MCH RBC QN AUTO: 30.3 PG (ref 26.8–34.3)
MCHC RBC AUTO-ENTMCNC: 34.2 G/DL (ref 31.4–37.4)
MCV RBC AUTO: 89 FL (ref 82–98)
MV E'TISSUE VEL-LAT: 10 CM/S
MV E'TISSUE VEL-SEP: 10 CM/S
MV MEAN GRADIENT: 6 MMHG
MV PEAK A VEL: 1.28 M/S
MV PEAK E VEL: 137 CM/S
MV PEAK GRADIENT: 12 MMHG
MV STENOSIS PRESSURE HALF TIME: 64 MS
MV VALVE AREA BY CONTINUITY EQUATION: 1.65 CM2
MV VALVE AREA P 1/2 METHOD: 3.44
NITRITE UR QL STRIP: NEGATIVE
NON-SQ EPI CELLS URNS QL MICRO: ABNORMAL /HPF
NONHDLC SERPL-MCNC: 108 MG/DL
P AXIS: 68 DEGREES
PH UR STRIP.AUTO: 7 [PH]
PLATELET # BLD AUTO: 199 THOUSANDS/UL (ref 149–390)
PLATELET # BLD AUTO: 223 THOUSANDS/UL (ref 149–390)
PMV BLD AUTO: 10.4 FL (ref 8.9–12.7)
PMV BLD AUTO: 10.7 FL (ref 8.9–12.7)
POTASSIUM SERPL-SCNC: 3.4 MMOL/L (ref 3.5–5.3)
PR INTERVAL: 160 MS
PROT UR STRIP-MCNC: ABNORMAL MG/DL
PROTHROMBIN TIME: 13.8 SECONDS (ref 11.6–14.5)
QRS AXIS: 20 DEGREES
QRSD INTERVAL: 86 MS
QT INTERVAL: 370 MS
QTC INTERVAL: 460 MS
RA PRESSURE ESTIMATED: 8 MMHG
RBC # BLD AUTO: 4.69 MILLION/UL (ref 3.81–5.12)
RBC #/AREA URNS AUTO: ABNORMAL /HPF
RIGHT ATRIUM AREA SYSTOLE A4C: 9.1 CM2
RIGHT VENTRICLE ID DIMENSION: 2.3 CM
RSV RNA RESP QL NAA+PROBE: NEGATIVE
RV PSP: 33 MMHG
SARS-COV-2 RNA RESP QL NAA+PROBE: NEGATIVE
SL CV LEFT ATRIUM LENGTH A2C: 5.2 CM
SL CV LV EF: 60
SL CV PED ECHO LEFT VENTRICLE DIASTOLIC VOLUME (MOD BIPLANE) 2D: 87 ML
SL CV PED ECHO LEFT VENTRICLE SYSTOLIC VOLUME (MOD BIPLANE) 2D: 32 ML
SODIUM SERPL-SCNC: 136 MMOL/L (ref 135–147)
SP GR UR STRIP.AUTO: >=1.05 (ref 1–1.03)
T WAVE AXIS: 36 DEGREES
T4 FREE SERPL-MCNC: 0.72 NG/DL (ref 0.61–1.12)
TR MAX PG: 25 MMHG
TR PEAK VELOCITY: 2.5 M/S
TRICUSPID VALVE PEAK REGURGITATION VELOCITY: 2.48 M/S
TRIGL SERPL-MCNC: 166 MG/DL
TSH SERPL DL<=0.05 MIU/L-ACNC: 5.81 UIU/ML (ref 0.45–4.5)
UROBILINOGEN UR STRIP-ACNC: <2 MG/DL
VENTRICULAR RATE: 93 BPM
VIT B12 SERPL-MCNC: 513 PG/ML (ref 180–914)
WBC # BLD AUTO: 10.11 THOUSAND/UL (ref 4.31–10.16)
WBC #/AREA URNS AUTO: ABNORMAL /HPF

## 2024-01-22 PROCEDURE — 82306 VITAMIN D 25 HYDROXY: CPT

## 2024-01-22 PROCEDURE — 80048 BASIC METABOLIC PNL TOTAL CA: CPT | Performed by: EMERGENCY MEDICINE

## 2024-01-22 PROCEDURE — 84439 ASSAY OF FREE THYROXINE: CPT

## 2024-01-22 PROCEDURE — 99223 1ST HOSP IP/OBS HIGH 75: CPT | Performed by: INTERNAL MEDICINE

## 2024-01-22 PROCEDURE — 99285 EMERGENCY DEPT VISIT HI MDM: CPT | Performed by: EMERGENCY MEDICINE

## 2024-01-22 PROCEDURE — 99285 EMERGENCY DEPT VISIT HI MDM: CPT

## 2024-01-22 PROCEDURE — 70551 MRI BRAIN STEM W/O DYE: CPT

## 2024-01-22 PROCEDURE — 3052F HG A1C>EQUAL 8.0%<EQUAL 9.0%: CPT | Performed by: NURSE PRACTITIONER

## 2024-01-22 PROCEDURE — 82948 REAGENT STRIP/BLOOD GLUCOSE: CPT

## 2024-01-22 PROCEDURE — 83036 HEMOGLOBIN GLYCOSYLATED A1C: CPT

## 2024-01-22 PROCEDURE — 70498 CT ANGIOGRAPHY NECK: CPT

## 2024-01-22 PROCEDURE — 93306 TTE W/DOPPLER COMPLETE: CPT | Performed by: INTERNAL MEDICINE

## 2024-01-22 PROCEDURE — 80061 LIPID PANEL: CPT

## 2024-01-22 PROCEDURE — 93306 TTE W/DOPPLER COMPLETE: CPT

## 2024-01-22 PROCEDURE — 85610 PROTHROMBIN TIME: CPT | Performed by: EMERGENCY MEDICINE

## 2024-01-22 PROCEDURE — 82607 VITAMIN B-12: CPT

## 2024-01-22 PROCEDURE — 85027 COMPLETE CBC AUTOMATED: CPT | Performed by: EMERGENCY MEDICINE

## 2024-01-22 PROCEDURE — 84484 ASSAY OF TROPONIN QUANT: CPT | Performed by: EMERGENCY MEDICINE

## 2024-01-22 PROCEDURE — 70496 CT ANGIOGRAPHY HEAD: CPT

## 2024-01-22 PROCEDURE — 85730 THROMBOPLASTIN TIME PARTIAL: CPT | Performed by: EMERGENCY MEDICINE

## 2024-01-22 PROCEDURE — 99205 OFFICE O/P NEW HI 60 MIN: CPT | Performed by: PSYCHIATRY & NEUROLOGY

## 2024-01-22 PROCEDURE — 36415 COLL VENOUS BLD VENIPUNCTURE: CPT | Performed by: EMERGENCY MEDICINE

## 2024-01-22 PROCEDURE — 82746 ASSAY OF FOLIC ACID SERUM: CPT

## 2024-01-22 PROCEDURE — 93005 ELECTROCARDIOGRAM TRACING: CPT

## 2024-01-22 PROCEDURE — 84443 ASSAY THYROID STIM HORMONE: CPT

## 2024-01-22 PROCEDURE — 81001 URINALYSIS AUTO W/SCOPE: CPT | Performed by: EMERGENCY MEDICINE

## 2024-01-22 PROCEDURE — 0241U HB NFCT DS VIR RESP RNA 4 TRGT: CPT | Performed by: EMERGENCY MEDICINE

## 2024-01-22 PROCEDURE — 85049 AUTOMATED PLATELET COUNT: CPT

## 2024-01-22 RX ORDER — CEPHALEXIN 500 MG/1
500 CAPSULE ORAL 2 TIMES DAILY
Status: DISCONTINUED | OUTPATIENT
Start: 2024-01-22 | End: 2024-01-23 | Stop reason: HOSPADM

## 2024-01-22 RX ORDER — OXYCODONE HYDROCHLORIDE AND ACETAMINOPHEN 5; 325 MG/1; MG/1
1 TABLET ORAL EVERY 8 HOURS PRN
Status: DISCONTINUED | OUTPATIENT
Start: 2024-01-22 | End: 2024-01-22

## 2024-01-22 RX ORDER — ASPIRIN 81 MG/1
324 TABLET, CHEWABLE ORAL ONCE
Status: COMPLETED | OUTPATIENT
Start: 2024-01-22 | End: 2024-01-22

## 2024-01-22 RX ORDER — CLOPIDOGREL BISULFATE 75 MG/1
75 TABLET ORAL DAILY
Status: DISCONTINUED | OUTPATIENT
Start: 2024-01-23 | End: 2024-01-23 | Stop reason: HOSPADM

## 2024-01-22 RX ORDER — KETOROLAC TROMETHAMINE 30 MG/ML
15 INJECTION, SOLUTION INTRAMUSCULAR; INTRAVENOUS ONCE
Status: COMPLETED | OUTPATIENT
Start: 2024-01-22 | End: 2024-01-22

## 2024-01-22 RX ORDER — AMLODIPINE BESYLATE 5 MG/1
10 TABLET ORAL DAILY
Status: DISCONTINUED | OUTPATIENT
Start: 2024-01-22 | End: 2024-01-22

## 2024-01-22 RX ORDER — ESCITALOPRAM OXALATE 10 MG/1
5 TABLET ORAL DAILY
Status: DISCONTINUED | OUTPATIENT
Start: 2024-01-22 | End: 2024-01-23 | Stop reason: HOSPADM

## 2024-01-22 RX ORDER — CLOPIDOGREL BISULFATE 75 MG/1
300 TABLET ORAL ONCE
Status: COMPLETED | OUTPATIENT
Start: 2024-01-22 | End: 2024-01-22

## 2024-01-22 RX ORDER — OXYCODONE HYDROCHLORIDE AND ACETAMINOPHEN 5; 325 MG/1; MG/1
1 TABLET ORAL ONCE
Status: COMPLETED | OUTPATIENT
Start: 2024-01-22 | End: 2024-01-22

## 2024-01-22 RX ORDER — LIDOCAINE 50 MG/G
1 PATCH TOPICAL DAILY
Status: DISCONTINUED | OUTPATIENT
Start: 2024-01-22 | End: 2024-01-23 | Stop reason: HOSPADM

## 2024-01-22 RX ORDER — AMLODIPINE BESYLATE 5 MG/1
5 TABLET ORAL DAILY
Status: DISCONTINUED | OUTPATIENT
Start: 2024-01-22 | End: 2024-01-23 | Stop reason: HOSPADM

## 2024-01-22 RX ORDER — METOPROLOL SUCCINATE 100 MG/1
100 TABLET, EXTENDED RELEASE ORAL DAILY
Status: DISCONTINUED | OUTPATIENT
Start: 2024-01-22 | End: 2024-01-23 | Stop reason: HOSPADM

## 2024-01-22 RX ORDER — MUSCLE RUB CREAM 100; 150 MG/G; MG/G
CREAM TOPICAL 4 TIMES DAILY
Status: DISCONTINUED | OUTPATIENT
Start: 2024-01-22 | End: 2024-01-23 | Stop reason: HOSPADM

## 2024-01-22 RX ORDER — INSULIN LISPRO 100 [IU]/ML
1-5 INJECTION, SOLUTION INTRAVENOUS; SUBCUTANEOUS
Status: DISCONTINUED | OUTPATIENT
Start: 2024-01-22 | End: 2024-01-23 | Stop reason: HOSPADM

## 2024-01-22 RX ORDER — ASPIRIN 325 MG
325 TABLET ORAL DAILY
Status: DISCONTINUED | OUTPATIENT
Start: 2024-01-22 | End: 2024-01-22

## 2024-01-22 RX ORDER — ATORVASTATIN CALCIUM 40 MG/1
40 TABLET, FILM COATED ORAL DAILY
Status: DISCONTINUED | OUTPATIENT
Start: 2024-01-23 | End: 2024-01-23 | Stop reason: HOSPADM

## 2024-01-22 RX ORDER — OXYCODONE HYDROCHLORIDE AND ACETAMINOPHEN 5; 325 MG/1; MG/1
0.5 TABLET ORAL EVERY 4 HOURS PRN
Status: DISCONTINUED | OUTPATIENT
Start: 2024-01-22 | End: 2024-01-22

## 2024-01-22 RX ORDER — ONDANSETRON 2 MG/ML
4 INJECTION INTRAMUSCULAR; INTRAVENOUS EVERY 6 HOURS PRN
Status: DISCONTINUED | OUTPATIENT
Start: 2024-01-22 | End: 2024-01-23 | Stop reason: HOSPADM

## 2024-01-22 RX ORDER — ASPIRIN 81 MG/1
324 TABLET, CHEWABLE ORAL ONCE
Status: DISCONTINUED | OUTPATIENT
Start: 2024-01-22 | End: 2024-01-22

## 2024-01-22 RX ORDER — INSULIN LISPRO 100 [IU]/ML
1-6 INJECTION, SOLUTION INTRAVENOUS; SUBCUTANEOUS
Status: DISCONTINUED | OUTPATIENT
Start: 2024-01-22 | End: 2024-01-23 | Stop reason: HOSPADM

## 2024-01-22 RX ORDER — LEFLUNOMIDE 10 MG/1
10 TABLET ORAL DAILY
Status: DISCONTINUED | OUTPATIENT
Start: 2024-01-22 | End: 2024-01-23 | Stop reason: HOSPADM

## 2024-01-22 RX ORDER — ENOXAPARIN SODIUM 100 MG/ML
40 INJECTION SUBCUTANEOUS DAILY
Status: DISCONTINUED | OUTPATIENT
Start: 2024-01-22 | End: 2024-01-23 | Stop reason: HOSPADM

## 2024-01-22 RX ORDER — KETOROLAC TROMETHAMINE 30 MG/ML
15 INJECTION, SOLUTION INTRAMUSCULAR; INTRAVENOUS EVERY 6 HOURS PRN
Status: DISCONTINUED | OUTPATIENT
Start: 2024-01-22 | End: 2024-01-23 | Stop reason: HOSPADM

## 2024-01-22 RX ORDER — OXYCODONE HYDROCHLORIDE AND ACETAMINOPHEN 5; 325 MG/1; MG/1
0.5 TABLET ORAL EVERY 6 HOURS PRN
Status: DISCONTINUED | OUTPATIENT
Start: 2024-01-22 | End: 2024-01-23 | Stop reason: HOSPADM

## 2024-01-22 RX ORDER — ACETAMINOPHEN 325 MG/1
650 TABLET ORAL EVERY 6 HOURS PRN
Status: DISCONTINUED | OUTPATIENT
Start: 2024-01-22 | End: 2024-01-23 | Stop reason: HOSPADM

## 2024-01-22 RX ORDER — POTASSIUM CHLORIDE 20 MEQ/1
20 TABLET, EXTENDED RELEASE ORAL ONCE
Status: COMPLETED | OUTPATIENT
Start: 2024-01-22 | End: 2024-01-22

## 2024-01-22 RX ORDER — PRAVASTATIN SODIUM 20 MG
20 TABLET ORAL DAILY
Status: DISCONTINUED | OUTPATIENT
Start: 2024-01-22 | End: 2024-01-22

## 2024-01-22 RX ADMIN — OXYCODONE HYDROCHLORIDE AND ACETAMINOPHEN 0.5 TABLET: 5; 325 TABLET ORAL at 23:37

## 2024-01-22 RX ADMIN — METOPROLOL SUCCINATE 100 MG: 100 TABLET, EXTENDED RELEASE ORAL at 12:08

## 2024-01-22 RX ADMIN — KETOROLAC TROMETHAMINE 15 MG: 30 INJECTION, SOLUTION INTRAMUSCULAR; INTRAVENOUS at 19:06

## 2024-01-22 RX ADMIN — AMLODIPINE BESYLATE 5 MG: 5 TABLET ORAL at 12:08

## 2024-01-22 RX ADMIN — OXYCODONE HYDROCHLORIDE AND ACETAMINOPHEN 1 TABLET: 5; 325 TABLET ORAL at 16:43

## 2024-01-22 RX ADMIN — INSULIN LISPRO 1 UNITS: 100 INJECTION, SOLUTION INTRAVENOUS; SUBCUTANEOUS at 11:43

## 2024-01-22 RX ADMIN — LIDOCAINE 5% 1 PATCH: 700 PATCH TOPICAL at 14:54

## 2024-01-22 RX ADMIN — ACETAMINOPHEN 650 MG: 325 TABLET, FILM COATED ORAL at 21:20

## 2024-01-22 RX ADMIN — PRAVASTATIN SODIUM 20 MG: 20 TABLET ORAL at 11:39

## 2024-01-22 RX ADMIN — ACETAMINOPHEN 650 MG: 325 TABLET, FILM COATED ORAL at 11:50

## 2024-01-22 RX ADMIN — CLOPIDOGREL BISULFATE 300 MG: 75 TABLET ORAL at 14:33

## 2024-01-22 RX ADMIN — OXYCODONE HYDROCHLORIDE AND ACETAMINOPHEN 1 TABLET: 5; 325 TABLET ORAL at 08:07

## 2024-01-22 RX ADMIN — ESCITALOPRAM OXALATE 5 MG: 5 TABLET, FILM COATED ORAL at 11:38

## 2024-01-22 RX ADMIN — CEPHALEXIN 500 MG: 500 CAPSULE ORAL at 11:38

## 2024-01-22 RX ADMIN — MENTHOL, METHYL SALICYLATE: 10; 15 CREAM TOPICAL at 21:21

## 2024-01-22 RX ADMIN — POTASSIUM CHLORIDE 20 MEQ: 1500 TABLET, EXTENDED RELEASE ORAL at 14:34

## 2024-01-22 RX ADMIN — ASPIRIN 81 MG CHEWABLE TABLET 324 MG: 81 TABLET CHEWABLE at 08:18

## 2024-01-22 RX ADMIN — Medication 1 PACKET: at 11:38

## 2024-01-22 RX ADMIN — CEPHALEXIN 500 MG: 500 CAPSULE ORAL at 22:22

## 2024-01-22 RX ADMIN — IOHEXOL 85 ML: 350 INJECTION, SOLUTION INTRAVENOUS at 06:27

## 2024-01-22 NOTE — ASSESSMENT & PLAN NOTE
-CT findings of acute on chronic sinusitis.  -CT soft tissue neck 1/17 showed, tonsillitis and reactive adenopathy.  -continue keflex 10 day course.

## 2024-01-22 NOTE — ASSESSMENT & PLAN NOTE
Assessment:  Patient is a 68-year-old female with past medical history significant for hypercholesterolemia, diabetes, hypertension, osteoarthritis, bilateral cataracts pulmonologist, squamous cell carcinoma of skin, and anxiety disorder the presented to [facility] on January 22, 2024 as a stroke alert.  The patient's last known well was 1/21/2024, 2400, NIH 1 (mild aphasia per ED exam).  Blood pressure 192/80.  Glucose 116.  Noncontrast CT head demonstrated no acute intracranial abnormality.  CTA stroke alert head and neck demonstrated no vascular pathology in head or neck, mild atherosclerosis noted.  MRI demonstrated no acute ischemic event, with chronic microangiopathy noted.    Impression:  Taking into account the patient's presentation, risk factors, and neuraxial imaging not demonstrating an acute vascular event, a transient ischemic attack versus hypertensive state is high on the differential.    Plan:  - Case discussed with attending neurologist Dr. Apodaca  - Recommend continuing stroke pathway  - Recommend normotension, normothermia, euglycemia  - Recommend atorvastatin 40 mg daily  - Recommend DAPT clopidogrel 75 mg daily, and aspirin 81 mg daily for 21 days, then transition to aspirin 81 mg daily monotherapy afterwards.  - Recommend intensive diabetes management with A1c of 8.1, recommend further investigation TSH of 5.813  - Recommend fall precautions  - Recommend continued evaluation and treatment of any metabolic, infectious, inflammatory derangements  - Mechanical and chemical DVT prophylaxis appreciated  - Recommend PT/OT  - Recommend STAT CT head noncontrast for acute changes mental status/neurologic examination, please contact neurology  - Recommend patient follow-up in outpatient neurology office with neurovascular advanced practitioner in 4 to 6 weeks.  - No further imaging required at this juncture in time  - No further inpatient neurology recommendations at this time,  please contact for any further questions or concerns  - Rest per primary team appreciated

## 2024-01-22 NOTE — SPEECH THERAPY NOTE
Speech Language/Pathology  Speech/Language Pathology  Assessment    Patient Name: Davina Allen  Today's Date: 1/22/2024     Problem List  Principal Problem:    Stroke-like symptoms  Active Problems:    Hypercholesterolemia    Rheumatoid arthritis involving multiple sites with positive rheumatoid factor (HCC)    Benign essential hypertension    Controlled diabetes mellitus type II without complication (HCC)    Sinusitis    Davina Allen is a 68 y.o. female with a PMH of Diabetes, HTN, Hypercholesterolemia, RA, fibromyalgia, squamous cell carcinoma who presents with stroke like symptoms of mild aphasia. Patient's last known normal was around midnight. She endorses feeling more fatigued and achy all over for the last two days which she contributes to combination of RA and fibromyalgia. She recently discontinued humera for RA treatment as she didn't feel it was helping her symptoms. She woke up early this morning around 5 am and was not able to figure out how to use the Keurig to make tea, her  noted that she is having word finding difficulty and brought her to the ED to rule out stroke. Stroke alert was called in the ED, imaging showed some ICA atherosclerosis with no acute acute intracranial abnormalities. Patient endorses fatigue, generalized myalgias, and continued word finding difficulty. She denies any other symptoms at this time.     Consult received for speech/swallow eval on stroke pathway.  Pt passed nsg swallow screen; tolerating regular diet w/o s/s dysphagia or aspiration. No speech/language deficits reported. NIH score is 1.     MRI: 1/22/24 No acute ischemia as clinically questioned. Minor white matter change suggestive of chronic microangiopathy.     No need for formal speech/swallow eval at this time. Reconsult if needed.    Rosalinda Bustamante MA CCC-SLP  Speech Pathologist  Available via  tiger text

## 2024-01-22 NOTE — QUICK NOTE
Stroke alert called: 620 AM  Neurology response immediate  LKW: midnight  NIHSS 1 for mild aphasia per ED exam    CT H not acute  CTA H/N no LVO, patient does have atherosclerotic plaque noted bl ICA in the cavernous region otherwise ICA with no significant disease noted- reviewed personally and with radiology    Concern for LMCA territory branch vessel acute ischemia  At this time recommend STAT MRI Brain wake up protocol if patient still in tPA window/ would choose to proceed with it. Benefits and smaller risk of bleed with tPA if patient is a candidate to be discussed by ED team with patient, family.    Recommend continue permissive HTN ideally : SBP > 160-210 okay for now.   Stroke protocol admit  Further recommendations pending STAT MRI

## 2024-01-22 NOTE — ASSESSMENT & PLAN NOTE
Home med simvastatin 10 mg, not in formulary.    -will transition to atorvastatin 40 mg based on neurology recommendations.  -Follow up on lipid panel.

## 2024-01-22 NOTE — ED ATTENDING ATTESTATION
1/22/2024  IAr DO, saw and evaluated the patient. I have discussed the patient with the resident/non-physician practitioner and agree with the resident's/non-physician practitioner's findings, Plan of Care, and MDM as documented in the resident's/non-physician practitioner's note, except where noted. All available labs and Radiology studies were reviewed.  I was present for key portions of any procedure(s) performed by the resident/non-physician practitioner and I was immediately available to provide assistance.       At this point I agree with the current assessment done in the Emergency Department.  I have conducted an independent evaluation of this patient a history and physical is as follows:    Patient is a 68-year-old female with a history of hypertension and diabetes who presents with speech difficulty.  Patient states that she has been feeling generally weak for the past few days but did not have any neurologic deficits last evening.  She was last known well around midnight.  She woke up this morning around 5 AM and had word finding difficulty.  She also was having difficulty using the coffee machine and putting the lid on the pot.  She denies other complaints, including but not limited to weakness, numbness, tingling, visual changes, other concerns.  Patient states that her blood pressure is normally controlled with her home medications.  She denies recent trauma.  She has had myalgias recently, which she attributed to fibromyalgia.  She also had a CT soft tissue neck which revealed acute tonsillitis and reactive cervical adenopathy.    On exam, patient is in no acute distress.  Heart is regular rate and rhythm.  Breath sounds normal.  Cranial nerves II through XII grossly intact.  Motor, sensation normal.  No dysarthria.  Subtle word finding difficulty.  No limb ataxia.  Visual fields intact.    Stroke alert called and patient taken for CTA head and neck.  This study was unremarkable and  "neurology requested stat MRI.  MRI was performed and reveals no acute infarct.  Neurology evaluated patient at bedside and recommended hospitalization.  Patient given full dose aspirin.  Will hospitalize for further workup and treatment.    Portions of the above record have been created with voice recognition software.  Occasional wrong word or \"sound alike\" substitutions may have occurred due to the inherent limitations of voice recognition software.  Read the chart carefully and recognize, using context, where substitutions may have occurred.      ED Course         Critical Care Time  Procedures      "

## 2024-01-22 NOTE — ED PROVIDER NOTES
History  Chief Complaint   Patient presents with    STROKE Alert     Pt family states that around 0500 when pt woke up was having confusion and expressive aphasia, reports last known well prior to midnight last night. Pt is also currently c/o dizziness and a headache.     (Davina Allen) Davina Allen is a 68 y.o. female     They presented to the emergency department on January 22, 2024. Patient presents with:  Altered Mental Status: Pt family states that around 0500 when pt woke up was having confusion and expressive aphasia, reports last known well prior to midnight last night. Pt is also currently c/o dizziness and a headache.      The patient's  states that patient was doing well last night, went to bed and was last known normal around midnight however upon waking at 5 AM noted patient patient had confusion as well as difficulty finding words while speaking.   states that he was concerned at that time after remembering certain stroke posters attempted to have his wife move her arms and speak, however because of the acute change came to the emergency department for evaluation.  Patient's  notes that the patient appears to be improving at this time and does not appear to be as confused.  Patient notes that she has a history of fibromyalgia, recently was evaluated with a CT of her neck, however is unable to recall the reason why at this time, and states that she is still having some word finding issues however does appear to be improving. Patient denies change in her facial appearance, difficulty with his vision, change in vision, weakness in her upper or lower extremities, decrease sensation, fever, chills, chest pain, abdominal pain, or any other complaint at this time.              Prior to Admission Medications   Prescriptions Last Dose Informant Patient Reported? Taking?   ALPRAZolam (XANAX) 0.5 mg tablet More than a month Self No No   Sig: Take 0.5 tablets (0.25 mg total) by mouth daily    Calcium Polycarbophil (Fiber) 625 MG TABS 1/21/2024  Yes Yes   Sig: Take 625 mg by mouth daily   Multiple Vitamin (MULTIVITAMIN ADULT PO) 1/21/2024  Yes Yes   Sig: multivitamin   albuterol (2.5 mg/3 mL) 0.083 % nebulizer solution More than a month  No No   Sig: Take 3 mL (2.5 mg total) by nebulization every 6 (six) hours as needed for wheezing or shortness of breath   albuterol (Ventolin HFA) 90 mcg/act inhaler More than a month Self No No   Sig: Inhale 2 puffs every 6 (six) hours as needed for wheezing or shortness of breath   amLODIPine (NORVASC) 10 mg tablet 1/21/2024  No Yes   Sig: take 1 tablet by mouth once daily   budesonide-formoterol (Symbicort) 160-4.5 mcg/act inhaler More than a month Self No No   Sig: Inhale 2 puffs 2 (two) times a day Rinse mouth after use.   cephalexin (KEFLEX) 500 mg capsule 1/21/2024  No Yes   Sig: Take 1 capsule (500 mg total) by mouth 2 (two) times a day for 10 days   ergocalciferol (VITAMIN D2) 50,000 units 1/21/2024  No Yes   Sig: take 1 capsule by mouth every week   escitalopram (LEXAPRO) 10 mg tablet 1/21/2024  No Yes   Sig: take 1/2 tablet by mouth once daily   hydrochlorothiazide (HYDRODIURIL) 25 mg tablet 1/21/2024 Self No Yes   Sig: take 1 tablet by mouth once daily   leflunomide (ARAVA) 10 MG tablet 1/21/2024  Yes Yes   Sig: Take 10 mg by mouth daily   losartan (COZAAR) 50 mg tablet 1/21/2024  No Yes   Sig: take 1 tablet once daily as directed   metFORMIN (GLUCOPHAGE-XR) 500 mg 24 hr tablet 1/21/2024 Self No Yes   Sig: take 1 tablet by mouth three times a day   Patient taking differently: 500 mg 2 (two) times a day   metoprolol succinate (TOPROL-XL) 100 mg 24 hr tablet 1/21/2024  No Yes   Sig: take 1 tablet by mouth once daily   ondansetron (ZOFRAN) 4 mg tablet Past Week  No Yes   Sig: take 1 tablet by mouth every 6 hours AS NEEDED FOR NAUSE OR VOMITING   oxyCODONE-acetaminophen (PERCOCET) 5-325 mg per tablet 1/21/2024 Self Yes Yes   Sig: Take 1 tablet by mouth every 8  (eight) hours as needed for moderate pain   simvastatin (ZOCOR) 10 mg tablet 2024  No Yes   Sig: take 1 tablet by mouth once daily      Facility-Administered Medications: None       Past Medical History:   Diagnosis Date    Acute non-recurrent frontal sinusitis 05/15/2019    Acute non-recurrent maxillary sinusitis 03/10/2020    Allergic In my 60’s    Anxiety     Anxiety disorder     Arthritis     OA    Asthma     exercise iniduced.    Cancer (HCC)     Squamous Cell on back    Dependence on crutches     prn    Depression     ro.     Diabetes mellitus (HCC)     NIDDM; per [employer]: Last Assessed: 7/15/15, New onset of type 2    Diverticulitis of colon     Fibromyalgia     Hearing aid worn     mayra    Heart murmur     HL (hearing loss)     b/l - uses hearing aids    Hyperglycemia     Last Assessed:10/29/15    Hyperlipidemia     Hypertension     Inflammatory bowel disease In my 60’s    LLQ abdominal pain 2020    Pneumonia May 2023    Psoriasis     red dry patch left side- waist area    Psoriatic arthritis (HCC)     RA (rheumatoid arthritis) ([facility])     Urinary tract infection Currently    Taking med    Wears glasses     reading       Past Surgical History:   Procedure Laterality Date    ARTHROSCOPY KNEE Left 3/2/2016    Procedure: ARTHROSCOPY KNEE;  Surgeon: Daniel Laureano MD;  Location: [facility];  Service:      SECTION      X2    COLONOSCOPY  2019    CYST REMOVAL Right     Cyst on bone     FRACTURE SURGERY      (Right) tibia/fibula Fx, and ankle    PILONIDAL CYST / SINUS EXCISION      CO ARTHRS KNE SURG W/MENISCECTOMY MED/LAT W/SHVG Left 3/2/2016    Procedure: PARTIAL MEDIAL &  LATERAL MENISCECTOMIES, CHONDROPLASTY, REMOVAL OSTEOPHYTE, LIMITED SYNOVECTOMY;  Surgeon: Daniel Laureano MD;  Location: [facility];  Service: Orthopedics    REPAIR KNEE LIGAMENT      TOOTH EXTRACTION         Family History   Problem Relation Age of Onset    Hypertension Mother     Heart attack Mother     Osteoarthritis  Mother     Breast cancer Mother 82    Arthritis Mother     Cancer Mother     Alcohol abuse Father     Diabetes Sister     No Known Problems Daughter     No Known Problems Daughter     No Known Problems Maternal Grandmother     No Known Problems Maternal Grandfather     No Known Problems Paternal Grandmother     No Known Problems Paternal Grandfather     Hypertension Brother     Diabetes Brother     Other Brother         Dyslipidemia    Coronary artery disease Brother         Diabetes    No Known Problems Maternal Aunt     No Known Problems Paternal Aunt     No Known Problems Paternal Aunt     No Known Problems Paternal Aunt     No Known Problems Paternal Aunt     No Known Problems Paternal Aunt      I have reviewed and agree with the history as documented.    E-Cigarette/Vaping    E-Cigarette Use Never User      E-Cigarette/Vaping Substances    Nicotine No     THC No     CBD No     Flavoring No     Other No     Unknown No      Social History     Tobacco Use    Smoking status: Former     Current packs/day: 0.00     Average packs/day: 2.0 packs/day for 25.0 years (50.0 ttl pk-yrs)     Types: Cigarettes     Start date:      Quit date:      Years since quittin.0     Passive exposure: Past    Smokeless tobacco: Never   Vaping Use    Vaping status: Never Used   Substance Use Topics    Alcohol use: No    Drug use: No        Review of Systems   Constitutional:  Negative for chills and fever.   HENT:  Negative for ear pain and sore throat.    Eyes:  Negative for pain and visual disturbance.   Respiratory:  Negative for cough and shortness of breath.    Cardiovascular:  Negative for chest pain and palpitations.   Gastrointestinal:  Negative for abdominal pain and vomiting.   Genitourinary:  Negative for dysuria and hematuria.   Musculoskeletal:  Negative for arthralgias and back pain.   Skin:  Negative for color change and rash.   Neurological:  Positive for speech difficulty. Negative for seizures and syncope.  "  All other systems reviewed and are negative.      Physical Exam  ED Triage Vitals   Temperature Pulse Respirations Blood Pressure SpO2   01/22/24 0615 01/22/24 0610 01/22/24 0610 01/22/24 0610 01/22/24 0610   97.8 °F (36.6 °C) 95 18 (!) 192/80 97 %      Temp Source Heart Rate Source Patient Position - Orthostatic VS BP Location FiO2 (%)   01/22/24 0615 01/22/24 0610 01/22/24 0610 01/22/24 0610 --   Oral Monitor Lying Right arm       Pain Score       01/22/24 0630       6             Orthostatic Vital Signs  Vitals:    01/22/24 1100 01/22/24 1200 01/22/24 1424 01/22/24 1530   BP: 127/63 122/81 122/81 139/65   Pulse: 84  85 78   Patient Position - Orthostatic VS:    Sitting       Physical Exam  Vitals and nursing note reviewed.   Constitutional:       General: She is not in acute distress.     Appearance: Normal appearance.   HENT:      Head: Normocephalic and atraumatic.      Right Ear: External ear normal.      Left Ear: External ear normal.      Nose: Nose normal.      Mouth/Throat:      Mouth: Mucous membranes are moist.   Eyes:      Conjunctiva/sclera: Conjunctivae normal.   Cardiovascular:      Rate and Rhythm: Normal rate and regular rhythm.   Pulmonary:      Effort: Pulmonary effort is normal. No respiratory distress.      Breath sounds: Normal breath sounds.   Abdominal:      General: Abdomen is flat. Bowel sounds are normal.      Tenderness: There is no abdominal tenderness. There is no guarding or rebound.   Musculoskeletal:         General: Normal range of motion.      Cervical back: Normal range of motion.   Skin:     General: Skin is warm and dry.      Capillary Refill: Capillary refill takes less than 2 seconds.   Neurological:      Mental Status: She is alert and oriented to person, place, and time.      Sensory: No sensory deficit.      Motor: No weakness.      Comments: Mild aphasia with repetition of \"no ifs, ands or buts\" able to identify objects without issue   Psychiatric:         Mood and " Affect: Mood normal.         ED Medications  Medications   cephalexin (KEFLEX) capsule 500 mg (500 mg Oral Given 1/22/24 1138)   escitalopram (LEXAPRO) tablet 5 mg (5 mg Oral Given 1/22/24 1138)   leflunomide (ARAVA) tablet 10 mg (10 mg Oral Not Given 1/22/24 1130)   oxyCODONE-acetaminophen (PERCOCET) 5-325 mg per tablet 1 tablet (has no administration in time range)   psyllium (METAMUCIL) 1 packet (1 packet Oral Given 1/22/24 1138)   insulin lispro (HumaLOG) 100 units/mL subcutaneous injection 1-6 Units (1 Units Subcutaneous Given 1/22/24 1143)   insulin lispro (HumaLOG) 100 units/mL subcutaneous injection 1-5 Units (has no administration in time range)   acetaminophen (TYLENOL) tablet 650 mg (650 mg Oral Given 1/22/24 1150)   ondansetron (ZOFRAN) injection 4 mg (has no administration in time range)   enoxaparin (LOVENOX) subcutaneous injection 40 mg (40 mg Subcutaneous Not Given 1/22/24 1142)   metoprolol succinate (TOPROL-XL) 24 hr tablet 100 mg (100 mg Oral Given 1/22/24 1208)   amLODIPine (NORVASC) tablet 5 mg (5 mg Oral Given 1/22/24 1208)   atorvastatin (LIPITOR) tablet 40 mg (has no administration in time range)   aspirin (ECOTRIN LOW STRENGTH) EC tablet 81 mg (has no administration in time range)   clopidogrel (PLAVIX) tablet 300 mg (300 mg Oral Given 1/22/24 1433)     Followed by   clopidogrel (PLAVIX) tablet 75 mg (has no administration in time range)   lidocaine (LIDODERM) 5 % patch 1 patch (1 patch Topical Medication Applied 1/22/24 1454)   iohexol (OMNIPAQUE) 350 MG/ML injection (MULTI-DOSE) 85 mL (85 mL Intravenous Given 1/22/24 0627)   oxyCODONE-acetaminophen (PERCOCET) 5-325 mg per tablet 1 tablet (1 tablet Oral Given 1/22/24 0807)   aspirin chewable tablet 324 mg (324 mg Oral Given 1/22/24 0818)   potassium chloride (K-DUR,KLOR-CON) CR tablet 20 mEq (20 mEq Oral Given 1/22/24 1434)       Diagnostic Studies  Results Reviewed       Procedure Component Value Units Date/Time    TSH, 3rd generation with  Free T4 reflex [[id number]]  (Abnormal) Collected: 01/22/24 1400    Lab Status: Final result Specimen: Blood from Arm, Right Updated: 01/22/24 1443     TSH 3RD GENERATON 5.813 uIU/mL     Lipid panel [[id number]]  (Abnormal) Collected: 01/22/24 1400    Lab Status: Final result Specimen: Blood from Arm, Right Updated: 01/22/24 1443     Cholesterol 147 mg/dL      Triglycerides 166 mg/dL      HDL, Direct 39 mg/dL      LDL Calculated 75 mg/dL      Non-HDL-Chol (CHOL-HDL) 108 mg/dl     T4, free [[id number]] Collected: 01/22/24 1400    Lab Status: In process Specimen: Blood from Arm, Right Updated: 01/22/24 1443    Platelet count [[id number]]  (Normal) Collected: 01/22/24 1400    Lab Status: Final result Specimen: Blood from Arm, Right Updated: 01/22/24 1412     Platelets 223 Thousands/uL      MPV 10.4 fL     Vitamin D 25 hydroxy [[id number]] Collected: 01/22/24 1400    Lab Status: In process Specimen: Blood from Arm, Right Updated: 01/22/24 1407    Folate [[id number]] Collected: 01/22/24 1400    Lab Status: In process Specimen: Blood from Arm, Right Updated: 01/22/24 1407    Vitamin B12 [[id number]] Collected: 01/22/24 1400    Lab Status: In process Specimen: Blood from Arm, Right Updated: 01/22/24 1407    Hemoglobin A1C [[id number]] Collected: 01/22/24 1400    Lab Status: In process Specimen: Blood from Arm, Right Updated: 01/22/24 1406    Fingerstick Glucose (POCT) [[id number]]  (Abnormal) Collected: 01/22/24 1136    Lab Status: Final result Updated: 01/22/24 1359     POC Glucose 180 mg/dl     HS Troponin I 4hr [[id number]]  (Normal) Collected: 01/22/24 1039    Lab Status: Final result Specimen: Blood from Arm, Right Updated: 01/22/24 1118     hs TnI 4hr 4 ng/L      Delta 4hr hsTnI 1 ng/L     HS Troponin I 2hr [[id number]]  (Normal) Collected: 01/22/24 0820    Lab Status: Final result Specimen: Blood Updated: 01/22/24 0931     hs TnI 2hr 2 ng/L      Delta 2hr hsTnI -1 ng/L     Urine Microscopic [[id number]]  (Abnormal) Collected:  01/22/24 0725    Lab Status: Final result Specimen: Urine, Other Updated: 01/22/24 0816     RBC, UA 1-2 /hpf      WBC, UA 2-4 /hpf      Epithelial Cells Occasional /hpf      Bacteria, UA Innumerable /hpf     UA w Reflex to Microscopic w Reflex to Culture [[id number]]  (Abnormal) Collected: 01/22/24 0725    Lab Status: Final result Specimen: Urine, Other Updated: 01/22/24 0742     Color, UA Light Yellow     Clarity, UA Clear     Specific Gravity, UA >=1.050     pH, UA 7.0     Leukocytes, UA Negative     Nitrite, UA Negative     Protein, UA Trace mg/dl      Glucose, UA Negative mg/dl      Ketones, UA Negative mg/dl      Urobilinogen, UA <2.0 mg/dl      Bilirubin, UA Negative     Occult Blood, UA Negative    FLU/RSV/COVID - if FLU/RSV clinically relevant [[id number]]  (Normal) Collected: 01/22/24 0617    Lab Status: Final result Specimen: Nares from Nose Updated: 01/22/24 0658     SARS-CoV-2 Negative     INFLUENZA A PCR Negative     INFLUENZA B PCR Negative     RSV PCR Negative    Narrative:      FOR PEDIATRIC PATIENTS - copy/paste COVID Guidelines URL to browser: [email or web address]    SARS-CoV-2 assay is a Nucleic Acid Amplification assay intended for the  qualitative detection of nucleic acid from SARS-CoV-2 in nasopharyngeal  swabs. Results are for the presumptive identification of SARS-CoV-2 RNA.    Positive results are indicative of infection with SARS-CoV-2, the virus  causing COVID-19, but do not rule out bacterial infection or co-infection  with other viruses. Laboratories within the [place] and its  territories are required to report all positive results to the appropriate  public health authorities. Negative results do not preclude SARS-CoV-2  infection and should not be used as the sole basis for treatment or other  patient management decisions. Negative results must be combined with  clinical observations, patient history, and epidemiological  information.  This test has not been FDA cleared or approved.    This test has been authorized by FDA under an Emergency Use Authorization  (EUA). This test is only authorized for the duration of time the  declaration that circumstances exist justifying the authorization of the  emergency use of an in vitro diagnostic tests for detection of SARS-CoV-2  virus and/or diagnosis of COVID-19 infection under section 564(b)(1) of  the Act, 21 U.S.C. 360bbb-3(b)(1), unless the authorization is terminated  or revoked sooner. The test has been validated but independent review by FDA  and CLIA is pending.    Test performed using [employer] GeneXpert: This RT-PCR assay targets N2,  a region unique to SARS-CoV-2. A conserved region in the E-gene was chosen  for pan-Sarbecovirus detection which includes SARS-CoV-2.    According to CMS-2020-01-R, this platform meets the definition of high-throughput technology.    Basic metabolic panel [[id number]]  (Abnormal) Collected: 01/22/24 0617    Lab Status: Final result Specimen: Blood from Arm, Right Updated: 01/22/24 0649     Sodium 136 mmol/L      Potassium 3.4 mmol/L      Chloride 101 mmol/L      CO2 27 mmol/L      ANION GAP 8 mmol/L      BUN 10 mg/dL      Creatinine 0.50 mg/dL      Glucose 216 mg/dL      Calcium 9.2 mg/dL      eGFR 98 ml/min/1.73sq m     Narrative:      National Kidney Disease Foundation guidelines for Chronic Kidney Disease (CKD):     Stage 1 with normal or high GFR (GFR > 90 mL/min/1.73 square meters)    Stage 2 Mild CKD (GFR = 60-89 mL/min/1.73 square meters)    Stage 3A Moderate CKD (GFR = 45-59 mL/min/1.73 square meters)    Stage 3B Moderate CKD (GFR = 30-44 mL/min/1.73 square meters)    Stage 4 Severe CKD (GFR = 15-29 mL/min/1.73 square meters)    Stage 5 End Stage CKD (GFR <15 mL/min/1.73 square meters)  Note: GFR calculation is accurate only with a steady state creatinine    HS Troponin 0hr (reflex protocol) [[id number]]  (Normal) Collected: 01/22/24 0617    Lab  Status: Final result Specimen: Blood from Arm, Right Updated: 01/22/24 0648     hs TnI 0hr 3 ng/L     CBC and Platelet [[id number]]  (Normal) Collected: 01/22/24 0617    Lab Status: Final result Specimen: Blood from Arm, Right Updated: 01/22/24 0645     WBC 10.11 Thousand/uL      RBC 4.69 Million/uL      Hemoglobin 14.2 g/dL      Hematocrit 41.5 %      MCV 89 fL      MCH 30.3 pg      MCHC 34.2 g/dL      RDW 12.5 %      Platelets 199 Thousands/uL      MPV 10.7 fL     Protime-INR [[id number]]  (Normal) Collected: 01/22/24 0617    Lab Status: Final result Specimen: Blood from Arm, Right Updated: 01/22/24 0638     Protime 13.8 seconds      INR 1.00    APTT [[id number]]  (Normal) Collected: 01/22/24 0617    Lab Status: Final result Specimen: Blood from Arm, Right Updated: 01/22/24 0638     PTT 31 seconds     Fingerstick Glucose (POCT) [[id number]]  (Abnormal) Collected: 01/22/24 0614    Lab Status: Final result Updated: 01/22/24 0615     POC Glucose 234 mg/dl                    MRI brain wo contrast   Final Result by Sal Perez DO (01/22 0757)      No acute ischemia as clinically questioned. Minor white matter change suggestive of chronic microangiopathy.      Paranasal sinus disease including moderate mucosal thickening of the ethmoid air cells and fluid layering within the left maxillary sinus with component of sinusitis.      Workstation performed: [id number]         CT stroke alert brain   Final Result by Dawna Gamez MD (01/22 0648)      No acute intracranial abnormality.  Nonemergent findings above.         IMPRESSION:      No acute intracranial abnormality. Nonemergent findings above.      Findings were directly discussed with Lilibeth Krause  at 6:48 a.m.      Workstation performed: [id number]         CTA stroke alert (head/neck)   Final Result by Dawna Gamez MD (01/22 0649)      No acute vascular pathology in the head or neck.      Mild atherosclerosis as detailed above.            Findings  were directly discussed with Lilibeth Krause  at 6:48 a.m.                        Workstation performed: [id number]               Procedures  ECG 12 Lead Documentation Only    Date/Time: 1/22/2024 8:11 AM    Performed by: Joaquín Elkins MD  Authorized by: Joaquín Elkins MD    Indications / Diagnosis:  Stroke eval  ECG reviewed by me, the ED Provider: yes    Patient location:  ED  Previous ECG:     Previous ECG:  Compared to current    Similarity:  No change  Interpretation:     Interpretation: non-specific    Rate:     ECG rate:  93    ECG rate assessment: normal    Rhythm:     Rhythm: sinus rhythm    Ectopy:     Ectopy: none    QRS:     QRS axis:  Normal    QRS intervals:  Normal  Conduction:     Conduction: normal    ST segments:     ST segments:  Normal  T waves:     T waves: normal    Comments:      Normal sinus rhythm at 93 bpm, no PACs, no PVCs, no acute ischemic changes.        ED Course  ED Course as of 01/22/24 1609   Mon Jan 22, 2024   0646 Discussed with neurology attending, will proceed with full dose aspirin as well as obtain MRI wake-up protocol.   0656 CBC and Platelet  Reassuring, within normal limits     0657 Basic metabolic panel(!)  Mild hypokalemia, elevated glucose with history of of diabetes   0703 FLU/RSV/COVID - if FLU/RSV clinically relevant  Reassuring, within normal limits     0708 Patient reevaluated, continues with mild aphasia and trouble word finding however denies any new focal symptoms.  Does note however that she has not taken her morning medications at this time including her Percocet, and has back pain as well as a mild headache.   0814 MRI brain wo contrast  Discussed MRI results with patient as well as  at bedside.  Given continued symptoms discussed with neurology who recommends patient be admitted at this time under stroke pathway.                  Stroke Assessment       Row Name 01/22/24 0616             NIH Stroke Scale    Interval --      Level of  Consciousness (1a.) 0      LOC Questions (1b.) 0      LOC Commands (1c.) 0      Best Gaze (2.) 0      Visual (3.) 0      Facial Palsy (4.) 0      Motor Arm, Left (5a.) 0      Motor Arm, Right (5b.) 0      Motor Leg, Left (6a.) 0      Motor Leg, Right (6b.) 0      Limb Ataxia (7.) 0      Sensory (8.) 0      Best Language (9.) 1      Dysarthria (10.) 0      Extinction and Inattention (11.) (Formerly Neglect) 0      Total 1                    Flowsheet Row Most Recent Value   Thrombolytic Decision Options    Thrombolytic Decision Patient not a candidate.   Patient is not a candidate options Unclear time of onset outside appropriate time window.                    SBIRT 20yo+      Flowsheet Row Most Recent Value   Initial Alcohol Screen: US AUDIT-C     1. How often do you have a drink containing alcohol? 0 Filed at: 01/22/2024 0639   2. How many drinks containing alcohol do you have on a typical day you are drinking?  0 Filed at: 01/22/2024 0639   3a. Male UNDER 65: How often do you have five or more drinks on one occasion? 0 Filed at: 01/22/2024 0639   3b. FEMALE Any Age, or MALE 65+: How often do you have 4 or more drinks on one occassion? 0 Filed at: 01/22/2024 0639   Audit-C Score 0 Filed at: 01/22/2024 0639   MELINA: How many times in the past year have you...    Used an illegal drug or used a prescription medication for non-medical reasons? Never Filed at: 01/22/2024 0639                  Medical Decision Making  68-year-old female with past medical history of fibromyalgia who presented with a acute aphasia upon waking this morning noted to have mild aphasia on my examination, NIH stroke scale of 1, stroke alert called due to concern for possible CVA (ischemic versus hemorrhagic), patient not a candidate at this time due to unclear onset of symptoms for TNK however potential candidate for thrombectomy.  CT imaging as well as blood work obtained without acute pertinent findings.  Discussed with neurology given  patient's continued symptoms requested stat MRI which showed no acute infarct at this time.  Neurology came and evaluated patient at bedside, recommended admission for further evaluation.    Amount and/or Complexity of Data Reviewed  Labs: ordered. Decision-making details documented in ED Course.  Radiology: ordered. Decision-making details documented in ED Course.    Risk  OTC drugs.  Prescription drug management.  Decision regarding hospitalization.          Disposition  Final diagnoses:   AMS (altered mental status)   Aphasia     Time reflects when diagnosis was documented in both MDM as applicable and the Disposition within this note       Time User Action Codes Description Comment    1/22/2024  6:16 AM Joaquín Elkins Add [R41.82] AMS (altered mental status)     1/22/2024  8:50 AM Joaquín Elkins Add [R47.01] Aphasia     1/22/2024 11:04 AM Collin Hicks Add [R29.90] Stroke-like symptoms           ED Disposition       ED Disposition   Admit    Condition   Stable    Date/Time   Mon Jan 22, 2024 0850    Comment   Case was discussed with Dr. Aly and the patient's admission status was agreed to be Admission Status: observation status to the service of Dr. Aly.               Follow-up Information    None         Current Discharge Medication List        CONTINUE these medications which have NOT CHANGED    Details   amLODIPine (NORVASC) 10 mg tablet take 1 tablet by mouth once daily  Qty: 90 tablet, Refills: 1    Associated Diagnoses: Essential hypertension      Calcium Polycarbophil (Fiber) 625 MG TABS Take 625 mg by mouth daily      cephalexin (KEFLEX) 500 mg capsule Take 1 capsule (500 mg total) by mouth 2 (two) times a day for 10 days  Qty: 20 capsule, Refills: 0    Associated Diagnoses: Tonsillitis      ergocalciferol (VITAMIN D2) 50,000 units take 1 capsule by mouth every week  Qty: 12 capsule, Refills: 3    Associated Diagnoses: Vitamin D deficiency      escitalopram (LEXAPRO) 10 mg tablet take 1/2  tablet by mouth once daily  Qty: 90 tablet, Refills: 1    Associated Diagnoses: Generalized anxiety disorder      hydrochlorothiazide (HYDRODIURIL) 25 mg tablet take 1 tablet by mouth once daily  Qty: 90 tablet, Refills: 3    Associated Diagnoses: Essential hypertension      leflunomide (ARAVA) 10 MG tablet Take 10 mg by mouth daily      losartan (COZAAR) 50 mg tablet take 1 tablet once daily as directed  Qty: 90 tablet, Refills: 3    Associated Diagnoses: Essential hypertension      metFORMIN (GLUCOPHAGE-XR) 500 mg 24 hr tablet take 1 tablet by mouth three times a day  Qty: 270 tablet, Refills: 3    Associated Diagnoses: Controlled type 2 diabetes mellitus without complication, without long-term current use of insulin (Formerly [facility])      metoprolol succinate (TOPROL-XL) 100 mg 24 hr tablet take 1 tablet by mouth once daily  Qty: 90 tablet, Refills: 3    Associated Diagnoses: Essential hypertension      Multiple Vitamin (MULTIVITAMIN ADULT PO) multivitamin      ondansetron (ZOFRAN) 4 mg tablet take 1 tablet by mouth every 6 hours AS NEEDED FOR NAUSE OR VOMITING  Qty: 60 tablet, Refills: 2    Associated Diagnoses: Nausea      oxyCODONE-acetaminophen (PERCOCET) 5-325 mg per tablet Take 1 tablet by mouth every 8 (eight) hours as needed for moderate pain      simvastatin (ZOCOR) 10 mg tablet take 1 tablet by mouth once daily  Qty: 90 tablet, Refills: 1    Associated Diagnoses: Mixed hyperlipidemia      albuterol (2.5 mg/3 mL) 0.083 % nebulizer solution Take 3 mL (2.5 mg total) by nebulization every 6 (six) hours as needed for wheezing or shortness of breath  Qty: 25 mL, Refills: 5    Associated Diagnoses: Moderate persistent asthma with exacerbation      albuterol (Ventolin HFA) 90 mcg/act inhaler Inhale 2 puffs every 6 (six) hours as needed for wheezing or shortness of breath  Qty: 54 g, Refills: 3    Comments: Substitution to a formulary equivalent within the same pharmaceutical class is authorized.  Associated Diagnoses:  Moderate persistent asthma with exacerbation      ALPRAZolam (XANAX) 0.5 mg tablet Take 0.5 tablets (0.25 mg total) by mouth daily  Qty: 30 tablet, Refills: 0    Associated Diagnoses: Anxiety      budesonide-formoterol (Symbicort) 160-4.5 mcg/act inhaler Inhale 2 puffs 2 (two) times a day Rinse mouth after use.  Qty: 10.2 g, Refills: 3    Associated Diagnoses: Moderate persistent asthma with exacerbation           No discharge procedures on file.    PDMP Review         Value Time User    PDMP Reviewed  Yes 9/5/2023  2:27 PM David Mccartney PA-C             ED Provider  Attending physically available and evaluated Davina Allen. I managed the patient along with the ED Attending.    Electronically Signed by           Joaquín Elkins MD  01/22/24 6165

## 2024-01-22 NOTE — ASSESSMENT & PLAN NOTE
-Word finding difficulty, didn't remember how to operate Keurig this am.  -No motor or sensory deficits, NIH 1 for mild aphasia.  -Stroke alert called, CT head, CTA head and neck, MRI brain negative for any acute intracranial abnormality.    Plan:  Admit under stroke pathway.  Frequent neuro checks, fall precautions  Telemetry monitoring, Echocardiogram   Neurology: TIA vs hypertensive encephalopathy. Recommendations: 21 days of DAPT therapy with Aspirin and clopidogrel, normotensive BP.  For any new neurological changes get STAT CT head.

## 2024-01-22 NOTE — H&P
[facility]  H&P  Name: Davina Allen 68 y.o. female I MRN: [id number]  Unit/Bed#: ED-30 I Date of Admission: 1/22/2024   Date of Service: 1/22/2024 I Hospital Day: 0      Assessment/Plan   * Stroke-like symptoms  Assessment & Plan  -Word finding difficulty, didn't remember how to operate [place] this am.  -No motor or sensory deficits, NIH 1 for mild aphasia.  -Stroke alert called, CT head, CTA head and neck, MRI brain negative for any acute intracranial abnormality.    Plan:  Admit under stroke pathway.  Frequent neuro checks, fall precautions  Telemetry monitoring, Echocardiogram   Neurology: TIA vs hypertensive encephalopathy. Recommendations: 21 days of DAPT therapy with Aspirin and clopidogrel, normotensive BP.  For any new neurological changes get STAT CT head.    Controlled diabetes mellitus type II without complication (HCC)  Assessment & Plan  Lab Results   Component Value Date    HGBA1C 7.7 (H) 10/16/2023       Recent Labs     01/22/24  0614 01/22/24  1136   POCGLU 234* 180*       Blood Sugar Average: Last 72 hrs:  (P) 207    Hold home metformin.    Insulin sliding scale.  Carb controlled diet.  Follow up Hb A1C level.    Benign essential hypertension  Assessment & Plan  Home meds: amlodipine, losartan, metoprolol, hydrochlorothiazide  BP currently in 120's    Will start metoprolol and half dose of amlodipine at this time.  Can resume remaining home meds if BP trends higher.    Sinusitis  Assessment & Plan  -CT findings of acute on chronic sinusitis.  -CT soft tissue neck 1/17 showed, tonsillitis and reactive adenopathy.  -continue keflex 10 day course.    Hypercholesterolemia  Assessment & Plan  Home med simvastatin 10 mg, not in formulary.    -will transition to atorvastatin 40 mg based on neurology recommendations.  -Follow up on lipid panel.    Rheumatoid arthritis involving multiple sites with positive rheumatoid factor (HCC)  Assessment & Plan  -Continue lefulnomide,  oxycodone prn.         VTE Pharmacologic Prophylaxis: VTE Score: 16 High Risk (Score >/= 5) - Pharmacological DVT Prophylaxis Ordered: enoxaparin (Lovenox). Sequential Compression Devices Ordered.  Code Status: Level 1 - Full Code Patient  Discussion with family: Updated  () at bedside.    Anticipated Length of Stay: Patient will be admitted on an observation basis with an anticipated length of stay of less than 2 midnights secondary to stroke like symptoms.    Chief Complaint: word finding difficulty.    History of Present Illness:  Davina Allen is a 68 y.o. female with a PMH of Diabetes, HTN, Hypercholesterolemia, RA, fibromyalgia, squamous cell carcinoma who presents with stroke like symptoms of mild aphasia. Patient's last known normal was around midnight. She endorses feeling more fatigued and achy all over for the last two days which she contributes to combination of RA and fibromyalgia. She recently discontinued humera for RA treatment as she didn't feel it was helping her symptoms. She woke up early this morning around 5 am and was not able to figure out how to use the Keurig to make tea, her  noted that she is having word finding difficulty and brought her to the ED to rule out stroke. Stroke alert was called in the ED, imaging showed some ICA atherosclerosis with no acute acute intracranial abnormalities. Patient endorses fatigue, generalized myalgias, and continued word finding difficulty. She denies any other symptoms at this time.    Review of Systems:  Review of Systems   Constitutional:  Positive for fatigue. Negative for appetite change, chills and fever.   HENT:  Negative for congestion, postnasal drip, rhinorrhea, sore throat and trouble swallowing.    Eyes:  Negative for discharge.   Respiratory:  Negative for cough, chest tightness and shortness of breath.    Gastrointestinal:  Negative for abdominal pain, blood in stool, constipation, diarrhea and nausea.    Genitourinary:  Negative for difficulty urinating, dysuria and hematuria.   Musculoskeletal:  Positive for myalgias.   Skin:  Negative for color change and rash.   Neurological:  Positive for headaches. Negative for dizziness, syncope and weakness.   Psychiatric/Behavioral:  Negative for confusion.        Past Medical and Surgical History:   Past Medical History:   Diagnosis Date    Acute non-recurrent frontal sinusitis 05/15/2019    Acute non-recurrent maxillary sinusitis 03/10/2020    Allergic In my 60’s    Anxiety     Anxiety disorder     Arthritis     OA    Asthma     exercise iniduced.    Cancer (HCC)     Squamous Cell on back    Dependence on crutches     prn    Depression     ro.     Diabetes mellitus (HCC)     NIDDM; per [employer]: Last Assessed: 7/15/15, New onset of type 2    Diverticulitis of colon     Fibromyalgia     Hearing aid worn     mayra    Heart murmur     HL (hearing loss)     b/l - uses hearing aids    Hyperglycemia     Last Assessed:10/29/15    Hyperlipidemia     Hypertension     Inflammatory bowel disease In my 60’s    LLQ abdominal pain 2020    Pneumonia May 2023    Psoriasis     red dry patch left side- waist area    Psoriatic arthritis (HCC)     RA (rheumatoid arthritis) (Formerly [facility])     Urinary tract infection Currently    Taking med    Wears glasses     reading       Past Surgical History:   Procedure Laterality Date    ARTHROSCOPY KNEE Left 3/2/2016    Procedure: ARTHROSCOPY KNEE;  Surgeon: Daniel Laureano MD;  Location: [facility];  Service:      SECTION      X2    COLONOSCOPY  2019    CYST REMOVAL Right     Cyst on bone     FRACTURE SURGERY      (Right) tibia/fibula Fx, and ankle    PILONIDAL CYST / SINUS EXCISION      AL ARTHRS KNE SURG W/MENISCECTOMY MED/LAT W/SHVG Left 3/2/2016    Procedure: PARTIAL MEDIAL &  LATERAL MENISCECTOMIES, CHONDROPLASTY, REMOVAL OSTEOPHYTE, LIMITED SYNOVECTOMY;  Surgeon: Daniel Laureano MD;  Location: [facility];  Service: Orthopedics    REPAIR  KNEE LIGAMENT      TOOTH EXTRACTION         Meds/Allergies:  Prior to Admission medications    Medication Sig Start Date End Date Taking? Authorizing Provider   amLODIPine (NORVASC) 10 mg tablet take 1 tablet by mouth once daily 11/6/23  Yes Mariaa Layton DO   Calcium Polycarbophil (Fiber) 625 MG TABS Take 625 mg by mouth daily   Yes Historical Provider, MD   cephalexin (KEFLEX) 500 mg capsule Take 1 capsule (500 mg total) by mouth 2 (two) times a day for 10 days 1/17/24 1/27/24 Yes GABRIELE Rich   ergocalciferol (VITAMIN D2) 50,000 units take 1 capsule by mouth every week 8/2/23  Yes Mariaa Layton DO   escitalopram (LEXAPRO) 10 mg tablet take 1/2 tablet by mouth once daily 8/23/23  Yes Mariaa Layton DO   hydrochlorothiazide (HYDRODIURIL) 25 mg tablet take 1 tablet by mouth once daily 2/18/23  Yes Mariaa Layton DO   leflunomide (ARAVA) 10 MG tablet Take 10 mg by mouth daily   Yes Historical Provider, MD   losartan (COZAAR) 50 mg tablet take 1 tablet once daily as directed 5/14/23  Yes Mariaa Layton DO   metFORMIN (GLUCOPHAGE-XR) 500 mg 24 hr tablet take 1 tablet by mouth three times a day  Patient taking differently: 500 mg 2 (two) times a day 1/30/23  Yes Mariaa Layton DO   metoprolol succinate (TOPROL-XL) 100 mg 24 hr tablet take 1 tablet by mouth once daily 5/14/23  Yes Mariaa Layton DO   Multiple Vitamin (MULTIVITAMIN ADULT PO) multivitamin   Yes Historical Provider, MD   ondansetron (ZOFRAN) 4 mg tablet take 1 tablet by mouth every 6 hours AS NEEDED FOR NAUSE OR VOMITING 7/26/23  Yes Yuliet Benitez PA-C   oxyCODONE-acetaminophen (PERCOCET) 5-325 mg per tablet Take 1 tablet by mouth every 8 (eight) hours as needed for moderate pain   Yes Historical Provider, MD   simvastatin (ZOCOR) 10 mg tablet take 1 tablet by mouth once daily 11/6/23  Yes Mariaa Layton DO   albuterol (2.5 mg/3 mL) 0.083 % nebulizer solution Take 3 mL (2.5 mg total) by nebulization every 6 (six) hours as needed for  wheezing or shortness of breath 23   Mariaa Layton DO   albuterol (Ventolin HFA) 90 mcg/act inhaler Inhale 2 puffs every 6 (six) hours as needed for wheezing or shortness of breath 21   Mariaa Layton DO   ALPRAZolam (XANAX) 0.5 mg tablet Take 0.5 tablets (0.25 mg total) by mouth daily 19   Mariaa Layton DO   budesonide-formoterol (Symbicort) 160-4.5 mcg/act inhaler Inhale 2 puffs 2 (two) times a day Rinse mouth after use. 21   Mariaa Layton DO     I have reviewed home medications with patient personally.    Allergies:   Allergies   Allergen Reactions    Zithromax [Azithromycin] Hives    Augmentin [Amoxicillin-Pot Clavulanate] Vomiting    Lisinopril Cough    Cefprozil Diarrhea       Social History:  Marital Status: /Civil Union   Occupation: Retired  Patient Pre-hospital Living Situation: Home  Patient Pre-hospital Level of Mobility: walks  Patient Pre-hospital Diet Restrictions: Carb controlled  Substance Use History:   Social History     Substance and Sexual Activity   Alcohol Use No     Social History     Tobacco Use   Smoking Status Former    Current packs/day: 0.00    Average packs/day: 2.0 packs/day for 25.0 years (50.0 ttl pk-yrs)    Types: Cigarettes    Start date:     Quit date:     Years since quittin.0    Passive exposure: Past   Smokeless Tobacco Never     Social History     Substance and Sexual Activity   Drug Use No       Family History:  Family History   Problem Relation Age of Onset    Hypertension Mother     Heart attack Mother     Osteoarthritis Mother     Breast cancer Mother 82    Arthritis Mother     Cancer Mother     Alcohol abuse Father     Diabetes Sister     No Known Problems Daughter     No Known Problems Daughter     No Known Problems Maternal Grandmother     No Known Problems Maternal Grandfather     No Known Problems Paternal Grandmother     No Known Problems Paternal Grandfather     Hypertension Brother     Diabetes Brother     Other Brother          Dyslipidemia    Coronary artery disease Brother         Diabetes    No Known Problems Maternal Aunt     No Known Problems Paternal Aunt     No Known Problems Paternal Aunt     No Known Problems Paternal Aunt     No Known Problems Paternal Aunt     No Known Problems Paternal Aunt        Physical Exam:     Vitals:   Blood Pressure: 122/81 (01/22/24 1200)  Pulse: 84 (01/22/24 1100)  Temperature: 97.8 °F (36.6 °C) (01/22/24 0615)  Temp Source: Oral (01/22/24 0615)  Respirations: 18 (01/22/24 1200)  Weight - Scale: 81.9 kg (180 lb 8.9 oz) (01/22/24 0615)  SpO2: 95 % (01/22/24 1100)    Physical Exam  Vitals reviewed.   Constitutional:       General: She is not in acute distress.     Appearance: She is obese.   HENT:      Head: Normocephalic and atraumatic.      Mouth/Throat:      Mouth: Mucous membranes are moist.      Pharynx: Oropharynx is clear.   Eyes:      General: No scleral icterus.     Extraocular Movements: Extraocular movements intact.      Conjunctiva/sclera: Conjunctivae normal.   Cardiovascular:      Rate and Rhythm: Normal rate and regular rhythm.      Pulses: Normal pulses.   Pulmonary:      Effort: Pulmonary effort is normal. No respiratory distress.      Breath sounds: Normal breath sounds. No stridor. No wheezing.   Abdominal:      General: Bowel sounds are normal. There is no distension.      Palpations: Abdomen is soft. There is no mass.      Tenderness: There is no abdominal tenderness.      Hernia: No hernia is present.   Musculoskeletal:         General: No swelling or tenderness.      Right lower leg: No edema.      Left lower leg: No edema.   Skin:     General: Skin is warm.      Findings: No lesion or rash.   Neurological:      Mental Status: She is alert and oriented to person, place, and time. Mental status is at baseline.      Cranial Nerves: No cranial nerve deficit.      Sensory: No sensory deficit.      Comments: Mild aphasia, occasional word finding difficulty.   Psychiatric:          Mood and Affect: Mood normal.         Behavior: Behavior normal.         Thought Content: Thought content normal.         Judgment: Judgment normal.          Additional Data:     Lab Results:  Results from last 7 days   Lab Units 01/22/24  1400 01/22/24  0617   WBC Thousand/uL  --  10.11   HEMOGLOBIN g/dL  --  14.2   HEMATOCRIT %  --  41.5   PLATELETS Thousands/uL 223 199     Results from last 7 days   Lab Units 01/22/24  0617   SODIUM mmol/L 136   POTASSIUM mmol/L 3.4*   CHLORIDE mmol/L 101   CO2 mmol/L 27   BUN mg/dL 10   CREATININE mg/dL 0.50*   ANION GAP mmol/L 8   CALCIUM mg/dL 9.2   GLUCOSE RANDOM mg/dL 216*     Results from last 7 days   Lab Units 01/22/24  0617   INR  1.00     Results from last 7 days   Lab Units 01/22/24  1136 01/22/24  0614   POC GLUCOSE mg/dl 180* 234*               Lines/Drains:  Invasive Devices       Peripheral Intravenous Line  Duration             Peripheral IV 01/22/24 Proximal;Right;Ventral (anterior) Forearm <1 day                        Imaging: Reviewed radiology reports from this admission including: CT head and MRI brain  MRI brain wo contrast   Final Result by Sal Perez DO (01/22 0757)      No acute ischemia as clinically questioned. Minor white matter change suggestive of chronic microangiopathy.      Paranasal sinus disease including moderate mucosal thickening of the ethmoid air cells and fluid layering within the left maxillary sinus with component of sinusitis.      Workstation performed: [id number]         CT stroke alert brain   Final Result by Dawna Gamez MD (01/22 0648)      No acute intracranial abnormality.  Nonemergent findings above.         IMPRESSION:      No acute intracranial abnormality. Nonemergent findings above.      Findings were directly discussed with Lilibeth Krause  at 6:48 a.m.      Workstation performed: [id number]         CTA stroke alert (head/neck)   Final Result by Dawna Gamez MD (01/22 0649)      No acute vascular  pathology in the head or neck.      Mild atherosclerosis as detailed above.            Findings were directly discussed with Lilibeth Krause  at 6:48 a.m.                        Workstation performed: [id number]             EKG and Other Studies Reviewed on Admission:   EKG: NSR. HR 93.    ** Please Note: This note has been constructed using a voice recognition system. **

## 2024-01-22 NOTE — ASSESSMENT & PLAN NOTE
Lab Results   Component Value Date    HGBA1C 7.7 (H) 10/16/2023       Recent Labs     01/22/24  0614 01/22/24  1136   POCGLU 234* 180*       Blood Sugar Average: Last 72 hrs:  (P) 207    Hold home metformin.    Insulin sliding scale.  Carb controlled diet.  Follow up Hb A1C level.

## 2024-01-22 NOTE — ASSESSMENT & PLAN NOTE
Home meds: amlodipine, losartan, metoprolol, hydrochlorothiazide  BP currently in 120's    Will start metoprolol and half dose of amlodipine at this time.  Can resume remaining home meds if BP trends higher.

## 2024-01-22 NOTE — CONSULTS
NEUROLOGY RESIDENCY CONSULT NOTE     Name: Davina Allen   Age & Sex: 68 y.o. female   MRN: [id number]  Unit/Bed#: ED-30   Encounter: [id number]  Length of Stay: 0    Recommendations for outpatient neurological follow up: have yet to be determined.     Pending for discharge: Stroke pathway completion     ASSESSMENT & PLAN     * Stroke-like symptoms  Assessment & Plan  Assessment:  Patient is a 68-year-old female with past medical history significant for hypercholesterolemia, diabetes, hypertension, RA, osteoarthritis, bilateral cataracts, pulmonary nodules, squamous cell carcinoma skin, and anxiety disorder that presented recently [place] on January 22, 2020 for a stroke alert.  Last known well 1/21/2024, 2400, NIH 1 (mild aphasia per ED exam).  Blood pressure 192/80.  Glucose 116.  Noncontrast CT head demonstrated no acute intracranial abnormality.  CTA stroke alert head and neck demonstrate no acute vascular pathology in head or neck, mild atherosclerosis noted.  MRI demonstrated no acute ischemic event.  Chronic microangiopathy noted.    Impression:  Taking into account the patient's presentation, medical history consist taking of hypercholesterolemia, diabetes, hypertension, as risk factors with neuraxial imaging not demonstrating acute vascular event, a transient ischemic attack versus hypertensive encephalopathy may be considered.    Plan:  - Case discussed with attending neurologist Dr. Layne  - Recommend admission under stroke pathway  - Recommend normotension, normothermia, euglycemia  - Recommend 2D echocardiogram with bubble study  - Recommend atorvastatin 40 mg daily  - Recommend loading with 300 mg of clopidogrel on January 22, 2024, and continue with 75 mg daily on January 23, 2024 for 21 days  - Recommend aspirin 81 mg daily  --Recommend DAPT (clopidogrel, aspirin) for 21 days with subsequent monotherapy of aspirin..  - Recommend updated hemoglobin A1c, TSH with T4, lipid panel, vitamin D,  vitamin B 9, vitamin B12  - Recommend fall precautions  - Recommend continued evaluation and treatment of any metabolic, infectious, Fematrix arrangements  - Mechanical and chemical DVT prophylax extremity appreciated  - Recommend PT/OT  - Recommend STAT CT head noncontrast for acute changes mental status/neurologic examination, please contact neurology  - Rest per primary team appreciated      SUBJECTIVE     Reason for Consult / Principal Problem: Stroke Alert  Hx and PE limited by: Anxiety, hard of hearing    HPI: Davina Allen is a 68 y.o. female with past medical history significant for hypercholesterolemia, diabetes, hypertension, rheumatoid arthritis, osteoarthritis, bilateral cataracts, pulmonary nodules, squamous cell carcinoma of skin, and anxiety disorder, the presented to [facility] on January 22, 2024 as a stroke alert.  Last known well 1/21/2024, 2400, NIH 1 (mild aphasia per ED exam).  Blood pressure 192/80.  Glucose 216.  Noncontrast CT of head demonstrated no acute intracranial abnormality.  CTA stroke alert head and neck demonstrated no acute vascular pathology in the head or neck, mild atherosclerosis noted.    -Patient maintained on amlodipine 10 mg daily, HCTZ 25 mg daily, losartan 50 mg daily, metoprolol succinate 100 mg daily, simvastatin 10 mg daily, amongst others at home.    Prior imaging reviewed:  - 2D echocardiogram pleated 3/18/2014, stress echocardiogram demonstrating no regional wall motion abnormalities, left ventricular size normal, ejection fraction 55 to 65%, left ventricular systolic function normal.    Prior labs reviewed:  - Hemoglobin A1c 10/16/2023: 7.7  - TSH, 5/9/2023: 1.060  - Lipid panel, 5/9/2023: Cholesterol 128, trig glycerides 121, HDL 44, LDL 60  - Vitamin D, 9/12/2018: 14.5    NIHSS:  1a.Level of Consciousness: 0 = Alert   1b. LOC Questions: 0 = Answers both correctly   1c. LOC Commands: 0 = Obeys both correctly   2. Best Gaze: 0 = Normal   3. Visual: 0 = No  "visual field loss   4. Facial Palsy: 0=Normal symmetric movement   5a. Motor Right Arm: 0=No drift, limb holds 90 (or 45) degrees for full 10 seconds   5b. Motor Left Arm: 0=No drift, limb holds 90 (or 45) degrees for full 10 seconds   6a. Motor Right Le=No drift, limb holds 90 (or 45) degrees for full 10 seconds   6b. Motor Left Le=No drift, limb holds 90 (or 45) degrees for full 10 seconds   7. Limb Ataxia:  0=Absent   8. Sensory: 0=Normal; no sensory loss   9. Best Language:  0=No aphasia, normal   10. Dysarthria: 0=Normal articulation   11. Extinction and Inattention (formerly Neglect): 0=No abnormality   Total Score: 0     After stroke alert evaluation was completed, additional information provided by the patient resulted in the following information.  The patient reported that she had some word finding difficulties especially with expressing herself, reporting that she would use the incorrect words, aware of the incorrect words being used.  The patient reported that she was at her \" completely normal myself\" this past Monday.  The patient's partner also reported that on 2024 at 0515 that the patient demonstrated confusion, which he especially noted when the patient had difficulty placing the lid of the coffee pot in his correct place.    Inpatient consult to Neurology  Consult performed by: Issac Dumont DO  Consult ordered by: Collin Hicks MD        Historical Information   Past Medical History:   Diagnosis Date    Acute non-recurrent frontal sinusitis 05/15/2019    Acute non-recurrent maxillary sinusitis 03/10/2020    Allergic In my 60’s    Anxiety     Anxiety disorder     Arthritis     OA    Asthma     exercise iniduced.    Cancer (HCC)     Squamous Cell on back    Dependence on crutches     prn    Depression     ro.     Diabetes mellitus (HCC)     NIDDM; per [employer]: Last Assessed: 7/15/15, New onset of type 2    Diverticulitis of colon     Fibromyalgia     Hearing aid worn     " mayra    Heart murmur     HL (hearing loss)     b/l - uses hearing aids    Hyperglycemia     Last Assessed:10/29/15    Hyperlipidemia     Hypertension     Inflammatory bowel disease In my 60’s    LLQ abdominal pain 2020    Pneumonia May 2023    Psoriasis     red dry patch left side- waist area    Psoriatic arthritis (HCC)     RA (rheumatoid arthritis) (HCC)     Urinary tract infection Currently    Taking med    Wears glasses     reading     Past Surgical History:   Procedure Laterality Date    ARTHROSCOPY KNEE Left 3/2/2016    Procedure: ARTHROSCOPY KNEE;  Surgeon: Daniel Laureano MD;  Location: [facility];  Service:      SECTION      X2    COLONOSCOPY  2019    CYST REMOVAL Right     Cyst on bone     FRACTURE SURGERY      (Right) tibia/fibula Fx, and ankle    PILONIDAL CYST / SINUS EXCISION      KS ARTHRS KNE SURG W/MENISCECTOMY MED/LAT W/SHVG Left 3/2/2016    Procedure: PARTIAL MEDIAL &  LATERAL MENISCECTOMIES, CHONDROPLASTY, REMOVAL OSTEOPHYTE, LIMITED SYNOVECTOMY;  Surgeon: Daniel Laureano MD;  Location: [facility];  Service: Orthopedics    REPAIR KNEE LIGAMENT      TOOTH EXTRACTION       Social History   Social History     Substance and Sexual Activity   Alcohol Use No     Social History     Substance and Sexual Activity   Drug Use No     E-Cigarette/Vaping    E-Cigarette Use Never User      E-Cigarette/Vaping Substances    Nicotine No     THC No     CBD No     Flavoring No     Other No     Unknown No      Social History     Tobacco Use   Smoking Status Former    Current packs/day: 0.00    Average packs/day: 2.0 packs/day for 25.0 years (50.0 ttl pk-yrs)    Types: Cigarettes    Start date:     Quit date:     Years since quittin.0    Passive exposure: Past   Smokeless Tobacco Never     Family History:   Family History   Problem Relation Age of Onset    Hypertension Mother     Heart attack Mother     Osteoarthritis Mother     Breast cancer Mother 82    Arthritis Mother     Cancer Mother      Alcohol abuse Father     Diabetes Sister     No Known Problems Daughter     No Known Problems Daughter     No Known Problems Maternal Grandmother     No Known Problems Maternal Grandfather     No Known Problems Paternal Grandmother     No Known Problems Paternal Grandfather     Hypertension Brother     Diabetes Brother     Other Brother         Dyslipidemia    Coronary artery disease Brother         Diabetes    No Known Problems Maternal Aunt     No Known Problems Paternal Aunt     No Known Problems Paternal Aunt     No Known Problems Paternal Aunt     No Known Problems Paternal Aunt     No Known Problems Paternal Aunt      Meds/Allergies   current meds:   Current Facility-Administered Medications   Medication Dose Route Frequency    acetaminophen (TYLENOL) tablet 650 mg  650 mg Oral Q6H PRN    amLODIPine (NORVASC) tablet 5 mg  5 mg Oral Daily    cephalexin (KEFLEX) capsule 500 mg  500 mg Oral BID    enoxaparin (LOVENOX) subcutaneous injection 40 mg  40 mg Subcutaneous Daily    escitalopram (LEXAPRO) tablet 5 mg  5 mg Oral Daily    insulin lispro (HumaLOG) 100 units/mL subcutaneous injection 1-5 Units  1-5 Units Subcutaneous HS    insulin lispro (HumaLOG) 100 units/mL subcutaneous injection 1-6 Units  1-6 Units Subcutaneous TID AC    leflunomide (ARAVA) tablet 10 mg  10 mg Oral Daily    metoprolol succinate (TOPROL-XL) 24 hr tablet 100 mg  100 mg Oral Daily    ondansetron (ZOFRAN) injection 4 mg  4 mg Intravenous Q6H PRN    oxyCODONE-acetaminophen (PERCOCET) 5-325 mg per tablet 1 tablet  1 tablet Oral Q8H PRN    pravastatin (PRAVACHOL) tablet 20 mg  20 mg Oral Daily    psyllium (METAMUCIL) 1 packet  1 packet Oral Daily    and PTA meds:   Prior to Admission Medications   Prescriptions Last Dose Informant Patient Reported? Taking?   ALPRAZolam (XANAX) 0.5 mg tablet More than a month Self No No   Sig: Take 0.5 tablets (0.25 mg total) by mouth daily   Calcium Polycarbophil (Fiber) 625 MG TABS 1/21/2024  Yes Yes    Sig: Take 625 mg by mouth daily   Multiple Vitamin (MULTIVITAMIN ADULT PO) 1/21/2024  Yes Yes   Sig: multivitamin   albuterol (2.5 mg/3 mL) 0.083 % nebulizer solution More than a month  No No   Sig: Take 3 mL (2.5 mg total) by nebulization every 6 (six) hours as needed for wheezing or shortness of breath   albuterol (Ventolin HFA) 90 mcg/act inhaler More than a month Self No No   Sig: Inhale 2 puffs every 6 (six) hours as needed for wheezing or shortness of breath   amLODIPine (NORVASC) 10 mg tablet 1/21/2024  No Yes   Sig: take 1 tablet by mouth once daily   budesonide-formoterol (Symbicort) 160-4.5 mcg/act inhaler More than a month Self No No   Sig: Inhale 2 puffs 2 (two) times a day Rinse mouth after use.   cephalexin (KEFLEX) 500 mg capsule 1/21/2024  No Yes   Sig: Take 1 capsule (500 mg total) by mouth 2 (two) times a day for 10 days   ergocalciferol (VITAMIN D2) 50,000 units 1/21/2024  No Yes   Sig: take 1 capsule by mouth every week   escitalopram (LEXAPRO) 10 mg tablet 1/21/2024  No Yes   Sig: take 1/2 tablet by mouth once daily   hydrochlorothiazide (HYDRODIURIL) 25 mg tablet 1/21/2024 Self No Yes   Sig: take 1 tablet by mouth once daily   leflunomide (ARAVA) 10 MG tablet 1/21/2024  Yes Yes   Sig: Take 10 mg by mouth daily   losartan (COZAAR) 50 mg tablet 1/21/2024  No Yes   Sig: take 1 tablet once daily as directed   metFORMIN (GLUCOPHAGE-XR) 500 mg 24 hr tablet 1/21/2024 Self No Yes   Sig: take 1 tablet by mouth three times a day   Patient taking differently: 500 mg 2 (two) times a day   metoprolol succinate (TOPROL-XL) 100 mg 24 hr tablet 1/21/2024  No Yes   Sig: take 1 tablet by mouth once daily   ondansetron (ZOFRAN) 4 mg tablet Past Week  No Yes   Sig: take 1 tablet by mouth every 6 hours AS NEEDED FOR NAUSE OR VOMITING   oxyCODONE-acetaminophen (PERCOCET) 5-325 mg per tablet 1/21/2024 Self Yes Yes   Sig: Take 1 tablet by mouth every 8 (eight) hours as needed for moderate pain   simvastatin (ZOCOR)  10 mg tablet 2024  No Yes   Sig: take 1 tablet by mouth once daily      Facility-Administered Medications: None     Allergies   Allergen Reactions    Zithromax [Azithromycin] Hives    Augmentin [Amoxicillin-Pot Clavulanate] Vomiting    Lisinopril Cough    Cefprozil Diarrhea     Review of Systems   Constitutional:  Positive for fatigue. Negative for appetite change, chills and fever.   HENT:  Negative for ear pain, mouth sores, nosebleeds, sore throat, trouble swallowing and voice change.    Eyes:  Negative for pain and visual disturbance.   Respiratory:  Negative for cough and shortness of breath.    Cardiovascular:  Negative for chest pain and palpitations.   Gastrointestinal:  Negative for abdominal pain, constipation, diarrhea, nausea and vomiting.   Genitourinary:  Negative for dysuria, hematuria and urgency.   Musculoskeletal:  Negative for arthralgias and back pain.   Skin:  Negative for color change and rash.   Neurological:  Negative for dizziness, tremors, seizures, syncope, facial asymmetry, speech difficulty, weakness, light-headedness, numbness and headaches.   Psychiatric/Behavioral:  Negative for agitation, behavioral problems, confusion, decreased concentration, dysphoric mood, hallucinations, self-injury, sleep disturbance and suicidal ideas. The patient is nervous/anxious. The patient is not hyperactive.    All other systems reviewed and are negative.    OBJECTIVE     Patient ID: Davina Allen is a 68 y.o. female.    Vitals:   Vitals:    24 0830 24 1000 24 1100 24 1200   BP: 156/72 118/62 127/63 122/81   BP Location:       Pulse: 83 85 84    Resp: 18   18   Temp:       TempSrc:       SpO2: 97% 95% 95%    Weight:          Body mass index is 33.02 kg/m².   No intake or output data in the 24 hours ending 24 1321    Temperature:   Temp (24hrs), Av.8 °F (36.6 °C), Min:97.8 °F (36.6 °C), Max:97.8 °F (36.6 °C)    Temperature: 97.8 °F (36.6 °C)    Invasive Devices:    Invasive Devices       Peripheral Intravenous Line  Duration             Peripheral IV 01/22/24 Proximal;Right;Ventral (anterior) Forearm <1 day                  Physical Exam  Vitals and nursing note reviewed.   Constitutional:       General: She is not in acute distress.     Appearance: She is well-developed.   HENT:      Head: Normocephalic and atraumatic.      Nose: Nose normal.      Mouth/Throat:      Mouth: Mucous membranes are moist.      Pharynx: Oropharynx is clear.   Eyes:      Extraocular Movements: Extraocular movements intact and EOM normal.      Conjunctiva/sclera: Conjunctivae normal.      Pupils: Pupils are equal, round, and reactive to light.   Cardiovascular:      Rate and Rhythm: Normal rate.      Pulses: Normal pulses.   Pulmonary:      Effort: Pulmonary effort is normal. No respiratory distress.   Abdominal:      Tenderness: There is no abdominal tenderness.   Musculoskeletal:         General: No swelling.      Cervical back: Neck supple.      Right lower leg: Edema present.      Left lower leg: Edema present.   Skin:     General: Skin is warm and dry.      Capillary Refill: Capillary refill takes less than 2 seconds.   Neurological:      Mental Status: She is alert.      Cranial Nerves: No cranial nerve deficit.      Sensory: No sensory deficit.      Motor: Motor strength is normal.No weakness.      Coordination: Coordination normal. Finger-Nose-Finger Test and Heel to Shin Test normal.      Gait: Gait normal.      Deep Tendon Reflexes: Reflexes normal.      Reflex Scores:       Tricep reflexes are 2+ on the right side and 2+ on the left side.       Bicep reflexes are 2+ on the right side and 2+ on the left side.       Brachioradialis reflexes are 2+ on the right side and 2+ on the left side.       Achilles reflexes are 2+ on the right side and 2+ on the left side.  Psychiatric:         Mood and Affect: Mood normal.         Speech: Speech normal.          Neurologic Exam     Mental Status    Oriented to person.   Oriented to place.   Oriented to time. Oriented to year, month and date.   Follows 3 step commands.   Attention: normal. Concentration: normal.   Speech: speech is normal   Able to perform simple calculations.   Able to name object. Able to repeat. Normal comprehension.     Cranial Nerves     CN II   Right visual field deficit: none  Left visual field deficit: none     CN III, IV, VI   Pupils are equal, round, and reactive to light.  Extraocular motions are normal.   Right pupil: Size: 3 mm. Shape: regular. Reactivity: brisk. Accommodation: intact.   Left pupil: Size: 3 mm. Shape: regular. Reactivity: brisk. Accommodation: intact.   CN III: no CN III palsy  CN VI: no CN VI palsy  Nystagmus: none   Diplopia: none  Ophthalmoparesis: none    CN V   Right facial sensation deficit: none  Left facial sensation deficit: none    CN VII   Right facial weakness: none  Left facial weakness: none    CN VIII   Hearing: impaired    CN IX, X   Palate: symmetric    CN XI   Right sternocleidomastoid strength: normal  Left sternocleidomastoid strength: normal  Right trapezius strength: normal  Left trapezius strength: normal    CN XII   Tongue: not atrophic  Fasciculations: absent  Tongue deviation: none    Motor Exam   Muscle bulk: normal  Overall muscle tone: normal  Right arm tone: normal  Left arm tone: normal  Right arm pronator drift: absent  Left arm pronator drift: absent  Right leg tone: normal  Left leg tone: normal    Strength   Strength 5/5 throughout.     Sensory Exam   Right arm light touch: normal  Left arm light touch: normal  Right leg light touch: normal  Left leg light touch: normal  Right arm vibration: normal  Left arm vibration: normal  Right leg vibration: normal  Left leg vibration: normal    Gait, Coordination, and Reflexes     Coordination   Finger to nose coordination: normal  Heel to shin coordination: normal    Tremor   Resting tremor: absent  Intention tremor: absent    Reflexes    Right brachioradialis: 2+  Left brachioradialis: 2+  Right biceps: 2+  Left biceps: 2+  Right triceps: 2+  Left triceps: 2+  Right achilles: 2+  Left achilles: 2+  Right : 2+  Left : 2+  Right plantar: normal  Left plantar: normal  Right Villegas: absent  Left Villegas: absent  Right ankle clonus: absent  Left ankle clonus: absent     LABORATORY DATA     Labs: I have personally reviewed pertinent reports.    Results from last 7 days   Lab Units 01/22/24  0617   WBC Thousand/uL 10.11   HEMOGLOBIN g/dL 14.2   HEMATOCRIT % 41.5   PLATELETS Thousands/uL 199      Results from last 7 days   Lab Units 01/22/24  0617 01/17/24  1432   POTASSIUM mmol/L 3.4* 3.4*   CHLORIDE mmol/L 101 97   CO2 mmol/L 27 26   BUN mg/dL 10 8   CREATININE mg/dL 0.50* 0.59*   CALCIUM mg/dL 9.2 9.4              Results from last 7 days   Lab Units 01/22/24  0617   INR  1.00   PTT seconds 31               IMAGING & DIAGNOSTIC TESTING     Radiology Results: I have personally reviewed pertinent reports.    MRI brain wo contrast   Final Result by Sal Perez DO (01/22 0757)      No acute ischemia as clinically questioned. Minor white matter change suggestive of chronic microangiopathy.      Paranasal sinus disease including moderate mucosal thickening of the ethmoid air cells and fluid layering within the left maxillary sinus with component of sinusitis.      Workstation performed: [id number]         CT stroke alert brain   Final Result by Dawna Gamez MD (01/22 0648)      No acute intracranial abnormality.  Nonemergent findings above.         IMPRESSION:      No acute intracranial abnormality. Nonemergent findings above.      Findings were directly discussed with Lilibeth Krause  at 6:48 a.m.      Workstation performed: [id number]         CTA stroke alert (head/neck)   Final Result by Dawna Gamez MD (01/22 0649)      No acute vascular pathology in the head or neck.      Mild atherosclerosis as detailed above.             Findings were directly discussed with Lilibeth Krause  at 6:48 a.m.                        Workstation performed: [id number]           Other Diagnostic Testing: I have personally reviewed pertinent reports.      ACTIVE MEDICATIONS     Current Facility-Administered Medications   Medication Dose Route Frequency    acetaminophen (TYLENOL) tablet 650 mg  650 mg Oral Q6H PRN    amLODIPine (NORVASC) tablet 5 mg  5 mg Oral Daily    cephalexin (KEFLEX) capsule 500 mg  500 mg Oral BID    enoxaparin (LOVENOX) subcutaneous injection 40 mg  40 mg Subcutaneous Daily    escitalopram (LEXAPRO) tablet 5 mg  5 mg Oral Daily    insulin lispro (HumaLOG) 100 units/mL subcutaneous injection 1-5 Units  1-5 Units Subcutaneous HS    insulin lispro (HumaLOG) 100 units/mL subcutaneous injection 1-6 Units  1-6 Units Subcutaneous TID AC    leflunomide (ARAVA) tablet 10 mg  10 mg Oral Daily    metoprolol succinate (TOPROL-XL) 24 hr tablet 100 mg  100 mg Oral Daily    ondansetron (ZOFRAN) injection 4 mg  4 mg Intravenous Q6H PRN    oxyCODONE-acetaminophen (PERCOCET) 5-325 mg per tablet 1 tablet  1 tablet Oral Q8H PRN    pravastatin (PRAVACHOL) tablet 20 mg  20 mg Oral Daily    psyllium (METAMUCIL) 1 packet  1 packet Oral Daily     Prior to Admission medications    Medication Sig Start Date End Date Taking? Authorizing Provider   amLODIPine (NORVASC) 10 mg tablet take 1 tablet by mouth once daily 11/6/23  Yes Mariaa Layton DO   cephalexin (KEFLEX) 500 mg capsule Take 1 capsule (500 mg total) by mouth 2 (two) times a day for 10 days 1/17/24 1/27/24 Yes GABRIELE Rich   ergocalciferol (VITAMIN D2) 50,000 units take 1 capsule by mouth every week 8/2/23  Yes Mariaa Layton DO   escitalopram (LEXAPRO) 10 mg tablet take 1/2 tablet by mouth once daily 8/23/23  Yes Mariaa Layton DO   hydrochlorothiazide (HYDRODIURIL) 25 mg tablet take 1 tablet by mouth once daily 2/18/23  Yes Mariaa Layton DO   leflunomide (ARAVA) 10 MG tablet Take 10 mg  by mouth daily   Yes Historical Provider, MD   losartan (COZAAR) 50 mg tablet take 1 tablet once daily as directed 5/14/23  Yes Mariaa Layton DO   metFORMIN (GLUCOPHAGE-XR) 500 mg 24 hr tablet take 1 tablet by mouth three times a day  Patient taking differently: 500 mg 2 (two) times a day 1/30/23  Yes Mariaa Layton DO   metoprolol succinate (TOPROL-XL) 100 mg 24 hr tablet take 1 tablet by mouth once daily 5/14/23  Yes Mariaa Layton DO   Multiple Vitamin (MULTIVITAMIN ADULT PO) multivitamin   Yes Historical Provider, MD   ondansetron (ZOFRAN) 4 mg tablet take 1 tablet by mouth every 6 hours AS NEEDED FOR NAUSE OR VOMITING 7/26/23  Yes Yuliet Benitez PA-C   oxyCODONE-acetaminophen (PERCOCET) 5-325 mg per tablet Take 1 tablet by mouth every 8 (eight) hours as needed for moderate pain   Yes Historical Provider, MD   simvastatin (ZOCOR) 10 mg tablet take 1 tablet by mouth once daily 11/6/23  Yes Mariaa Layton DO   albuterol (2.5 mg/3 mL) 0.083 % nebulizer solution Take 3 mL (2.5 mg total) by nebulization every 6 (six) hours as needed for wheezing or shortness of breath 5/30/23   Mariaa Layton DO   albuterol (Ventolin HFA) 90 mcg/act inhaler Inhale 2 puffs every 6 (six) hours as needed for wheezing or shortness of breath 8/31/21   Mariaa Layton DO   ALPRAZolam (XANAX) 0.5 mg tablet Take 0.5 tablets (0.25 mg total) by mouth daily 12/16/19   Mariaa Layton DO   budesonide-formoterol (Symbicort) 160-4.5 mcg/act inhaler Inhale 2 puffs 2 (two) times a day Rinse mouth after use. 8/31/21   Mariaa Layton DO   Calcium Polycarbophil (Fiber) 625 MG TABS Take 625 mg by mouth daily  Patient not taking: Reported on 1/22/2024    Historical Provider, MD     CODE STATUS & ADVANCED DIRECTIVES     Code Status: Level 1 - Full Code  Advance Directive and Living Will:      Power of :    POLST:      VTE Pharmacologic Prophylaxis: Per Primary Team  VTE Mechanical Prophylaxis: Per Primary Team    ======    I have discussed the  patient's history, physical exam findings, assessment, and plan in detail with attending, Dr. Apodaca    Thank you for allowing me to participate in the care of your patient, Davina Allen.    Issac Dumont DO  [facility] Neurology Residency, PGY-3

## 2024-01-23 ENCOUNTER — APPOINTMENT (OUTPATIENT)
Dept: RADIOLOGY | Facility: HOSPITAL | Age: 69
End: 2024-01-23
Payer: COMMERCIAL

## 2024-01-23 VITALS
HEART RATE: 87 BPM | TEMPERATURE: 98.8 F | SYSTOLIC BLOOD PRESSURE: 152 MMHG | RESPIRATION RATE: 18 BRPM | OXYGEN SATURATION: 97 % | HEIGHT: 62 IN | DIASTOLIC BLOOD PRESSURE: 83 MMHG | BODY MASS INDEX: 33.13 KG/M2 | WEIGHT: 180 LBS

## 2024-01-23 LAB
ANION GAP SERPL CALCULATED.3IONS-SCNC: 10 MMOL/L
BASOPHILS # BLD AUTO: 0.02 THOUSANDS/ÂΜL (ref 0–0.1)
BASOPHILS NFR BLD AUTO: 0 % (ref 0–1)
BUN SERPL-MCNC: 10 MG/DL (ref 5–25)
CALCIUM SERPL-MCNC: 9.2 MG/DL (ref 8.4–10.2)
CHLORIDE SERPL-SCNC: 102 MMOL/L (ref 96–108)
CO2 SERPL-SCNC: 23 MMOL/L (ref 21–32)
CREAT SERPL-MCNC: 0.46 MG/DL (ref 0.6–1.3)
DME PARACHUTE DELIVERY DATE REQUESTED: NORMAL
DME PARACHUTE ITEM DESCRIPTION: NORMAL
DME PARACHUTE ORDER STATUS: NORMAL
DME PARACHUTE SUPPLIER NAME: NORMAL
DME PARACHUTE SUPPLIER PHONE: NORMAL
EOSINOPHIL # BLD AUTO: 0.87 THOUSAND/ÂΜL (ref 0–0.61)
EOSINOPHIL NFR BLD AUTO: 9 % (ref 0–6)
ERYTHROCYTE [DISTWIDTH] IN BLOOD BY AUTOMATED COUNT: 12.2 % (ref 11.6–15.1)
GFR SERPL CREATININE-BSD FRML MDRD: 101 ML/MIN/1.73SQ M
GLUCOSE P FAST SERPL-MCNC: 180 MG/DL (ref 65–99)
GLUCOSE SERPL-MCNC: 169 MG/DL (ref 65–140)
GLUCOSE SERPL-MCNC: 180 MG/DL (ref 65–140)
GLUCOSE SERPL-MCNC: 193 MG/DL (ref 65–140)
GLUCOSE SERPL-MCNC: 214 MG/DL (ref 65–140)
HCT VFR BLD AUTO: 41.7 % (ref 34.8–46.1)
HGB BLD-MCNC: 14 G/DL (ref 11.5–15.4)
IMM GRANULOCYTES # BLD AUTO: 0.06 THOUSAND/UL (ref 0–0.2)
IMM GRANULOCYTES NFR BLD AUTO: 1 % (ref 0–2)
LYMPHOCYTES # BLD AUTO: 1.82 THOUSANDS/ÂΜL (ref 0.6–4.47)
LYMPHOCYTES NFR BLD AUTO: 19 % (ref 14–44)
MAGNESIUM SERPL-MCNC: 2 MG/DL (ref 1.9–2.7)
MCH RBC QN AUTO: 29.7 PG (ref 26.8–34.3)
MCHC RBC AUTO-ENTMCNC: 33.6 G/DL (ref 31.4–37.4)
MCV RBC AUTO: 89 FL (ref 82–98)
MONOCYTES # BLD AUTO: 0.6 THOUSAND/ÂΜL (ref 0.17–1.22)
MONOCYTES NFR BLD AUTO: 6 % (ref 4–12)
NEUTROPHILS # BLD AUTO: 6.32 THOUSANDS/ÂΜL (ref 1.85–7.62)
NEUTS SEG NFR BLD AUTO: 65 % (ref 43–75)
NRBC BLD AUTO-RTO: 0 /100 WBCS
PLATELET # BLD AUTO: 221 THOUSANDS/UL (ref 149–390)
PMV BLD AUTO: 10 FL (ref 8.9–12.7)
POTASSIUM SERPL-SCNC: 3.6 MMOL/L (ref 3.5–5.3)
RBC # BLD AUTO: 4.71 MILLION/UL (ref 3.81–5.12)
SODIUM SERPL-SCNC: 135 MMOL/L (ref 135–147)
WBC # BLD AUTO: 9.69 THOUSAND/UL (ref 4.31–10.16)

## 2024-01-23 PROCEDURE — 97116 GAIT TRAINING THERAPY: CPT

## 2024-01-23 PROCEDURE — 83735 ASSAY OF MAGNESIUM: CPT

## 2024-01-23 PROCEDURE — 97163 PT EVAL HIGH COMPLEX 45 MIN: CPT

## 2024-01-23 PROCEDURE — 82948 REAGENT STRIP/BLOOD GLUCOSE: CPT

## 2024-01-23 PROCEDURE — 99239 HOSP IP/OBS DSCHRG MGMT >30: CPT | Performed by: INTERNAL MEDICINE

## 2024-01-23 PROCEDURE — 72110 X-RAY EXAM L-2 SPINE 4/>VWS: CPT

## 2024-01-23 PROCEDURE — 80048 BASIC METABOLIC PNL TOTAL CA: CPT

## 2024-01-23 PROCEDURE — 85025 COMPLETE CBC W/AUTO DIFF WBC: CPT

## 2024-01-23 RX ORDER — INSULIN LISPRO 100 [IU]/ML
3 INJECTION, SOLUTION INTRAVENOUS; SUBCUTANEOUS
Status: DISCONTINUED | OUTPATIENT
Start: 2024-01-23 | End: 2024-01-23

## 2024-01-23 RX ORDER — ATORVASTATIN CALCIUM 40 MG/1
40 TABLET, FILM COATED ORAL DAILY
Qty: 30 TABLET | Refills: 0 | Status: SHIPPED | OUTPATIENT
Start: 2024-01-24 | End: 2024-02-23

## 2024-01-23 RX ORDER — LIDOCAINE 50 MG/G
1 PATCH TOPICAL DAILY
Qty: 5 PATCH | Refills: 0 | Status: SHIPPED | OUTPATIENT
Start: 2024-01-24 | End: 2024-01-29 | Stop reason: ALTCHOICE

## 2024-01-23 RX ORDER — INSULIN LISPRO 100 [IU]/ML
5 INJECTION, SOLUTION INTRAVENOUS; SUBCUTANEOUS
Status: DISCONTINUED | OUTPATIENT
Start: 2024-01-23 | End: 2024-01-23 | Stop reason: HOSPADM

## 2024-01-23 RX ORDER — CLOPIDOGREL BISULFATE 75 MG/1
75 TABLET ORAL DAILY
Qty: 19 TABLET | Refills: 0 | Status: SHIPPED | OUTPATIENT
Start: 2024-01-24 | End: 2024-02-12

## 2024-01-23 RX ADMIN — ATORVASTATIN CALCIUM 40 MG: 40 TABLET, FILM COATED ORAL at 08:49

## 2024-01-23 RX ADMIN — KETOROLAC TROMETHAMINE 15 MG: 30 INJECTION, SOLUTION INTRAMUSCULAR; INTRAVENOUS at 07:24

## 2024-01-23 RX ADMIN — INSULIN LISPRO 3 UNITS: 100 INJECTION, SOLUTION INTRAVENOUS; SUBCUTANEOUS at 08:55

## 2024-01-23 RX ADMIN — INSULIN LISPRO 2 UNITS: 100 INJECTION, SOLUTION INTRAVENOUS; SUBCUTANEOUS at 12:33

## 2024-01-23 RX ADMIN — INSULIN LISPRO 2 UNITS: 100 INJECTION, SOLUTION INTRAVENOUS; SUBCUTANEOUS at 08:55

## 2024-01-23 RX ADMIN — METOPROLOL SUCCINATE 100 MG: 100 TABLET, EXTENDED RELEASE ORAL at 08:49

## 2024-01-23 RX ADMIN — ASPIRIN 81 MG: 81 TABLET, COATED ORAL at 08:49

## 2024-01-23 RX ADMIN — OXYCODONE HYDROCHLORIDE AND ACETAMINOPHEN 0.5 TABLET: 5; 325 TABLET ORAL at 12:33

## 2024-01-23 RX ADMIN — INSULIN LISPRO 3 UNITS: 100 INJECTION, SOLUTION INTRAVENOUS; SUBCUTANEOUS at 12:33

## 2024-01-23 RX ADMIN — OXYCODONE HYDROCHLORIDE AND ACETAMINOPHEN 0.5 TABLET: 5; 325 TABLET ORAL at 06:15

## 2024-01-23 RX ADMIN — ONDANSETRON 4 MG: 2 INJECTION INTRAMUSCULAR; INTRAVENOUS at 08:56

## 2024-01-23 RX ADMIN — LIDOCAINE 5% 1 PATCH: 700 PATCH TOPICAL at 08:49

## 2024-01-23 RX ADMIN — ONDANSETRON 4 MG: 2 INJECTION INTRAMUSCULAR; INTRAVENOUS at 15:26

## 2024-01-23 RX ADMIN — ESCITALOPRAM OXALATE 5 MG: 5 TABLET, FILM COATED ORAL at 08:49

## 2024-01-23 RX ADMIN — MENTHOL, METHYL SALICYLATE: 10; 15 CREAM TOPICAL at 08:50

## 2024-01-23 RX ADMIN — INSULIN LISPRO 1 UNITS: 100 INJECTION, SOLUTION INTRAVENOUS; SUBCUTANEOUS at 16:34

## 2024-01-23 RX ADMIN — CLOPIDOGREL BISULFATE 75 MG: 75 TABLET ORAL at 08:49

## 2024-01-23 RX ADMIN — CEPHALEXIN 500 MG: 500 CAPSULE ORAL at 08:49

## 2024-01-23 RX ADMIN — Medication 1 PACKET: at 08:50

## 2024-01-23 RX ADMIN — INSULIN LISPRO 5 UNITS: 100 INJECTION, SOLUTION INTRAVENOUS; SUBCUTANEOUS at 16:34

## 2024-01-23 RX ADMIN — AMLODIPINE BESYLATE 5 MG: 5 TABLET ORAL at 08:49

## 2024-01-23 RX ADMIN — KETOROLAC TROMETHAMINE 15 MG: 30 INJECTION, SOLUTION INTRAMUSCULAR; INTRAVENOUS at 15:17

## 2024-01-23 RX ADMIN — ENOXAPARIN SODIUM 40 MG: 40 INJECTION SUBCUTANEOUS at 08:49

## 2024-01-23 RX ADMIN — KETOROLAC TROMETHAMINE 15 MG: 30 INJECTION, SOLUTION INTRAMUSCULAR; INTRAVENOUS at 01:08

## 2024-01-23 NOTE — ASSESSMENT & PLAN NOTE
Assessment:  Presented with word finding difficulty.   No motor or sensory deficit.   NIH 1 for mild aphasia  Stroke alert called.  CT head: no acute findings.   CTA head/neck: Mild atherosclerosis.   MRI brain: No acute ischemia.   Started on DAPT per neurology recommendations.   Likely TIA  Echocardiogram: EF 60%. No PFO. Mild AS. Moderate thickening of mitral leaflets. Mild regurgitation of TV.   This morning, Symptoms resolved and no neurologic deficit.     Plan:  Continue DAPT for 19 days with transitions to aspirin afterwards per neurology recommendations.  PT recommends level III: Will receive outpatient physical therapy.

## 2024-01-23 NOTE — DISCHARGE INSTR - AVS FIRST PAGE
Dear Davina Allen,     It was our pleasure to care for you here at [facility].  It is our hope that we were always able to exceed the expected standards for your care during your stay.  You were hospitalized due to TIA.  You were cared for on the third floor by Ashtyn Hayes MD under the service of Elana Adame MD with the [facility] who covers for your primary care physician (PCP), Mariaa Layton DO, while you were hospitalized.  If you have any questions or concerns related to this hospitalization, you may contact us at .  For follow up as well as any medication refills, we recommend that you follow up with your primary care physician.  A registered nurse will reach out to you by phone within a few days after your discharge to answer any additional questions that you may have after going home.  However, at this time we provide for you here, the most important instructions / recommendations at discharge:     Notable Medication Adjustments -   START taking 1 tablet of Plavix 75 mg and 1 tablet of aspirin 81 once daily for 19 days. Afterwards, Stop Plavix and Continue taking 1 tablet of aspirin 81 mg daily.   STOP taking Simvastatin 10 mg.   START taking Atorvastatin 40 mg 1 tablet daily.   Use Lidocaine patch for back pain as needed.   No other medication changes have been made.   Testing Required after Discharge -   None  ** Please contact your PCP to request testing orders for any of the testing recommended here **  Important follow up information -   Please follow up with Neurology within 4-6 weeks of discharge.   Please follow up with Spine and pain management outpatient.   Please follow up with Physical Therapy outpatient.   Please follow up with your primary care provider within 1 week after discharge.   Other Instructions -   If you experience worsening of symptoms including difficulty talking or walking, weakness of legs or arms  please return to the ED.   Please review this entire after visit summary as additional general instructions including medication list, appointments, activity, diet, any pertinent wound care, and other additional recommendations from your care team that may be provided for you.      Sincerely,     Ashtyn Hayes MD

## 2024-01-23 NOTE — PROGRESS NOTES
[facility]  Progress Note  Name: Davina Allen I  MRN: [id number]  Unit/Bed#: S -01 I Date of Admission: 1/22/2024   Date of Service: 1/23/2024 I Hospital Day: 0    Assessment/Plan   * Stroke-like symptoms  Assessment & Plan  Assessment:  Presented with word finding difficulty.   No motor or sensory deficit.   NIH 1 for mild aphasia  Stroke alert called.  CT head: no acute findings.   CTA head/neck: Mild atherosclerosis.   MRI brain: No acute ischemia.   Started on DAPT per neurology recommendations.   Likely TIA  Echocardiogram: EF 60%. No PFO. Mild AS. Moderate thickening of mitral leaflets. Mild regurgitation of TV.   This morning, Symptoms resolved and no neurologic deficit.     Plan:  Continue stroke pathway.  Neurology is following: Continue DAPT for 21 days with transitions to aspirin afterwards.   Frequent neuro checks  Fall precautions  PT recommends level III   Telemetry monitoring       Sinusitis  Assessment & Plan  Assessment:  CT soft tissue neck 1/17: Acute palatine tonsillitis with no suspicious tonsillar/peritonsillar abscess. Acute on chronic sinusitis.    Plan:  Continue keflex day #2 for 10 day course.     Controlled diabetes mellitus type II without complication (HCC)  Assessment & Plan  Lab Results   Component Value Date    HGBA1C 8.1 (H) 01/22/2024       Recent Labs     01/22/24  1709 01/22/24  2221 01/23/24  0752 01/23/24  1131   POCGLU 122 136 193* 214*       Blood Sugar Average: Last 72 hrs:  (P) [id number]    A1c 8.1   Hold home metformin.  Insulin sliding scale.  Carb controlled diet.    Benign essential hypertension  Assessment & Plan  Assessment:  Home Regimen: amlodipine 10 mg, losartan, metoprolol 100 mg daily, hydrochlorothiazide  BP currently in in 140-150s.     Plan:  Currently, Continue amilodipine 5 mg and Metoprolol 100 mg daily   Monitor Blood pressure   Will continue the rest of home regimen prior to discharge.     Rheumatoid arthritis  involving multiple sites with positive rheumatoid factor (HCC)  Assessment & Plan  Continue lefulnomide  Percocet Q6H PRN  Tramadol 15 mg Q6H PRN     Fibromyalgia  Assessment & Plan  Assessment:  Complained of severe lower back pain this morning which seems to be chronic in nature.   Pending spine lumbar xray.     Hypercholesterolemia  Assessment & Plan   Home med simvastatin 10 mg, not in formulary.  Transitioned to Atorvastatin 40 mg per neurology recommendations.   Lipid panel: . LDL normal. HDL 39          VTE Pharmacologic Prophylaxis: VTE Score: 16 Moderate Risk (Score 3-4) - Pharmacological DVT Prophylaxis Ordered: enoxaparin (Lovenox).    Mobility:   Basic Mobility Inpatient Raw Score: 24  JH-HLM Goal: 8: Walk 250 feet or more  JH-HLM Achieved: 7: Walk 25 feet or more (Patient unable to complete 250 feet due to low back pain)  HLM Goal achieved. Continue to encourage appropriate mobility.    Patient Centered Rounds: I performed bedside rounds with nursing staff today.  Discussions with Specialists or Other Care Team Provider: Neurology    Education and Discussions with Family / Patient: Updated  () at bedside.    Current Length of Stay: 0 day(s)  Current Patient Status: Observation   Discharge Plan: Anticipate discharge tomorrow to home.    Code Status: Level 1 - Full Code    Subjective:   Patient complained of severe lower back pain this morning. Lower back pain has been worsening for the past 1 week. She used Tylenol, ibuprofen, and percocet which improves her pain.   Her mild aphasia has improved. She was walking independently which is her baseline.      Objective:   Patient was seen and examined this morning. She was sitting in bed. She was in pain secondary to her back. AAO x 4.     Vitals:   Temp (24hrs), Av.8 °F (37.1 °C), Min:98.8 °F (37.1 °C), Max:98.8 °F (37.1 °C)    Temp:  [98.8 °F (37.1 °C)] 98.8 °F (37.1 °C)  HR:  [] 86  Resp:  [18] 18  BP: (122-163)/(65-91)  138/76  SpO2:  [87 %-97 %] 95 %  Body mass index is 32.92 kg/m².     Input and Output Summary (last 24 hours):   No intake or output data in the 24 hours ending 01/23/24 1332    Physical Exam:   Physical Exam  Constitutional:       General: She is in acute distress.   HENT:      Head: Normocephalic and atraumatic.      Nose: Nose normal.      Mouth/Throat:      Mouth: Mucous membranes are moist.   Eyes:      Extraocular Movements: Extraocular movements intact.      Pupils: Pupils are equal, round, and reactive to light.   Cardiovascular:      Rate and Rhythm: Normal rate and regular rhythm.      Pulses: Normal pulses.      Heart sounds: Normal heart sounds.   Pulmonary:      Effort: Pulmonary effort is normal.      Breath sounds: Normal breath sounds.   Abdominal:      General: Abdomen is flat.      Tenderness: There is no abdominal tenderness.   Skin:     General: Skin is warm.   Neurological:      Mental Status: She is alert and oriented to person, place, and time.      Cranial Nerves: Cranial nerves 2-12 are intact.      Sensory: Sensation is intact.      Motor: Motor function is intact.      Coordination: Coordination is intact.         Additional Data:     Labs:  Results from last 7 days   Lab Units 01/23/24  0508   WBC Thousand/uL 9.69   HEMOGLOBIN g/dL 14.0   HEMATOCRIT % 41.7   PLATELETS Thousands/uL 221   NEUTROS PCT % 65   LYMPHS PCT % 19   MONOS PCT % 6   EOS PCT % 9*     Results from last 7 days   Lab Units 01/23/24  0508   SODIUM mmol/L 135   POTASSIUM mmol/L 3.6   CHLORIDE mmol/L 102   CO2 mmol/L 23   BUN mg/dL 10   CREATININE mg/dL 0.46*   ANION GAP mmol/L 10   CALCIUM mg/dL 9.2   GLUCOSE RANDOM mg/dL 180*     Results from last 7 days   Lab Units 01/22/24  0617   INR  1.00     Results from last 7 days   Lab Units 01/23/24  1131 01/23/24  0752 01/22/24  2221 01/22/24  1709 01/22/24  1136 01/22/24  0614   POC GLUCOSE mg/dl 214* 193* 136 122 180* 234*     Results from last 7 days   Lab Units  24  1400   HEMOGLOBIN A1C % 8.1*           Lines/Drains:  Invasive Devices       Peripheral Intravenous Line  Duration             Peripheral IV 24 Proximal;Right;Ventral (anterior) Forearm 1 day                      Telemetry:  Telemetry Orders (From admission, onward)               24 Hour Telemetry Monitoring  Continuous x 24 Hours (Telem)           Question:  Reason for 24 Hour Telemetry  Answer:  TIA/Suspected CVA/ Confirmed CVA                            Imaging: Reviewed radiology reports from this admission including: CT head, MRI brain, and ECHO    Recent Cultures (last 7 days):         Last 24 Hours Medication List:   Current Facility-Administered Medications   Medication Dose Route Frequency Provider Last Rate    acetaminophen  650 mg Oral Q6H PRN Collin Hicks MD      amLODIPine  5 mg Oral Daily Collin Hicks MD      aspirin  81 mg Oral Daily Collin Hicks MD      atorvastatin  40 mg Oral Daily Collin Hicks MD      cephalexin  500 mg Oral BID Collin Hicks MD      clopidogrel  75 mg Oral Daily Collin Hicks MD      enoxaparin  40 mg Subcutaneous Daily Collin Hicks MD      escitalopram  5 mg Oral Daily Collin Hicks MD      insulin lispro  1-5 Units Subcutaneous HS Collin Hicks MD      insulin lispro  1-6 Units Subcutaneous TID AC Collin Hicks MD      insulin lispro  3 Units Subcutaneous TID With Meals Tami Montanez MD      ketorolac  15 mg Intravenous Q6H PRN Shonna Gao MD      leflunomide  10 mg Oral Daily Collin Hicks MD      lidocaine  1 patch Topical Daily Collin Hicks MD      menthol-methyl salicylate   Apply externally 4x Daily Shonna Gao MD      metoprolol succinate  100 mg Oral Daily Collin Hicks MD      ondansetron  4 mg Intravenous Q6H PRN Collin Hicks MD      oxyCODONE-acetaminophen  0.5 tablet Oral Q6H PRN Shonna Gao MD      psyllium  1 packet Oral Daily Collin Hicks MD             Emotional and Mental  Well-being, Sleep, Connectedness Assessment and Intervention:    Sleep/stress assessment performed          Today, Patient Was Seen By: Ashtyn Hayes MD    **Please Note: This note may have been constructed using a voice recognition system.**

## 2024-01-23 NOTE — ASSESSMENT & PLAN NOTE
Home med simvastatin 10 mg, not in formulary.  Transitioned to Atorvastatin 40 mg per neurology recommendations.   Lipid panel: . LDL normal. HDL 39   Continue atorvastatin 40 mg at home.

## 2024-01-23 NOTE — ASSESSMENT & PLAN NOTE
Assessment:  CT soft tissue neck 1/17: Acute palatine tonsillitis with no suspicious tonsillar/peritonsillar abscess. Acute on chronic sinusitis.    Plan:  Continue keflex day #2 for 10 day course.

## 2024-01-23 NOTE — UTILIZATION REVIEW
Initial Clinical Review    Admission: Date/Time/Statement:   Admission Orders (From admission, onward)       Ordered        01/22/24 0852  Place in Observation  Once                          Orders Placed This Encounter   Procedures    Place in Observation     Standing Status:   Standing     Number of Occurrences:   1     Order Specific Question:   Level of Care     Answer:   Med Surg [16]     ED Arrival Information       Expected   -    Arrival   1/22/2024 06:01    Acuity   Emergent              Means of arrival   Walk-In    Escorted by   Spouse    Service   Hospitalist    Admission type   Emergency              Arrival complaint   Confusion/Trouble Speaking             Chief Complaint   Patient presents with    STROKE Alert     Pt family states that around 0500 when pt woke up was having confusion and expressive aphasia, reports last known well prior to midnight last night. Pt is also currently c/o dizziness and a headache.       Initial Presentation: 68 y.o. female with a PMH of Diabetes, HTN, Hypercholesterolemia, RA, fibromyalgia, squamous cell carcinoma who presents with stroke like symptoms of mild aphasia. Patient's last known normal was around midnight. She endorses feeling more fatigued and achy all over for the last two days which she contributes to combination of RA and fibromyalgia. She recently discontinued humera for RA treatment as she didn't feel it was helping her symptoms. She woke up early this morning around 5 am and was not able to figure out how to use the Keurig to make tea, her  noted that she is having word finding difficulty and brought her to the ED to rule out stroke. Stroke alert was called in the ED, imaging showed some ICA atherosclerosis with no acute acute intracranial abnormalities. Patient endorses fatigue, generalized myalgias, and continued word finding difficulty. Plan: Observation for stroke like symptoms, DM, HTN, sinusitis, hypercholesterolemia, RA: stroke pathway,  neuro checks, fall precautions, telemetry, Echo, ASA and Plavix, hold home metformin and start SSI, HgbA1c, continue home metoprolol and half dose of amlodipine, hold losartan and HCTZ, continue keflex, atorvastatin, lefulnomide.    Neurology consult: stroke pathway, normotension, normothermia, euglycemia, Echo, atorvastatin, Plavix load followed by 75 mg, ASA 81 mg, hemoglobin A1c, TSH with T4, lipid panel, vitamin D, vitamin B 9, vitamin B12, fall precautions, PT/OT eval.     1/23:    Neurology: continue stroke pathway, recommend normotension, normothermia, euglycemia, atorvastatin, ASA, Plavix, fall precautions, PT/OT eval.     Internal medicine: continue stroke pathway, continue DAPT, neuro checks, fall precautions, telemetry, continue Keflex for 10 day course, carb controlled diet with SSI, continue home amlodipine and metoprolol, lefulnomide. Pt with c/o severe lower back pain this am- spine lumbar xray ordered.     ED Triage Vitals   Temperature Pulse Respirations Blood Pressure SpO2   01/22/24 0615 01/22/24 0610 01/22/24 0610 01/22/24 0610 01/22/24 0610   97.8 °F (36.6 °C) 95 18 (!) 192/80 97 %      Temp Source Heart Rate Source Patient Position - Orthostatic VS BP Location FiO2 (%)   01/22/24 0615 01/22/24 0610 01/22/24 0610 01/22/24 0610 --   Oral Monitor Lying Right arm       Pain Score       01/22/24 0630       6          Wt Readings from Last 1 Encounters:   01/22/24 81.6 kg (180 lb)     Additional Vital Signs:     Date/Time Temp Pulse Resp BP MAP (mmHg) SpO2 O2 Device   01/23/24 07:54:38 98.8 °F (37.1 °C) 81 18 127/88 101 94 % --   01/23/24 06:33:37 -- 84 -- 152/91 111 96 % --   01/23/24 0430 -- 102 -- 148/80 -- -- --   01/22/24 2230 -- -- 18 152/79 103 -- None (Room air)   01/22/24 2030 -- -- 18 149/70 96 -- None (Room air)   01/22/24 1830 -- -- 18 155/77 103 -- None (Room air)   01/22/24 1730 -- -- 18 163/85 111 -- None (Room air)   01/22/24 16:36:39 -- 82 -- 160/75 103 94 % --   01/22/24 1630 -- 82  18 160/75 103 97 % None (Room air)   01/22/24 1530 -- 78 18 139/65 91 97 % None (Room air)   01/22/24 1424 -- 85 -- 122/81 -- -- --   01/22/24 1200 -- -- 18 122/81 94 -- --   01/22/24 1100 -- 84 -- 127/63 87 95 % --   01/22/24 1000 -- 85 -- 118/62 86 95 % --   01/22/24 0830 -- 83 18 156/72 -- 97 % --   01/22/24 0800 -- 83 18 131/69 -- 97 % --   01/22/24 0715 -- 88 18 162/68 -- 96 % --   01/22/24 0700 -- 84 18 154/68 -- 97 % None (Room air)   01/22/24 0645 -- 85 18 145/55 -- 96 % None (Room air)   01/22/24 0640 -- -- -- -- -- -- None (Room air)   01/22/24 0630 -- 84 18 169/78 -- 97 % None (Room air)   01/22/24 0615 97.8 °F (36.6 °C) -- -- -- -- -- --     Pertinent Labs/Diagnostic Test Results:   MRI brain wo contrast   Final Result by Sal Perez DO (01/22 0757)      No acute ischemia as clinically questioned. Minor white matter change suggestive of chronic microangiopathy.      Paranasal sinus disease including moderate mucosal thickening of the ethmoid air cells and fluid layering within the left maxillary sinus with component of sinusitis.      Workstation performed: [id number]         CT stroke alert brain   Final Result by Dawna Gamez MD (01/22 0648)      No acute intracranial abnormality.  Nonemergent findings above.         IMPRESSION:      No acute intracranial abnormality. Nonemergent findings above.      Findings were directly discussed with Lilibeth Krause  at 6:48 a.m.      Workstation performed: [id number]         CTA stroke alert (head/neck)   Final Result by Dawna Gamez MD (01/22 0649)      No acute vascular pathology in the head or neck.      Mild atherosclerosis as detailed above.            Findings were directly discussed with Lilibeth Krause  at 6:48 a.m.                        Workstation performed: [id number]           1/22 Echo:  Interpretation Summary       Left Ventricle: Left ventricular cavity size is normal. Wall thickness is normal. The left ventricular ejection  fraction is 60%. Systolic function is normal. Wall motion is normal.    Right Ventricle: Systolic function is normal.    Left Atrium: The atrium is mildly dilated.    Atrial Septum: No patent foramen ovale detected, confirmed by provocation with valsalva, using agitated saline contrast.    Aortic Valve: There is mild stenosis. The aortic valve mean gradient is 11 mmHg. The dimensionless velocity index is 0.55. The aortic valve area is 1.52 cm2.    Mitral Valve: There is moderate thickening of the posterior leaflet. There is moderate annular calcification. There is mild to moderate stenosis. The mitral valve mean gradient is 6mmHg.    Tricuspid Valve: There is mild regurgitation. The estimated right ventricular systolic pressure is 33.00 mmHg.    1/22 EKG:  Normal sinus rhythm  Normal ECG  When compared with ECG of 03-MAY-2018 14:09,  No significant change was found    Results from last 7 days   Lab Units 01/22/24  0617   SARS-COV-2  Negative     Results from last 7 days   Lab Units 01/23/24  0508 01/22/24  1400 01/22/24  0617   WBC Thousand/uL 9.69  --  10.11   HEMOGLOBIN g/dL 14.0  --  14.2   HEMATOCRIT % 41.7  --  41.5   PLATELETS Thousands/uL 221 223 199   NEUTROS ABS Thousands/µL 6.32  --   --          Results from last 7 days   Lab Units 01/23/24  0508 01/22/24  0617 01/17/24  1432   SODIUM mmol/L 135 136 131*   POTASSIUM mmol/L 3.6 3.4* 3.4*   CHLORIDE mmol/L 102 101 97   CO2 mmol/L 23 27 26   ANION GAP mmol/L 10 8 8   BUN mg/dL 10 10 8   CREATININE mg/dL 0.46* 0.50* 0.59*   EGFR ml/min/1.73sq m 101 98 93   CALCIUM mg/dL 9.2 9.2 9.4   MAGNESIUM mg/dL 2.0  --   --          Results from last 7 days   Lab Units 01/22/24  2221 01/22/24  1709 01/22/24  1136 01/22/24  0614   POC GLUCOSE mg/dl 136 122 180* 234*     Results from last 7 days   Lab Units 01/23/24  0508 01/22/24  0617 01/17/24  1432   GLUCOSE RANDOM mg/dL 180* 216* 303*         Results from last 7 days   Lab Units 01/22/24  1400   HEMOGLOBIN A1C % 8.1*    EAG mg/dl 186           Results from last 7 days   Lab Units 01/22/24  1039 01/22/24  0820 01/22/24  0617   HS TNI 0HR ng/L  --   --  3   HS TNI 2HR ng/L  --  2  --    HSTNI D2 ng/L  --  -1  --    HS TNI 4HR ng/L 4  --   --    HSTNI D4 ng/L 1  --   --          Results from last 7 days   Lab Units 01/22/24  0617   PROTIME seconds 13.8   INR  1.00   PTT seconds 31     Results from last 7 days   Lab Units 01/22/24  1400   TSH 3RD GENERATON uIU/mL 5.813*           Results from last 7 days   Lab Units 01/22/24  0725   CLARITY UA  Clear   COLOR UA  Light Yellow   SPEC GRAV UA  >=1.050*   PH UA  7.0   GLUCOSE UA mg/dl Negative   KETONES UA mg/dl Negative   BLOOD UA  Negative   PROTEIN UA mg/dl Trace*   NITRITE UA  Negative   BILIRUBIN UA  Negative   UROBILINOGEN UA (BE) mg/dl <2.0   LEUKOCYTES UA  Negative   WBC UA /hpf 2-4*   RBC UA /hpf 1-2   BACTERIA UA /hpf Innumerable*   EPITHELIAL CELLS WET PREP /hpf Occasional     Results from last 7 days   Lab Units 01/22/24  0617   INFLUENZA A PCR  Negative   INFLUENZA B PCR  Negative   RSV PCR  Negative         ED Treatment:   Medication Administration from 01/22/2024 0601 to 01/22/2024 1519         Date/Time Order Dose Route Action     01/22/2024 0627 EST iohexol (OMNIPAQUE) 350 MG/ML injection (MULTI-DOSE) 85 mL 85 mL Intravenous Given     01/22/2024 0807 EST oxyCODONE-acetaminophen (PERCOCET) 5-325 mg per tablet 1 tablet 1 tablet Oral Given     01/22/2024 0818 EST aspirin chewable tablet 324 mg 324 mg Oral Given     01/22/2024 1138 EST cephalexin (KEFLEX) capsule 500 mg 500 mg Oral Given     01/22/2024 1138 EST escitalopram (LEXAPRO) tablet 5 mg 5 mg Oral Given     01/22/2024 1139 EST pravastatin (PRAVACHOL) tablet 20 mg 20 mg Oral Given     01/22/2024 1138 EST psyllium (METAMUCIL) 1 packet 1 packet Oral Given     01/22/2024 1143 EST insulin lispro (HumaLOG) 100 units/mL subcutaneous injection 1-6 Units 1 Units Subcutaneous Given     01/22/2024 1150 EST acetaminophen  (TYLENOL) tablet 650 mg 650 mg Oral Given     01/22/2024 1208 EST metoprolol succinate (TOPROL-XL) 24 hr tablet 100 mg 100 mg Oral Given     01/22/2024 1208 EST amLODIPine (NORVASC) tablet 5 mg 5 mg Oral Given     01/22/2024 1433 EST clopidogrel (PLAVIX) tablet 300 mg 300 mg Oral Given     01/22/2024 1434 EST potassium chloride (K-DUR,KLOR-CON) CR tablet 20 mEq 20 mEq Oral Given     01/22/2024 1454 EST lidocaine (LIDODERM) 5 % patch 1 patch 1 patch Topical Medication Applied          Past Medical History:   Diagnosis Date    Acute non-recurrent frontal sinusitis 05/15/2019    Acute non-recurrent maxillary sinusitis 03/10/2020    Allergic In my 60’s    Anxiety     Anxiety disorder     Arthritis     OA    Asthma     exercise iniduced.    Cancer (HCC)     Squamous Cell on back    Dependence on crutches     prn    Depression     ro.     Diabetes mellitus (HCC)     NIDDM; per All[employer]: Last Assessed: 7/15/15, New onset of type 2    Diverticulitis of colon     Fibromyalgia     Hearing aid worn     mayra    Heart murmur     HL (hearing loss)     b/l - uses hearing aids    Hyperglycemia     Last Assessed:10/29/15    Hyperlipidemia     Hypertension     Inflammatory bowel disease In my 60’s    LLQ abdominal pain 03/30/2020    Pneumonia May 2023    Psoriasis     red dry patch left side- waist area    Psoriatic arthritis (HCC)     RA (rheumatoid arthritis) (HCC)     Urinary tract infection Currently    Taking med    Wears glasses     reading     Present on Admission:   Stroke-like symptoms   Hypercholesterolemia   Benign essential hypertension   Controlled diabetes mellitus type II without complication (HCC)   Rheumatoid arthritis involving multiple sites with positive rheumatoid factor (HCC)      Admitting Diagnosis: Aphasia [R47.01]  Stroke-like symptoms [R29.90]  AMS (altered mental status) [R41.82]  Age/Sex: 68 y.o. female  Admission Orders:  Scheduled Medications:  amLODIPine, 5 mg, Oral, Daily  aspirin, 81 mg, Oral,  Daily  atorvastatin, 40 mg, Oral, Daily  cephalexin, 500 mg, Oral, BID  clopidogrel, 75 mg, Oral, Daily  enoxaparin, 40 mg, Subcutaneous, Daily  escitalopram, 5 mg, Oral, Daily  insulin lispro, 1-5 Units, Subcutaneous, HS  insulin lispro, 1-6 Units, Subcutaneous, TID AC  insulin lispro, 3 Units, Subcutaneous, TID With Meals  leflunomide, 10 mg, Oral, Daily  lidocaine, 1 patch, Topical, Daily  menthol-methyl salicylate, , Apply externally, 4x Daily  metoprolol succinate, 100 mg, Oral, Daily  psyllium, 1 packet, Oral, Daily      Continuous IV Infusions:     PRN Meds:  acetaminophen, 650 mg, Oral, Q6H PRN  ketorolac, 15 mg, Intravenous, Q6H PRN  ondansetron, 4 mg, Intravenous, Q6H PRN  oxyCODONE-acetaminophen, 0.5 tablet, Oral, Q6H PRN        IP CONSULT TO NEUROLOGY  IP CONSULT TO NUTRITION SERVICES    Network Utilization Review Department  ATTENTION: Please call with any questions or concerns to [phone] and carefully listen to the prompts so that you are directed to the right person. All voicemails are confidential.   For Discharge needs, contact Care Management DC Support Team at [phone] opt. [phone]  Send all requests for admission clinical reviews, approved or denied determinations and any other requests to dedicated fax number below belonging to the campus where the patient is receiving treatment. List of dedicated fax numbers for the Facilities:  FACILITY NAME UR FAX NUMBER   ADMISSION DENIALS (Administrative/Medical Necessity) [phone]   DISCHARGE SUPPORT TEAM (NETWORK) [phone]   PARENT CHILD HEALTH (Maternity/NICU/Pediatrics) [phone]   [facility] [phone]   [facility] [phone]   Critical access hospital [phone]   [facility] [phone]   [facility] [phone]   [facility] [phone]   [facility] [phone]    [facility] [phone]   [facility] [phone]   [facility] [phone]   [facility] [phone]

## 2024-01-23 NOTE — CASE MANAGEMENT
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) 09/10/2021    Cataract, nuclear sclerotic, both eyes 09/10/2021    Metatarsalgia, right foot 2021    Porokeratosis 2021    Encounter for screening mammogram for malignant neoplasm of breast 2020    Functional diarrhea 2020    Pulmonary nodules 2020    Diverticulosis of colon 2020    Moderate persistent asthma 05/15/2019    Chronic neck pain 05/15/2019    Chronic pain of left knee 2018    Primary osteoarthritis of left knee 2018    Hypercholesterolemia     Fibromyalgia     Psoriasis with arthropathy (HCC)     Rheumatoid arthritis involving multiple sites with positive rheumatoid factor (HCC)     Controlled diabetes mellitus type II without complication (HCC) 2015    Vitamin D deficiency 2015    Allergy to pollen 2015    Hearing loss 2014    Squamous cell carcinoma of skin 2013    Anxiety disorder 2012    Benign essential hypertension 2012    Generalized osteoarthritis of multiple sites 2012    Psoriasis 2012      LOS (days): 0  Geometric Mean LOS (GMLOS) (days):   Days to GMLOS:     OBJECTIVE:              Current admission status: Observation  Referral Reason: Stroke    Preferred Pharmacy:   [id number] - [place], [place] - [address]  [place] [address]  Phone: [phone] Fax: [phone]    Primary Care Provider: Mariaa Layton DO    [place] Insurance[place] [employer]  Secondary Insurance: MEDICARE    ASSESSMENT:  Active Health Care Proxies    There are no active Health Care Proxies on file.                      Patient Information  Admitted from:: Home  Mental Status: Alert  During Assessment patient was accompanied by: Spouse  Assessment information provided by:: Patient, Spouse  Primary Caregiver: Self  Support Systems: Self, Spouse/significant other  County of Residence: [place]  What city do you live in?: [place]  Type of Current Residence: [place] (with flight of stairs to basement where laundry is)  Living Arrangements: Lives w/ Spouse/significant other    Activities of Daily Living Prior to Admission  Functional Status: Independent  Completes ADLs independently?: Yes  Ambulates independently?: Yes  Does patient use assisted devices?: No  Does patient currently own DME?: Yes  What DME does the patient currently own?: Walker (but is from the 90's and does not have wheels)  Does patient have a history of Outpatient Therapy (PT/OT)?: Yes (had gone to out-patient therapy in [place] around the holidays)  Does the patient have a history of Short-Term Rehab?: No  Does patient have a history of HHC?: No  Does patient currently have HHC?: No         Patient Information Continued  Does patient have prescription coverage?: Yes  Does patient receive dialysis treatments?: No         Means of Transportation  Means of Transport to [facility]:: Drives Self      Housing Stability: Low Risk  (1/23/2024)    Housing Stability Vital Sign     Unable to Pay for Housing in the Last Year: No     Number of Places Lived in the Last Year: 1     Unstable Housing in the Last Year: No   Food Insecurity: No Food Insecurity (1/23/2024)    Hunger Vital Sign     Worried About Running Out of Food in the Last Year: Never true     Ran Out of Food  in the Last Year: Never true   Transportation Needs: No Transportation Needs (1/23/2024)    PRAPARE - Transportation     Lack of Transportation (Medical): No     Lack of Transportation (Non-Medical): No   Utilities: Not At Risk (1/23/2024)    [facility] Utilities     Threatened with loss of utilities: No       DISCHARGE DETAILS:    Discharge planning discussed with:: patient and spouse at bedside  Freedom of Choice: Yes (re: out-patient therapy and DME)  Comments - Freedom of Choice: list of [facility] providers given for out-patient therapy - requesting walker be ordered through [employer]  CM contacted family/caregiver?: Yes (spouse at bedside)  Were Treatment Team discharge recommendations reviewed with patient/caregiver?: Yes  Did patient/caregiver verbalize understanding of patient care needs?: Yes  Were patient/caregiver advised of the risks associated with not following Treatment Team discharge recommendations?: Yes    Contacts  Patient Contacts: elina Teran  Relationship to Patient:: Family  Contact Method: In Person  Reason/Outcome: Emergency Contact, Discharge Planning    Requested Home Health Care         Is the patient interested in HHC at discharge?: No    DME Referral Provided  Referral made for DME?: Yes  DME referral completed for the following items:: Walker  DME Supplier Name:: [employer]    Other Referral/Resources/Interventions Provided:  Interventions: Outpatient PT, DME  Referral Comments: Patient admitted due to stroke-like symptoms. CM consult received for ischemic stroke. PT recommending out-patient therapy and rolling walker. Met with patient and spouse at bedside to complete assessment. Patient reports she and spouse live in ranch home with one flight of stairs to basement where laundry room is. Has a walker from a late aunt, but states that it does not have wheels. Aware of therapy's recommendation for wheeled walker and in agreement with order to be sent to [employer] for  same. Patient previously independent with ADLs and ambulation. States that she had started going to out-patient therapy in [place] around the holidays, but she had a copayment of $50/visit so she had been going sparingly. Patient now has Blue Cross and Medicare, so they feel coverage may be better at this time. Reviewed list of [facility]'s out-patient locations and patient relayed preference to go to closer location. Aware that ambulatory order for PT/OT should be placed at d/c - but she can contact preferred location for scheduling. Order placed in [place] for rolling walker; no other DME needs identified at this time.    Would you like to participate in our [facility] service program?  : No - Declined    Treatment Team Recommendation: Home  Discharge Destination Plan:: Home  Transport at Discharge : Family

## 2024-01-23 NOTE — ASSESSMENT & PLAN NOTE
Lab Results   Component Value Date    HGBA1C 8.1 (H) 01/22/2024       Recent Labs     01/22/24  2221 01/23/24  0752 01/23/24  1131 01/23/24  1615   POCGLU 136 193* 214* 169*         Blood Sugar Average: Last 72 hrs:  (P) [id number]    A1c 8.1   Can continue metformin at home.  Patient needs to follow-up with her primary care provider for tight glucose control.

## 2024-01-23 NOTE — ASSESSMENT & PLAN NOTE
Assessment:  Home Regimen: amlodipine 10 mg, losartan, metoprolol 100 mg daily, hydrochlorothiazide  BP currently in in 140-150s.     Plan:  Can continue home regimen at home.

## 2024-01-23 NOTE — DISCHARGE SUMMARY
[facility]  Discharge- Davina Allen 1955, 68 y.o. female MRN: [id number]  Unit/Bed#: S -[facility] Encounter: [id number]  Primary Care Provider: Mariaa Layton DO   Date and time admitted to hospital: 1/22/2024  6:02 AM    * Stroke-like symptoms  Assessment & Plan  Assessment:  Presented with word finding difficulty.   No motor or sensory deficit.   NIH 1 for mild aphasia  Stroke alert called.  CT head: no acute findings.   CTA head/neck: Mild atherosclerosis.   MRI brain: No acute ischemia.   Started on DAPT per neurology recommendations.   Likely TIA  Echocardiogram: EF 60%. No PFO. Mild AS. Moderate thickening of mitral leaflets. Mild regurgitation of TV.   This morning, Symptoms resolved and no neurologic deficit.     Plan:  Continue DAPT for 19 days with transitions to aspirin afterwards per neurology recommendations.  PT recommends level III: Will receive outpatient physical therapy.      Sinusitis  Assessment & Plan  Assessment:  CT soft tissue neck 1/17: Acute palatine tonsillitis with no suspicious tonsillar/peritonsillar abscess. Acute on chronic sinusitis.    Plan:  Continue keflex day #2 for 10 day course home.    Controlled diabetes mellitus type II without complication (HCC)  Assessment & Plan  Lab Results   Component Value Date    HGBA1C 8.1 (H) 01/22/2024       Recent Labs     01/22/24  2221 01/23/24  0752 01/23/24  1131 01/23/24  1615   POCGLU 136 193* 214* 169*         Blood Sugar Average: Last 72 hrs:  (P) [id number]    A1c 8.1   Can continue metformin at home.  Patient needs to follow-up with her primary care provider for tight glucose control.    Benign essential hypertension  Assessment & Plan  Assessment:  Home Regimen: amlodipine 10 mg, losartan, metoprolol 100 mg daily, hydrochlorothiazide  BP currently in in 140-150s.     Plan:  Can continue home regimen at home.    Rheumatoid arthritis involving multiple sites with positive rheumatoid factor  (HCC)  Assessment & Plan  Continue lefulnomide and Percocet at home.    Fibromyalgia  Assessment & Plan  Assessment:  Complained of severe lower back pain this morning which seems to be chronic in nature.   Spine lumbar xray:   No acute osseous abnormality  L5-S1 moderate/marked degenerative changes.   Ambulatory referral to acute pain and spine management.    Hypercholesterolemia  Assessment & Plan   Home med simvastatin 10 mg, not in formulary.  Transitioned to Atorvastatin 40 mg per neurology recommendations.   Lipid panel: . LDL normal. HDL 39   Continue atorvastatin 40 mg at home.      Medical Problems       Resolved Problems  Date Reviewed: 1/22/2024   None       Discharging Resident: Ashtyn Hayes MD  Discharging Attending: Elana Adame MD  PCP: Mariaa Layton DO  Admission Date:   Admission Orders (From admission, onward)       Ordered        01/22/24 0852  Place in Observation  Once                          Discharge Date: 01/23/24    Consultations During Hospital Stay:  Neurology    Procedures Performed:   None    Significant Findings / Test Results:   None    Incidental Findings:   A1c 8.1  Elevated TSH 5  I reviewed the above mentioned incidental findings with the patient and/or family and they expressed understanding.    Test Results Pending at Discharge (will require follow up):   None     Outpatient Tests Requested:  None    Complications: None    Reason for Admission: Aphasia    Hospital Course:   Davina Allen is a 68 y.o. female patient who originally presented to the hospital on 1/22/2024 due to mild dysphagia with difficulty finding words.  She also reported fatigue for the past 2 days prior to admission. Stroke alert was called NIH 1 and neurology were consulted. Patient was loaded with DAPT. CT head/MRI brain showed no acute findings or ischemia. CTA head/neck showed mild atherosclerosis. Echocardiogram showed normal ejection fraction and no patent foramen ovale. Her telemetry did not  show any significant findings. On admission, her blood pressure was elevated. Patient was evaluated by PT/OT which recommended outpatient physical therapy. On second day of admission, she complained of severe lower back pain for which she was receiving oxycodone and Robaxin here in the hospital. She was discharged stable and instructed to continue Plavix and aspirin for 19 days with transition to aspirin afterwards she was also instructed to follow-up with neurology outpatient within 4 to 6 weeks after discharge. She will also has to follow-up with her primary care provider Yadira for better glucose control.    The patient, initially admitted to the hospital as inpatient, was discharged earlier than expected given the following: Stable with no signs of stroke and on MRI brain..    Please see above list of diagnoses and related plan for additional information.     Condition at Discharge: stable    Discharge Day Visit / Exam:   * Please refer to separate progress note for these details *    Discussion with Family: Updated  () at bedside.    Discharge instructions/Information to patient and family:   See after visit summary for information provided to patient and family.      Provisions for Follow-Up Care:  See after visit summary for information related to follow-up care and any pertinent home health orders.      Mobility at time of Discharge:   Basic Mobility Inpatient Raw Score: 24  JH-HLM Goal: 8: Walk 250 feet or more  JH-HLM Achieved: 7: Walk 25 feet or more (Patient unable to complete 250 feet due to low back pain)  HLM Goal achieved. Continue to encourage appropriate mobility.     Disposition:   Home    Planned Readmission: Not at this time    Discharge Medications:  See after visit summary for reconciled discharge medications provided to patient and/or family.      **Please Note: This note may have been constructed using a voice recognition system**

## 2024-01-23 NOTE — PLAN OF CARE
Problem: Potential for Falls  Goal: Patient will remain free of falls  Description: INTERVENTIONS:  - Educate patient/family on patient safety including physical limitations  - Instruct patient to call for assistance with activity   - Consult OT/PT to assist with strengthening/mobility   - Keep Call bell within reach  - Keep bed low and locked with side rails adjusted as appropriate  - Keep care items and personal belongings within reach  - Initiate and maintain comfort rounds  - Make Fall Risk Sign visible to staff  - Offer Toileting every 2 Hours, in advance of need  - Initiate/Maintain bed/ chair alarm  - Apply yellow socks and bracelet for high fall risk patients  - Consider moving patient to room near nurses station  Outcome: Adequate for Discharge

## 2024-01-23 NOTE — ASSESSMENT & PLAN NOTE
Assessment:  Complained of severe lower back pain this morning which seems to be chronic in nature.   Spine lumbar xray:   No acute osseous abnormality  L5-S1 moderate/marked degenerative changes.   Ambulatory referral to acute pain and spine management.

## 2024-01-23 NOTE — ASSESSMENT & PLAN NOTE
Assessment:  Home Regimen: amlodipine 10 mg, losartan, metoprolol 100 mg daily, hydrochlorothiazide  BP currently in in 140-150s.     Plan:  Currently, Continue amilodipine 5 mg and Metoprolol 100 mg daily   Monitor Blood pressure   Will continue the rest of home regimen prior to discharge.

## 2024-01-23 NOTE — ASSESSMENT & PLAN NOTE
Home med simvastatin 10 mg, not in formulary.  Transitioned to Atorvastatin 40 mg per neurology recommendations.   Lipid panel: . LDL normal. HDL 39

## 2024-01-23 NOTE — QUICK NOTE
Assessment:  Patient is a 68-year-old female with past medical history significant for hypercholesterolemia, diabetes, hypertension, osteoarthritis, bilateral cataracts pulmonologist, squamous cell carcinoma of skin, and anxiety disorder the presented to [facility] on January 22, 2024 as a stroke alert.  The patient's last known well was 1/21/2024, 2400, NIH 1 (mild aphasia per ED exam).  Blood pressure 192/80.  Glucose 116.  Noncontrast CT head demonstrated no acute intracranial abnormality.  CTA stroke alert head and neck demonstrated no vascular pathology in head or neck, mild atherosclerosis noted.  MRI demonstrated no acute ischemic event, with chronic microangiopathy noted.    Impression:  Taking into account the patient's presentation, risk factors, and neuraxial imaging not demonstrating an acute vascular event, a transient ischemic attack versus hypertensive state is high on the differential.    Plan:  - Case discussed with attending neurologist Dr. Apodaca  - Recommend continuing stroke pathway  - Recommend normotension, normothermia, euglycemia  - Recommend atorvastatin 40 mg daily  - Recommend DAPT clopidogrel 75 mg daily, and aspirin 81 mg daily for 21 days, then transition to aspirin 81 mg daily monotherapy afterwards.  - Recommend intensive diabetes management with A1c of 8.1, recommend further investigation TSH of 5.813  - Recommend fall precautions  - Recommend continued evaluation and treatment of any metabolic, infectious, inflammatory derangements  - Mechanical and chemical DVT prophylaxis appreciated  - Recommend PT/OT  - Recommend STAT CT head noncontrast for acute changes mental status/neurologic examination, please contact neurology  - Recommend patient follow-up in outpatient neurology office with neurovascular advanced practitioner in 4 to 6 weeks.  - No further imaging required at this juncture in time  - No further inpatient neurology recommendations at this time,  please contact for any further questions or concerns  - Rest per primary team appreciated

## 2024-01-23 NOTE — PLAN OF CARE
Problem: Potential for Falls  Goal: Patient will remain free of falls  Description: INTERVENTIONS:  - Educate patient/family on patient safety including physical limitations  - Instruct patient to call for assistance with activity   - Consult OT/PT to assist with strengthening/mobility   - Keep Call bell within reach  - Keep bed low and locked with side rails adjusted as appropriate  - Keep care items and personal belongings within reach  - Initiate and maintain comfort rounds  - Make Fall Risk Sign visible to staff  - Offer Toileting every 2 Hours, in advance of need  - Initiate/Maintain bed and chair alarm  - Obtain necessary fall risk management equipment:   - Apply yellow socks and bracelet for high fall risk patients  - Consider moving patient to room near nurses station  Outcome: Progressing

## 2024-01-23 NOTE — ASSESSMENT & PLAN NOTE
Assessment:  CT soft tissue neck 1/17: Acute palatine tonsillitis with no suspicious tonsillar/peritonsillar abscess. Acute on chronic sinusitis.    Plan:  Continue keflex day #2 for 10 day course home.

## 2024-01-23 NOTE — ASSESSMENT & PLAN NOTE
Lab Results   Component Value Date    HGBA1C 8.1 (H) 01/22/2024       Recent Labs     01/22/24  1709 01/22/24  2221 01/23/24  0752 01/23/24  1131   POCGLU 122 136 193* 214*       Blood Sugar Average: Last 72 hrs:  (P) 179.3771109047653003    A1c 8.1   Hold home metformin.  Insulin sliding scale.  Carb controlled diet.

## 2024-01-23 NOTE — PHYSICAL THERAPY NOTE
PHYSICAL THERAPY EVALUATION/TREATMENT    Patient is not in need of further skilled inpatient physical therapy at this time and can ambulate independently with use of a roller walker while admitted.    ADMIT DX:  Aphasia [R47.01]  Stroke-like symptoms [R29.90]  AMS (altered mental status) [R41.82]    Past Medical History:   Diagnosis Date    Acute non-recurrent frontal sinusitis 05/15/2019    Acute non-recurrent maxillary sinusitis 03/10/2020    Allergic In my 60’s    Anxiety     Anxiety disorder     Arthritis     OA    Asthma     exercise iniduced.    Cancer (HCC)     Squamous Cell on back    Dependence on crutches     prn    Depression     ro.     Diabetes mellitus (HCC)     NIDDM; per [employer]: Last Assessed: 7/15/15, New onset of type 2    Diverticulitis of colon     Fibromyalgia     Hearing aid worn     mayra    Heart murmur     HL (hearing loss)     b/l - uses hearing aids    Hyperglycemia     Last Assessed:10/29/15    Hyperlipidemia     Hypertension     Inflammatory bowel disease In my 60’s    LLQ abdominal pain 2020    Pneumonia May 2023    Psoriasis     red dry patch left side- waist area    Psoriatic arthritis (HCC)     RA (rheumatoid arthritis) (formerly [facility])     Urinary tract infection Currently    Taking med    Wears glasses     reading     Past Surgical History:   Procedure Laterality Date    ARTHROSCOPY KNEE Left 3/2/2016    Procedure: ARTHROSCOPY KNEE;  Surgeon: Daniel Laureano MD;  Location: [facility];  Service:      SECTION      X2    COLONOSCOPY  2019    CYST REMOVAL Right     Cyst on bone     FRACTURE SURGERY      (Right) tibia/fibula Fx, and ankle    PILONIDAL CYST / SINUS EXCISION      MN ARTHRS KNE SURG W/MENISCECTOMY MED/LAT W/SHVG Left 3/2/2016    Procedure: PARTIAL MEDIAL &  LATERAL MENISCECTOMIES, CHONDROPLASTY, REMOVAL OSTEOPHYTE, LIMITED SYNOVECTOMY;  Surgeon: Daniel Laureano MD;  Location: [facility];  Service: Orthopedics    REPAIR KNEE LIGAMENT      TOOTH EXTRACTION    "      Performed at least 2 patient identifiers during session: Name, Birthday, and ID bracelet       01/23/24 0918   PT Last Visit   PT Visit Date 01/23/24   Note Type   Note type Evaluation   Pain Assessment   Pain Assessment Tool 0-10   Pain Score 6   Pain Location/Orientation Orientation: Bilateral;Orientation: Lower;Location: Back   Pain Onset/Description Onset: Ongoing;Frequency: Constant/Continuous   Effect of Pain on Daily Activities Limits comfort and mobility   Patient's Stated Pain Goal No pain   Hospital Pain Intervention(s) Repositioned;Ambulation/increased activity;Emotional support;Rest  (Patient has a history of low back pain since December; was attending outpatient PT and was getting better but had to stop outpatient PT secondary to cost)   Restrictions/Precautions   Other Precautions Pain;Telemetry  (Zeke on room air)   Home Living   Type of Home House   Home Layout One level;Able to live on main level with bedroom/bathroom;Performs ADLs on one level;Stairs to enter without rails  (2 steps to enter with a \"column\" to hold onto)   Bathroom Shower/Tub Walk-in shower   Bathroom Toilet Raised   Bathroom Equipment Grab bars in shower   Bathroom Accessibility Accessible   Home Equipment Cane  (Standard walker)   Prior Function   Level of [place] Independent with ADLs;Independent with functional mobility;Independent with IADLS   Lives With Spouse   Receives Help From Family   IADLs Independent with driving;Independent with meal prep;Independent with medication management   Falls in the last 6 months 0  (Denies)   Vocational Retired  (Teacher, 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  Per patient and her spouse patient was \"very confused and unable to speak at all last night, is much better this morning but not 100%\" MRI (-) for " acute event   Family/Caregiver Present Yes  (Patient's spouse and daughter)   Cognition   Overall Cognitive Status WFL   Arousal/Participation Cooperative   Orientation Level Oriented X4   Memory Decreased recall of recent events   Following Commands Follows multistep commands with increased time or repetition  (Due to focus on pain low back)   Comments Patient is distracted at times during PT evaluation due to pain in low back   Subjective   Subjective Patient reporting pain to low back, states pain has been progressively getting worse since December 2023 after stopping outpatient PT due to cost   RUE Assessment   RUE Assessment WFL  (Grossly 4/5)   LUE Assessment   LUE Assessment WFL  (Grossly 4/5)   RLE Assessment   RLE Assessment WFL  (Grossly 4/5)   LLE Assessment   LLE Assessment WFL  (Grossly 4/5)   Coordination   Movements are Fluid and Coordinated 1   Sensation WFL   Finger to Nose & Finger to Finger  Intact   Heel to Shin Intact   Rapid Alternating Movements Intact   Light Touch   RLE Light Touch Grossly intact   LLE Light Touch Grossly intact   Proprioception   RLE Proprioception Grossly intact   LLE Proprioception Grossly Intact   Bed Mobility   Additional Comments Patient received out of bed in chair   Transfers   Sit to Stand 7  Independent   Additional items Armrests;Increased time required   Stand to Sit 7  Independent   Additional items Armrests;Increased time required   Ambulation/Elevation   Gait pattern Short stride;Step through pattern  (Painful; guarded; 2/2 low back pain)   Gait Assistance 6  Modified independent   Assistive Device None   Distance 80 feet with change in direction; PT recommended trial of RW but patient initially declined   Balance   Static Sitting Good   Static Standing Good   Ambulatory Good   Activity Tolerance   Activity Tolerance Patient limited by pain   Assessment   Problem List Pain   Assessment Patient seen for Physical Therapy evaluation. Patient admitted with  Stroke-like symptoms.  Comorbidities affecting patient's physical performance include: RA, benign essential hypertension, DM 2, fibromyalgia, psoriasis with arthropathy, OA left knee, anxiety disorder, generalized OA multiple sites, moderate persistent asthma, continuous opioid dependence, COVID-19, acute low back pain without sciatica.  Personal factors affecting patient at time of initial evaluation include: lives in one story house, stairs to enter home, inability to navigate community distances, inability to navigate level surfaces without external assistance, inability to perform dynamic tasks in community, and anxiety. Prior to admission, patient was independent with functional mobility without assistive device, independent with ADLS, independent with IADLS, living with spouse in a 1 level home with 2 steps to enter, ambulating household distance, and ambulating community distances.  Please find objective findings from Physical Therapy assessment regarding body systems outlined above with impairments and limitations including pain, decreased activity tolerance, and decreased functional mobility tolerance. Patient's clinical presentation is currently unstable/unpredictable as seen in patient's presentation of changing level of pain and new onset of impairment of functional mobility.  Patient is not in need of further skilled inpatient physical therapy at this time and can ambulate independently with use of a roller walker while admitted.  Will DC skilled inpatient PT services. As demonstrated by objective findings, the assigned level of complexity for this evaluation is high.    The patient's -[facility] Basic Mobility Inpatient Short Form Raw Score is 24. A Raw score of greater than 16 suggests the patient may benefit from discharge to home. Please also refer to the recommendation of the Physical Therapist for safe discharge planning.   Goals   Patient Goals Less pain in my back   STG Expiration Date 02/03/24    Short Term Goal #1 Patient will: Ambulate at least 500+ ft. with least restrictive assistive device independently w/o LOB to facilitate return and engagement w/ previous living environment and Navigate 2 stairs independently without handrail to either improve independence w/ entering home and/or so patient can fully access living areas in home   Plan   Treatment/Interventions ADL retraining;LE strengthening/ROM;Functional transfer training;Elevations;Therapeutic exercise;Endurance training;Patient/family training;Equipment eval/education;Bed mobility;Gait training;Compensatory technique education;Spoke to case management;Family   PT Frequency Other (Comment)  (One-time visit)   Discharge Recommendation   Rehab Resource Intensity Level, PT III (Minimum Resource Intensity)   Equipment Recommended Walker   Walker Package Recommended Wheeled walker   AM-PAC Basic Mobility Inpatient   Turning in Flat Bed Without Bedrails 4   Lying on Back to Sitting on Edge of Flat Bed Without Bedrails 4   Moving Bed to Chair 4   Standing Up From Chair Using Arms 4   Walk in Room 4   Climb 3-5 Stairs With Railing 4   Basic Mobility Inpatient Raw Score 24   Basic Mobility Standardized Score 57.68   Highest Level Of Mobility   JH-HLM Goal 8: Walk 250 feet or more   JH-HLM Achieved 7: Walk 25 feet or more  (Patient unable to complete 250 feet due to low back pain)   Additional Treatment Session   Start Time 0918   End Time 0930   Treatment Assessment Patient agreeable to additional ambulation trial with use of roller walker.  Patient transfers sit to stand independently with cues for safe hand placement and ambulates with a roller walker 80 feet with change in direction with mod I and cues for RW placement, control and direction.  Patient transfers stand to sit independently with cues for safe hand placement.  A: neurologically speaking, patient with good response to PT evaluation and treatment with no noted deficits.  Functionally  however, patient is limited by low back pain which has been progressively worsening since December 2023.  Patient initially declining use of roller walker but then was agreeable.  With use of roller walker for functional mobility and gait patient did feel relief in her low back pain with ambulation compared to gait without an assistive device.  Patient now agreeable to continue with roller walker for ambulation to assist in resolving low back pain.  While admitted, patient is not in need of further skilled inpatient physical therapy services and can ambulate independently with a roller walker in her room and with supervision of the nursing staff in the hallway.  Patient will benefit from follow-up outpatient PT for low back pain.  This PT also spoke with case management about ordering a roller walker for patient to use at home.  When medically stable for discharge, patient is appropriate for Level III Minimum Resource Intensity.   End of Consult   Patient Position at End of Consult Bedside chair;All needs within reach   Licensure   [place] License Number  Pricila L Kiana, PT   Portions of the documentation may have been created using voice recognition software. Occasional wrong word or sound alike substitutions may have occurred due to the inherent limitation of the voice recognition software. Read the chart carefully and recognize, using context, where substitutions have occurred.

## 2024-01-23 NOTE — ASSESSMENT & PLAN NOTE
Assessment:  Presented with word finding difficulty.   No motor or sensory deficit.   NIH 1 for mild aphasia  Stroke alert called.  CT head: no acute findings.   CTA head/neck: Mild atherosclerosis.   MRI brain: No acute ischemia.   Started on DAPT per neurology recommendations.   Likely TIA  Echocardiogram: EF 60%. No PFO. Mild AS. Moderate thickening of mitral leaflets. Mild regurgitation of TV.   This morning, Symptoms resolved and no neurologic deficit.     Plan:  Continue stroke pathway.  Neurology is following: Continue DAPT for 21 days with transitions to aspirin afterwards.   Frequent neuro checks  Fall precautions  PT recommends level III   Telemetry monitoring

## 2024-01-24 ENCOUNTER — TRANSITIONAL CARE MANAGEMENT (OUTPATIENT)
Dept: FAMILY MEDICINE CLINIC | Facility: CLINIC | Age: 69
End: 2024-01-24

## 2024-01-25 ENCOUNTER — TELEPHONE (OUTPATIENT)
Age: 69
End: 2024-01-25

## 2024-01-25 ENCOUNTER — NURSE TRIAGE (OUTPATIENT)
Age: 69
End: 2024-01-25

## 2024-01-25 DIAGNOSIS — R11.0 NAUSEA: ICD-10-CM

## 2024-01-25 RX ORDER — ONDANSETRON 4 MG/1
4 TABLET, ORALLY DISINTEGRATING ORAL EVERY 6 HOURS PRN
Qty: 10 TABLET | Refills: 1 | Status: SHIPPED | OUTPATIENT
Start: 2024-01-25

## 2024-01-25 NOTE — TELEPHONE ENCOUNTER
Patient calling in stating that she has a yeast infection and needs something called in to the pharmacy

## 2024-01-25 NOTE — TELEPHONE ENCOUNTER
Pts daughter called to say her mother is still not well since being d/c from the hospital. She is very frail, in pain and shivering. Warm transfer to a nurse for triage.

## 2024-01-25 NOTE — TELEPHONE ENCOUNTER
Spoke to daughter, she would like something sent to the pharmacy for nausea. She wanted to only see you for her follow up so they will keep Mondays appt

## 2024-01-25 NOTE — TELEPHONE ENCOUNTER
Please follow up with daughter Ca in an hour or two for update.  Her call back number is [phone]. Advise if patient needs to be seen sooner for TCM or return to the Er if symptoms worsen.    Daughter called, mother is staying with her today, low grade fever of 99.9, feels very nauseous, shaking . On ABT , Cephalexin. Will start with a yogurt with live culture. Will give her tylenol for the low garde fever.   Discharged 1/23 Dx: TIA. On Plavix and other med changes, see [facility] on [facility]. TCM Monday.

## 2024-01-25 NOTE — TELEPHONE ENCOUNTER
"Reason for Disposition  • Unexplained nausea    Answer Assessment - Initial Assessment Questions  1. NAUSEA SEVERITY: \"How bad is the nausea?\" (e.g., mild, moderate, severe; dehydration, weight loss)    - MILD: loss of appetite without change in eating habits    - MODERATE: decreased oral intake without significant weight loss, dehydration, or malnutrition    - SEVERE: inadequate caloric or fluid intake, significant weight loss, symptoms of dehydration      moderate  2. ONSET: \"When did the nausea begin?\"      Within a couple hours   3. VOMITING: \"Any vomiting?\" If Yes, ask: \"How many times today?\"      no  4. RECURRENT SYMPTOM: \"Have you had nausea before?\" If Yes, ask: \"When was the last time?\" \"What happened that time?\"      no  5. CAUSE: \"What do you think is causing the nausea?\"      Unknown , possible meds   6. PREGNANCY: \"Is there any chance you are pregnant?\" (e.g., unprotected intercourse, missed birth control pill, broken condom)      N/a    Protocols used: Nausea-ADULT-[place]    "

## 2024-01-26 ENCOUNTER — TELEPHONE (OUTPATIENT)
Dept: NEUROLOGY | Facility: CLINIC | Age: 69
End: 2024-01-26

## 2024-01-26 DIAGNOSIS — B37.49 CANDIDIASIS OF GENITALIA: Primary | ICD-10-CM

## 2024-01-26 RX ORDER — FLUCONAZOLE 150 MG/1
150 TABLET ORAL ONCE
Qty: 1 TABLET | Refills: 0 | Status: SHIPPED | OUTPATIENT
Start: 2024-01-26 | End: 2024-01-26

## 2024-01-26 NOTE — TELEPHONE ENCOUNTER
Patient called in the schedule appointment.    [facility]/[place] [facility]/* Stroke-like symptoms  DC/Home/ 1.23.24    Recommend patient follow-up in outpatient neurology office with neurovascular advanced practitioner in 4 to 6 weeks.     Patient was scheduled with Dr Flores for 2/22/24 at 2pm in [facility].

## 2024-01-29 ENCOUNTER — OFFICE VISIT (OUTPATIENT)
Dept: FAMILY MEDICINE CLINIC | Facility: CLINIC | Age: 69
End: 2024-01-29
Payer: COMMERCIAL

## 2024-01-29 VITALS
SYSTOLIC BLOOD PRESSURE: 108 MMHG | DIASTOLIC BLOOD PRESSURE: 70 MMHG | OXYGEN SATURATION: 97 % | RESPIRATION RATE: 16 BRPM | WEIGHT: 172.4 LBS | HEIGHT: 62 IN | HEART RATE: 73 BPM | BODY MASS INDEX: 31.73 KG/M2 | TEMPERATURE: 99.6 F

## 2024-01-29 DIAGNOSIS — I10 BENIGN ESSENTIAL HYPERTENSION: ICD-10-CM

## 2024-01-29 DIAGNOSIS — E78.00 HYPERCHOLESTEROLEMIA: ICD-10-CM

## 2024-01-29 DIAGNOSIS — L40.50 PSORIASIS WITH ARTHROPATHY (HCC): ICD-10-CM

## 2024-01-29 DIAGNOSIS — R29.90 STROKE-LIKE SYMPTOMS: ICD-10-CM

## 2024-01-29 DIAGNOSIS — M05.79 RHEUMATOID ARTHRITIS INVOLVING MULTIPLE SITES WITH POSITIVE RHEUMATOID FACTOR (HCC): ICD-10-CM

## 2024-01-29 DIAGNOSIS — Z09 HOSPITAL DISCHARGE FOLLOW-UP: Primary | ICD-10-CM

## 2024-01-29 DIAGNOSIS — E11.9 CONTROLLED TYPE 2 DIABETES MELLITUS WITHOUT COMPLICATION, WITHOUT LONG-TERM CURRENT USE OF INSULIN (HCC): ICD-10-CM

## 2024-01-29 PROBLEM — I65.23 BILATERAL CAROTID ARTERY STENOSIS: Status: ACTIVE | Noted: 2024-01-29

## 2024-01-29 PROBLEM — J06.9 UPPER RESPIRATORY TRACT INFECTION: Status: RESOLVED | Noted: 2023-11-30 | Resolved: 2024-01-29

## 2024-01-29 PROBLEM — J32.9 SINUSITIS: Status: RESOLVED | Noted: 2024-01-22 | Resolved: 2024-01-29

## 2024-01-29 PROBLEM — U07.1 COVID-19: Status: RESOLVED | Noted: 2023-10-04 | Resolved: 2024-01-29

## 2024-01-29 PROBLEM — J02.9 SORE THROAT: Status: RESOLVED | Noted: 2024-01-17 | Resolved: 2024-01-29

## 2024-01-29 PROCEDURE — 1111F DSCHRG MED/CURRENT MED MERGE: CPT | Performed by: FAMILY MEDICINE

## 2024-01-29 PROCEDURE — 99496 TRANSJ CARE MGMT HIGH F2F 7D: CPT | Performed by: FAMILY MEDICINE

## 2024-01-29 NOTE — PROGRESS NOTES
Assessment & Plan     1. Hospital discharge follow-up    2. Stroke-like symptoms  Assessment & Plan:  Possible TIA  On plavix      3. Controlled type 2 diabetes mellitus without complication, without long-term current use of insulin (HCC)  Assessment & Plan:  Increased metformin to 3 pills  Lab Results   Component Value Date    HGBA1C 8.1 (H) 01/22/2024     Orders:  -     Hemoglobin A1C; Future; Expected date: 04/29/2024    4. Benign essential hypertension  Assessment & Plan:  Stable on current meds      5. Psoriasis with arthropathy (HCC)  Assessment & Plan:  Stable on current meds      6. Rheumatoid arthritis involving multiple sites with positive rheumatoid factor (HCC)  Assessment & Plan:  stable      7. Hypercholesterolemia  Assessment & Plan:  stable          Depression Screening and Follow-up Plan: Patient was screened for depression during today's encounter. They screened negative with a PHQ-2 score of 0.      Subjective     Transitional Care Management Review:   Davina Allen is a 68 y.o. female here for TCM follow up.     During the TCM phone call patient stated:  TCM Call     Date and time call was made  1/24/2024  9:54 AM    Patient was hospitialized at  [facility]    Date of Admission  01/22/24    Date of discharge  01/23/24    Diagnosis  Stroke-like symptoms    Disposition  Home    Were the patients medications reviewed and updated  No      TCM Call     Should patient be enrolled in anticoag monitoring?  Yes    Scheduled for follow up?  Yes    Did you obtain your prescribed medications  Yes    Do you need help managing your prescriptions or medications  No    Is transportation to your appointment needed  No    I have advised the patient to call PCP with any new or worsening symptoms  Jaquelin Antonio,     Living Arrangements  Spouse or Significiant other    Are you recieving any outpatient services  No    Are you recieving home care services  No    Are you using any community resources  No     "Current waiver services  No    Have you fallen in the last 12 months  No    Interperter language line needed  No        Here for hospital follow up  Discharge with diagnosis of possible TIA  Woke up at 5am, made tea at coffee   Taken the lid off and couldn't get the lid on, couldn't get the lid on   kept asking her questions and she couldn't come up with them  He decided to take her to the hospital  Balance was also off  Hospital records reviewed  Ct and MRI were unrevealing  Seen by neurologist  Possible TIA  Has follow up neurolgy      Review of Systems   Constitutional:  Positive for fatigue.   HENT: Negative.     Eyes: Negative.    Respiratory: Negative.     Cardiovascular: Negative.    Gastrointestinal:  Positive for abdominal pain.       Objective     /70 (BP Location: Left arm, Patient Position: Sitting, Cuff Size: Standard)   Pulse 73   Temp 99.6 °F (37.6 °C) (Tympanic)   Resp 16   Ht 5' 2\" (1.575 m)   Wt 78.2 kg (172 lb 6.4 oz)   SpO2 97%   BMI 31.53 kg/m²      Physical Exam  Vitals and nursing note reviewed.   Constitutional:       Appearance: Normal appearance. She is well-developed.   HENT:      Head: Normocephalic and atraumatic.      Right Ear: External ear normal.      Left Ear: External ear normal.      Nose: Nose normal.   Eyes:      Extraocular Movements: Extraocular movements intact.      Conjunctiva/sclera: Conjunctivae normal.      Pupils: Pupils are equal, round, and reactive to light.   Cardiovascular:      Rate and Rhythm: Normal rate and regular rhythm.      Heart sounds: Normal heart sounds.   Pulmonary:      Effort: Pulmonary effort is normal.      Breath sounds: Normal breath sounds.   Abdominal:      General: Abdomen is flat. Bowel sounds are normal.      Palpations: Abdomen is soft.   Musculoskeletal:         General: Normal range of motion.      Cervical back: Normal range of motion and neck supple.   Skin:     General: Skin is warm and dry.      Capillary Refill: " Capillary refill takes less than 2 seconds.   Neurological:      General: No focal deficit present.      Mental Status: She is alert and oriented to person, place, and time.   Psychiatric:         Mood and Affect: Mood normal.         Behavior: Behavior normal.         Thought Content: Thought content normal.         Judgment: Judgment normal.       Medications have been reviewed by provider in current encounter    Mariaa Layton DO

## 2024-01-29 NOTE — ASSESSMENT & PLAN NOTE
Increased metformin to 3 pills  Lab Results   Component Value Date    HGBA1C 8.1 (H) 01/22/2024

## 2024-01-31 ENCOUNTER — TELEPHONE (OUTPATIENT)
Dept: FAMILY MEDICINE CLINIC | Facility: CLINIC | Age: 69
End: 2024-01-31

## 2024-01-31 NOTE — TELEPHONE ENCOUNTER
Patient called me back and said that she has been taking probiotic all along. Patient said she stopped her cephalexin and wants to know what to do next. Left a note for provider to advise. Will call patient back with reply from provider

## 2024-02-01 DIAGNOSIS — R19.7 DIARRHEA, UNSPECIFIED TYPE: Primary | ICD-10-CM

## 2024-02-01 LAB
DME PARACHUTE DELIVERY DATE ACTUAL: NORMAL
DME PARACHUTE DELIVERY DATE REQUESTED: NORMAL
DME PARACHUTE ITEM DESCRIPTION: NORMAL
DME PARACHUTE ORDER STATUS: NORMAL
DME PARACHUTE SUPPLIER NAME: NORMAL
DME PARACHUTE SUPPLIER PHONE: NORMAL

## 2024-02-01 NOTE — TELEPHONE ENCOUNTER
Patient called and states is not any better, she has been taking the probiotic for months, states when tries to drink water or hot tea in morning feels like she is going to vomit. Would like to do the stool culture. Needs to know where to go for that and needs a order. Would like a call back at [phone]

## 2024-02-02 DIAGNOSIS — I10 ESSENTIAL HYPERTENSION: ICD-10-CM

## 2024-02-02 RX ORDER — HYDROCHLOROTHIAZIDE 25 MG/1
TABLET ORAL
Qty: 90 TABLET | Refills: 1 | Status: SHIPPED | OUTPATIENT
Start: 2024-02-02

## 2024-02-08 ENCOUNTER — APPOINTMENT (OUTPATIENT)
Dept: LAB | Facility: AMBULARY SURGERY CENTER | Age: 69
End: 2024-02-08
Payer: MEDICARE

## 2024-02-08 DIAGNOSIS — Q76.1 KLIPPEL-FEIL DEFORMITY: Primary | ICD-10-CM

## 2024-02-08 DIAGNOSIS — L40.50 PSORIATIC ARTHROPATHY (HCC): ICD-10-CM

## 2024-02-08 DIAGNOSIS — E87.6 LOW SERUM POTASSIUM: ICD-10-CM

## 2024-02-08 DIAGNOSIS — E11.9 CONTROLLED TYPE 2 DIABETES MELLITUS WITHOUT COMPLICATION, WITHOUT LONG-TERM CURRENT USE OF INSULIN (HCC): ICD-10-CM

## 2024-02-08 LAB
ALBUMIN SERPL BCP-MCNC: 3.8 G/DL (ref 3.5–5)
ALP SERPL-CCNC: 77 U/L (ref 34–104)
ALT SERPL W P-5'-P-CCNC: 20 U/L (ref 7–52)
ANION GAP SERPL CALCULATED.3IONS-SCNC: 5 MMOL/L
AST SERPL W P-5'-P-CCNC: 30 U/L (ref 13–39)
BILIRUB SERPL-MCNC: 0.33 MG/DL (ref 0.2–1)
BUN SERPL-MCNC: 11 MG/DL (ref 5–25)
CALCIUM SERPL-MCNC: 9.2 MG/DL (ref 8.4–10.2)
CHLORIDE SERPL-SCNC: 100 MMOL/L (ref 96–108)
CO2 SERPL-SCNC: 30 MMOL/L (ref 21–32)
CREAT SERPL-MCNC: 0.51 MG/DL (ref 0.6–1.3)
CRP SERPL QL: 3.4 MG/L
ERYTHROCYTE [SEDIMENTATION RATE] IN BLOOD: 37 MM/HOUR (ref 0–29)
EST. AVERAGE GLUCOSE BLD GHB EST-MCNC: 180 MG/DL
GFR SERPL CREATININE-BSD FRML MDRD: 97 ML/MIN/1.73SQ M
GLUCOSE P FAST SERPL-MCNC: 146 MG/DL (ref 65–99)
HBA1C MFR BLD: 7.9 %
POTASSIUM SERPL-SCNC: 3.5 MMOL/L (ref 3.5–5.3)
PROT SERPL-MCNC: 7.3 G/DL (ref 6.4–8.4)
SODIUM SERPL-SCNC: 135 MMOL/L (ref 135–147)

## 2024-02-08 PROCEDURE — 85007 BL SMEAR W/DIFF WBC COUNT: CPT

## 2024-02-08 PROCEDURE — 36415 COLL VENOUS BLD VENIPUNCTURE: CPT

## 2024-02-08 PROCEDURE — 86140 C-REACTIVE PROTEIN: CPT

## 2024-02-08 PROCEDURE — 85652 RBC SED RATE AUTOMATED: CPT

## 2024-02-08 PROCEDURE — 83036 HEMOGLOBIN GLYCOSYLATED A1C: CPT

## 2024-02-08 PROCEDURE — 80053 COMPREHEN METABOLIC PANEL: CPT

## 2024-02-08 PROCEDURE — 85027 COMPLETE CBC AUTOMATED: CPT

## 2024-02-09 LAB
BASOPHILS # BLD MANUAL: 0.14 THOUSAND/UL (ref 0–0.1)
BASOPHILS NFR MAR MANUAL: 2 % (ref 0–1)
DIFFERENTIAL COMMENT: ABNORMAL
EOSINOPHIL # BLD MANUAL: 2.37 THOUSAND/UL (ref 0–0.4)
EOSINOPHIL NFR BLD MANUAL: 33 % (ref 0–6)
ERYTHROCYTE [DISTWIDTH] IN BLOOD BY AUTOMATED COUNT: 12.5 % (ref 11.6–15.1)
HCT VFR BLD AUTO: 39.7 % (ref 34.8–46.1)
HGB BLD-MCNC: 13.4 G/DL (ref 11.5–15.4)
LYMPHOCYTES # BLD AUTO: 2.15 THOUSAND/UL (ref 0.6–4.47)
LYMPHOCYTES # BLD AUTO: 30 % (ref 14–44)
MCH RBC QN AUTO: 29.9 PG (ref 26.8–34.3)
MCHC RBC AUTO-ENTMCNC: 33.8 G/DL (ref 31.4–37.4)
MCV RBC AUTO: 89 FL (ref 82–98)
MONOCYTES # BLD AUTO: 0.14 THOUSAND/UL (ref 0–1.22)
MONOCYTES NFR BLD: 2 % (ref 4–12)
NEUTROPHILS # BLD MANUAL: 2.37 THOUSAND/UL (ref 1.85–7.62)
NEUTS SEG NFR BLD AUTO: 33 % (ref 43–75)
PATHOLOGY REVIEW: YES
PLATELET # BLD AUTO: 187 THOUSANDS/UL (ref 149–390)
PLATELET BLD QL SMEAR: ADEQUATE
PMV BLD AUTO: 11.5 FL (ref 8.9–12.7)
RBC # BLD AUTO: 4.48 MILLION/UL (ref 3.81–5.12)
RBC MORPH BLD: NORMAL
WBC # BLD AUTO: 7.18 THOUSAND/UL (ref 4.31–10.16)

## 2024-02-09 PROCEDURE — 85060 BLOOD SMEAR INTERPRETATION: CPT | Performed by: PATHOLOGY

## 2024-02-12 ENCOUNTER — TELEPHONE (OUTPATIENT)
Dept: NEUROLOGY | Facility: CLINIC | Age: 69
End: 2024-02-12

## 2024-02-16 ENCOUNTER — CONSULT (OUTPATIENT)
Dept: PAIN MEDICINE | Facility: CLINIC | Age: 69
End: 2024-02-16
Payer: COMMERCIAL

## 2024-02-16 VITALS
WEIGHT: 175 LBS | DIASTOLIC BLOOD PRESSURE: 78 MMHG | HEART RATE: 70 BPM | SYSTOLIC BLOOD PRESSURE: 159 MMHG | HEIGHT: 62 IN | BODY MASS INDEX: 32.2 KG/M2

## 2024-02-16 DIAGNOSIS — M54.50 CHRONIC LOW BACK PAIN WITHOUT SCIATICA, UNSPECIFIED BACK PAIN LATERALITY: ICD-10-CM

## 2024-02-16 DIAGNOSIS — G89.29 CHRONIC LOW BACK PAIN WITHOUT SCIATICA, UNSPECIFIED BACK PAIN LATERALITY: ICD-10-CM

## 2024-02-16 DIAGNOSIS — M43.16 SPONDYLOLISTHESIS, LUMBAR REGION: Primary | ICD-10-CM

## 2024-02-16 DIAGNOSIS — M47.816 LUMBAR SPONDYLOSIS: ICD-10-CM

## 2024-02-16 PROCEDURE — 99204 OFFICE O/P NEW MOD 45 MIN: CPT | Performed by: ANESTHESIOLOGY

## 2024-02-16 NOTE — PROGRESS NOTES
Assessment  1. Spondylolisthesis, lumbar region    2. Lumbar spondylosis    3. Chronic low back pain without sciatica, unspecified back pain laterality        Plan  68-year-old female with a history of diabetes, fibromyalgia, psoriatic arthritis, referred by Dr. Hayes, presenting for initial consultation regarding a 3-month history of axial lumbosacral back pain without any radicular symptoms into her lower extremities.  X-ray of the lumbar spine from January 23, 2024 demonstrates anterolisthesis of L5 on S1.  Disc base narrowing at L5-S1 with facet arthrosis at this level.  Physical therapy was ordered, however she has not engaged in this yet.  She does take Percocet 5/325 mg every 8 hours as needed which is prescribed by her rheumatologist with some relief.  Unable to take NSAIDs secondary to antiplatelet therapy.  She has found some relief with oral steroids and Flexeril in the past.  The patient's back pain seems to be mostly myofascial and facet mediated in nature.  No evidence of sacroiliitis.  No evidence of lumbar radiculopathy on exam.    1.  I did offer the patient bilateral L3-5 medial branch blocks to address the facet mediated component of her low back pain.  At present, the patient's back pain is not that severe and she would like to hold off on interventional therapy at this time.  She was provided literature regarding medial branch blocks and RFA to review if symptoms worsen  2.  Patient will continue with Percocet as prescribed by rheumatologist  3.  Encourage patient to engage in physical therapy as ordered as a feel she would benefit from core strengthening exercises  4.  I will follow-up with the patient on a as needed basis      My impressions and treatment recommendations were discussed in detail with the patient who verbalized understanding and had no further questions.  Discharge instructions were provided. I personally saw and examined the patient and I agree with the above discussed plan of  care.    No orders of the defined types were placed in this encounter.    No orders of the defined types were placed in this encounter.      History of Present Illness    Davina Allen is a 68 y.o. female with a history of diabetes, fibromyalgia, psoriatic arthritis, referred by Dr. Hayes, presenting for initial consultation regarding a 3-month history of axial lumbosacral back pain without any radicular symptoms into her lower extremities.  She denies any bladder or bowel incontinence or saddle anesthesia.  She denies any trauma or inciting event.  X-ray of the lumbar spine from January 23, 2024 demonstrates anterolisthesis of L5 on S1.  Disc base narrowing at L5-S1 with facet arthrosis at this level.  Physical therapy was ordered, however she has not engaged in this yet.  She does take Percocet 5/325 mg every 8 hours as needed which is prescribed by her rheumatologist with some relief.  Unable to take NSAIDs secondary to antiplatelet therapy.  She has found some relief with oral steroids and Flexeril in the past.  The patient rates her pain currently a 0 out of 10, however pain can become a 3 out of 10.  The pain is occasional and does not follow any particular pattern throughout the day.  The pain is described as dull and aching.  The pain is increased with standing, bending, sitting, and walking.  She does find some relief with heat and ice.      Other than as stated above, the patient denies any interval changes in medications, medical condition, mental condition, symptoms, or allergies since the last office visit.    I have personally reviewed and/or updated the patient's past medical history, past surgical history, family history, social history, current medications, allergies, and vital signs today.     Review of Systems   Constitutional:  Negative for fever and unexpected weight change.   HENT:  Positive for hearing loss. Negative for trouble swallowing.    Eyes:  Negative for visual disturbance.    Respiratory:  Negative for shortness of breath and wheezing.    Cardiovascular:  Negative for chest pain and palpitations.   Gastrointestinal:  Positive for abdominal pain and nausea. Negative for constipation, diarrhea and vomiting.   Endocrine: Negative for cold intolerance, heat intolerance and polydipsia.   Genitourinary:  Negative for difficulty urinating and frequency.   Musculoskeletal:  Positive for arthralgias, joint swelling and myalgias. Negative for gait problem.   Skin:  Positive for rash.   Neurological:  Positive for dizziness and headaches. Negative for seizures, syncope and weakness.   Hematological:  Does not bruise/bleed easily.   Psychiatric/Behavioral:  Negative for dysphoric mood.    All other systems reviewed and are negative.      Patient Active Problem List   Diagnosis    Hypercholesterolemia    Fibromyalgia    Psoriasis with arthropathy (HCC)    Rheumatoid arthritis involving multiple sites with positive rheumatoid factor (HCC)    Chronic pain of left knee    Primary osteoarthritis of left knee    Allergy to pollen    Anxiety disorder    Benign essential hypertension    Controlled diabetes mellitus type II without complication (HCC)    Generalized osteoarthritis of multiple sites    Hearing loss    Psoriasis    Squamous cell carcinoma of skin    Vitamin D deficiency    Moderate persistent asthma    Chronic neck pain    Diverticulosis of colon    Pulmonary nodules    Functional diarrhea    Encounter for screening mammogram for malignant neoplasm of breast    Metatarsalgia, right foot    Porokeratosis    Cataract, nuclear sclerotic, both eyes    Continuous opioid dependence (HCC)    Annual physical exam    Nausea    Acute low back pain without sciatica    Stroke-like symptoms    Hospital discharge follow-up    Bilateral carotid artery stenosis       Past Medical History:   Diagnosis Date    Acute non-recurrent frontal sinusitis 05/15/2019    Acute non-recurrent maxillary sinusitis  03/10/2020    Allergic In my 60’s    Anxiety     Anxiety disorder     Arthritis     OA    Asthma     exercise iniduced.    Cancer (HCC)     Squamous Cell on back    COVID-19     Dependence on crutches     prn    Depression     ro.     Diabetes mellitus (HCC)     NIDDM; per [employer]: Last Assessed: 7/15/15, New onset of type 2    Diverticulitis of colon     Fibromyalgia     Hearing aid worn     mayra    Heart murmur     HL (hearing loss)     b/l - uses hearing aids    Hyperglycemia     Last Assessed:10/29/15    Hyperlipidemia     Hypertension     Inflammatory bowel disease In my 60’s    LLQ abdominal pain 2020    Pneumonia May 2023    Psoriasis     red dry patch left side- waist area    Psoriatic arthritis (HCC)     RA (rheumatoid arthritis) (HCC)     Urinary tract infection Currently    Taking med    Wears glasses     reading       Past Surgical History:   Procedure Laterality Date    ARTHROSCOPY KNEE Left 3/2/2016    Procedure: ARTHROSCOPY KNEE;  Surgeon: Daniel Laureano MD;  Location: [facility];  Service:      SECTION      X2    COLONOSCOPY  2019    CYST REMOVAL Right     Cyst on bone     FRACTURE SURGERY      (Right) tibia/fibula Fx, and ankle    PILONIDAL CYST / SINUS EXCISION      DE ARTHRS KNE SURG W/MENISCECTOMY MED/LAT W/SHVG Left 3/2/2016    Procedure: PARTIAL MEDIAL &  LATERAL MENISCECTOMIES, CHONDROPLASTY, REMOVAL OSTEOPHYTE, LIMITED SYNOVECTOMY;  Surgeon: Daniel Laureano MD;  Location: [facility];  Service: Orthopedics    REPAIR KNEE LIGAMENT      TOOTH EXTRACTION         Family History   Problem Relation Age of Onset    Hypertension Mother     Heart attack Mother     Osteoarthritis Mother     Breast cancer Mother 82    Arthritis Mother     Cancer Mother     Alcohol abuse Father     Diabetes Sister     No Known Problems Daughter     No Known Problems Daughter     No Known Problems Maternal Grandmother     No Known Problems Maternal Grandfather     No Known Problems Paternal Grandmother      No Known Problems Paternal Grandfather     Hypertension Brother     Diabetes Brother     Other Brother         Dyslipidemia    Coronary artery disease Brother         Diabetes    No Known Problems Maternal Aunt     No Known Problems Paternal Aunt     No Known Problems Paternal Aunt     No Known Problems Paternal Aunt     No Known Problems Paternal Aunt     No Known Problems Paternal Aunt        Social History     Occupational History    Not on file   Tobacco Use    Smoking status: Former     Current packs/day: 0.00     Average packs/day: 2.0 packs/day for 25.0 years (50.0 ttl pk-yrs)     Types: Cigarettes     Start date:      Quit date:      Years since quittin.1     Passive exposure: Past    Smokeless tobacco: Never   Vaping Use    Vaping status: Never Used   Substance and Sexual Activity    Alcohol use: No    Drug use: No    Sexual activity: Yes     Partners: Male     Birth control/protection: Post-menopausal       Current Outpatient Medications on File Prior to Visit   Medication Sig    albuterol (2.5 mg/3 mL) 0.083 % nebulizer solution Take 3 mL (2.5 mg total) by nebulization every 6 (six) hours as needed for wheezing or shortness of breath    albuterol (Ventolin HFA) 90 mcg/act inhaler Inhale 2 puffs every 6 (six) hours as needed for wheezing or shortness of breath    ALPRAZolam (XANAX) 0.5 mg tablet Take 0.5 tablets (0.25 mg total) by mouth daily    amLODIPine (NORVASC) 10 mg tablet take 1 tablet by mouth once daily    aspirin (ECOTRIN LOW STRENGTH) 81 mg EC tablet Take 1 tablet (81 mg total) by mouth daily for 19 doses    atorvastatin (LIPITOR) 40 mg tablet Take 1 tablet (40 mg total) by mouth in the morning    budesonide-formoterol (Symbicort) 160-4.5 mcg/act inhaler Inhale 2 puffs 2 (two) times a day Rinse mouth after use.    Calcium Polycarbophil (Fiber) 625 MG TABS Take 625 mg by mouth daily    clopidogrel (PLAVIX) 75 mg tablet Take 1 tablet (75 mg total) by mouth daily for 19 doses     "ergocalciferol (VITAMIN D2) 50,000 units take 1 capsule by mouth every week    escitalopram (LEXAPRO) 10 mg tablet take 1/2 tablet by mouth once daily    hydroCHLOROthiazide 25 mg tablet take 1 tablet by mouth once daily    leflunomide (ARAVA) 10 MG tablet Take 10 mg by mouth daily    losartan (COZAAR) 50 mg tablet take 1 tablet once daily as directed    metFORMIN (GLUCOPHAGE-XR) 500 mg 24 hr tablet take 1 tablet by mouth three times a day (Patient taking differently: 500 mg 2 (two) times a day)    metoprolol succinate (TOPROL-XL) 100 mg 24 hr tablet take 1 tablet by mouth once daily    Multiple Vitamin (MULTIVITAMIN ADULT PO) multivitamin    ondansetron (ZOFRAN) 4 mg tablet take 1 tablet by mouth every 6 hours AS NEEDED FOR NAUSE OR VOMITING    ondansetron (ZOFRAN-ODT) 4 mg disintegrating tablet Take 1 tablet (4 mg total) by mouth every 6 (six) hours as needed for nausea or vomiting    oxyCODONE-acetaminophen (PERCOCET) 5-325 mg per tablet Take 1 tablet by mouth every 8 (eight) hours as needed for moderate pain     No current facility-administered medications on file prior to visit.       Allergies   Allergen Reactions    Zithromax [Azithromycin] Hives    Augmentin [Amoxicillin-Pot Clavulanate] Vomiting    Lisinopril Cough    Cefprozil Diarrhea       Physical Exam    /78   Pulse 70   Ht 5' 2\" (1.575 m)   Wt 79.4 kg (175 lb)   BMI 32.01 kg/m²     Constitutional: normal, well developed, well nourished, alert, in no distress and non-toxic and no overt pain behavior.  Eyes: anicteric  HEENT: grossly intact  Neck: supple, symmetric, trachea midline and no masses   Pulmonary:even and unlabored  Cardiovascular:No edema or pitting edema present  Skin:Normal without rashes or lesions and well hydrated  Psychiatric:Mood and affect appropriate  Neurologic:Cranial Nerves II-XII grossly intact  Musculoskeletal:normal gait.  Bilateral lumbar paraspinals minimally tender to palpation.  Bilateral SI joints and " trochanteric flare is nontender to palpation.  Bilateral patellar and Achilles reflexes were 2 out of 4 and symmetrical.  No clonus is noted bilaterally.  Bilateral lower extremity strength 5 out of 5 in all muscle groups.  Sensation intact to light touch in L3-S1 dermatomes bilaterally.  Negative straight leg raise bilaterally.  Negative Eliezer's test bilaterally.  Positive lumbar facet loading maneuvers bilaterally.    Imaging    PACS Images     Show images for XR spine lumbar minimum 4 views non injury  Study Result    Narrative & Impression   LUMBAR SPINE     INDICATION:   Severe Back pain.     COMPARISON:  None     VIEWS:  XR SPINE LUMBAR MINIMUM 4 VIEWS NON INJURY        FINDINGS:     There are 5 non rib bearing lumbar vertebral bodies.     There is no evidence of acute fracture or destructive osseous lesion.     L1-L2 slight retrolisthesis. L5-S1 mild spondylolisthesis with marked L5-S1 posterior facet arthrosis and moderate L5-S1 disc change.     No significant lumbar degenerative change noted.     The pedicles appear intact.     Soft tissues are unremarkable.     IMPRESSION:     No acute osseous abnormality     L5-S1 moderate/marked degenerative changes.        Workstation performed: [id number]        PACS Images     Show images for DXA bone density spine hip and pelvis  Study Result    Narrative & Impression   CENTRAL DXA SCAN     CLINICAL HISTORY:  67-year-old postmenopausal  female.  History of rheumatoid arthritis.  Lexapro use.  Osteoporosis screening.   OTHER RISK FACTORS:  None.     PHARMACOLOGIC THERAPY FOR OSTEOPOROSIS:  None.     TECHNIQUE: Bone densitometry was performed using a [employer] Horizon A  bone densitometer.  Regions of interest appear properly placed.       COMPARISON: There are no prior DXA studies performed on this unit for comparison.           RESULTS:   LUMBAR SPINE L1-L4 :   BMD  0.895  gm/cm2   T-score -1.4    These values are artifactually elevated due to  degenerative sclerosis and osteophytosis.     LEFT  TOTAL HIP:   BMD:  0.728  gm/cm2   T-score:  -1.8     LEFT  FEMORAL NECK:   BMD:  0.737  gm/cm2   T score: -1.0                  IMPRESSION:  1.  Low bone mass (OSTEOPENIA). [Based on the total left hip]     2.  The 10 year risk of hip fracture is 1 % with the 10 year risk of major osteoporotic fracture being 11 % as calculated by the [employer]/WHO fracture risk assessment tool (FRAX).       3.  The current NOF guidelines recommend treating patients with a T-score of -2.5 or less in the lumbar spine or hips, or in post-menopausal women and men over the age of 50 with low bone mass (osteopenia) and a FRAX 10 year risk score of >3% for hip   fracture and/or >20% for major osteoporotic fracture.     4.  The NOF recommends follow-up DXA in 1-2 years after initiating therapy for osteoporosis and every 2 years thereafter. More frequent evaluation is appropriate for patients with conditions associated with rapid bone loss, such as glucocorticoid   therapy. The interval between DXA screenings may be longer for individuals without major risk factors and initial T-score in the normal or upper low bone mass range.              The FRAX algorithm has certain limitations:  -FRAX has not been validated in patients currently or previously treated with pharmacotherapy for osteoporosis.  In such patients, clinical judgment must be exercised in interpreting FRAX scores.    -Prior hip, vertebral and humeral fragility fractures appear to confer greater risk of subsequent fracture than fractures at other sites (this is especially true for individuals with severe vertebral fractures), but quantification of this incremental   risk is not possible with FRAX.  -FRAX underestimates fracture risk in patients with history of multiple fragility fractures.  -FRAX may underestimate fracture risk in patients with history of frequent falls.  -It is not appropriate to use FRAX to  monitor treatment response.              WHO CLASSIFICATION:  Normal (a T-score of -1.0 or higher)  Low bone mineral density (a T-score of less than -1.0 but higher than -2.5)  Osteoporosis (a T-score of -2.5 or less)  Severe osteoporosis (a T-score of -2.5 or less with a fragility fracture)     Workstation performed: [id number]

## 2024-02-19 ENCOUNTER — OFFICE VISIT (OUTPATIENT)
Dept: SURGERY | Facility: CLINIC | Age: 69
End: 2024-02-19
Payer: COMMERCIAL

## 2024-02-19 VITALS
SYSTOLIC BLOOD PRESSURE: 147 MMHG | BODY MASS INDEX: 31.47 KG/M2 | HEART RATE: 87 BPM | HEIGHT: 62 IN | DIASTOLIC BLOOD PRESSURE: 83 MMHG | WEIGHT: 171 LBS

## 2024-02-19 DIAGNOSIS — M62.08 DIASTASIS RECTI: Primary | ICD-10-CM

## 2024-02-19 DIAGNOSIS — K42.9 UMBILICAL HERNIA WITHOUT OBSTRUCTION OR GANGRENE: ICD-10-CM

## 2024-02-19 PROCEDURE — 99203 OFFICE O/P NEW LOW 30 MIN: CPT | Performed by: SURGERY

## 2024-02-19 NOTE — PROGRESS NOTES
Assessment/Plan: Patient presents with bulging of the upper midline abdomen.  She is concerned about a possible hernia.  In addition she has discomfort when touching the upper abdomen superficially.  This is located to the right of the midline.  She is not able to feel a subcutaneous nodule or mass at today's exam.    Examination reveals a diastases recti of the upper abdomen.  In addition there is a small umbilical hernia associated with Valsalva.  No masses located to the right of the midline in the upper abdomen.  There is tenderness to light palpation.    I gave her reassurance.  The diastases is cosmetic.  It represents a weakness of the linea alba.  There is no surgical intervention needed.  The small umbilical hernia can be monitored.  Is presently asymptomatic.  I am unsure as to why she has tenderness to palpation of the upper abdomen.  She may have an element of fatty tissue trauma, however no mass effect is noted.  Again reassurance provided.  All questions answered.    There are no diagnoses linked to this encounter.    Subjective:      Patient ID: Davina Allen is a 68 y.o. female.    Patient presents for ventral hernia consult.  States she feels a small bulge on the right side of her mid abdomen for the past 2 to 3 weeks.  She has pain with pressure.  She also has a bulge on her abdomen when she lays down and sits up.        The following portions of the patient's history were reviewed and updated as appropriate:     She  has a past medical history of Acute non-recurrent frontal sinusitis (05/15/2019), Acute non-recurrent maxillary sinusitis (03/10/2020), Allergic (In my 60’s), Anxiety, Anxiety disorder, Arthritis, Asthma, Cancer (HCC), Colon polyp, COVID-19, Dependence on crutches, Depression, Diabetes mellitus (HCC), Diverticulitis of colon, Fibromyalgia, Hearing aid worn, Heart murmur, HL (hearing loss), Hyperglycemia, Hyperlipidemia, Hypertension, Inflammatory bowel disease (In my 60’s), LLQ  abdominal pain (2020), Pneumonia (May 2023), Psoriasis, Psoriatic arthritis (HCC), RA (rheumatoid arthritis) (HCC), Urinary tract infection (Currently), and Wears glasses.  She  has a past surgical history that includes Pilonidal cyst / sinus excision;  section; Repair knee ligament; Cyst Removal (Right); pr arthrs kne surg w/meniscectomy med/lat w/shvg (Left, 3/2/2016); ARTHROSCOPY KNEE (Left, 3/2/2016); TOOTH EXTRACTION; Fracture surgery; and Colonoscopy (2019).  Her family history includes Alcohol abuse in her father; Arthritis in her mother; Breast cancer (age of onset: 82) in her mother; Cancer in her mother; Coronary artery disease in her brother; Diabetes in her brother and sister; Heart attack in her mother; Hypertension in her brother and mother; No Known Problems in her daughter, daughter, maternal aunt, maternal grandfather, maternal grandmother, paternal aunt, paternal aunt, paternal aunt, paternal aunt, paternal aunt, paternal grandfather, and paternal grandmother; Osteoarthritis in her mother; Other in her brother.  She  reports that she quit smoking about 28 years ago. Her smoking use included cigarettes. She started smoking about 53 years ago. She has a 50 pack-year smoking history. She has been exposed to tobacco smoke. She has never used smokeless tobacco. She reports that she does not drink alcohol and does not use drugs.  Current Outpatient Medications   Medication Sig Dispense Refill    albuterol (2.5 mg/3 mL) 0.083 % nebulizer solution Take 3 mL (2.5 mg total) by nebulization every 6 (six) hours as needed for wheezing or shortness of breath 25 mL 5    albuterol (Ventolin HFA) 90 mcg/act inhaler Inhale 2 puffs every 6 (six) hours as needed for wheezing or shortness of breath 54 g 3    ALPRAZolam (XANAX) 0.5 mg tablet Take 0.5 tablets (0.25 mg total) by mouth daily 30 tablet 0    amLODIPine (NORVASC) 10 mg tablet take 1 tablet by mouth once daily 90 tablet 1    aspirin  (ECOTRIN LOW STRENGTH) 81 mg EC tablet Take 1 tablet (81 mg total) by mouth daily for 19 doses 19 tablet 0    atorvastatin (LIPITOR) 40 mg tablet Take 1 tablet (40 mg total) by mouth in the morning 30 tablet 0    budesonide-formoterol (Symbicort) 160-4.5 mcg/act inhaler Inhale 2 puffs 2 (two) times a day Rinse mouth after use. 10.2 g 3    Calcium Polycarbophil (Fiber) 625 MG TABS Take 625 mg by mouth daily      clopidogrel (PLAVIX) 75 mg tablet Take 1 tablet (75 mg total) by mouth daily for 19 doses 19 tablet 0    ergocalciferol (VITAMIN D2) 50,000 units take 1 capsule by mouth every week 12 capsule 3    escitalopram (LEXAPRO) 10 mg tablet take 1/2 tablet by mouth once daily 90 tablet 1    hydroCHLOROthiazide 25 mg tablet take 1 tablet by mouth once daily 90 tablet 1    leflunomide (ARAVA) 10 MG tablet Take 10 mg by mouth daily      losartan (COZAAR) 50 mg tablet take 1 tablet once daily as directed 90 tablet 3    metFORMIN (GLUCOPHAGE-XR) 500 mg 24 hr tablet take 1 tablet by mouth three times a day (Patient taking differently: 500 mg 2 (two) times a day) 270 tablet 3    metoprolol succinate (TOPROL-XL) 100 mg 24 hr tablet take 1 tablet by mouth once daily 90 tablet 3    Multiple Vitamin (MULTIVITAMIN ADULT PO) multivitamin      ondansetron (ZOFRAN) 4 mg tablet take 1 tablet by mouth every 6 hours AS NEEDED FOR NAUSE OR VOMITING 60 tablet 2    ondansetron (ZOFRAN-ODT) 4 mg disintegrating tablet Take 1 tablet (4 mg total) by mouth every 6 (six) hours as needed for nausea or vomiting 10 tablet 1    oxyCODONE-acetaminophen (PERCOCET) 5-325 mg per tablet Take 1 tablet by mouth every 8 (eight) hours as needed for moderate pain       No current facility-administered medications for this visit.     She is allergic to zithromax [azithromycin], augmentin [amoxicillin-pot clavulanate], lisinopril, and cefprozil..    Review of Systems   Constitutional: Negative.  Negative for activity change.   HENT: Negative.     Eyes:  "Negative.    Respiratory: Negative.     Cardiovascular: Negative.    Gastrointestinal:  Positive for abdominal pain.   Endocrine: Negative.    Genitourinary: Negative.    Musculoskeletal: Negative.    Skin: Negative.    Allergic/Immunologic: Negative.    Neurological: Negative.    Psychiatric/Behavioral:  Negative for agitation, behavioral problems and confusion. The patient is not nervous/anxious.          Objective:      /83   Pulse 87   Ht 5' 2\" (1.575 m)   Wt 77.6 kg (171 lb)   BMI 31.28 kg/m²          Physical Exam  Constitutional:       Appearance: Normal appearance.   Eyes:      Extraocular Movements: Extraocular movements intact.   Cardiovascular:      Rate and Rhythm: Normal rate.   Pulmonary:      Effort: Pulmonary effort is normal.      Breath sounds: Normal breath sounds.   Abdominal:      General: Abdomen is flat.      Palpations: Abdomen is soft.      Hernia: A hernia (Umbilical hernia is present.  This is reducible.) is present.      Comments:  Diastases recti is present       Skin:     General: Skin is warm and dry.         "

## 2024-02-20 ENCOUNTER — OFFICE VISIT (OUTPATIENT)
Dept: NEUROLOGY | Facility: CLINIC | Age: 69
End: 2024-02-20
Payer: COMMERCIAL

## 2024-02-20 VITALS
OXYGEN SATURATION: 98 % | WEIGHT: 173.3 LBS | SYSTOLIC BLOOD PRESSURE: 126 MMHG | BODY MASS INDEX: 31.7 KG/M2 | DIASTOLIC BLOOD PRESSURE: 70 MMHG | TEMPERATURE: 96.3 F | HEART RATE: 78 BPM

## 2024-02-20 DIAGNOSIS — I65.23 BILATERAL CAROTID ARTERY STENOSIS: ICD-10-CM

## 2024-02-20 DIAGNOSIS — I51.7 LEFT ATRIAL DILATATION: ICD-10-CM

## 2024-02-20 DIAGNOSIS — I10 BENIGN ESSENTIAL HYPERTENSION: ICD-10-CM

## 2024-02-20 DIAGNOSIS — R29.90 STROKE-LIKE SYMPTOMS: Primary | ICD-10-CM

## 2024-02-20 DIAGNOSIS — E78.5 HYPERLIPIDEMIA: ICD-10-CM

## 2024-02-20 PROCEDURE — 99215 OFFICE O/P EST HI 40 MIN: CPT

## 2024-02-20 RX ORDER — ATORVASTATIN CALCIUM 40 MG/1
40 TABLET, FILM COATED ORAL DAILY
Qty: 30 TABLET | Refills: 3 | Status: SHIPPED | OUTPATIENT
Start: 2024-02-20

## 2024-02-20 NOTE — PATIENT INSTRUCTIONS
Stroke-like symptoms:     - Lipid panel repeated in 2-3 months time   - VAS carotid artery ordered for bilateral atherosclerotic disease at this time.  - Ambulatory referral to Cardiology for further cardiac testing and evaluating her echocardiogram that was performed in the hospital. Will need to see if she has any need for a long-term cardiac monitoring.   - Continue PT for lower back pain at this time     - For ongoing stroke prevention continue: aspirin 81 mg once daily, atorvastatin 40 mg once daily    - Discussed the importance of antiplatelet management with the patient to prevent future strokes.   - Recommend to check blood pressure occasionally away from the doctor's office to make sure that those numbers are typically less than 130/80.  If they are frequently higher than that, we recommend checking a little more often and to follow up with primary care team   - Will defer to primary care team for monitoring of cholesterol panel and blood sugar numbers with target LDL cholesterol of less than 70 and hemoglobin A1c less than 7%  - Recommend following a low salt, mediterranean diet   - Recommend routine physical exercise as tolerated     We will plan for her to return to the office in 6 months time to see on of the APPs or Dr. Mao but would be happy to see her sooner if the need should arise.  If she has any symptoms concerning for TIA or stroke including sudden painless loss of vision or double vision, difficulty speaking or swallowing, vertigo/room spinning that does not quickly resolve, or weakness/numbness/loss of coordination affecting 1 side of the face or body she should proceed by ambulance to the nearest emergency room immediately.

## 2024-02-26 ENCOUNTER — OFFICE VISIT (OUTPATIENT)
Dept: GASTROENTEROLOGY | Facility: AMBULARY SURGERY CENTER | Age: 69
End: 2024-02-26
Payer: MEDICARE

## 2024-02-26 VITALS
HEART RATE: 67 BPM | OXYGEN SATURATION: 97 % | HEIGHT: 62 IN | DIASTOLIC BLOOD PRESSURE: 86 MMHG | BODY MASS INDEX: 31.94 KG/M2 | WEIGHT: 173.6 LBS | SYSTOLIC BLOOD PRESSURE: 130 MMHG

## 2024-02-26 DIAGNOSIS — K59.1 FUNCTIONAL DIARRHEA: ICD-10-CM

## 2024-02-26 DIAGNOSIS — K57.30 DIVERTICULOSIS OF COLON: Primary | ICD-10-CM

## 2024-02-26 DIAGNOSIS — K58.1 IRRITABLE BOWEL SYNDROME WITH CONSTIPATION: ICD-10-CM

## 2024-02-26 PROCEDURE — 99214 OFFICE O/P EST MOD 30 MIN: CPT | Performed by: INTERNAL MEDICINE

## 2024-02-26 RX ORDER — ESTRADIOL 0.1 MG/G
CREAM VAGINAL
COMMUNITY
Start: 2024-02-04

## 2024-02-26 RX ORDER — CHOLESTYRAMINE 4 G/9G
1 POWDER, FOR SUSPENSION ORAL
Qty: 270 EACH | Refills: 3 | Status: SHIPPED | OUTPATIENT
Start: 2024-02-26

## 2024-02-26 NOTE — PROGRESS NOTES
[facility] - Outpatient Follow-up Note  Davina Allen 68 y.o. female MRN: [id number]  Encounter: [id number]          ASSESSMENT AND PLAN:      1. Diverticulosis of colon  2. Irritable bowel syndrome with constipation  3. Functional diarrhea  She presents for chronic symptoms > 12 months, with recent exacerbation and for medical management  GI evaluation including EGD, colonoscopy, CT, right upper quadrant ultrasound, gastric emptying study have been performed  Symptoms are improved with less processed food diet, and low FODMAP diet  Was doing well until about one week ago with acute worsening of symptoms including diarrhea-  Encouraged continue lifestyle measures with decreasing caffeine intake  Continue lactaid supplementation and avoidance of lactose  Continue psyllium fiber  Add once daily with slow increase questran  - cholestyramine (QUESTRAN) 4 g packet; Take 1 packet (4 g total) by mouth 3 (three) times a day with meals  Dispense: 270 each; Refill: 3  ______________________________________________________________________    SUBJECTIVE:      Patient is a 68-year-old female who sees me in follow-up for IBS-C, diverticular disease, lower abdominal pain. Diagnostic evaluation includes- EGD August 2022 with mild H. pylori negative gastritis, biopsies negative for celiac disease.  RUQ ultrasound with hepatomegaly and steatosis.  Normal gastric emptying study. Last CT abdomen/pelvis demonstrated colonic diverticular disease with focal acute sigmoid diverticulitis.  Her last colonoscopy was in 2019 with diverticular disease.     She continues to do well since her last office visit July 2023-following a low processed food diet.  Overall was doing well until about a week ago when she started to experience increasing frequent bowel movements with abdominal pain.  She had an episode of fecal incontinence although this is improved with Benefiber and fiber pills.  She has discontinued caffeine,  "and is using Lactaid pills.  She has seen general surgery for concern of \"bulging\" of abdomen, consistent with diastasis recti- continued monitoring was recommended.     I personally reviewed external procedure reports.     Objective     Blood pressure 130/86, pulse 67, height 5' 2\" (1.575 m), weight 78.7 kg (173 lb 9.6 oz), SpO2 97%, not currently breastfeeding. Body mass index is 31.75 kg/m².      PHYSICAL EXAM:      General Appearance:   Alert, cooperative, no distress   HEENT:   Normocephalic, atraumatic, anicteric.     Neck:  Supple, symmetrical, trachea midline   Lungs:   Equal chest rise and unlabored breathing, normal effort, no coughing.    Heart::   No visualized JVD.   Abdomen:   Soft, non-tender, non-distended; normal bowel sounds; no masses, no organomegaly    Rectal:   Deferred    Extremities:  No cyanosis, clubbing or edema    Pulses:  2+ and symmetric    Skin:  No jaundice, rashes, or lesions      Lab Results:   Lab Results   Component Value Date    WBC 7.18 02/08/2024    HGB 13.4 02/08/2024    HCT 39.7 02/08/2024    MCV 89 02/08/2024     02/08/2024       Lab Results   Component Value Date     09/14/2015    SODIUM 135 02/08/2024    K 3.5 02/08/2024     02/08/2024    CO2 30 02/08/2024    ANIONGAP 9 09/14/2015    AGAP 5 02/08/2024    BUN 11 02/08/2024    CREATININE 0.51 (L) 02/08/2024    GLUC 180 (H) 01/23/2024    GLUF 146 (H) 02/08/2024    CALCIUM 9.2 02/08/2024    AST 30 02/08/2024    ALT 20 02/08/2024    ALKPHOS 77 02/08/2024    PROT 7.6 09/14/2015    TP 7.3 02/08/2024    BILITOT 0.28 09/14/2015    TBILI 0.33 02/08/2024    EGFR 97 02/08/2024       I personally reviewed relevant labs.    Radiology Results:   No results found.    "

## 2024-02-27 ENCOUNTER — OFFICE VISIT (OUTPATIENT)
Dept: FAMILY MEDICINE CLINIC | Facility: CLINIC | Age: 69
End: 2024-02-27
Payer: MEDICARE

## 2024-02-27 VITALS
WEIGHT: 173 LBS | HEART RATE: 75 BPM | TEMPERATURE: 98 F | SYSTOLIC BLOOD PRESSURE: 130 MMHG | RESPIRATION RATE: 16 BRPM | BODY MASS INDEX: 31.83 KG/M2 | DIASTOLIC BLOOD PRESSURE: 78 MMHG | OXYGEN SATURATION: 97 % | HEIGHT: 62 IN

## 2024-02-27 DIAGNOSIS — E11.9 CONTROLLED TYPE 2 DIABETES MELLITUS WITHOUT COMPLICATION, WITHOUT LONG-TERM CURRENT USE OF INSULIN (HCC): Primary | ICD-10-CM

## 2024-02-27 DIAGNOSIS — J45.40 MODERATE PERSISTENT ASTHMA WITHOUT COMPLICATION: ICD-10-CM

## 2024-02-27 DIAGNOSIS — R29.90 STROKE-LIKE SYMPTOMS: ICD-10-CM

## 2024-02-27 DIAGNOSIS — I10 BENIGN ESSENTIAL HYPERTENSION: ICD-10-CM

## 2024-02-27 DIAGNOSIS — F11.20 CONTINUOUS OPIOID DEPENDENCE (HCC): ICD-10-CM

## 2024-02-27 DIAGNOSIS — Z12.31 ENCOUNTER FOR SCREENING MAMMOGRAM FOR MALIGNANT NEOPLASM OF BREAST: ICD-10-CM

## 2024-02-27 DIAGNOSIS — K42.9 UMBILICAL HERNIA WITHOUT OBSTRUCTION OR GANGRENE: ICD-10-CM

## 2024-02-27 DIAGNOSIS — I34.81 MITRAL VALVE ANNULAR CALCIFICATION: ICD-10-CM

## 2024-02-27 PROBLEM — R11.0 NAUSEA: Status: RESOLVED | Noted: 2022-06-02 | Resolved: 2024-02-27

## 2024-02-27 PROCEDURE — 99214 OFFICE O/P EST MOD 30 MIN: CPT | Performed by: FAMILY MEDICINE

## 2024-02-27 NOTE — PROGRESS NOTES
Assessment/Plan:    1. Controlled type 2 diabetes mellitus without complication, without long-term current use of insulin (HCC)  -     Hemoglobin A1C; Future; Expected date: 06/26/2024    2. Benign essential hypertension  Assessment & Plan:  Stable on current meds      3. Encounter for screening mammogram for malignant neoplasm of breast  -     Mammo screening bilateral w 3d & cad; Future    4. Continuous opioid dependence (HCC)  Assessment & Plan:  Seeing rheum for pain      5. Mitral valve annular calcification  Assessment & Plan:  Referred to cardiology      6. Stroke-like symptoms  Assessment & Plan:  Seen by neurology      7. Umbilical hernia without obstruction or gangrene  Assessment & Plan:  Seen by Dr. Howell      8. Moderate persistent asthma without complication  Assessment & Plan:  Start nebs-mild wheezing              There are no Patient Instructions on file for this visit.    No follow-ups on file.    Subjective:      Patient ID: Davina Allen is a 68 y.o. female.    Chief Complaint   Patient presents with   • Follow-up     Patient is being seen for a 6 month follow up.        Here for follow up  Seen by neurology, general surgery and Gi  Nerulogy recommend cardiology eval due to calcification of mitral valve- need to schedule that appt.  GI is working with IBS-starting powder- questran    Hypertension  This is a chronic problem. The current episode started more than 1 year ago. The problem is unchanged. The problem is controlled. There are no associated agents to hypertension. Past treatments include beta blockers and diuretics. The current treatment provides significant improvement. There are no compliance problems.    Hyperlipidemia  This is a chronic problem. The current episode started more than 1 year ago. The problem is controlled. Recent lipid tests were reviewed and are normal. Current antihyperlipidemic treatment includes statins. The current treatment provides significant improvement of  lipids. There are no compliance problems.        The following portions of the patient's history were reviewed and updated as appropriate: allergies, current medications, past family history, past medical history, past social history, past surgical history and problem list.    Review of Systems   Constitutional: Negative.    HENT: Negative.     Eyes: Negative.    Respiratory: Negative.     Cardiovascular: Negative.    Gastrointestinal:  Positive for diarrhea.   Endocrine: Negative.    Genitourinary: Negative.    Musculoskeletal:  Positive for arthralgias.   Allergic/Immunologic: Negative.    Neurological: Negative.    Hematological: Negative.    Psychiatric/Behavioral: Negative.           Current Outpatient Medications   Medication Sig Dispense Refill   • albuterol (2.5 mg/3 mL) 0.083 % nebulizer solution Take 3 mL (2.5 mg total) by nebulization every 6 (six) hours as needed for wheezing or shortness of breath 25 mL 5   • albuterol (Ventolin HFA) 90 mcg/act inhaler Inhale 2 puffs every 6 (six) hours as needed for wheezing or shortness of breath 54 g 3   • ALPRAZolam (XANAX) 0.5 mg tablet Take 0.5 tablets (0.25 mg total) by mouth daily 30 tablet 0   • amLODIPine (NORVASC) 10 mg tablet take 1 tablet by mouth once daily 90 tablet 1   • aspirin (ECOTRIN LOW STRENGTH) 81 mg EC tablet Take 1 tablet (81 mg total) by mouth daily for 19 doses 19 tablet 0   • atorvastatin (LIPITOR) 40 mg tablet Take 1 tablet (40 mg total) by mouth in the morning 30 tablet 3   • budesonide-formoterol (Symbicort) 160-4.5 mcg/act inhaler Inhale 2 puffs 2 (two) times a day Rinse mouth after use. 10.2 g 3   • Calcium Polycarbophil (Fiber) 625 MG TABS Take 625 mg by mouth daily     • cholestyramine (QUESTRAN) 4 g packet Take 1 packet (4 g total) by mouth 3 (three) times a day with meals 270 each 3   • ergocalciferol (VITAMIN D2) 50,000 units take 1 capsule by mouth every week 12 capsule 3   • escitalopram (LEXAPRO) 10 mg tablet take 1/2 tablet by  "mouth once daily 90 tablet 1   • estradiol (ESTRACE) 0.1 mg/g vaginal cream MASSAGE A PEA SIZED AMOUNT INTO THE VAGINA TWICE A WEEK FOR 2 WEE...  (REFER TO PRESCRIPTION NOTES).     • hydroCHLOROthiazide 25 mg tablet take 1 tablet by mouth once daily 90 tablet 1   • leflunomide (ARAVA) 10 MG tablet Take 10 mg by mouth daily     • losartan (COZAAR) 50 mg tablet take 1 tablet once daily as directed 90 tablet 3   • metFORMIN (GLUCOPHAGE-XR) 500 mg 24 hr tablet take 1 tablet by mouth three times a day (Patient taking differently: 500 mg 2 (two) times a day) 270 tablet 3   • metoprolol succinate (TOPROL-XL) 100 mg 24 hr tablet take 1 tablet by mouth once daily 90 tablet 3   • Multiple Vitamin (MULTIVITAMIN ADULT PO) multivitamin     • ondansetron (ZOFRAN) 4 mg tablet take 1 tablet by mouth every 6 hours AS NEEDED FOR NAUSE OR VOMITING 60 tablet 2   • ondansetron (ZOFRAN-ODT) 4 mg disintegrating tablet Take 1 tablet (4 mg total) by mouth every 6 (six) hours as needed for nausea or vomiting 10 tablet 1   • oxyCODONE-acetaminophen (PERCOCET) 5-325 mg per tablet Take 1 tablet by mouth every 8 (eight) hours as needed for moderate pain       No current facility-administered medications for this visit.       Objective:    /78 (BP Location: Left arm, Patient Position: Sitting, Cuff Size: Standard)   Pulse 75   Temp 98 °F (36.7 °C) (Tympanic)   Resp 16   Ht 5' 2\" (1.575 m)   Wt 78.5 kg (173 lb)   SpO2 97%   BMI 31.64 kg/m²        Physical Exam  Vitals and nursing note reviewed.   Constitutional:       Appearance: Normal appearance. She is well-developed.   HENT:      Head: Normocephalic and atraumatic.      Nose: Nose normal.   Eyes:      General: Lids are normal.      Extraocular Movements: Extraocular movements intact.      Conjunctiva/sclera: Conjunctivae normal.      Pupils: Pupils are equal, round, and reactive to light.   Cardiovascular:      Rate and Rhythm: Normal rate and regular rhythm.      Heart sounds: " Normal heart sounds, S1 normal and S2 normal.   Pulmonary:      Effort: Pulmonary effort is normal.      Breath sounds: Wheezing present.   Abdominal:      General: Abdomen is flat. Bowel sounds are normal.      Palpations: Abdomen is soft.   Musculoskeletal:         General: Normal range of motion.      Cervical back: Normal range of motion and neck supple.   Skin:     General: Skin is warm and dry.   Neurological:      General: No focal deficit present.      Mental Status: She is alert and oriented to person, place, and time.      Deep Tendon Reflexes: Reflexes are normal and symmetric.   Psychiatric:         Mood and Affect: Mood normal.         Speech: Speech normal.         Behavior: Behavior normal.         Thought Content: Thought content normal.         Judgment: Judgment normal.                Mariaa Layton DO

## 2024-03-25 ENCOUNTER — EVALUATION (OUTPATIENT)
Dept: PHYSICAL THERAPY | Facility: REHABILITATION | Age: 69
End: 2024-03-25
Payer: COMMERCIAL

## 2024-03-25 DIAGNOSIS — M54.6 CHRONIC BILATERAL THORACIC BACK PAIN: ICD-10-CM

## 2024-03-25 DIAGNOSIS — G89.29 CHRONIC BILATERAL THORACIC BACK PAIN: ICD-10-CM

## 2024-03-25 DIAGNOSIS — M54.50 LUMBAR PAIN: Primary | ICD-10-CM

## 2024-03-25 PROCEDURE — 97110 THERAPEUTIC EXERCISES: CPT

## 2024-03-25 PROCEDURE — 97161 PT EVAL LOW COMPLEX 20 MIN: CPT

## 2024-03-25 NOTE — PROGRESS NOTES
PT Evaluation     Today's date: 3/25/2024  Patient name: Davina Allen  : 1955  MRN: [id number]  Referring provider: Amari Douglas DO  Dx:   Encounter Diagnosis     ICD-10-CM    1. Lumbar pain  M54.50       2. Chronic bilateral thoracic back pain  M54.6     G89.29           Start Time: 0850  Stop Time: 0930  Total time in clinic (min): 40 minutes    Assessment  Assessment details: Davina Allen is a 68 y.o. female presenting with chronic lumbar and thoracic pain following an injury in 2023 then aggravated after hospital stay s/p TIA.  Primary impairments include lumbar and thoracic pain with functional activities, B global hip weakness, lumbar AROM dysfunction, paraspinal and scapular motor control dysfunction.  Educated pt on anatomy and physiology of diagnosis.  Will benefit from skilled PT interventions for community reintegration, ADL management/independence, return to work/sport/hobbies.  Provided pt with written home exercise program to be completed daily.    Impairments: abnormal coordination, abnormal muscle firing, abnormal muscle tone, abnormal or restricted ROM, activity intolerance, impaired physical strength, lacks appropriate home exercise program, pain with function, scapular dyskinesis, poor posture  and poor body mechanics  Functional limitations: ADLs, lifting/carrying, pushing, pulling, stooping, prolonged standing  Symptom irritability: moderateUnderstanding of Dx/Px/POC: good   Prognosis: good    Goals    Short Term Goals:  In 4 weeks, the patient will:  1. Improve B global hip strength to at least 4/5 MMT  2. Improve lumbar flexion AROM to at least 75%  3. Supervision with HEP for self-care    Long Term Goals:  In 8 weeks, the patient will:  1. Tolerate standing and walking for at least 30 minutes without aggravating symptoms  2. FOTO to greater than predicted value  3. Independent with HEP for self-care      Plan  Patient would benefit from: skilled physical therapy  Planned  "modality interventions: manual electrical stimulation  Planned therapy interventions: abdominal trunk stabilization, activity modification, balance, balance/weight bearing training, behavior modification, body mechanics training, community reintegration, coordination, fine motor coordination training, flexibility, functional ROM exercises, gait training, graded activity, graded exercise, graded motor, home exercise program, work reintegration, therapeutic training, therapeutic exercise, therapeutic activities, stretching, strengthening, self care, postural training, patient education, neuromuscular re-education, motor coordination training, massage, manual therapy, joint mobilization and ADL training  Frequency: 2x week  Duration in weeks: 8  Plan of Care beginning date: 3/25/2024  Plan of Care expiration date: 5/20/2024  Treatment plan discussed with: patient      Subjective    Pain Location: B thoracic region across scapular region; initially lumbar region; mild cervical pain and headaches  Pain Intensity: dull, achey, worst 5-6/10; best 0/10  FREDY: bent over and heard rubber band snap initially; went to hospital for TIA which aggravated back pain Jan 2024  DOI: Nov. 2023  Aggravating Factors: prolonged standing, careful with picking up - unable to  grandson  Alleviating Factors: heat, motrin, percocet (attempting to wean off)  Living Situation: independent ADLs, lives with   Constitutional S/S: denies n/t   Goals: \"no pain in my back\"  PLOF: went to PT for a few visits initially       Objective       Postural Findings:     Head Position x Protracted   Neutral   Retracted   Scapular Position  Protracted x  Neutral   Retracted   Thoracic Spine x  Inc Kyphosis  Neutral       Lumbar Spine   Inc Lordosis   Neutral x Dec Lordosis   Pelvis   Anterior Tilt  Neutral  x Posterior Tilt   Iliac Crest   L elevated x Neutral   R elevated   Feet   Pronated x Neutral   Supinated   Lateral Shift   Right   Left x None "      Strength and ROM evaluated B from a regional biomechanical perspective and values relevant to this episode recorded in tables below.     ROM:   Joint / Motion  Right  Left    Lumbar Flexion  50%     Lumbar Extension  5%     Lumbar Sidebending  25% 25%   Lumbar rotation 25% 25%         Strength: MMT revealed the following findings.  Joint Motion Right Left   Hip Flexion 3+/5 3+/5   Hip Abduction 3+/5 3+/5   Hip Adduction 3+/5 3+/5   Hip Extension 3+/5 3+/5   Knee Extension 3+/5 3+/5   Knee Flexion 3+/5 3+/5   Ankle Plantarflexion 4/5 4/5   Ankle Dorsiflexion 4/5 4/5            LE Myotomes:  Nerve root Test Action RIGHT  LEFT   L1-L2 Hip Flexion intact intact   L3 Knee Extension intact intact   L4  Ankle DF intact intact   L5 Great toe Extension intact intact   S1 Ankle PF, ankle eversion, hip extension, knee flexion intact intact      S2 Knee Flexion intact intact      Additional Assessments:  Pain with palpation noted: mild TTP throughout thoracic region, moderate TTP at L3-S1        Special Tests:  Lumbar Specific and Neural Tension  Test / Measure  3/25/2024   Prone instability test +       SI Joint Screen: unremarkable     Treatment Based Classification: Primary stability       Flowsheet Rows      Flowsheet Row Most Recent Value   PT/OT G-Codes    Current Score 70   Projected Score 71               Precautions: hx TIA, HTN, diabetes, rheumatoid arthritis, fibromyalgia    POC expires Unit limit Auth Expiration date PT/OT + Visit Limit?   5/20/24 BOMN N/A BOMN     Visit/Unit Tracking  AUTH Status:  Date 3/25        RE at every 10v Used 1         Remaining  9           Pertinent Findings:                                                                                            Test / Measure  3/25/2024   FOTO (Predicted 71) 70   L/s flex AROM 50%   Global hip MMT 3+/5         Manuals 3/25            L/s STM, , distr                                                    Neuro Re-Ed             LTRs 10x          "   Bridges             PPT 10x3\"            PB crush             Pallof press 5x B otb            Suitcase carry                          Ther Ex             HEP review / edu 10'            NuStep             STS             LSU             Unilat row             Unilat Sh ext             DL / rack pull                          Ther Activity                                       Gait Training                                       Modalities                                            "

## 2024-04-01 ENCOUNTER — OFFICE VISIT (OUTPATIENT)
Dept: PHYSICAL THERAPY | Facility: REHABILITATION | Age: 69
End: 2024-04-01
Payer: MEDICARE

## 2024-04-01 DIAGNOSIS — M54.6 CHRONIC BILATERAL THORACIC BACK PAIN: ICD-10-CM

## 2024-04-01 DIAGNOSIS — M54.50 LUMBAR PAIN: Primary | ICD-10-CM

## 2024-04-01 DIAGNOSIS — G89.29 CHRONIC BILATERAL THORACIC BACK PAIN: ICD-10-CM

## 2024-04-01 PROCEDURE — 97110 THERAPEUTIC EXERCISES: CPT

## 2024-04-01 PROCEDURE — 97112 NEUROMUSCULAR REEDUCATION: CPT

## 2024-04-01 NOTE — PROGRESS NOTES
"Daily Note     Today's date: 2024  Patient name: Davina Allen  : 1955  MRN: [id number]  Referring provider: Ashtyn Hayes MD  Dx:   Encounter Diagnosis     ICD-10-CM    1. Lumbar pain  M54.50       2. Chronic bilateral thoracic back pain  M54.6     G89.29           Start Time: 0933  Stop Time: 1020  Total time in clinic (min): 47 minutes    Subjective: Pt denies any pain upon arrival today. She notes some stiffness in LB and upper lat region.       Objective: See treatment diary below      Assessment: Tolerated treatment well. She had decreased tension with PPT and pallof press. She was educated on anatomy, TA, diastasis recti and postural alignment. Pt was advised to continue with HEP including new Roxana for symptom relief to decrease tension outside of clinic setting. Patient demonstrated fatigue post treatment, exhibited good technique with therapeutic exercises, and would benefit from continued PT      Plan: Continue per plan of care.  Progress treatment as tolerated.       Precautions: hx TIA, HTN, diabetes, rheumatoid arthritis, fibromyalgia    POC expires Unit limit Auth Expiration date PT/OT + Visit Limit?   24 BOMN N/A BOMN     Visit/Unit Tracking  AUTH Status:  Date 3/25 4/1       RE at every 10v Used 1 2        Remaining  9 8          Pertinent Findings:                                                                                            Test / Measure  3/25/2024   FOTO (Predicted 71) 70   L/s flex AROM 50%   Global hip MMT 3+/5         Manuals 3/25 4/1           L/s STM, , distr                                                    Neuro Re-Ed             LTRs 10x 10x           Bridges  10x           PPT 10x3\" 10x3\"           PB crush  5\"x10           Pallof press 5x B otb 10x B otb            Suitcase carry                          Ther Ex             HEP review / edu 10'            NuStep  L3 5'           STS             LSU             Unilat row             Unilat Sh ext           "   DL / rack pull                          Ther Activity                                       Gait Training                                       Modalities

## 2024-04-04 ENCOUNTER — OFFICE VISIT (OUTPATIENT)
Dept: PHYSICAL THERAPY | Facility: REHABILITATION | Age: 69
End: 2024-04-04
Payer: MEDICARE

## 2024-04-04 DIAGNOSIS — M54.50 LUMBAR PAIN: Primary | ICD-10-CM

## 2024-04-04 DIAGNOSIS — G89.29 CHRONIC BILATERAL THORACIC BACK PAIN: ICD-10-CM

## 2024-04-04 DIAGNOSIS — M54.6 CHRONIC BILATERAL THORACIC BACK PAIN: ICD-10-CM

## 2024-04-04 PROCEDURE — 97110 THERAPEUTIC EXERCISES: CPT

## 2024-04-04 NOTE — PROGRESS NOTES
"Daily Note     Today's date: 2024  Patient name: Davina Allen  : 1955  MRN: [id number]  Referring provider: Ashtyn Hayes MD  Dx:   Encounter Diagnosis     ICD-10-CM    1. Lumbar pain  M54.50       2. Chronic bilateral thoracic back pain  M54.6     G89.29           Start Time: 1155  Stop Time: 1230  Total time in clinic (min): 35 minutes    Subjective: Pt reports she is feeling good today. Has no pain or any chief complaints noted upon arrival.       Objective: See treatment diary below      Assessment: Arrived 10' late. Treatment program accommodated. Tolerated treatment well. Focused on core stabilization ex's as noted below. Patient demonstrated fatigue post treatment, exhibited good technique with therapeutic exercises, and would benefit from continued PT.      Plan: Continue per plan of care.      Precautions: hx TIA, HTN, diabetes, rheumatoid arthritis, fibromyalgia    POC expires Unit limit Auth Expiration date PT/OT + Visit Limit?   24 BOMN N/A BOMN     Visit/Unit Tracking  AUTH Status:  Date 3/25 4/1 4/4      RE at every 10v Used 1 2 3       Remaining  9 8 7         Pertinent Findings:                                                                                            Test / Measure  3/25/2024   FOTO (Predicted 71) 70   L/s flex AROM 50%   Global hip MMT 3+/5         Manuals 3/25 4/1 4/4          L/s STM, , distr                                                    Neuro Re-Ed             LTRs 10x 10x 2x10 ea           Bridges  10x 2x10           PPT 10x3\" 10x3\" 10x3\"           PB crush  5\"x10 5\"x10 standing (for TrA core brace)          Pallof press 5x B otb 10x B otb  2x10 B otb           Suitcase carry                          Ther Ex             HEP review / edu 10'            NuStep  L3 5' L3 5'           STS             LSU             Unilat row             Unilat Sh ext             DL / rack pull                          Ther Activity                                     "   Gait Training                                       Modalities

## 2024-04-08 ENCOUNTER — OFFICE VISIT (OUTPATIENT)
Dept: PHYSICAL THERAPY | Facility: REHABILITATION | Age: 69
End: 2024-04-08
Payer: MEDICARE

## 2024-04-08 DIAGNOSIS — M54.6 CHRONIC BILATERAL THORACIC BACK PAIN: ICD-10-CM

## 2024-04-08 DIAGNOSIS — M54.50 LUMBAR PAIN: Primary | ICD-10-CM

## 2024-04-08 DIAGNOSIS — G89.29 CHRONIC BILATERAL THORACIC BACK PAIN: ICD-10-CM

## 2024-04-08 PROCEDURE — 97112 NEUROMUSCULAR REEDUCATION: CPT

## 2024-04-08 PROCEDURE — 97110 THERAPEUTIC EXERCISES: CPT

## 2024-04-08 NOTE — PROGRESS NOTES
"Daily Note     Today's date: 2024  Patient name: Davina Allen  : 1955  MRN: [id number]  Referring provider: Ashtyn Hayes MD  Dx:   Encounter Diagnosis     ICD-10-CM    1. Lumbar pain  M54.50       2. Chronic bilateral thoracic back pain  M54.6     G89.29           Start Time: 1036  Stop Time: 1123  Total time in clinic (min): 47 minutes    Subjective: Pt reports she is feeling good.       Objective: See treatment diary below      Assessment: Tolerated treatment well. Focused on core stabilization ex's. Pt tolerated all new Roxana without an increase in pain except for STS which increased pain in R knee. Patient demonstrated fatigue post treatment, exhibited good technique with therapeutic exercises, and would benefit from continued PT.      Plan: Continue per plan of care.      Precautions: hx TIA, HTN, diabetes, rheumatoid arthritis, fibromyalgia    POC expires Unit limit Auth Expiration date PT/OT + Visit Limit?   24 BOMN N/A BOMN     Visit/Unit Tracking  AUTH Status:  Date 3/25 4/1 4/4 4/8     RE at every 10v Used 1 2 3 4      Remaining  9 8 7 6        Pertinent Findings:                                                                                            Test / Measure  3/25/2024   FOTO (Predicted 71) 70   L/s flex AROM 50%   Global hip MMT 3+/5         Manuals 3/25 4/1 4/4 4/8         L/s STM, , distr                                                    Neuro Re-Ed             LTRs 10x 10x 2x10 ea  2x10 ea         Bridges  10x 2x10  2x10         PPT 10x3\" 10x3\" 10x3\"  10x3\"         PB crush  5\"x10 5\"x10 standing (for TrA core brace) 5\"x10 standing (for TrA core brace)         Pallof press 5x B otb 10x B otb  2x10 B otb  2x10 B otb          Suitcase carry                          Ther Ex             HEP review / edu 10'            NuStep  L3 5' L3 5'           STS    1x10         LSU    2x10         Unilat row    2x10 otb          Unilat Sh ext             DL / rack pull                      "     Ther Activity                                       Gait Training                                       Modalities

## 2024-04-10 DIAGNOSIS — E11.9 CONTROLLED TYPE 2 DIABETES MELLITUS WITHOUT COMPLICATION, WITHOUT LONG-TERM CURRENT USE OF INSULIN (HCC): ICD-10-CM

## 2024-04-10 RX ORDER — METFORMIN HYDROCHLORIDE 500 MG/1
TABLET, EXTENDED RELEASE ORAL
Qty: 270 TABLET | Refills: 1 | Status: SHIPPED | OUTPATIENT
Start: 2024-04-10

## 2024-04-11 ENCOUNTER — OFFICE VISIT (OUTPATIENT)
Dept: PHYSICAL THERAPY | Facility: REHABILITATION | Age: 69
End: 2024-04-11
Payer: MEDICARE

## 2024-04-11 DIAGNOSIS — G89.29 CHRONIC BILATERAL THORACIC BACK PAIN: ICD-10-CM

## 2024-04-11 DIAGNOSIS — M54.6 CHRONIC BILATERAL THORACIC BACK PAIN: ICD-10-CM

## 2024-04-11 DIAGNOSIS — M54.50 LUMBAR PAIN: Primary | ICD-10-CM

## 2024-04-11 PROCEDURE — 97110 THERAPEUTIC EXERCISES: CPT

## 2024-04-11 PROCEDURE — 97112 NEUROMUSCULAR REEDUCATION: CPT

## 2024-04-11 NOTE — PROGRESS NOTES
"Daily Note     Today's date: 2024  Patient name: Davina Allen  : 1955  MRN: [id number]  Referring provider: Ashtyn Hayes MD  Dx:   Encounter Diagnosis     ICD-10-CM    1. Lumbar pain  M54.50       2. Chronic bilateral thoracic back pain  M54.6     G89.29           Start Time: 1205  Stop Time: 1250  Total time in clinic (min): 45 minutes    Subjective: Pt reports she is doing okay today. States the stuff she did the other day bothered her R knee.       Objective: See treatment diary below      Assessment: Tolerated treatment well. Focused on core stabilization ex's as indicated below. Pt progressing along nicely - consider progressing/advancing next visit. Patient demonstrated fatigue post treatment, exhibited good technique with therapeutic exercises, and would benefit from continued PT      Plan: Continue per plan of care.      Precautions: hx TIA, HTN, diabetes, rheumatoid arthritis, fibromyalgia    POC expires Unit limit Auth Expiration date PT/OT + Visit Limit?   24 BOMN N/A BOMN     Visit/Unit Tracking  AUTH Status:  Date 3/25 4/1 4/4 4/8 4/11    RE at every 10v Used 1 2 3 4 5     Remaining  9 8 7 6 5       Pertinent Findings:                                                                                            Test / Measure  3/25/2024   FOTO (Predicted 71) 70   L/s flex AROM 50%   Global hip MMT 3+/5         Manuals 3/25 4/1 4/4 4/8 4/11        L/s STM, , distr                                                    Neuro Re-Ed             LTRs 10x 10x 2x10 ea  2x10 ea 2x10 ea         Bridges  10x 2x10  2x10 2x10         PPT 10x3\" 10x3\" 10x3\"  10x3\" 2x10 3\"         PB crush  5\"x10 5\"x10 standing (for TrA core brace) 5\"x10 standing (for TrA core brace) 5\"x10 standing (for TrA core brace)         Pallof press 5x B otb 10x B otb  2x10 B otb  2x10 B otb  2x10 B otb        Suitcase carry                          Ther Ex             HEP review / edu 10'            NuStep  L3 5' L3 5'   L3 5'    "      STS    1x10 Hold due to knee pain        LSU    2x10 Hold due to knee pain        Unilat row    2x10 otb  2x10 otb        Unilat Sh ext             DL / rack pull                          Ther Activity                                       Gait Training                                       Modalities

## 2024-04-15 ENCOUNTER — APPOINTMENT (OUTPATIENT)
Dept: PHYSICAL THERAPY | Facility: REHABILITATION | Age: 69
End: 2024-04-15
Payer: MEDICARE

## 2024-04-18 ENCOUNTER — OFFICE VISIT (OUTPATIENT)
Dept: PHYSICAL THERAPY | Facility: REHABILITATION | Age: 69
End: 2024-04-18
Payer: COMMERCIAL

## 2024-04-18 DIAGNOSIS — M54.6 CHRONIC BILATERAL THORACIC BACK PAIN: ICD-10-CM

## 2024-04-18 DIAGNOSIS — G89.29 CHRONIC BILATERAL THORACIC BACK PAIN: ICD-10-CM

## 2024-04-18 DIAGNOSIS — M54.50 LUMBAR PAIN: Primary | ICD-10-CM

## 2024-04-18 PROCEDURE — 97112 NEUROMUSCULAR REEDUCATION: CPT

## 2024-04-18 PROCEDURE — 97110 THERAPEUTIC EXERCISES: CPT

## 2024-04-18 NOTE — PROGRESS NOTES
"Daily Note     Today's date: 2024  Patient name: Davina Allen  : 1955  MRN: [id number]  Referring provider: Ashtyn Hayes MD  Dx:   Encounter Diagnosis     ICD-10-CM    1. Lumbar pain  M54.50       2. Chronic bilateral thoracic back pain  M54.6     G89.29           Start Time: 0915  Stop Time: 1000  Total time in clinic (min): 45 minutes    Subjective: Pt reports continued back pain. States that knee dysfunction limits certain exercises.        Objective: See treatment diary below      Assessment: Tolerated treatment well. Patient would benefit from continued PT.  Pt able to tolerate slight progression of planned therapeutic interventions this visit.  Challenged with STS transfers due to knee pain - continued to hold step ups due to knee pain.  No back aggravation thorughout tx session.  VC's and demonstration provided for new exercises, especially rack pulls.  1:1 with Tung Jimenes DPT entirety of tx.        Plan: Continue per plan of care.  Progress treatment as tolerated.       Precautions: hx TIA, HTN, diabetes, rheumatoid arthritis, fibromyalgia    POC expires Unit limit Auth Expiration date PT/OT + Visit Limit?   24 BOMN N/A BOMN     Visit/Unit Tracking  AUTH Status:  Date 3/25 4/1 4/4 4/8 4/11 4/18   RE at every 10v Used 1 2 3 4 5 6    Remaining  9 8 7 6 5 4      Pertinent Findings:                                                                                            Test / Measure  3/25/2024   FOTO (Predicted 71) 70   L/s flex AROM 50%   Global hip MMT 3+/5         Manuals 3/25 4/1 4/4 4/8 4/11 4/18   L/s STM, , distr                                    Neuro Re-Ed         LTRs 10x 10x 2x10 ea  2x10 ea 2x10 ea  15x ea   Bridges  10x 2x10  2x10 2x10  2x10 + hip add squeeze   PPT 10x3\" 10x3\" 10x3\"  10x3\" 2x10 3\"     PB crush  5\"x10 5\"x10 standing (for TrA core brace) 5\"x10 standing (for TrA core brace) 5\"x10 standing (for TrA core brace)     Pallof press 5x B otb 10x B otb  2x10 B otb  " "2x10 B otb  2x10 B otb    Suitcase carry      2x1min 15#   QS + SLR      2x10 B   Seated T/s ext self-mobs      15x3\"   Ther Ex         HEP review / edu 10'        NuStep  L3 5' L3 5'   L3 5'  8' L5   STS    1x10 Hold due to knee pain From 20\" box 10x   LSU    2x10 Hold due to knee pain    Unilat row    2x10 otb  2x10 otb    Unilat Sh ext         DL / rack pull      3x5 5# from 9\" step            Ther Activity                           Gait Training                           Modalities                                      "

## 2024-04-21 NOTE — PROGRESS NOTES
Cardiology Consultation     Davina Allen  [id number]  1955  HEART & VASCULAR [facility]  [address] [place] [address]  [place] [place] [id number]  1. Stroke-like symptoms  AMB extended holter monitor    Ambulatory Referral to Cardiology    POCT ECG    Echo complete w/ contrast if indicated    VAS carotid complete study      2. Mitral valve annular calcification  AMB extended holter monitor    POCT ECG    Echo complete w/ contrast if indicated    VAS carotid complete study      3. Benign essential hypertension  AMB extended holter monitor    POCT ECG    Echo complete w/ contrast if indicated    VAS carotid complete study      4. Hypercholesterolemia  AMB extended holter monitor    POCT ECG    Echo complete w/ contrast if indicated    VAS carotid complete study      5. Left atrial dilatation  AMB extended holter monitor    Ambulatory Referral to Cardiology    POCT ECG    Echo complete w/ contrast if indicated    VAS carotid complete study      6. Acute cerebrovascular insufficiency  VAS carotid complete study        Patient Active Problem List   Diagnosis   • Hypercholesterolemia   • Fibromyalgia   • Psoriasis with arthropathy (HCC)   • Rheumatoid arthritis involving multiple sites with positive rheumatoid factor (HCC)   • Chronic pain of left knee   • Primary osteoarthritis of left knee   • Allergy to pollen   • Anxiety disorder   • Benign essential hypertension   • Controlled diabetes mellitus type II without complication (HCC)   • Generalized osteoarthritis of multiple sites   • Hearing loss   • Psoriasis   • Squamous cell carcinoma of skin   • Vitamin D deficiency   • Moderate persistent asthma   • Chronic neck pain   • Diverticulosis of colon   • Pulmonary nodules   • Functional diarrhea   • Encounter for screening mammogram for malignant neoplasm of breast   • Metatarsalgia, right foot   • Porokeratosis   • Cataract, nuclear sclerotic, both  eyes   • Continuous opioid dependence (HCC)   • Annual physical exam   • Chronic low back pain without sciatica   • Stroke-like symptoms   • Hospital discharge follow-up   • Bilateral carotid artery stenosis   • Spondylolisthesis, lumbar region   • Diastasis recti   • Umbilical hernia without obstruction or gangrene   • Mitral valve annular calcification       HPI patient here for cardiology evaluation. Patient has HTN, HLD, psoriatic arthritis and DM.  There had been concern about strokelike symptoms.  Patient with possible TIA.  Patient had been hospitalized 1/2024.  She was placed on DAPT for 21 days and thereafter on a single agent.  She was seen by Neurology service.  MRI  and CT of the brain 1/22/2024 showed no acute ischemia/acute intracranial abnormality.  Echocardiogram 1/22/2024 demonstrated LVEF of 60%.  There was mild LAE.  There was no evidence of PFO.  There was mild AS with mean gradient of 11 mmHg.  Fibrocalcific disease of the MV was noted with mild to moderate MS with mean gradient of 6 mmHg.  MAC was present carotid Doppler was ordered but patient was no-show.  CTA of the head and neck 1/22/2024 demonstrated no acute vascular pathology in the head or neck.  Mild plaque was noted in the right extracranial carotid segment.  EKG 1/22/2024 demonstrated NSR and was a normal tracing.  Lipid profile 1/22/2024 demonstrated total cholesterol 147 with an HDL of 39 and a calculated LDL of 75.  Patient is on statin.  Patient's vital signs today are stable.  She has no chest pain or significant dyspnea.  Patient did smoke for 25 years but stopped in 1996.  In reference to family history her younger brother had CABG.  He had weight issues and had gastric bypass.  He has diabetes.    PMH-  Past Medical History:   Diagnosis Date   • Acute non-recurrent frontal sinusitis 05/15/2019   • Acute non-recurrent maxillary sinusitis 03/10/2020   • Allergic In my 60’s   • Anxiety    • Anxiety disorder    • Arthritis     OA    • Asthma     exercise iniduced.   • Cancer (HCC)     Squamous Cell on back   • Colon polyp    • COVID-19    • Dependence on crutches     prn   • Depression     ro.    • Diabetes mellitus (HCC)     NIDDM; per Allscripts: Last Assessed: 7/15/15, New onset of type 2   • Diverticulitis of colon    • Fibromyalgia    • Hearing aid worn     amyra   • Heart murmur    • HL (hearing loss)     b/l - uses hearing aids   • Hyperglycemia     Last Assessed:10/29/15   • Hyperlipidemia    • Hypertension    • Inflammatory bowel disease In my 60’s   • LLQ abdominal pain 2020   • Pneumonia May 2023   • Psoriasis     red dry patch left side- waist area   • Psoriatic arthritis (HCC)    • RA (rheumatoid arthritis) (HCC)    • Urinary tract infection Currently    Taking med   • Wears glasses     reading        SOCIAL HISTORY-  Social History     Socioeconomic History   • Marital status: /Civil Union     Spouse name: Not on file   • Number of children: Not on file   • Years of education: Not on file   • Highest education level: Not on file   Occupational History   • Not on file   Tobacco Use   • Smoking status: Former     Current packs/day: 0.00     Average packs/day: 2.0 packs/day for 25.0 years (50.0 ttl pk-yrs)     Types: Cigarettes     Start date:      Quit date:      Years since quittin.3     Passive exposure: Past   • Smokeless tobacco: Never   Vaping Use   • Vaping status: Never Used   Substance and Sexual Activity   • Alcohol use: No   • Drug use: No   • Sexual activity: Yes     Partners: Male     Birth control/protection: Post-menopausal   Other Topics Concern   • Not on file   Social History Narrative   • Not on file     Social Determinants of Health     Financial Resource Strain: Not on file   Food Insecurity: No Food Insecurity (2024)    Hunger Vital Sign    • Worried About Running Out of Food in the Last Year: Never true    • Ran Out of Food in the Last Year: Never true   Transportation Needs: No  Transportation Needs (2024)    PRAPARE - Transportation    • Lack of Transportation (Medical): No    • Lack of Transportation (Non-Medical): No   Physical Activity: Not on file   Stress: Not on file   Social Connections: Not on file   Intimate Partner Violence: Not on file   Housing Stability: Low Risk  (2024)    Housing Stability Vital Sign    • Unable to Pay for Housing in the Last Year: No    • Number of Places Lived in the Last Year: 1    • Unstable Housing in the Last Year: No        FAMILY HISTORY-  Family History   Problem Relation Age of Onset   • Hypertension Mother    • Heart attack Mother    • Osteoarthritis Mother    • Breast cancer Mother 82   • Arthritis Mother    • Cancer Mother    • Alcohol abuse Father    • Diabetes Sister    • No Known Problems Daughter    • No Known Problems Daughter    • No Known Problems Maternal Grandmother    • No Known Problems Maternal Grandfather    • No Known Problems Paternal Grandmother    • No Known Problems Paternal Grandfather    • Hypertension Brother    • Diabetes Brother    • Other Brother         Dyslipidemia   • Coronary artery disease Brother         Diabetes   • No Known Problems Maternal Aunt    • No Known Problems Paternal Aunt    • No Known Problems Paternal Aunt    • No Known Problems Paternal Aunt    • No Known Problems Paternal Aunt    • No Known Problems Paternal Aunt        SURGICAL HISTORY-  Past Surgical History:   Procedure Laterality Date   • ARTHROSCOPY KNEE Left 3/2/2016    Procedure: ARTHROSCOPY KNEE;  Surgeon: Daniel Laureano MD;  Location: [facility] OR;  Service:    •  SECTION      X2   • COLONOSCOPY  2019   • CYST REMOVAL Right     Cyst on bone    • FRACTURE SURGERY      (Right) tibia/fibula Fx, and ankle   • PILONIDAL CYST / SINUS EXCISION     • MS ARTHRS KNE SURG W/MENISCECTOMY MED/LAT W/SHVG Left 3/2/2016    Procedure: PARTIAL MEDIAL &  LATERAL MENISCECTOMIES, CHONDROPLASTY, REMOVAL OSTEOPHYTE, LIMITED SYNOVECTOMY;   Surgeon: Daniel Laureano MD;  Location: [facility];  Service: Orthopedics   • REPAIR KNEE LIGAMENT     • TOOTH EXTRACTION           Current Outpatient Medications:   •  albuterol (2.5 mg/3 mL) 0.083 % nebulizer solution, Take 3 mL (2.5 mg total) by nebulization every 6 (six) hours as needed for wheezing or shortness of breath, Disp: 25 mL, Rfl: 5  •  albuterol (Ventolin HFA) 90 mcg/act inhaler, Inhale 2 puffs every 6 (six) hours as needed for wheezing or shortness of breath, Disp: 54 g, Rfl: 3  •  ALPRAZolam (XANAX) 0.5 mg tablet, Take 0.5 tablets (0.25 mg total) by mouth daily, Disp: 30 tablet, Rfl: 0  •  amLODIPine (NORVASC) 10 mg tablet, take 1 tablet by mouth once daily, Disp: 90 tablet, Rfl: 1  •  aspirin (ECOTRIN LOW STRENGTH) 81 mg EC tablet, Take 1 tablet (81 mg total) by mouth daily for 19 doses, Disp: 19 tablet, Rfl: 0  •  atorvastatin (LIPITOR) 40 mg tablet, Take 1 tablet (40 mg total) by mouth in the morning, Disp: 30 tablet, Rfl: 3  •  budesonide-formoterol (Symbicort) 160-4.5 mcg/act inhaler, Inhale 2 puffs 2 (two) times a day Rinse mouth after use., Disp: 10.2 g, Rfl: 3  •  Calcium Polycarbophil (Fiber) 625 MG TABS, Take 625 mg by mouth daily, Disp: , Rfl:   •  cholestyramine (QUESTRAN) 4 g packet, Take 1 packet (4 g total) by mouth 3 (three) times a day with meals, Disp: 270 each, Rfl: 3  •  ergocalciferol (VITAMIN D2) 50,000 units, take 1 capsule by mouth every week, Disp: 12 capsule, Rfl: 3  •  escitalopram (LEXAPRO) 10 mg tablet, take 1/2 tablet by mouth once daily, Disp: 90 tablet, Rfl: 1  •  estradiol (ESTRACE) 0.1 mg/g vaginal cream, MASSAGE A PEA SIZED AMOUNT INTO THE VAGINA TWICE A WEEK FOR 2 WEE...  (REFER TO PRESCRIPTION NOTES)., Disp: , Rfl:   •  hydroCHLOROthiazide 25 mg tablet, take 1 tablet by mouth once daily, Disp: 90 tablet, Rfl: 1  •  leflunomide (ARAVA) 10 MG tablet, Take 10 mg by mouth daily, Disp: , Rfl:   •  losartan (COZAAR) 50 mg tablet, take 1 tablet once daily as directed,  "Disp: 90 tablet, Rfl: 3  •  metFORMIN (GLUCOPHAGE-XR) 500 mg 24 hr tablet, take 1 tablet by mouth three times a day, Disp: 270 tablet, Rfl: 1  •  metoprolol succinate (TOPROL-XL) 100 mg 24 hr tablet, take 1 tablet by mouth once daily, Disp: 90 tablet, Rfl: 3  •  Multiple Vitamin (MULTIVITAMIN ADULT PO), multivitamin, Disp: , Rfl:   •  ondansetron (ZOFRAN) 4 mg tablet, take 1 tablet by mouth every 6 hours AS NEEDED FOR NAUSE OR VOMITING, Disp: 60 tablet, Rfl: 2  •  ondansetron (ZOFRAN-ODT) 4 mg disintegrating tablet, Take 1 tablet (4 mg total) by mouth every 6 (six) hours as needed for nausea or vomiting, Disp: 10 tablet, Rfl: 1  •  oxyCODONE-acetaminophen (PERCOCET) 5-325 mg per tablet, Take 1 tablet by mouth every 8 (eight) hours as needed for moderate pain, Disp: , Rfl:   Allergies   Allergen Reactions   • Zithromax [Azithromycin] Hives   • Augmentin [Amoxicillin-Pot Clavulanate] Vomiting   • Lisinopril Cough   • Cefprozil Diarrhea     Vitals:    05/01/24 1346   BP: 108/52   BP Location: Left arm   Patient Position: Sitting   Cuff Size: Standard   Pulse: 76   SpO2: 95%   Weight: 80 kg (176 lb 6.4 oz)   Height: 5' 2\" (1.575 m)         Review of Systems:  Review of Systems   All other systems reviewed and are negative.      Physical Exam:  Physical Exam  Vitals reviewed.   Constitutional:       Appearance: She is well-developed.   HENT:      Head: Normocephalic and atraumatic.   Eyes:      Conjunctiva/sclera: Conjunctivae normal.      Pupils: Pupils are equal, round, and reactive to light.   Cardiovascular:      Rate and Rhythm: Normal rate.      Heart sounds: Normal heart sounds.   Pulmonary:      Effort: Pulmonary effort is normal.      Breath sounds: Normal breath sounds.   Musculoskeletal:      Cervical back: Normal range of motion and neck supple.   Skin:     General: Skin is warm and dry.   Neurological:      Mental Status: She is alert and oriented to person, place, and time.         Discussion/Summary: " Patient for cardiac assessment in the setting of strokelike symptoms.  Patient has aortic and mitral valve disease which we will need to monitor.  She has left atrial cavity enlargement.  Will do 2 weeks Zio patch to assess for evidence of PAF.  She would like to reschedule her carotid Doppler which she inadvertently missed.  Will continue her current medical regimen.  I have asked her to call if there is a problem in the interim otherwise I will see her in 6 months and sooner as is necessary.

## 2024-04-22 ENCOUNTER — OFFICE VISIT (OUTPATIENT)
Dept: PHYSICAL THERAPY | Facility: REHABILITATION | Age: 69
End: 2024-04-22
Payer: COMMERCIAL

## 2024-04-22 DIAGNOSIS — M54.50 LUMBAR PAIN: Primary | ICD-10-CM

## 2024-04-22 DIAGNOSIS — M54.6 CHRONIC BILATERAL THORACIC BACK PAIN: ICD-10-CM

## 2024-04-22 DIAGNOSIS — G89.29 CHRONIC BILATERAL THORACIC BACK PAIN: ICD-10-CM

## 2024-04-22 PROCEDURE — 97110 THERAPEUTIC EXERCISES: CPT

## 2024-04-22 PROCEDURE — 97112 NEUROMUSCULAR REEDUCATION: CPT

## 2024-04-22 NOTE — PROGRESS NOTES
"Daily Note     Today's date: 2024  Patient name: Davina Allen  : 1955  MRN: [id number]  Referring provider: Ashtyn Hayes MD  Dx:   Encounter Diagnosis     ICD-10-CM    1. Lumbar pain  M54.50       2. Chronic bilateral thoracic back pain  M54.6     G89.29           Start Time: 1124  Stop Time: 1202  Total time in clinic (min): 38 minutes    Subjective: Pt reports back feels \"very good\" today.         Objective: See treatment diary below      Assessment: Tolerated treatment well. Continued to hold STS's and step ups per pt request due to knee pain. Added LAQ tband for quad strengthening, added to HEP per pt request. Progressing proximal hip strength well, no c/o LBP throughout tx. Patient would benefit from continued PT. 1:1 with Ar Kaur DPT for entirety of tx.       Plan: Continue per plan of care.  Progress treatment as tolerated.       Precautions: hx TIA, HTN, diabetes, rheumatoid arthritis, fibromyalgia    POC expires Unit limit Auth Expiration date PT/OT + Visit Limit?   24 BOMN N/A BOMN     Visit/Unit Tracking  AUTH Status:  Date 3/25 4/1 4/4 4/8 4/11 4/18 4/22   RE at every 10v Used 1 2 3 4 5 6 7    Remaining  9 8 7 6 5 4 3      Pertinent Findings:                                                                                            Test / Measure  3/25/2024   FOTO (Predicted 71) 70   L/s flex AROM 50%   Global hip MMT 3+/5         Manuals 3/25 4/1 4/4 4/8 4/11 4/18 4/22   L/s STM, , distr                                        Neuro Re-Ed          LTRs 10x 10x 2x10 ea  2x10 ea 2x10 ea  15x ea 15x ea   Bridges  10x 2x10  2x10 2x10  2x10 + hip add squeeze 2x10 + hip add squeeze   PPT 10x3\" 10x3\" 10x3\"  10x3\" 2x10 3\"      PB crush  5\"x10 5\"x10 standing (for TrA core brace) 5\"x10 standing (for TrA core brace) 5\"x10 standing (for TrA core brace)      Pallof press 5x B otb 10x B otb  2x10 B otb  2x10 B otb  2x10 B otb     Suitcase carry      2x1min 15# 2x1min 15#   QS + SLR      " "2x10 B 2x10 B    Seated T/s ext self-mobs      15x3\"    Ther Ex          HEP review / edu 10'         NuStep  L3 5' L3 5'   L3 5'  8' L5 8' L5   STS    1x10 Hold due to knee pain From 20\" box 10x Pt deferred   LSU    2x10 Hold due to knee pain     Unilat row    2x10 otb  2x10 otb     Unilat Sh ext          DL / rack pull      3x5 5# from 9\" step 3x10 5# from 9\" step   LAQ       2x10 B gtb HEP   Ther Activity                              Gait Training                              Modalities                                         "

## 2024-04-25 ENCOUNTER — OFFICE VISIT (OUTPATIENT)
Dept: PHYSICAL THERAPY | Facility: REHABILITATION | Age: 69
End: 2024-04-25
Payer: COMMERCIAL

## 2024-04-25 DIAGNOSIS — M54.6 CHRONIC BILATERAL THORACIC BACK PAIN: ICD-10-CM

## 2024-04-25 DIAGNOSIS — G89.29 CHRONIC BILATERAL THORACIC BACK PAIN: ICD-10-CM

## 2024-04-25 DIAGNOSIS — M54.50 LUMBAR PAIN: Primary | ICD-10-CM

## 2024-04-25 PROCEDURE — 97110 THERAPEUTIC EXERCISES: CPT

## 2024-04-25 PROCEDURE — 97112 NEUROMUSCULAR REEDUCATION: CPT

## 2024-04-25 NOTE — PROGRESS NOTES
"Daily Note     Today's date: 2024  Patient name: Davina Allen  : 1955  MRN: [id number]  Referring provider: Ashtyn Hayes MD  Dx:   Encounter Diagnosis     ICD-10-CM    1. Lumbar pain  M54.50       2. Chronic bilateral thoracic back pain  M54.6     G89.29             Start Time: 1215  Stop Time: 1300  Total time in clinic (min): 45 minutes    Subjective: Pt reports back feels \"very good\" today and ready to DC       Objective: See treatment diary below      Assessment: Tolerated treatment well. Patient DC to HEP.       Plan: Pt was DC to HEP.      Precautions: hx TIA, HTN, diabetes, rheumatoid arthritis, fibromyalgia    POC expires Unit limit Auth Expiration date PT/OT + Visit Limit?   24 BOMN N/A BOMN     Visit/Unit Tracking  AUTH Status:  Date 3/25 4/1 4/4 4/8 4/11 4/18 4/22 4/25   RE at every 10v Used 1 2 3 4 5 6 7 8    Remaining  9 8 7 6 5 4 3 2      Pertinent Findings:                                                                                            Test / Measure  3/25/2024   FOTO (Predicted 71) 70   L/s flex AROM 50%   Global hip MMT 3+/5         Manuals 3/25 4/1 4/4 4/8 4/11 4/18 4/22 4/25              L/s STM, , distr                                            Neuro Re-Ed           LTRs 10x 10x 2x10 ea  2x10 ea 2x10 ea  15x ea 15x ea 2x10   Bridges  10x 2x10  2x10 2x10  2x10 + hip add squeeze 2x10 + hip add squeeze 3x10 w/ gtb abd    PPT 10x3\" 10x3\" 10x3\"  10x3\" 2x10 3\"    3x10   PB crush  5\"x10 5\"x10 standing (for TrA core brace) 5\"x10 standing (for TrA core brace) 5\"x10 standing (for TrA core brace)       Pallof press 5x B otb 10x B otb  2x10 B otb  2x10 B otb  2x10 B otb      Suitcase carry      2x1min 15# 2x1min 15#    QS + SLR      2x10 B 2x10 B     Seated T/s ext self-mobs      15x3\"     Ther Ex           HEP review / edu 10'       20'   NuStep  L3 5' L3 5'   L3 5'  8' L5 8' L5    STS    1x10 Hold due to knee pain From 20\" box 10x Pt deferred    LSU    2x10 Hold due to knee " "pain      Unilat row    2x10 otb  2x10 otb      Unilat Sh ext           DL / rack pull      3x5 5# from 9\" step 3x10 5# from 9\" step    LAQ       2x10 B gtb HEP 2x10 B gtb    Clamshells         3x10 gtb    Ther Activity                                 Gait Training                                 Modalities                                            "

## 2024-05-01 ENCOUNTER — CONSULT (OUTPATIENT)
Dept: CARDIOLOGY CLINIC | Facility: CLINIC | Age: 69
End: 2024-05-01
Payer: MEDICARE

## 2024-05-01 VITALS
WEIGHT: 176.4 LBS | OXYGEN SATURATION: 95 % | HEIGHT: 62 IN | BODY MASS INDEX: 32.46 KG/M2 | HEART RATE: 76 BPM | SYSTOLIC BLOOD PRESSURE: 108 MMHG | DIASTOLIC BLOOD PRESSURE: 52 MMHG

## 2024-05-01 DIAGNOSIS — E78.00 HYPERCHOLESTEROLEMIA: ICD-10-CM

## 2024-05-01 DIAGNOSIS — R29.90 STROKE-LIKE SYMPTOMS: Primary | ICD-10-CM

## 2024-05-01 DIAGNOSIS — I51.7 LEFT ATRIAL DILATATION: ICD-10-CM

## 2024-05-01 DIAGNOSIS — I67.81 ACUTE CEREBROVASCULAR INSUFFICIENCY: ICD-10-CM

## 2024-05-01 DIAGNOSIS — I10 BENIGN ESSENTIAL HYPERTENSION: ICD-10-CM

## 2024-05-01 DIAGNOSIS — I34.81 MITRAL VALVE ANNULAR CALCIFICATION: ICD-10-CM

## 2024-05-01 PROCEDURE — 93000 ELECTROCARDIOGRAM COMPLETE: CPT | Performed by: INTERNAL MEDICINE

## 2024-05-01 PROCEDURE — 99204 OFFICE O/P NEW MOD 45 MIN: CPT | Performed by: INTERNAL MEDICINE

## 2024-05-02 ENCOUNTER — TELEPHONE (OUTPATIENT)
Dept: CARDIOLOGY CLINIC | Facility: CLINIC | Age: 69
End: 2024-05-02

## 2024-05-02 NOTE — TELEPHONE ENCOUNTER
14 day Zio patch has been ordered and will be sent to pt's home address. No prior auth needed due to Medicare as primary.

## 2024-05-05 DIAGNOSIS — I10 ESSENTIAL HYPERTENSION: ICD-10-CM

## 2024-05-06 RX ORDER — METOPROLOL SUCCINATE 100 MG/1
TABLET, EXTENDED RELEASE ORAL
Qty: 90 TABLET | Refills: 1 | Status: SHIPPED | OUTPATIENT
Start: 2024-05-06

## 2024-05-09 DIAGNOSIS — R29.90 STROKE-LIKE SYMPTOMS: ICD-10-CM

## 2024-05-09 DIAGNOSIS — E78.5 HYPERLIPIDEMIA: ICD-10-CM

## 2024-05-09 RX ORDER — ATORVASTATIN CALCIUM 40 MG/1
40 TABLET, FILM COATED ORAL EVERY MORNING
Qty: 90 TABLET | Refills: 0 | Status: SHIPPED | OUTPATIENT
Start: 2024-05-09

## 2024-05-12 DIAGNOSIS — I10 ESSENTIAL HYPERTENSION: ICD-10-CM

## 2024-05-12 RX ORDER — LOSARTAN POTASSIUM 50 MG/1
TABLET ORAL
Qty: 90 TABLET | Refills: 3 | Status: SHIPPED | OUTPATIENT
Start: 2024-05-12

## 2024-05-12 RX ORDER — AMLODIPINE BESYLATE 10 MG/1
10 TABLET ORAL DAILY
Qty: 90 TABLET | Refills: 1 | Status: SHIPPED | OUTPATIENT
Start: 2024-05-12

## 2024-06-07 ENCOUNTER — CLINICAL SUPPORT (OUTPATIENT)
Dept: CARDIOLOGY CLINIC | Facility: CLINIC | Age: 69
End: 2024-06-07
Payer: MEDICARE

## 2024-06-07 DIAGNOSIS — I51.7 LEFT ATRIAL DILATATION: ICD-10-CM

## 2024-06-07 DIAGNOSIS — I10 BENIGN ESSENTIAL HYPERTENSION: ICD-10-CM

## 2024-06-07 DIAGNOSIS — R29.90 STROKE-LIKE SYMPTOMS: ICD-10-CM

## 2024-06-07 DIAGNOSIS — E78.00 HYPERCHOLESTEROLEMIA: ICD-10-CM

## 2024-06-07 DIAGNOSIS — I34.81 MITRAL VALVE ANNULAR CALCIFICATION: ICD-10-CM

## 2024-06-07 PROCEDURE — 93248 EXT ECG>7D<15D REV&INTERPJ: CPT | Performed by: INTERNAL MEDICINE

## 2024-06-07 NOTE — RESULT ENCOUNTER NOTE
Patient had a min HR of 56 bpm, max HR of 146 bpm, and avg HR of 77 bpm. Predominant underlying rhythm was Sinus Rhythm. 5 Supraventricular Tachycardia runs occurred, the run with the fastest interval lasting 5 beats with a max rate of 146 bpm, the longest lasting 11 beats with an avg rate of 98 bpm. Isolated SVEs were rare (<1.0%), SVE Couplets were rare (<1.0%), and SVE Triplets were rare (<1.0%). Isolated VEs were rare (<1.0%), VE Couplets were rare (<1.0%), and no VE Triplets were present.    Impression:    1.  Normal sinus rhythm throughout the monitoring interval with average heart rate of 77.  2.  Rare premature ventricular ectopy.  3.  Rare premature supraventricular ectopy.  Rare very brief episodes of SVT were noted.  There was no evidence of atrial fibrillation or flutter.  4.  No significant bradycardia.  5.  Patient complained of fluttering or racing heartbeat.  Patient admitted to drinking tea.  Patient's rhythm was sinus at a rate of 81 without ectopic complexes noted.  6.  No evidence of prognostically important arrhythmia.  Specifically no evidence of atrial fibrillation/flutter.

## 2024-06-13 ENCOUNTER — OFFICE VISIT (OUTPATIENT)
Dept: OBGYN CLINIC | Facility: HOSPITAL | Age: 69
End: 2024-06-13
Payer: MEDICARE

## 2024-06-13 VITALS
SYSTOLIC BLOOD PRESSURE: 135 MMHG | HEIGHT: 62 IN | WEIGHT: 175 LBS | HEART RATE: 84 BPM | DIASTOLIC BLOOD PRESSURE: 73 MMHG | BODY MASS INDEX: 32.2 KG/M2

## 2024-06-13 DIAGNOSIS — M17.12 PRIMARY OSTEOARTHRITIS OF LEFT KNEE: ICD-10-CM

## 2024-06-13 DIAGNOSIS — M25.562 LEFT KNEE PAIN, UNSPECIFIED CHRONICITY: Primary | ICD-10-CM

## 2024-06-13 PROCEDURE — 99214 OFFICE O/P EST MOD 30 MIN: CPT | Performed by: ORTHOPAEDIC SURGERY

## 2024-06-13 RX ORDER — MELOXICAM 7.5 MG/1
7.5 TABLET ORAL DAILY
Qty: 30 TABLET | Refills: 2 | Status: SHIPPED | OUTPATIENT
Start: 2024-06-13

## 2024-06-13 NOTE — PROGRESS NOTES
Assessment:   Diagnosis ICD-10-CM Associated Orders   1. Left knee pain, unspecified chronicity  M25.562 meloxicam (Mobic) 7.5 mg tablet      2. Primary osteoarthritis of left knee  M17.12           Plan:  68 y.o. female with known osteoarthritis of the left knee   Injections in the past with little benefit   Patient hesitant to try injections again today   Educated on different treatments including injections and oral medications   Meloxicam sent to pharmacy today  Check A1c, once below 7 patient advised to call office for scheduling left TKA     The above stated was discussed in layman's terms and the patient expressed understanding.  All questions were answered to the patient's satisfaction.     To do next visit:  The patient can follow up as needed and is welcome to return at any point with any new or old issue.       Subjective:   Davina Allen is a 68 y.o. female who presents for follow up of left knee pain.  Patient has known osteoarthritis of her left knee which has been treated in the past with injections of corticosteroid, bracing, and therapy.  She was last seen in September, 2023 and was hoping to schedule total knee arthroplasty, however her A1c remains above 7.0.  Over the past month she explains posterior left knee pain that continues to worsen.  She is hesitant to receive injections today due to little relief in the past.  Educated and discussed with patient treatment options including injections and oral medications.  Patient wishes to try Meloxicam, which was sent to her pharmacy today.         Pain score 9/10    Review of systems negative unless otherwise specified in HPI    Past Medical History:   Diagnosis Date    Acute non-recurrent frontal sinusitis 05/15/2019    Acute non-recurrent maxillary sinusitis 03/10/2020    Allergic In my 60’s    Anxiety     Anxiety disorder     Arthritis     OA    Asthma     exercise iniduced.    Cancer (HCC)     Squamous Cell on back    Colon polyp     COVID-19      Dependence on crutches     prn    Depression     ro.     Diabetes mellitus (HCC)     NIDDM; per [employer]: Last Assessed: 7/15/15, New onset of type 2    Diverticulitis of colon     Fibromyalgia     Hearing aid worn     mayra    Heart murmur     HL (hearing loss)     b/l - uses hearing aids    Hyperglycemia     Last Assessed:10/29/15    Hyperlipidemia     Hypertension     Inflammatory bowel disease In my 60’s    LLQ abdominal pain 2020    Pneumonia May 2023    Psoriasis     red dry patch left side- waist area    Psoriatic arthritis (HCC)     RA (rheumatoid arthritis) ([facility])     Urinary tract infection Currently    Taking med    Wears glasses     reading       Past Surgical History:   Procedure Laterality Date    ARTHROSCOPY KNEE Left 3/2/2016    Procedure: ARTHROSCOPY KNEE;  Surgeon: Daniel Laureano MD;  Location: [facility];  Service:      SECTION      X2    COLONOSCOPY  2019    CYST REMOVAL Right     Cyst on bone     FRACTURE SURGERY      (Right) tibia/fibula Fx, and ankle    PILONIDAL CYST / SINUS EXCISION      UT ARTHRS KNE SURG W/MENISCECTOMY MED/LAT W/SHVG Left 3/2/2016    Procedure: PARTIAL MEDIAL &  LATERAL MENISCECTOMIES, CHONDROPLASTY, REMOVAL OSTEOPHYTE, LIMITED SYNOVECTOMY;  Surgeon: Daniel Laureano MD;  Location: [facility];  Service: Orthopedics    REPAIR KNEE LIGAMENT      TOOTH EXTRACTION         Family History   Problem Relation Age of Onset    Hypertension Mother     Heart attack Mother     Osteoarthritis Mother     Breast cancer Mother 82    Arthritis Mother     Cancer Mother     Alcohol abuse Father     Diabetes Sister     No Known Problems Daughter     No Known Problems Daughter     No Known Problems Maternal Grandmother     No Known Problems Maternal Grandfather     No Known Problems Paternal Grandmother     No Known Problems Paternal Grandfather     Hypertension Brother     Diabetes Brother     Other Brother         Dyslipidemia    Coronary artery disease Brother         Diabetes     No Known Problems Maternal Aunt     No Known Problems Paternal Aunt     No Known Problems Paternal Aunt     No Known Problems Paternal Aunt     No Known Problems Paternal Aunt     No Known Problems Paternal Aunt        Social History     Occupational History    Not on file   Tobacco Use    Smoking status: Former     Current packs/day: 0.00     Average packs/day: 2.0 packs/day for 25.0 years (50.0 ttl pk-yrs)     Types: Cigarettes     Start date:      Quit date:      Years since quittin.4     Passive exposure: Past    Smokeless tobacco: Never   Vaping Use    Vaping status: Never Used   Substance and Sexual Activity    Alcohol use: No    Drug use: No    Sexual activity: Yes     Partners: Male     Birth control/protection: Post-menopausal         Current Outpatient Medications:     meloxicam (Mobic) 7.5 mg tablet, Take 1 tablet (7.5 mg total) by mouth daily, Disp: 30 tablet, Rfl: 2    albuterol (2.5 mg/3 mL) 0.083 % nebulizer solution, Take 3 mL (2.5 mg total) by nebulization every 6 (six) hours as needed for wheezing or shortness of breath, Disp: 25 mL, Rfl: 5    albuterol (Ventolin HFA) 90 mcg/act inhaler, Inhale 2 puffs every 6 (six) hours as needed for wheezing or shortness of breath, Disp: 54 g, Rfl: 3    ALPRAZolam (XANAX) 0.5 mg tablet, Take 0.5 tablets (0.25 mg total) by mouth daily, Disp: 30 tablet, Rfl: 0    amLODIPine (NORVASC) 10 mg tablet, take 1 tablet by mouth daily, Disp: 90 tablet, Rfl: 1    aspirin (ECOTRIN LOW STRENGTH) 81 mg EC tablet, Take 1 tablet (81 mg total) by mouth daily for 19 doses, Disp: 19 tablet, Rfl: 0    atorvastatin (LIPITOR) 40 mg tablet, take 1 tablet by mouth every morning, Disp: 90 tablet, Rfl: 0    budesonide-formoterol (Symbicort) 160-4.5 mcg/act inhaler, Inhale 2 puffs 2 (two) times a day Rinse mouth after use., Disp: 10.2 g, Rfl: 3    Calcium Polycarbophil (Fiber) 625 MG TABS, Take 625 mg by mouth daily, Disp: , Rfl:     cholestyramine (QUESTRAN) 4 g packet,  Take 1 packet (4 g total) by mouth 3 (three) times a day with meals, Disp: 270 each, Rfl: 3    ergocalciferol (VITAMIN D2) 50,000 units, take 1 capsule by mouth every week, Disp: 12 capsule, Rfl: 3    escitalopram (LEXAPRO) 10 mg tablet, take 1/2 tablet by mouth once daily, Disp: 90 tablet, Rfl: 1    estradiol (ESTRACE) 0.1 mg/g vaginal cream, MASSAGE A PEA SIZED AMOUNT INTO THE VAGINA TWICE A WEEK FOR 2 WEE...  (REFER TO PRESCRIPTION NOTES)., Disp: , Rfl:     hydroCHLOROthiazide 25 mg tablet, take 1 tablet by mouth once daily, Disp: 90 tablet, Rfl: 1    leflunomide (ARAVA) 10 MG tablet, Take 10 mg by mouth daily, Disp: , Rfl:     losartan (COZAAR) 50 mg tablet, take 1 tablet once daily as directed, Disp: 90 tablet, Rfl: 3    metFORMIN (GLUCOPHAGE-XR) 500 mg 24 hr tablet, take 1 tablet by mouth three times a day, Disp: 270 tablet, Rfl: 1    metoprolol succinate (TOPROL-XL) 100 mg 24 hr tablet, take 1 tablet by mouth once daily, Disp: 90 tablet, Rfl: 1    Multiple Vitamin (MULTIVITAMIN ADULT PO), multivitamin, Disp: , Rfl:     ondansetron (ZOFRAN) 4 mg tablet, take 1 tablet by mouth every 6 hours AS NEEDED FOR NAUSE OR VOMITING, Disp: 60 tablet, Rfl: 2    ondansetron (ZOFRAN-ODT) 4 mg disintegrating tablet, Take 1 tablet (4 mg total) by mouth every 6 (six) hours as needed for nausea or vomiting, Disp: 10 tablet, Rfl: 1    oxyCODONE-acetaminophen (PERCOCET) 5-325 mg per tablet, Take 1 tablet by mouth every 8 (eight) hours as needed for moderate pain, Disp: , Rfl:     Allergies   Allergen Reactions    Zithromax [Azithromycin] Hives    Augmentin [Amoxicillin-Pot Clavulanate] Vomiting    Lisinopril Cough    Cefprozil Diarrhea            Vitals:    06/13/24 1327   BP: 135/73   Pulse: 84       Objective:  Physical exam  General: Awake, Alert, Oriented  Eyes: Pupils equal, round and reactive to light  Heart: regular rate and rhythm  Lungs: No audible wheezing  Abdomen: soft                    Left Knee Exam     Tenderness  "  The patient is experiencing tenderness in the lateral joint line and medial joint line.    Range of Motion   Extension:  5   Flexion:  100 (with crepitation)     Other   Erythema: absent  Scars: absent  Sensation: normal  Swelling: mild  Effusion: effusion (trace) present    Comments:  Varus alignment             Diagnostics, reviewed and taken today if performed as documented:    Left knee x-ray:  - Significant tricompartmental osteoarthritis with degenerative changes   - Varus alignment with bone-on-bone articulation medially  - Bone-on-bone articulation of patellofemoral compartment   - Subchondral sclerosis and multiple osteophytes noted        Procedures, if performed today:    None performed      Portions of the record may have been created with voice recognition software.  Occasional wrong word or \"sound a like\" substitutions may have occurred due to the inherent limitations of voice recognition software.  Read the chart carefully and recognize, using context, where substitutions have occurred.      "

## 2024-06-29 DIAGNOSIS — E55.9 VITAMIN D DEFICIENCY: ICD-10-CM

## 2024-07-01 ENCOUNTER — HOSPITAL ENCOUNTER (OUTPATIENT)
Dept: NON INVASIVE DIAGNOSTICS | Facility: CLINIC | Age: 69
Discharge: HOME | End: 2024-07-01
Payer: MEDICARE

## 2024-07-01 VITALS
DIASTOLIC BLOOD PRESSURE: 73 MMHG | HEIGHT: 62 IN | SYSTOLIC BLOOD PRESSURE: 135 MMHG | HEART RATE: 84 BPM | WEIGHT: 175 LBS | BODY MASS INDEX: 32.2 KG/M2

## 2024-07-01 DIAGNOSIS — E78.00 HYPERCHOLESTEROLEMIA: ICD-10-CM

## 2024-07-01 DIAGNOSIS — I10 BENIGN ESSENTIAL HYPERTENSION: ICD-10-CM

## 2024-07-01 DIAGNOSIS — R29.90 STROKE-LIKE SYMPTOMS: ICD-10-CM

## 2024-07-01 DIAGNOSIS — I34.81 MITRAL VALVE ANNULAR CALCIFICATION: ICD-10-CM

## 2024-07-01 DIAGNOSIS — I67.81 ACUTE CEREBROVASCULAR INSUFFICIENCY: ICD-10-CM

## 2024-07-01 DIAGNOSIS — I51.7 LEFT ATRIAL DILATATION: ICD-10-CM

## 2024-07-01 LAB
AORTIC ROOT: 2.7 CM
AORTIC VALVE MEAN VELOCITY: 15.1 M/S
APICAL FOUR CHAMBER EJECTION FRACTION: 71 %
ASCENDING AORTA: 3.1 CM
AV AREA BY CONTINUOUS VTI: 1.4 CM2
AV AREA PEAK VELOCITY: 1.4 CM2
AV LVOT MEAN GRADIENT: 4 MMHG
AV LVOT PEAK GRADIENT: 7 MMHG
AV MEAN GRADIENT: 10 MMHG
AV PEAK GRADIENT: 19 MMHG
AV VALVE AREA: 1.39 CM2
AV VELOCITY RATIO: 0.6
BSA FOR ECHO PROCEDURE: 1.81 M2
DOP CALC AO PEAK VEL: 2.18 M/S
DOP CALC AO VTI: 44.04 CM
DOP CALC LVOT AREA: 2.27 CM2
DOP CALC LVOT CARDIAC INDEX: 2.46 L/MIN/M2
DOP CALC LVOT CARDIAC OUTPUT: 4.45 L/MIN
DOP CALC LVOT DIAMETER: 1.7 CM
DOP CALC LVOT PEAK VEL VTI: 26.98 CM
DOP CALC LVOT PEAK VEL: 1.3 M/S
DOP CALC LVOT STROKE INDEX: 33.1 ML/M2
DOP CALC LVOT STROKE VOLUME: 61.21
DOP CALC MV VTI: 46.07 CM
E WAVE DECELERATION TIME: 302 MS
E/A RATIO: 0.95
FRACTIONAL SHORTENING: 38 (ref 28–44)
INTERVENTRICULAR SEPTUM IN DIASTOLE (PARASTERNAL SHORT AXIS VIEW): 1.4 CM
INTERVENTRICULAR SEPTUM: 1.4 CM (ref 0.6–1.1)
LAAS-AP2: 15.4 CM2
LAAS-AP4: 19.4 CM2
LEFT ATRIUM SIZE: 3.7 CM
LEFT ATRIUM VOLUME (MOD BIPLANE): 54 ML
LEFT ATRIUM VOLUME INDEX (MOD BIPLANE): 29.8 ML/M2
LEFT INTERNAL DIMENSION IN SYSTOLE: 2 CM (ref 2.1–4)
LEFT VENTRICULAR INTERNAL DIMENSION IN DIASTOLE: 3.2 CM (ref 3.5–6)
LEFT VENTRICULAR POSTERIOR WALL IN END DIASTOLE: 1.4 CM
LEFT VENTRICULAR STROKE VOLUME: 28 ML
LVSV (TEICH): 28 ML
MV E'TISSUE VEL-SEP: 120 CM/S
MV MEAN GRADIENT: 4 MMHG
MV PEAK A VEL: 1.32 M/S
MV PEAK E VEL: 125 CM/S
MV PEAK GRADIENT: 9 MMHG
MV STENOSIS PRESSURE HALF TIME: 88 MS
MV VALVE AREA BY CONTINUITY EQUATION: 1.33 CM2
MV VALVE AREA P 1/2 METHOD: 2.5
RA PRESSURE ESTIMATED: 5 MMHG
RIGHT ATRIAL 2D VOLUME: 42 ML
RIGHT ATRIUM AREA SYSTOLE A4C: 16.4 CM2
RIGHT VENTRICLE ID DIMENSION: 3.7 CM
SL CV LEFT ATRIUM LENGTH A2C: 4.4 CM
SL CV PED ECHO LEFT VENTRICLE DIASTOLIC VOLUME (MOD BIPLANE) 2D: 40 ML
SL CV PED ECHO LEFT VENTRICLE SYSTOLIC VOLUME (MOD BIPLANE) 2D: 13 ML
TRICUSPID ANNULAR PLANE SYSTOLIC EXCURSION: 2.3 CM

## 2024-07-01 PROCEDURE — 93306 TTE W/DOPPLER COMPLETE: CPT | Performed by: INTERNAL MEDICINE

## 2024-07-01 PROCEDURE — 93306 TTE W/DOPPLER COMPLETE: CPT

## 2024-07-01 PROCEDURE — 93880 EXTRACRANIAL BILAT STUDY: CPT

## 2024-07-01 RX ORDER — ERGOCALCIFEROL 1.25 MG/1
CAPSULE ORAL
Qty: 12 CAPSULE | Refills: 3 | Status: SHIPPED | OUTPATIENT
Start: 2024-07-01

## 2024-07-02 PROCEDURE — 93880 EXTRACRANIAL BILAT STUDY: CPT | Performed by: SURGERY

## 2024-07-10 ENCOUNTER — APPOINTMENT (OUTPATIENT)
Dept: LAB | Facility: AMBULARY SURGERY CENTER | Age: 69
End: 2024-07-10
Payer: MEDICARE

## 2024-07-10 DIAGNOSIS — R29.90 STROKE-LIKE SYMPTOMS: ICD-10-CM

## 2024-07-10 DIAGNOSIS — E11.9 CONTROLLED TYPE 2 DIABETES MELLITUS WITHOUT COMPLICATION, WITHOUT LONG-TERM CURRENT USE OF INSULIN (HCC): ICD-10-CM

## 2024-07-10 DIAGNOSIS — E78.5 HYPERLIPIDEMIA: ICD-10-CM

## 2024-07-10 LAB
CHOLEST SERPL-MCNC: 141 MG/DL
EST. AVERAGE GLUCOSE BLD GHB EST-MCNC: 166 MG/DL
HBA1C MFR BLD: 7.4 %
HDLC SERPL-MCNC: 48 MG/DL
LDLC SERPL CALC-MCNC: 58 MG/DL (ref 0–100)
TRIGL SERPL-MCNC: 177 MG/DL

## 2024-07-10 PROCEDURE — 36415 COLL VENOUS BLD VENIPUNCTURE: CPT

## 2024-07-10 PROCEDURE — 83036 HEMOGLOBIN GLYCOSYLATED A1C: CPT

## 2024-07-10 PROCEDURE — 80061 LIPID PANEL: CPT

## 2024-07-15 ENCOUNTER — RA CDI HCC (OUTPATIENT)
Dept: OTHER | Facility: HOSPITAL | Age: 69
End: 2024-07-15

## 2024-07-25 ENCOUNTER — OFFICE VISIT (OUTPATIENT)
Dept: FAMILY MEDICINE CLINIC | Facility: CLINIC | Age: 69
End: 2024-07-25
Payer: MEDICARE

## 2024-07-25 VITALS
DIASTOLIC BLOOD PRESSURE: 84 MMHG | HEART RATE: 79 BPM | HEIGHT: 62 IN | TEMPERATURE: 99.2 F | WEIGHT: 175.8 LBS | SYSTOLIC BLOOD PRESSURE: 122 MMHG | RESPIRATION RATE: 18 BRPM | BODY MASS INDEX: 32.35 KG/M2 | OXYGEN SATURATION: 98 %

## 2024-07-25 DIAGNOSIS — E11.9 CONTROLLED TYPE 2 DIABETES MELLITUS WITHOUT COMPLICATION, WITHOUT LONG-TERM CURRENT USE OF INSULIN (HCC): ICD-10-CM

## 2024-07-25 DIAGNOSIS — K58.0 IRRITABLE BOWEL SYNDROME WITH DIARRHEA: ICD-10-CM

## 2024-07-25 DIAGNOSIS — Z12.11 SCREENING FOR COLON CANCER: ICD-10-CM

## 2024-07-25 DIAGNOSIS — R30.0 DYSURIA: ICD-10-CM

## 2024-07-25 DIAGNOSIS — Z00.00 WELCOME TO MEDICARE PREVENTIVE VISIT: Primary | ICD-10-CM

## 2024-07-25 PROBLEM — Z09 HOSPITAL DISCHARGE FOLLOW-UP: Status: RESOLVED | Noted: 2024-01-29 | Resolved: 2024-07-25

## 2024-07-25 LAB
CREAT UR-MCNC: 80.7 MG/DL
MICROALBUMIN UR-MCNC: 8.7 MG/L
MICROALBUMIN/CREAT 24H UR: 11 MG/G CREATININE (ref 0–30)
SL AMB  POCT GLUCOSE, UA: NEGATIVE
SL AMB LEUKOCYTE ESTERASE,UA: NEGATIVE
SL AMB POCT BILIRUBIN,UA: NEGATIVE
SL AMB POCT BLOOD,UA: NEGATIVE
SL AMB POCT CLARITY,UA: CLEAR
SL AMB POCT COLOR,UA: YELLOW
SL AMB POCT KETONES,UA: NEGATIVE
SL AMB POCT NITRITE,UA: NEGATIVE
SL AMB POCT PH,UA: 6
SL AMB POCT SPECIFIC GRAVITY,UA: 1.02
SL AMB POCT URINE PROTEIN: NEGATIVE
SL AMB POCT UROBILINOGEN: NEGATIVE

## 2024-07-25 PROCEDURE — G0402 INITIAL PREVENTIVE EXAM: HCPCS | Performed by: FAMILY MEDICINE

## 2024-07-25 PROCEDURE — 82570 ASSAY OF URINE CREATININE: CPT | Performed by: FAMILY MEDICINE

## 2024-07-25 PROCEDURE — 82043 UR ALBUMIN QUANTITATIVE: CPT | Performed by: FAMILY MEDICINE

## 2024-07-25 PROCEDURE — 81002 URINALYSIS NONAUTO W/O SCOPE: CPT | Performed by: FAMILY MEDICINE

## 2024-07-25 NOTE — PATIENT INSTRUCTIONS
Medicare Preventive Visit Patient Instructions  Thank you for completing your Welcome to Medicare Visit or Medicare Annual Wellness Visit today. Your next wellness visit will be due in one year (7/26/2025).  The screening/preventive services that you may require over the next 5-10 years are detailed below. Some tests may not apply to you based off risk factors and/or age. Screening tests ordered at today's visit but not completed yet may show as past due. Also, please note that scanned in results may not display below.  Preventive Screenings:  Service Recommendations Previous Testing/Comments   Colorectal Cancer Screening  * Colonoscopy    * Fecal Occult Blood Test (FOBT)/Fecal Immunochemical Test (FIT)  * Fecal DNA/Cologuard Test  * Flexible Sigmoidoscopy Age: 45-75 years old   Colonoscopy: every 10 years (may be performed more frequently if at higher risk)  OR  FOBT/FIT: every 1 year  OR  Cologuard: every 3 years  OR  Sigmoidoscopy: every 5 years  Screening may be recommended earlier than age 45 if at higher risk for colorectal cancer. Also, an individualized decision between you and your healthcare provider will decide whether screening between the ages of 76-85 would be appropriate. Colonoscopy: 05/14/2019  FOBT/FIT: Not on file  Cologuard: Not on file  Sigmoidoscopy: Not on file    Screening Current     Breast Cancer Screening Age: 40+ years old  Frequency: every 1-2 years  Not required if history of left and right mastectomy Mammogram: 05/07/2023    Screening Current   Cervical Cancer Screening Between the ages of 21-29, pap smear recommended once every 3 years.   Between the ages of 30-65, can perform pap smear with HPV co-testing every 5 years.   Recommendations may differ for women with a history of total hysterectomy, cervical cancer, or abnormal pap smears in past. Pap Smear: 02/23/2016    Screening Not Indicated   Hepatitis C Screening Once for adults born between 1945 and 1965  More frequently in  patients at high risk for Hepatitis C Hep C Antibody: 08/03/2020    Screening Current   Diabetes Screening 1-2 times per year if you're at risk for diabetes or have pre-diabetes Fasting glucose: 146 mg/dL (2/8/2024)  A1C: 7.4 % (7/10/2024)  Screening Not Indicated  History Diabetes   Cholesterol Screening Once every 5 years if you don't have a lipid disorder. May order more often based on risk factors. Lipid panel: 07/10/2024    Screening Not Indicated  History Lipid Disorder     Other Preventive Screenings Covered by Medicare:  Abdominal Aortic Aneurysm (AAA) Screening: covered once if your at risk. You're considered to be at risk if you have a family history of AAA.  Lung Cancer Screening: covers low dose CT scan once per year if you meet all of the following conditions: (1) Age 55-77; (2) No signs or symptoms of lung cancer; (3) Current smoker or have quit smoking within the last 15 years; (4) You have a tobacco smoking history of at least 20 pack years (packs per day multiplied by number of years you smoked); (5) You get a written order from a healthcare provider.  Glaucoma Screening: covered annually if you're considered high risk: (1) You have diabetes OR (2) Family history of glaucoma OR (3)  aged 50 and older OR (4)  American aged 65 and older  Osteoporosis Screening: covered every 2 years if you meet one of the following conditions: (1) You're estrogen deficient and at risk for osteoporosis based off medical history and other findings; (2) Have a vertebral abnormality; (3) On glucocorticoid therapy for more than 3 months; (4) Have primary hyperparathyroidism; (5) On osteoporosis medications and need to assess response to drug therapy.   Last bone density test (DXA Scan): 08/03/2022.  HIV Screening: covered annually if you're between the age of 15-65. Also covered annually if you are younger than 15 and older than 65 with risk factors for HIV infection. For pregnant patients, it is  covered up to 3 times per pregnancy.    Immunizations:  Immunization Recommendations   Influenza Vaccine Annual influenza vaccination during flu season is recommended for all persons aged >= 6 months who do not have contraindications   Pneumococcal Vaccine   * Pneumococcal conjugate vaccine = PCV13 (Prevnar 13), PCV15 (Vaxneuvance), PCV20 (Prevnar 20)  * Pneumococcal polysaccharide vaccine = PPSV23 (Pneumovax) Adults 19-65 yo with certain risk factors or if 65+ yo  If never received any pneumonia vaccine: recommend Prevnar 20 (PCV20)  Give PCV20 if previously received 1 dose of PCV13 or PPSV23   Hepatitis B Vaccine 3 dose series if at intermediate or high risk (ex: diabetes, end stage renal disease, liver disease)   Respiratory syncytial virus (RSV) Vaccine - COVERED BY MEDICARE PART D  * RSVPreF3 (Arexvy) CDC recommends that adults 60 years of age and older may receive a single dose of RSV vaccine using shared clinical decision-making (SCDM)   Tetanus (Td) Vaccine - COST NOT COVERED BY MEDICARE PART B Following completion of primary series, a booster dose should be given every 10 years to maintain immunity against tetanus. Td may also be given as tetanus wound prophylaxis.   Tdap Vaccine - COST NOT COVERED BY MEDICARE PART B Recommended at least once for all adults. For pregnant patients, recommended with each pregnancy.   Shingles Vaccine (Shingrix) - COST NOT COVERED BY MEDICARE PART B  2 shot series recommended in those 19 years and older who have or will have weakened immune systems or those 50 years and older     Health Maintenance Due:      Topic Date Due   • Breast Cancer Screening: Mammogram  05/07/2024   • Colorectal Cancer Screening  05/14/2024   • Hepatitis C Screening  Completed     Immunizations Due:      Topic Date Due   • COVID-19 Vaccine (4 - 2023-24 season) 09/01/2023   • Influenza Vaccine (1) 09/01/2024     Advance Directives   What are advance directives?  Advance directives are legal documents  that state your wishes and plans for medical care. These plans are made ahead of time in case you lose your ability to make decisions for yourself. Advance directives can apply to any medical decision, such as the treatments you want, and if you want to donate organs.   What are the types of advance directives?  There are many types of advance directives, and each state has rules about how to use them. You may choose a combination of any of the following:  Living will:  This is a written record of the treatment you want. You can also choose which treatments you do not want, which to limit, and which to stop at a certain time. This includes surgery, medicine, IV fluid, and tube feedings.   Durable power of  for healthcare (DPAHC):  This is a written record that states who you want to make healthcare choices for you when you are unable to make them for yourself. This person, called a proxy, is usually a family member or a friend. You may choose more than 1 proxy.  Do not resuscitate (DNR) order:  A DNR order is used in case your heart stops beating or you stop breathing. It is a request not to have certain forms of treatment, such as CPR. A DNR order may be included in other types of advance directives.  Medical directive:  This covers the care that you want if you are in a coma, near death, or unable to make decisions for yourself. You can list the treatments you want for each condition. Treatment may include pain medicine, surgery, blood transfusions, dialysis, IV or tube feedings, and a ventilator (breathing machine).  Values history:  This document has questions about your views, beliefs, and how you feel and think about life. This information can help others choose the care that you would choose.  Why are advance directives important?  An advance directive helps you control your care. Although spoken wishes may be used, it is better to have your wishes written down. Spoken wishes can be misunderstood, or  not followed. Treatments may be given even if you do not want them. An advance directive may make it easier for your family to make difficult choices about your care.   Weight Management   Why it is important to manage your weight:  Being overweight increases your risk of health conditions such as heart disease, high blood pressure, type 2 diabetes, and certain types of cancer. It can also increase your risk for osteoarthritis, sleep apnea, and other respiratory problems. Aim for a slow, steady weight loss. Even a small amount of weight loss can lower your risk of health problems.  How to lose weight safely:  A safe and healthy way to lose weight is to eat fewer calories and get regular exercise. You can lose up about 1 pound a week by decreasing the number of calories you eat by 500 calories each day.   Healthy meal plan for weight management:  A healthy meal plan includes a variety of foods, contains fewer calories, and helps you stay healthy. A healthy meal plan includes the following:  Eat whole-grain foods more often.  A healthy meal plan should contain fiber. Fiber is the part of grains, fruits, and vegetables that is not broken down by your body. Whole-grain foods are healthy and provide extra fiber in your diet. Some examples of whole-grain foods are whole-wheat breads and pastas, oatmeal, brown rice, and bulgur.  Eat a variety of vegetables every day.  Include dark, leafy greens such as spinach, kale, rosa m greens, and mustard greens. Eat yellow and orange vegetables such as carrots, sweet potatoes, and winter squash.   Eat a variety of fruits every day.  Choose fresh or canned fruit (canned in its own juice or light syrup) instead of juice. Fruit juice has very little or no fiber.  Eat low-fat dairy foods.  Drink fat-free (skim) milk or 1% milk. Eat fat-free yogurt and low-fat cottage cheese. Try low-fat cheeses such as mozzarella and other reduced-fat cheeses.  Choose meat and other protein foods that  are low in fat.  Choose beans or other legumes such as split peas or lentils. Choose fish, skinless poultry (chicken or turkey), or lean cuts of red meat (beef or pork). Before you cook meat or poultry, cut off any visible fat.   Use less fat and oil.  Try baking foods instead of frying them. Add less fat, such as margarine, sour cream, regular salad dressing and mayonnaise to foods. Eat fewer high-fat foods. Some examples of high-fat foods include french fries, doughnuts, ice cream, and cakes.  Eat fewer sweets.  Limit foods and drinks that are high in sugar. This includes candy, cookies, regular soda, and sweetened drinks.  Exercise:  Exercise at least 30 minutes per day on most days of the week. Some examples of exercise include walking, biking, dancing, and swimming. You can also fit in more physical activity by taking the stairs instead of the elevator or parking farther away from stores. Ask your healthcare provider about the best exercise plan for you.   Narcotic (Opioid) Safety    Use narcotics safely:  Take prescribed narcotics exactly as directed  Do not give narcotics to others or take narcotics that belong to someone else  Do not mix narcotics without medicines or alcohol  Do not drive or operate heavy machinery after you take the narcotic  Monitor for side effects and notify your healthcare provider if you experienced side effects such as nausea, sleepiness, itching, or trouble thinking clearly.    Manage constipation:    Constipation is the most common side effect of narcotic medicine. Constipation is when you have hard, dry bowel movements, or you go longer than usual between bowel movements. Tell your healthcare provider about all changes in your bowel movements while you are taking narcotics. He or she may recommend laxative medicine to help you have a bowel movement. He or she may also change the kind of narcotic you are taking, or change when you take it. The following are more ways you can  prevent or relieve constipation:    Drink liquids as directed.  You may need to drink extra liquids to help soften and move your bowels. Ask how much liquid to drink each day and which liquids are best for you.  Eat high-fiber foods.  This may help decrease constipation by adding bulk to your bowel movements. High-fiber foods include fruits, vegetables, whole-grain breads and cereals, and beans. Your healthcare provider or dietitian can help you create a high-fiber meal plan. Your provider may also recommend a fiber supplement if you cannot get enough fiber from food.  Exercise regularly.  Regular physical activity can help stimulate your intestines. Walking is a good exercise to prevent or relieve constipation. Ask which exercises are best for you.  Schedule a time each day to have a bowel movement.  This may help train your body to have regular bowel movements. Bend forward while you are on the toilet to help move the bowel movement out. Sit on the toilet for at least 10 minutes, even if you do not have a bowel movement.    Store narcotics safely:   Store narcotics where others cannot easily get them.  Keep them in a locked cabinet or secure area. Do not  keep them in a purse or other bag you carry with you. A person may be looking for something else and find the narcotics.  Make sure narcotics are stored out of the reach of children.  A child can easily overdose on narcotics. Narcotics may look like candy to a small child.    The best way to dispose of narcotics:      The laws vary by country and area. In the [place], the best way is to return the narcotics through a take-back program. This program is offered by the [place] Drug Enforcement Agency (TOM). The following are options for using the program:  Take the narcotics to a TOM collection site.  The site is often a law enforcement center. Call your local law enforcement center for scheduled take-back days in your area. You will be given information on where  to go if the collection site is in a different location.  Take the narcotics to an approved pharmacy or hospital.  A pharmacy or hospital may be set up as a collection site. You will need to ask if it is a TOM collection site if you were not directed there. A pharmacy or doctor's office may not be able to take back narcotics unless it is a TOM site.  Use a mail-back system.  This means you are given containers to put the narcotics into. You will then mail them in the containers.  Use a take-back drop box.  This is a place to leave the narcotics at any time. People and animals will not be able to get into the box. Your local law enforcement agency can tell you where to find a drop box in your area.    Other ways to manage pain:   Ask your healthcare provider about non-narcotic medicines to control pain.  Nonprescription medicines include NSAIDs (such as ibuprofen) and acetaminophen. Prescription medicines include muscle relaxers, antidepressants, and steroids.  Pain may be managed without any medicines.  Some ways to relieve pain include massage, aromatherapy, or meditation. Physical or occupational therapy may also help.    For more information:   Drug Enforcement Administration  [address] [place] [address]  Phone: 3- 110 - 476-2900  Web Address: https://www.deadiversion.[facility].gov/drug_disposal/    [place] Food and Drug Administration  [address] [place] [address]  Phone: 0- 825 - 985-0903  Web Address: http://www.fda.gov     © Copyright [employer] 2018 Information is for End User's use only and may not be sold, redistributed or otherwise used for commercial purposes. All illustrations and images included in CareNotes® are the copyrighted property of A.D.A.M., Inc. or [employer]

## 2024-07-25 NOTE — PROGRESS NOTES
Ambulatory Visit  Name: Davina Allen      : 1955      MRN: [id number]  Encounter Provider: Mariaa Layton DO  Encounter Date: 2024   Encounter department: [facility]    Assessment & Plan   1. Welcome to Medicare preventive visit  2. Screening for colon cancer  -     Ambulatory Referral to Gastroenterology; Future  3. Controlled type 2 diabetes mellitus without complication, without long-term current use of insulin (HCC)  Assessment & Plan:  Improved  Complicane issues with 3rd metformin  Lab Results   Component Value Date    HGBA1C 7.4 (H) 07/10/2024     Orders:  -     Albumin / creatinine urine ratio  4. Irritable bowel syndrome with diarrhea  Assessment & Plan:  No constipation  Meds work well  5. Dysuria  -     POCT urine dip    Recent Results (from the past 24 hour(s))   POCT urine dip    Collection Time: 24  4:30 PM   Result Value Ref Range    LEUKOCYTE ESTERASE,UA Negative     NITRITE,UA Negative     SL AMB POCT UROBILINOGEN Negative     POCT URINE PROTEIN Negative      PH,UA 6.0     BLOOD,UA Negative     SPECIFIC GRAVITY,UA 1.025     KETONES,UA Negative     BILIRUBIN,UA Negative     GLUCOSE, UA Negative      COLOR,UA Yellow     CLARITY,UA Clear       Depression Screening and Follow-up Plan: Patient was screened for depression during today's encounter. They screened negative with a PHQ-2 score of 0.      Preventive health issues were discussed with patient, and age appropriate screening tests were ordered as noted in patient's After Visit Summary. Personalized health advice and appropriate referrals for health education or preventive services given if needed, as noted in patient's After Visit Summary.    History of Present Illness     Here for wellness  Welcome to medicare  Having knee surgery in sept  Will change metformin to taking 2 at night  Cutting back percocet           Patient Care Team:  Mariaa Layton DO as PCP - General  MD Mariaa Frey  DO Kinjal    Review of Systems   Constitutional: Negative.    HENT: Negative.     Eyes: Negative.    Respiratory: Negative.     Cardiovascular: Negative.    Gastrointestinal: Negative.    Endocrine: Negative.    Genitourinary: Negative.    Musculoskeletal:  Positive for arthralgias (knee).   Skin: Negative.    Allergic/Immunologic: Negative.    Neurological: Negative.    Hematological: Negative.    Psychiatric/Behavioral: Negative.       Medical History Reviewed by provider this encounter:       Annual Wellness Visit Questionnaire   Davina is here for her Welcome to Medicare visit.     Health Risk Assessment:   Patient rates overall health as good. Patient feels that their physical health rating is same. Patient is very satisfied with their life. Eyesight was rated as same. Hearing was rated as same. Patient feels that their emotional and mental health rating is same. Patients states they are never, rarely angry. Patient states they are often unusually tired/fatigued. Pain experienced in the last 7 days has been a lot. Patient's pain rating has been 6/10. Patient states that she has experienced no weight loss or gain in last 6 months.     Depression Screening:   PHQ-2 Score: 0      Fall Risk Screening:   In the past year, patient has experienced: no history of falling in past year      Urinary Incontinence Screening:   Patient has not leaked urine accidently in the last six months.     Home Safety:  Patient has trouble with stairs inside or outside of their home. Patient has working smoke alarms and has working carbon monoxide detector. Home safety hazards include: none.     Nutrition:   Current diet is Regular.     Medications:   Patient is currently taking over-the-counter supplements. OTC medications include: see medication list. Patient is able to manage medications.     Activities of Daily Living (ADLs)/Instrumental Activities of Daily Living (IADLs):   Walk and transfer into and out of bed and chair?:  Yes  Dress and groom yourself?: Yes    Bathe or shower yourself?: Yes    Feed yourself? Yes  Do your laundry/housekeeping?: Yes  Manage your money, pay your bills and track your expenses?: Yes  Make your own meals?: Yes    Do your own shopping?: Yes    Previous Hospitalizations:   Any hospitalizations or ED visits within the last 12 months?: Yes    How many hospitalizations have you had in the last year?: 1-2    Advance Care Planning:   Living will: No    Durable POA for healthcare: No    Advanced directive: Yes      PREVENTIVE SCREENINGS      Cardiovascular Screening:    General: Screening Not Indicated and History Lipid Disorder      Diabetes Screening:     General: Screening Not Indicated and History Diabetes      Colorectal Cancer Screening:     General: Screening Current      Breast Cancer Screening:     General: Screening Current      Cervical Cancer Screening:    General: Screening Not Indicated      Lung Cancer Screening:     General: Screening Not Indicated      Hepatitis C Screening:    General: Screening Current    Screening, Brief Intervention, and Referral to Treatment (SBIRT)    Screening  Typical number of drinks in a day: 0  Typical number of drinks in a week: 0  Interpretation: Low risk drinking behavior.    AUDIT-C Screenin) How often did you have a drink containing alcohol in the past year? never  2) How many drinks did you have on a typical day when you were drinking in the past year? 0  3) How often did you have 6 or more drinks on one occasion in the past year? never    AUDIT-C Score: 0  Interpretation: Score 0-2 (female): Negative screen for alcohol misuse    Single Item Drug Screening:  How often have you used an illegal drug (including marijuana) or a prescription medication for non-medical reasons in the past year? never    Single Item Drug Screen Score: 0  Interpretation: Negative screen for possible drug use disorder    Review of Current Opioid Use    Opioid Risk Tool (ORT)  "Interpretation: Complete Opioid Risk Tool (ORT)    Social Determinants of Health     Food Insecurity: No Food Insecurity (7/25/2024)    Hunger Vital Sign    • Worried About Running Out of Food in the Last Year: Never true    • Ran Out of Food in the Last Year: Never true   Transportation Needs: No Transportation Needs (7/25/2024)    PRAPARE - Transportation    • Lack of Transportation (Medical): No    • Lack of Transportation (Non-Medical): No   Housing Stability: Low Risk  (7/25/2024)    Housing Stability Vital Sign    • Unable to Pay for Housing in the Last Year: No    • Number of Times Moved in the Last Year: 0    • Homeless in the Last Year: No   Utilities: Not At Risk (7/25/2024)    [facility] Utilities    • Threatened with loss of utilities: No     Vision Screening    Right eye Left eye Both eyes   Without correction 20/25 20/20 20/20   With correction          Objective     /84 (BP Location: Left arm, Patient Position: Sitting, Cuff Size: Standard)   Pulse 79   Temp 99.2 °F (37.3 °C) (Tympanic)   Resp 18   Ht 5' 1.89\" (1.572 m)   Wt 79.7 kg (175 lb 12.8 oz)   SpO2 98%   BMI 32.27 kg/m²     Physical Exam  Vitals and nursing note reviewed.   Constitutional:       Appearance: Normal appearance. She is well-developed.   HENT:      Head: Normocephalic and atraumatic.      Right Ear: External ear normal.      Left Ear: External ear normal.      Nose: Nose normal.   Eyes:      Extraocular Movements: Extraocular movements intact.      Conjunctiva/sclera: Conjunctivae normal.      Pupils: Pupils are equal, round, and reactive to light.   Cardiovascular:      Rate and Rhythm: Normal rate and regular rhythm.      Heart sounds: Normal heart sounds.   Pulmonary:      Effort: Pulmonary effort is normal.      Breath sounds: Normal breath sounds.   Abdominal:      General: Abdomen is flat. Bowel sounds are normal.      Palpations: Abdomen is soft.   Musculoskeletal:         General: Normal range of motion.      " Cervical back: Normal range of motion and neck supple.   Skin:     General: Skin is warm and dry.      Capillary Refill: Capillary refill takes less than 2 seconds.   Neurological:      General: No focal deficit present.      Mental Status: She is alert and oriented to person, place, and time.   Psychiatric:         Mood and Affect: Mood normal.         Behavior: Behavior normal.         Thought Content: Thought content normal.         Judgment: Judgment normal.

## 2024-07-25 NOTE — ASSESSMENT & PLAN NOTE
Improved  Complicane issues with 3rd metformin  Lab Results   Component Value Date    HGBA1C 7.4 (H) 07/10/2024

## 2024-07-28 DIAGNOSIS — F41.1 GENERALIZED ANXIETY DISORDER: ICD-10-CM

## 2024-07-28 RX ORDER — ESCITALOPRAM OXALATE 10 MG/1
TABLET ORAL
Qty: 90 TABLET | Refills: 1 | Status: SHIPPED | OUTPATIENT
Start: 2024-07-28

## 2024-08-11 DIAGNOSIS — I10 ESSENTIAL HYPERTENSION: ICD-10-CM

## 2024-08-12 RX ORDER — HYDROCHLOROTHIAZIDE 25 MG/1
TABLET ORAL
Qty: 100 TABLET | Refills: 1 | Status: SHIPPED | OUTPATIENT
Start: 2024-08-12

## 2024-08-13 ENCOUNTER — TELEPHONE (OUTPATIENT)
Dept: NEUROLOGY | Facility: CLINIC | Age: 69
End: 2024-08-13

## 2024-08-14 ENCOUNTER — VBI (OUTPATIENT)
Dept: ADMINISTRATIVE | Facility: OTHER | Age: 69
End: 2024-08-14

## 2024-08-20 ENCOUNTER — CONSULT (OUTPATIENT)
Dept: FAMILY MEDICINE CLINIC | Facility: CLINIC | Age: 69
End: 2024-08-20
Payer: MEDICARE

## 2024-08-20 ENCOUNTER — OFFICE VISIT (OUTPATIENT)
Dept: NEUROLOGY | Facility: CLINIC | Age: 69
End: 2024-08-20
Payer: MEDICARE

## 2024-08-20 VITALS
HEART RATE: 70 BPM | WEIGHT: 175.6 LBS | BODY MASS INDEX: 33.18 KG/M2 | OXYGEN SATURATION: 98 % | SYSTOLIC BLOOD PRESSURE: 118 MMHG | DIASTOLIC BLOOD PRESSURE: 60 MMHG | TEMPERATURE: 97.2 F

## 2024-08-20 VITALS
DIASTOLIC BLOOD PRESSURE: 64 MMHG | WEIGHT: 175.8 LBS | TEMPERATURE: 98.7 F | BODY MASS INDEX: 33.19 KG/M2 | HEART RATE: 72 BPM | OXYGEN SATURATION: 96 % | HEIGHT: 61 IN | RESPIRATION RATE: 16 BRPM | SYSTOLIC BLOOD PRESSURE: 122 MMHG

## 2024-08-20 DIAGNOSIS — I10 BENIGN ESSENTIAL HYPERTENSION: ICD-10-CM

## 2024-08-20 DIAGNOSIS — E11.9 CONTROLLED TYPE 2 DIABETES MELLITUS WITHOUT COMPLICATION, WITHOUT LONG-TERM CURRENT USE OF INSULIN (HCC): ICD-10-CM

## 2024-08-20 DIAGNOSIS — J45.40 MODERATE PERSISTENT ASTHMA WITHOUT COMPLICATION: ICD-10-CM

## 2024-08-20 DIAGNOSIS — K58.0 IRRITABLE BOWEL SYNDROME WITH DIARRHEA: ICD-10-CM

## 2024-08-20 DIAGNOSIS — M62.838 MUSCLE SPASM: ICD-10-CM

## 2024-08-20 DIAGNOSIS — I65.23 BILATERAL CAROTID ARTERY STENOSIS: ICD-10-CM

## 2024-08-20 DIAGNOSIS — Z01.818 PREOPERATIVE CLEARANCE: Primary | ICD-10-CM

## 2024-08-20 DIAGNOSIS — R29.90 STROKE-LIKE SYMPTOMS: Primary | ICD-10-CM

## 2024-08-20 DIAGNOSIS — I51.7 LEFT ATRIAL DILATATION: ICD-10-CM

## 2024-08-20 DIAGNOSIS — E78.00 HYPERCHOLESTEROLEMIA: ICD-10-CM

## 2024-08-20 DIAGNOSIS — M05.79 RHEUMATOID ARTHRITIS INVOLVING MULTIPLE SITES WITH POSITIVE RHEUMATOID FACTOR (HCC): ICD-10-CM

## 2024-08-20 DIAGNOSIS — E78.5 HYPERLIPIDEMIA: ICD-10-CM

## 2024-08-20 PROCEDURE — 99214 OFFICE O/P EST MOD 30 MIN: CPT | Performed by: NURSE PRACTITIONER

## 2024-08-20 PROCEDURE — 99215 OFFICE O/P EST HI 40 MIN: CPT

## 2024-08-20 PROCEDURE — 93000 ELECTROCARDIOGRAM COMPLETE: CPT | Performed by: NURSE PRACTITIONER

## 2024-08-20 RX ORDER — CYCLOBENZAPRINE HCL 10 MG
10 TABLET ORAL
Qty: 30 TABLET | Refills: 5 | Status: SHIPPED | OUTPATIENT
Start: 2024-08-20

## 2024-08-20 RX ORDER — MUPIROCIN 20 MG/G
OINTMENT TOPICAL
COMMUNITY
Start: 2024-08-15

## 2024-08-20 NOTE — ASSESSMENT & PLAN NOTE
Previously took flexeril prn and this helped her a lot, says she still gets neck pain but has forgotten to request refill for a long time.  Refill sent.   no

## 2024-08-20 NOTE — PROGRESS NOTES
Ambulatory Visit  Name: Davina Allen      : 1955      MRN: [id number]  Encounter Provider: GABRIELE Rich  Encounter Date: 2024   [facility]    Assessment & Plan   1. Preoperative clearance  Assessment & Plan:  Treatment of chronic conditions is optimized on current therapy.  Pt is cleared for proposed procedure.   EKG with no acute findings.   Orders:  -     POCT ECG  2. Muscle spasm  Assessment & Plan:  Previously took flexeril prn and this helped her a lot, says she still gets neck pain but has forgotten to request refill for a long time.  Refill sent.  Orders:  -     cyclobenzaprine (FLEXERIL) 10 mg tablet; Take 1 tablet (10 mg total) by mouth daily at bedtime  3. Benign essential hypertension  Assessment & Plan:  Stable with current management., continue amlodipine, hctz, losartan, metoprolol,   4. Bilateral carotid artery stenosis  Assessment & Plan:  To have repeat CTA   5. Moderate persistent asthma without complication  Assessment & Plan:  Stable with current management., lungs clear.  Continue symbicort  6. Irritable bowel syndrome with diarrhea  Assessment & Plan:  Stable with current management., continue meds  7. Controlled type 2 diabetes mellitus without complication, without long-term current use of insulin (HCC)  Assessment & Plan:  Stable with current management., continue metformin, statin, arb.    Lab Results   Component Value Date    HGBA1C 7.4 (H) 07/10/2024     8. Rheumatoid arthritis involving multiple sites with positive rheumatoid factor (HCC)  Assessment & Plan:  Stable with current management., continue meds  9. Hypercholesterolemia  Assessment & Plan:  Stable with current management., continue atorvastatin.       History of Present Illness     Pt is a 69 y.o. female who is seen today for pre-op clearance.  Pt is scheduled for L tka at [facility] on .  We reviewed PMH, PSH, social history, medications, and allergies.  Past  "medical history of HTN, mitral valve annular calcification, asthma, pulmonary nodules, IBS-D, diabetes II, hearing loss, osteoarthritis, psoriasis, rheumatoid arthritis, lumbar spondylolisthesis, Pre-op testing was ordered.  Pt denies chest pain, palpitations, shortness of breath, headache, dizziness, numbness, tingling.  Appetite is normal, no N/V/D.  No uri symptoms.  No urinary symptoms.          Review of Systems   Constitutional:  Negative for appetite change, chills, fatigue and fever.   HENT:  Negative for congestion and sore throat.    Respiratory:  Negative for cough and shortness of breath.    Cardiovascular:  Negative for chest pain, palpitations and leg swelling (baseline from past trauma).   Gastrointestinal:  Negative for abdominal pain, constipation, diarrhea, nausea and vomiting.   Genitourinary:  Negative for difficulty urinating and frequency.   Musculoskeletal:  Positive for neck pain.   Neurological:  Negative for dizziness (gets vertigo when her neck bothers her) and light-headedness.   Psychiatric/Behavioral:  Negative for sleep disturbance.        Objective     /64 (BP Location: Left arm, Patient Position: Sitting, Cuff Size: Standard)   Pulse 72   Temp 98.7 °F (37.1 °C) (Tympanic)   Resp 16   Ht 5' 1\" (1.549 m)   Wt 79.7 kg (175 lb 12.8 oz)   SpO2 96%   BMI 33.22 kg/m²     Physical Exam  Vitals reviewed.   Constitutional:       General: She is awake. She is not in acute distress.     Appearance: Normal appearance. She is well-developed and well-groomed. She is not ill-appearing.   HENT:      Head: Normocephalic.      Right Ear: Tympanic membrane normal. Decreased hearing noted.      Left Ear: Tympanic membrane normal. Decreased hearing noted.      Nose: Nose normal.      Mouth/Throat:      Lips: Pink.      Mouth: Mucous membranes are moist.      Pharynx: Oropharynx is clear.   Eyes:      General: Lids are normal.      Conjunctiva/sclera: Conjunctivae normal.      Pupils: Pupils " are equal, round, and reactive to light.   Neck:      Vascular: Normal carotid pulses. No carotid bruit or JVD.   Cardiovascular:      Rate and Rhythm: Normal rate and regular rhythm.      Pulses: Normal pulses.      Heart sounds: Normal heart sounds. No murmur heard.     Comments: Baseline BLE swelling, non-pitting  Pulmonary:      Effort: Pulmonary effort is normal. No tachypnea, accessory muscle usage or respiratory distress.      Breath sounds: Normal breath sounds.   Abdominal:      General: Abdomen is flat. Bowel sounds are normal.      Palpations: Abdomen is soft.      Tenderness: There is no abdominal tenderness.      Hernia: A hernia is present. Hernia is present in the ventral area.   Musculoskeletal:      Right lower leg: Edema present.      Left lower leg: Edema present.   Lymphadenopathy:      Cervical: No cervical adenopathy.   Neurological:      Mental Status: She is alert and oriented to person, place, and time.   Psychiatric:         Attention and Perception: Attention normal.         Mood and Affect: Mood normal.         Speech: Speech normal.         Behavior: Behavior normal. Behavior is cooperative.         Thought Content: Thought content normal.         Cognition and Memory: Cognition normal.         Judgment: Judgment normal.       Administrative Statements

## 2024-08-20 NOTE — ASSESSMENT & PLAN NOTE
Stable with current management., continue metformin, statin, arb.    Lab Results   Component Value Date    HGBA1C 7.4 (H) 07/10/2024

## 2024-08-20 NOTE — ASSESSMENT & PLAN NOTE
Treatment of chronic conditions is optimized on current therapy.  Pt is cleared for proposed procedure.   EKG with no acute findings.

## 2024-08-20 NOTE — PROGRESS NOTES
Patient ID: Davina Allen is a 69 y.o. female who presents to the [facility].    Assessment/Plan:    Stroke-like symptoms:    - Continue to follow with cardiology as recommended, Zio patch showed no atrial fibrillation or arrhythmia.     - For ongoing stroke prevention continue: aspirin 81 mg once daily, atorvastatin 40 mg once daily    - Discussed the importance of antiplatelet management with the patient to prevent future strokes.   - Recommend to check blood pressure occasionally away from the doctor's office to make sure that those numbers are typically less than 130/80.  If they are frequently higher than that, we recommend checking a little more often and to follow up with primary care team   - Will defer to primary care team for monitoring of cholesterol panel and blood sugar numbers with target LDL cholesterol of less than 70 and hemoglobin A1c less than 7%  - Recommend following a low salt, mediterranean diet   - Recommend routine physical exercise as tolerated     We will plan for her to return to the office on an as needed basis to see one of the APPs or Dr. Mao but would be happy to see her sooner if the need should arise.  If she has any symptoms concerning for TIA or stroke including sudden painless loss of vision or double vision, difficulty speaking or swallowing, vertigo/room spinning that does not quickly resolve, or weakness/numbness/loss of coordination affecting 1 side of the face or body she should proceed by ambulance to the nearest emergency room immediately.    Subjective:    HPI      For Review:    The patient was last seen in the office on 02/20/2024 in regard to a hospital follow-up appointment for recently suspected TIA.  The patient had done relatively well and not experiencing any new strokelike symptoms.  The patient did not have any residual deficits.  She stated that after the incident she felt very fatigued for weeks afterwards.  Initially the patient presented  with aphasia but this resolved the day of presenting to the hospital.  It seems possible that the patient's event was related to a TIA.  Regardless felt as though that carotid artery ultrasound was appropriate due to atherosclerotic disease/plaque buildup.  The patient had a carotid artery ultrasound completed on 07/01/2024, showed less than 50% stenosis of bilateral carotid arteries.  Repeat in 1 to 2 years time.  Advised that we would repeat lipid panel in 2 to 3 months time.  Did place an ambulatory referral for the patient to see cardiology about calcification of the mitral valve and left atrial dilation on echocardiogram.  Like to see if they recommend any cardiac testing moving forward.  Patient was to continue on aspirin 81 mg daily and atorvastatin 40 mg daily at the last visit for secondary stroke prevention.  Patient was to continue to monitor her vascular risk factors such as her blood pressure, cholesterol, and glycemic control.  Advised patient to try to stay relatively active and work on trying to eat relatively healthy and well-balanced diet as well.    Interval History:    New stroke symptoms/residual symptoms:    Any new, sudden onset weakness, numbness, facial droop, slurred speech, difficulty speaking, trouble swallowing, persistent vertigo, or sudden double vision or vision loss? No new stroke-like symptoms    Residual symptoms include: None    Stroke Etiology and Risk Factor modification:    Stroke risk factors were evaluated including: hypertension, hyperlipidemia, Type 2 diabetes mellitus     AP/AC therapy: Aspirin 81 mg once daily. No bleeding or bruising issues at this time.     Statin therapy: Atorvastatin 40 mg once daily     Blood pressure today and as of late:118/60 BP today in the office.  Still checking and monitoring blood pressure at home.    Most recent LDL: 58 mg/dL    Most recent hemoglobin A1C: 7.4%    Cardiology evaluation? Any cardiac monitoring required?:  The patient had a  consultation with cardiology.  The patient did have a Zio patch which was ultimately unrevealing for atrial fibrillation.    Endocrinology evaluation? Following proper glycemic treatment/diet?  The patient's primary care was handling her diabetes mellitus still.  Patient continuing to working on decreasing her hemoglobin A1c.    Lab Results   Component Value Date/Time    CHOLESTEROL 141 07/10/2024 09:48 AM     Lab Results   Component Value Date/Time    TRIG 177 (H) 07/10/2024 09:48 AM    TRIG 221 07/06/2015 08:21 AM     Lab Results   Component Value Date/Time    HDL 48 (L) 07/10/2024 09:48 AM    HDL 40 07/06/2015 08:21 AM     Lab Results   Component Value Date/Time    LDLCALC 58 07/10/2024 09:48 AM    LDLCALC 86 07/06/2015 08:21 AM       Lab Results   Component Value Date/Time    HGBA1C 7.4 (H) 07/10/2024 09:43 AM    HGBA1C 6.4 (H) 10/27/2015 07:27 AM     Lab Results   Component Value Date/Time     07/10/2024 09:43 AM     10/27/2015 07:27 AM           Past Medical History:   Diagnosis Date    Acute non-recurrent frontal sinusitis 05/15/2019    Acute non-recurrent maxillary sinusitis 03/10/2020    Allergic In my 60’s    Anxiety     Anxiety disorder     Arthritis     OA    Asthma     exercise iniduced.    Cancer (HCC)     Squamous Cell on back    Colon polyp     COVID-19     Dependence on crutches     prn    Depression     ro.     Diabetes mellitus (HCC)     NIDDM; per [employer]: Last Assessed: 7/15/15, New onset of type 2    Diverticulitis of colon     Fibromyalgia     Hearing aid worn     mayra    Heart murmur     HL (hearing loss)     b/l - uses hearing aids    Hyperglycemia     Last Assessed:10/29/15    Hyperlipidemia     Hypertension     Inflammatory bowel disease In my 60’s    LLQ abdominal pain 03/30/2020    Pneumonia May 2023    Psoriasis     red dry patch left side- waist area    Psoriatic arthritis (HCC)     RA (rheumatoid arthritis) (HCC)     Urinary tract infection Currently    Taking med     Wears glasses     reading       Current Outpatient Medications:     ALPRAZolam (XANAX) 0.5 mg tablet, Take 0.5 tablets (0.25 mg total) by mouth daily, Disp: 30 tablet, Rfl: 0    amLODIPine (NORVASC) 10 mg tablet, take 1 tablet by mouth daily, Disp: 90 tablet, Rfl: 1    aspirin (ECOTRIN LOW STRENGTH) 81 mg EC tablet, Take 1 tablet (81 mg total) by mouth daily for 19 doses, Disp: 19 tablet, Rfl: 0    atorvastatin (LIPITOR) 40 mg tablet, take 1 tablet by mouth every morning, Disp: 90 tablet, Rfl: 0    budesonide-formoterol (Symbicort) 160-4.5 mcg/act inhaler, Inhale 2 puffs 2 (two) times a day Rinse mouth after use., Disp: 10.2 g, Rfl: 3    Calcium Polycarbophil (Fiber) 625 MG TABS, Take 625 mg by mouth daily, Disp: , Rfl:     cholestyramine (QUESTRAN) 4 g packet, Take 1 packet (4 g total) by mouth 3 (three) times a day with meals, Disp: 270 each, Rfl: 3    cyclobenzaprine (FLEXERIL) 10 mg tablet, Take 1 tablet (10 mg total) by mouth daily at bedtime, Disp: 30 tablet, Rfl: 5    ergocalciferol (VITAMIN D2) 50,000 units, take 1 capsule by mouth every week, Disp: 12 capsule, Rfl: 3    escitalopram (LEXAPRO) 10 mg tablet, take 1/2 tablet by mouth once daily, Disp: 90 tablet, Rfl: 1    estradiol (ESTRACE) 0.1 mg/g vaginal cream, MASSAGE A PEA SIZED AMOUNT INTO THE VAGINA TWICE A WEEK FOR 2 WEE...  (REFER TO PRESCRIPTION NOTES)., Disp: , Rfl:     hydroCHLOROthiazide 25 mg tablet, take 1 tablet by mouth once daily, Disp: 100 tablet, Rfl: 1    leflunomide (ARAVA) 10 MG tablet, Take 10 mg by mouth daily, Disp: , Rfl:     losartan (COZAAR) 50 mg tablet, take 1 tablet once daily as directed, Disp: 90 tablet, Rfl: 3    meloxicam (Mobic) 7.5 mg tablet, Take 1 tablet (7.5 mg total) by mouth daily, Disp: 30 tablet, Rfl: 2    metFORMIN (GLUCOPHAGE-XR) 500 mg 24 hr tablet, take 1 tablet by mouth three times a day, Disp: 270 tablet, Rfl: 1    metoprolol succinate (TOPROL-XL) 100 mg 24 hr tablet, take 1 tablet by mouth once daily, Disp:  90 tablet, Rfl: 1    Multiple Vitamin (MULTIVITAMIN ADULT PO), multivitamin, Disp: , Rfl:     mupirocin (BACTROBAN) 2 % ointment, APPLY TO EACH NOSTRIL WITH Q-TIP TWICE DAILY - 5 DAYS PREOPERATIVELY, Disp: , Rfl:     ondansetron (ZOFRAN) 4 mg tablet, take 1 tablet by mouth every 6 hours AS NEEDED FOR NAUSE OR VOMITING, Disp: 60 tablet, Rfl: 2    ondansetron (ZOFRAN-ODT) 4 mg disintegrating tablet, Take 1 tablet (4 mg total) by mouth every 6 (six) hours as needed for nausea or vomiting, Disp: 10 tablet, Rfl: 1    oxyCODONE-acetaminophen (PERCOCET) 5-325 mg per tablet, Take 1 tablet by mouth every 8 (eight) hours as needed for moderate pain, Disp: , Rfl:      Objective:    Physical Exam:                                                                 Vitals:            Constitutional:    /60 (BP Location: Left arm, Patient Position: Sitting, Cuff Size: Adult)   Pulse 70   Temp (!) 97.2 °F (36.2 °C) (Temporal)   Wt 79.7 kg (175 lb 9.6 oz)   SpO2 98%   BMI 33.18 kg/m²   BP Readings from Last 3 Encounters:   08/20/24 118/60   08/20/24 122/64   07/25/24 122/84     Pulse Readings from Last 3 Encounters:   08/20/24 70   08/20/24 72   07/25/24 79         Well developed, well nourished, well groomed. No dysmorphic features.       Psychiatric:  Normal behavior and appropriate affect        Neurological Examination:     Mental status/cognitive function:   Orientated to time, place and person. Recent and remote memory intact. Attention span and concentration as well as fund of knowledge are appropriate for age. Normal language and spontaneous speech.     Cranial Nerves:  II-visual fields full.   III, IV, VI-Pupils were equal, round, and reactive to light and accomodation. Extraocular movements were full and conjugate without nystagmus. Conjugate gaze, normal smooth pursuits, normal saccades   V-facial sensation symmetric.    VII-facial expression symmetric, intact forehead wrinkle, strong eye closure, symmetric smile     VIII-hearing grossly intact bilaterally   IX, X-palate elevation symmetric, no dysarthria.   XI-shoulder shrug strength intact    XII-tongue protrusion midline.    Motor Exam: symmetric bulk and tone throughout, no pronator drift. Power/strength 5/5 bilateral upper and lower extremities, no atrophy, fasciculations or abnormal movements noted.   Sensory: grossly intact light touch in all extremities.   Reflexes: brachioradialis 2+, biceps 2+, knee 2+, bilaterally  Coordination: Finger nose finger intact bilaterally, no apparent dysmetria, ataxia or tremor noted  Gait: steady casual and tandem gait.        ROS:    Review of Systems   Constitutional:  Negative for appetite change, fatigue and fever.   HENT: Negative.  Negative for hearing loss, tinnitus, trouble swallowing and voice change.    Eyes: Negative.  Negative for photophobia, pain and visual disturbance.   Respiratory: Negative.  Negative for shortness of breath.    Cardiovascular: Negative.  Negative for palpitations.   Gastrointestinal: Negative.  Negative for nausea and vomiting.   Endocrine: Negative.  Negative for cold intolerance.   Genitourinary: Negative.  Negative for dysuria, frequency and urgency.   Musculoskeletal:  Negative for back pain, gait problem, myalgias, neck pain and neck stiffness.   Skin: Negative.  Negative for rash.   Allergic/Immunologic: Negative.    Neurological: Negative.  Negative for dizziness, tremors, seizures, syncope, facial asymmetry, speech difficulty, weakness, light-headedness, numbness and headaches.   Hematological: Negative.  Does not bruise/bleed easily.   Psychiatric/Behavioral: Negative.  Negative for confusion, hallucinations and sleep disturbance.    All other systems reviewed and are negative.    I have spent 40 minutes today on this case including chart review, performing history and exam, patient counseling, and documentation/communication      Lyndon Gatica PA-C  8/20/2024 3:09 PM

## 2024-08-20 NOTE — PATIENT INSTRUCTIONS
Stroke-like symptoms:    - Continue to follow with cardiology as recommended, Zio patch showed no atrial fibrillation or arrhythmia.     - For ongoing stroke prevention continue: aspirin 81 mg once daily, atorvastatin 40 mg once daily    - Discussed the importance of antiplatelet management with the patient to prevent future strokes.   - Recommend to check blood pressure occasionally away from the doctor's office to make sure that those numbers are typically less than 130/80.  If they are frequently higher than that, we recommend checking a little more often and to follow up with primary care team   - Will defer to primary care team for monitoring of cholesterol panel and blood sugar numbers with target LDL cholesterol of less than 70 and hemoglobin A1c less than 7%  - Recommend following a low salt, mediterranean diet   - Recommend routine physical exercise as tolerated     We will plan for her to return to the office on an as needed basis to see one of the APPs or Dr. Mao but would be happy to see her sooner if the need should arise.  If she has any symptoms concerning for TIA or stroke including sudden painless loss of vision or double vision, difficulty speaking or swallowing, vertigo/room spinning that does not quickly resolve, or weakness/numbness/loss of coordination affecting 1 side of the face or body she should proceed by ambulance to the nearest emergency room immediately.

## 2024-08-24 PROBLEM — Z12.11 SCREENING FOR COLON CANCER: Status: RESOLVED | Noted: 2022-05-19 | Resolved: 2024-08-24

## 2024-08-28 DIAGNOSIS — R29.90 STROKE-LIKE SYMPTOMS: ICD-10-CM

## 2024-08-28 DIAGNOSIS — E78.5 HYPERLIPIDEMIA: ICD-10-CM

## 2024-08-28 RX ORDER — ATORVASTATIN CALCIUM 40 MG/1
40 TABLET, FILM COATED ORAL EVERY MORNING
Qty: 90 TABLET | Refills: 1 | Status: SHIPPED | OUTPATIENT
Start: 2024-08-28

## 2024-09-02 DIAGNOSIS — I10 ESSENTIAL HYPERTENSION: ICD-10-CM

## 2024-09-03 RX ORDER — AMLODIPINE BESYLATE 10 MG/1
10 TABLET ORAL DAILY
Qty: 90 TABLET | Refills: 1 | Status: SHIPPED | OUTPATIENT
Start: 2024-09-03

## 2024-09-10 ENCOUNTER — TELEPHONE (OUTPATIENT)
Age: 69
End: 2024-09-10

## 2024-09-10 DIAGNOSIS — B37.31 VAGINAL CANDIDA: Primary | ICD-10-CM

## 2024-09-10 RX ORDER — FLUCONAZOLE 150 MG/1
150 TABLET ORAL ONCE
Qty: 1 TABLET | Refills: 0 | Status: SHIPPED | OUTPATIENT
Start: 2024-09-10 | End: 2024-09-10

## 2024-09-10 NOTE — TELEPHONE ENCOUNTER
Pt. Just had knee replacement and they gave her antibiotics to take and it gave her a yeast infection. She said the monostat did not work and Dr. Layton usually gives her a pill to take but she can't remember the name of it.  She wants to know if she will please send it in to the pharmacy for her.   Ty

## 2024-09-13 DIAGNOSIS — B37.49 CANDIDIASIS OF GENITALIA: Primary | ICD-10-CM

## 2024-09-13 RX ORDER — FLUCONAZOLE 150 MG/1
150 TABLET ORAL ONCE
Qty: 1 TABLET | Refills: 0 | Status: SHIPPED | OUTPATIENT
Start: 2024-09-13 | End: 2024-09-13

## 2024-09-13 NOTE — TELEPHONE ENCOUNTER
Ronaldo Rondon RN

## 2024-09-13 NOTE — TELEPHONE ENCOUNTER
Pt called to say the yeast infection is better but not gone. She would like another diflucan sent to [facility]. Please advise as soon as possible.

## 2024-10-14 DIAGNOSIS — E11.9 CONTROLLED TYPE 2 DIABETES MELLITUS WITHOUT COMPLICATION, WITHOUT LONG-TERM CURRENT USE OF INSULIN (HCC): ICD-10-CM

## 2024-10-16 RX ORDER — METFORMIN HYDROCHLORIDE 500 MG/1
TABLET, EXTENDED RELEASE ORAL
Qty: 270 TABLET | Refills: 1 | Status: SHIPPED | OUTPATIENT
Start: 2024-10-16 | End: 2024-10-18 | Stop reason: SDUPTHER

## 2024-10-18 RX ORDER — METFORMIN HYDROCHLORIDE 500 MG/1
500 TABLET, EXTENDED RELEASE ORAL 3 TIMES DAILY
Qty: 270 TABLET | Refills: 1 | Status: SHIPPED | OUTPATIENT
Start: 2024-10-18

## 2024-10-22 DIAGNOSIS — I10 ESSENTIAL HYPERTENSION: ICD-10-CM

## 2024-10-22 RX ORDER — METOPROLOL SUCCINATE 100 MG/1
TABLET, EXTENDED RELEASE ORAL
Qty: 90 TABLET | Refills: 1 | Status: SHIPPED | OUTPATIENT
Start: 2024-10-22

## 2024-11-06 ENCOUNTER — OFFICE VISIT (OUTPATIENT)
Dept: FAMILY MEDICINE CLINIC | Facility: CLINIC | Age: 69
End: 2024-11-06
Payer: MEDICARE

## 2024-11-06 VITALS
TEMPERATURE: 98.2 F | RESPIRATION RATE: 16 BRPM | WEIGHT: 175 LBS | DIASTOLIC BLOOD PRESSURE: 66 MMHG | HEIGHT: 61 IN | BODY MASS INDEX: 33.04 KG/M2 | HEART RATE: 79 BPM | OXYGEN SATURATION: 97 % | SYSTOLIC BLOOD PRESSURE: 110 MMHG

## 2024-11-06 DIAGNOSIS — L30.9 DERMATITIS: Primary | ICD-10-CM

## 2024-11-06 PROCEDURE — G2211 COMPLEX E/M VISIT ADD ON: HCPCS | Performed by: FAMILY MEDICINE

## 2024-11-06 PROCEDURE — 99213 OFFICE O/P EST LOW 20 MIN: CPT | Performed by: FAMILY MEDICINE

## 2024-11-06 RX ORDER — SENNOSIDES 8.6 MG
650 CAPSULE ORAL EVERY 8 HOURS PRN
COMMUNITY
Start: 2024-10-11

## 2024-11-06 RX ORDER — OXYCODONE HYDROCHLORIDE 5 MG/1
TABLET ORAL
COMMUNITY
Start: 2024-10-31

## 2024-11-06 RX ORDER — CLINDAMYCIN HYDROCHLORIDE 150 MG/1
150 CAPSULE ORAL
COMMUNITY
Start: 2024-09-24 | End: 2024-11-19

## 2024-11-06 RX ORDER — TRIAMCINOLONE ACETONIDE 0.25 MG/G
CREAM TOPICAL 2 TIMES DAILY
Qty: 80 G | Refills: 0 | Status: SHIPPED | OUTPATIENT
Start: 2024-11-06

## 2024-11-06 NOTE — PROGRESS NOTES
Diabetic Foot Exam    Patient's shoes and socks removed.    Right Foot/Ankle   Right Foot Inspection  Skin Exam: skin normal and skin intact. No dry skin, no warmth, no callus, no erythema, no maceration, no abnormal color, no pre-ulcer, no ulcer and no callus.     Toe Exam: ROM and strength within normal limits and swelling.     Sensory   Monofilament testing: intact    Vascular  Capillary refills: < 3 seconds  The right DP pulse is 2+. The right PT pulse is 2+.     Left Foot/Ankle  Left Foot Inspection  Skin Exam: skin normal and skin intact. No dry skin, no warmth, no erythema, no maceration, normal color, no pre-ulcer, no ulcer and no callus.     Toe Exam: ROM and strength within normal limits and swelling.     Sensory   Monofilament testing: intact    Vascular  Capillary refills: < 3 seconds  The left DP pulse is 2+. The left PT pulse is 2+.     Assign Risk Category  No deformity present  No loss of protective sensation  No weak pulses  Risk: 0

## 2024-11-06 NOTE — PROGRESS NOTES
Ambulatory Visit  Name: Davina Allen      : 1955      MRN: [id number]  Encounter Provider: Uriel George DO  Encounter Date: 2024   Encounter department: [facility]    Assessment & Plan  Dermatitis  -Mild area of papules noted 2 days ago after cleaning old furniture.  -Denies difficulty breathing, wheezing, shortness of breath.  -Will treat with Kenalog cream as below.  -Denies keep area clean, dry, unscented soaps and lotions.  -Follow-up as needed.  Orders:    triamcinolone (KENALOG) 0.025 % cream; Apply topically 2 (two) times a day         History of Present Illness     Davina is a 69-year-old female presents today for evaluation of dry, itchy rash that started 2 days ago after cleaning old furniture.  Notes has been a change for him for some scratching at it.  Does believe may have caused spread from her arm to area on her cheek.  Denies any difficulty breathing, wheezing, shortness of breath.  Unclear if any bites or exposure was exactly.  No new soaps or lotions.  No new detergents.      Review of Systems   Constitutional:  Negative for chills and fever.   HENT:  Negative for ear pain and sore throat.    Eyes:  Negative for pain and visual disturbance.   Respiratory:  Negative for cough and shortness of breath.    Cardiovascular:  Negative for chest pain and palpitations.   Gastrointestinal:  Negative for abdominal pain and vomiting.   Genitourinary:  Negative for dysuria and hematuria.   Musculoskeletal:  Negative for arthralgias and back pain.   Skin:  Positive for rash. Negative for color change.   Neurological:  Negative for seizures and syncope.   Psychiatric/Behavioral:  Negative for confusion.    All other systems reviewed and are negative.    Past Medical History:   Diagnosis Date    Acute non-recurrent frontal sinusitis 05/15/2019    Acute non-recurrent maxillary sinusitis 03/10/2020    Allergic In my 60’s    Anxiety     Anxiety disorder     Arthritis     OA     Asthma     exercise iniduced.    Cancer (HCC)     Squamous Cell on back    Colon polyp     COVID-19     Dependence on crutches     prn    Depression     ro.     Diabetes mellitus (HCC)     NIDDM; per [employer]: Last Assessed: 7/15/15, New onset of type 2    Diverticulitis of colon     Fibromyalgia     Hearing aid worn     mayra    Heart murmur     HL (hearing loss)     b/l - uses hearing aids    Hyperglycemia     Last Assessed:10/29/15    Hyperlipidemia     Hypertension     Inflammatory bowel disease In my 60’s    LLQ abdominal pain 2020    Pneumonia May 2023    Psoriasis     red dry patch left side- waist area    Psoriatic arthritis (HCC)     RA (rheumatoid arthritis) (HCC)     Urinary tract infection Currently    Taking med    Wears glasses     reading     Past Surgical History:   Procedure Laterality Date    ARTHROSCOPY KNEE Left 3/2/2016    Procedure: ARTHROSCOPY KNEE;  Surgeon: Daniel Laureano MD;  Location: [facility];  Service:      SECTION      X2    COLONOSCOPY  2019    CYST REMOVAL Right     Cyst on bone     FRACTURE SURGERY      (Right) tibia/fibula Fx, and ankle    PILONIDAL CYST / SINUS EXCISION      IN ARTHRS KNE SURG W/MENISCECTOMY MED/LAT W/SHVG Left 3/2/2016    Procedure: PARTIAL MEDIAL &  LATERAL MENISCECTOMIES, CHONDROPLASTY, REMOVAL OSTEOPHYTE, LIMITED SYNOVECTOMY;  Surgeon: Daniel Laureano MD;  Location: [facility];  Service: Orthopedics    REPAIR KNEE LIGAMENT      TOOTH EXTRACTION       Family History   Problem Relation Age of Onset    Hypertension Mother     Heart attack Mother     Osteoarthritis Mother     Breast cancer Mother 82    Arthritis Mother     Cancer Mother     Alcohol abuse Father     Diabetes Sister     No Known Problems Daughter     No Known Problems Daughter     No Known Problems Maternal Grandmother     No Known Problems Maternal Grandfather     No Known Problems Paternal Grandmother     No Known Problems Paternal Grandfather     Hypertension Brother      Diabetes Brother     Other Brother         Dyslipidemia    Coronary artery disease Brother         Diabetes    No Known Problems Maternal Aunt     No Known Problems Paternal Aunt     No Known Problems Paternal Aunt     No Known Problems Paternal Aunt     No Known Problems Paternal Aunt     No Known Problems Paternal Aunt      Social History     Tobacco Use    Smoking status: Former     Current packs/day: 0.00     Average packs/day: 2.0 packs/day for 25.0 years (50.0 ttl pk-yrs)     Types: Cigarettes     Start date:      Quit date:      Years since quittin.8     Passive exposure: Past    Smokeless tobacco: Never   Vaping Use    Vaping status: Never Used   Substance and Sexual Activity    Alcohol use: No    Drug use: No    Sexual activity: Yes     Partners: Male     Birth control/protection: Post-menopausal     Current Outpatient Medications on File Prior to Visit   Medication Sig    acetaminophen (TYLENOL) 650 mg CR tablet Take 650 mg by mouth every 8 (eight) hours as needed    ALPRAZolam (XANAX) 0.5 mg tablet Take 0.5 tablets (0.25 mg total) by mouth daily    amLODIPine (NORVASC) 10 mg tablet Take 1 tablet (10 mg total) by mouth daily    aspirin (ECOTRIN LOW STRENGTH) 81 mg EC tablet Take 1 tablet (81 mg total) by mouth daily for 19 doses    atorvastatin (LIPITOR) 40 mg tablet take 1 tablet by mouth every morning    budesonide-formoterol (Symbicort) 160-4.5 mcg/act inhaler Inhale 2 puffs 2 (two) times a day Rinse mouth after use.    cholestyramine (QUESTRAN) 4 g packet Take 1 packet (4 g total) by mouth 3 (three) times a day with meals    clindamycin (CLEOCIN) 150 mg capsule Take 150 mg by mouth    ergocalciferol (VITAMIN D2) 50,000 units take 1 capsule by mouth every week    escitalopram (LEXAPRO) 10 mg tablet take 1/2 tablet by mouth once daily    estradiol (ESTRACE) 0.1 mg/g vaginal cream MASSAGE A PEA SIZED AMOUNT INTO THE VAGINA TWICE A WEEK FOR 2 WEE...  (REFER TO PRESCRIPTION NOTES).     hydroCHLOROthiazide 25 mg tablet take 1 tablet by mouth once daily    losartan (COZAAR) 50 mg tablet take 1 tablet once daily as directed    metFORMIN (GLUCOPHAGE-XR) 500 mg 24 hr tablet Take 1 tablet (500 mg total) by mouth 3 (three) times a day    metoprolol succinate (TOPROL-XL) 100 mg 24 hr tablet take 1 tablet by mouth once daily    Multiple Vitamin (MULTIVITAMIN ADULT PO) multivitamin    oxyCODONE (ROXICODONE) 5 immediate release tablet take 1 tablet by mouth every 6 hours if needed for SEVERE PAIN ( ...  (REFER TO PRESCRIPTION NOTES).    Calcium Polycarbophil (Fiber) 625 MG TABS Take 625 mg by mouth daily (Patient not taking: Reported on 11/6/2024)    cyclobenzaprine (FLEXERIL) 10 mg tablet Take 1 tablet (10 mg total) by mouth daily at bedtime (Patient not taking: Reported on 11/6/2024)    leflunomide (ARAVA) 10 MG tablet Take 10 mg by mouth daily (Patient not taking: Reported on 11/6/2024)    meloxicam (Mobic) 7.5 mg tablet Take 1 tablet (7.5 mg total) by mouth daily (Patient not taking: Reported on 11/6/2024)    mupirocin (BACTROBAN) 2 % ointment APPLY TO EACH NOSTRIL WITH Q-TIP TWICE DAILY - 5 DAYS PREOPERATIVELY (Patient not taking: Reported on 11/6/2024)    ondansetron (ZOFRAN) 4 mg tablet take 1 tablet by mouth every 6 hours AS NEEDED FOR NAUSE OR VOMITING (Patient not taking: Reported on 11/6/2024)    ondansetron (ZOFRAN-ODT) 4 mg disintegrating tablet Take 1 tablet (4 mg total) by mouth every 6 (six) hours as needed for nausea or vomiting (Patient not taking: Reported on 11/6/2024)    oxyCODONE-acetaminophen (PERCOCET) 5-325 mg per tablet Take 1 tablet by mouth every 8 (eight) hours as needed for moderate pain (Patient not taking: Reported on 11/6/2024)     Allergies   Allergen Reactions    Zithromax [Azithromycin] Hives    Augmentin [Amoxicillin-Pot Clavulanate] Vomiting    Lisinopril Cough    Cefprozil Diarrhea     Immunization History   Administered Date(s) Administered    COVID-19 MODERNA VACC 0.5  "ML IM 01/21/2021, 02/19/2021, 08/26/2021    INFLUENZA 01/01/2005, 10/16/2008, 09/23/2009, 10/29/2015, 10/01/2016, 11/21/2016, 10/02/2017, 09/07/2018, 11/15/2020, 10/05/2022    Influenza Quadrivalent Preservative Free 3 years and older IM 10/29/2015, 10/29/2015, 11/21/2016, 10/02/2017    Influenza, high dose seasonal 0.7 mL 10/15/2019, 11/25/2020, 11/17/2021, 12/22/2023    Influenza, seasonal, injectable 1955, 01/01/2014    Pneumococcal Conjugate 13-Valent 01/03/2018    Pneumococcal Conjugate Vaccine 20-valent (Pcv20), Polysace 11/30/2022    Pneumococcal Polysaccharide PPV23 10/29/2015, 12/28/2016    Tdap 07/07/2018, 08/26/2021    Zoster Vaccine Recombinant 06/28/2019, 10/04/2019     Objective     /66 (BP Location: Left arm, Patient Position: Sitting, Cuff Size: Standard)   Pulse 79   Temp 98.2 °F (36.8 °C) (Tympanic)   Resp 16   Ht 5' 1\" (1.549 m)   Wt 79.4 kg (175 lb)   SpO2 97%   BMI 33.07 kg/m²     Physical Exam  Vitals and nursing note reviewed.   Constitutional:       General: She is not in acute distress.     Appearance: Normal appearance.   HENT:      Head: Normocephalic and atraumatic.      Right Ear: Tympanic membrane and external ear normal.      Left Ear: Tympanic membrane and external ear normal.      Nose: Nose normal.      Mouth/Throat:      Mouth: Mucous membranes are moist.   Eyes:      Extraocular Movements: Extraocular movements intact.      Conjunctiva/sclera: Conjunctivae normal.      Pupils: Pupils are equal, round, and reactive to light.   Cardiovascular:      Rate and Rhythm: Normal rate and regular rhythm.      Pulses: Normal pulses.      Heart sounds: Normal heart sounds. No murmur heard.  Pulmonary:      Effort: Pulmonary effort is normal.      Breath sounds: Normal breath sounds. No wheezing, rhonchi or rales.   Abdominal:      General: Bowel sounds are normal.      Palpations: Abdomen is soft.      Tenderness: There is no abdominal tenderness. There is no guarding. "   Musculoskeletal:         General: Normal range of motion.      Cervical back: Normal range of motion.      Right lower leg: No edema.      Left lower leg: No edema.   Lymphadenopathy:      Cervical: No cervical adenopathy.   Skin:     General: Skin is warm.      Capillary Refill: Capillary refill takes less than 2 seconds.      Findings: Rash (Mild area of macular papular, dry rash on her and 2 papules on left cheek.) present.   Neurological:      General: No focal deficit present.      Mental Status: She is alert and oriented to person, place, and time.   Psychiatric:         Mood and Affect: Mood normal.         Behavior: Behavior normal.

## 2024-11-07 ENCOUNTER — TELEPHONE (OUTPATIENT)
Age: 69
End: 2024-11-07

## 2024-11-07 DIAGNOSIS — L30.9 DERMATITIS: Primary | ICD-10-CM

## 2024-11-07 RX ORDER — MOMETASONE FUROATE 1 MG/G
CREAM TOPICAL DAILY
Qty: 45 G | Refills: 0 | Status: SHIPPED | OUTPATIENT
Start: 2024-11-07

## 2024-11-07 NOTE — TELEPHONE ENCOUNTER
Patient called stating the triamcinolone is not available at her [facility] they had to order it but it is still not in and her symptoms are worsening.  Is there another cream that can be prescribed, please review

## 2024-11-11 DIAGNOSIS — L30.9 DERMATITIS: Primary | ICD-10-CM

## 2024-11-11 RX ORDER — METHYLPREDNISOLONE 4 MG/1
TABLET ORAL
Qty: 21 EACH | Refills: 0 | Status: SHIPPED | OUTPATIENT
Start: 2024-11-11 | End: 2024-11-19

## 2024-11-11 NOTE — TELEPHONE ENCOUNTER
Patient stopped in the office to check on status of request and she stated she leaves for [place] tomorrow and would need something by tonight

## 2024-11-11 NOTE — TELEPHONE ENCOUNTER
Pt calling back.  She has been using the prescribed cream but her rash is getting worse and spreading.  It is now on her neck and her other cheek. It is very itchy.  She wants to know if Dr. George can send something else over to the pharmacy for her.  She leaves for [place] later this week and cannot tolerate the itching while she is away.  Please advise.

## 2024-11-11 NOTE — TELEPHONE ENCOUNTER
Please give her a call let her know that I sent over a prescription for oral steroids to help.  She should continue the topical treatments as well.  Thank you.

## 2024-11-13 ENCOUNTER — TELEPHONE (OUTPATIENT)
Dept: ADMINISTRATIVE | Facility: OTHER | Age: 69
End: 2024-11-13

## 2024-11-13 NOTE — TELEPHONE ENCOUNTER
11/13/24 2:15 PM    Patient contacted to bring Advance Directive, POLST, or Living Will document to next scheduled pcp visit.[facility] Department left message.    Thank you.  Aldair Mejia MA  [facility]

## 2024-11-18 ENCOUNTER — OFFICE VISIT (OUTPATIENT)
Dept: FAMILY MEDICINE CLINIC | Facility: CLINIC | Age: 69
End: 2024-11-18
Payer: MEDICARE

## 2024-11-18 VITALS
OXYGEN SATURATION: 98 % | SYSTOLIC BLOOD PRESSURE: 120 MMHG | HEIGHT: 61 IN | RESPIRATION RATE: 17 BRPM | BODY MASS INDEX: 32.55 KG/M2 | HEART RATE: 82 BPM | DIASTOLIC BLOOD PRESSURE: 70 MMHG | TEMPERATURE: 97.8 F | WEIGHT: 172.4 LBS

## 2024-11-18 DIAGNOSIS — Z12.31 ENCOUNTER FOR SCREENING MAMMOGRAM FOR BREAST CANCER: ICD-10-CM

## 2024-11-18 DIAGNOSIS — I10 BENIGN ESSENTIAL HYPERTENSION: ICD-10-CM

## 2024-11-18 DIAGNOSIS — E78.2 MIXED HYPERLIPIDEMIA: ICD-10-CM

## 2024-11-18 DIAGNOSIS — E11.9 CONTROLLED TYPE 2 DIABETES MELLITUS WITHOUT COMPLICATION, WITHOUT LONG-TERM CURRENT USE OF INSULIN (HCC): Primary | ICD-10-CM

## 2024-11-18 DIAGNOSIS — Z12.11 SCREENING FOR COLON CANCER: ICD-10-CM

## 2024-11-18 DIAGNOSIS — Z23 ENCOUNTER FOR IMMUNIZATION: ICD-10-CM

## 2024-11-18 LAB — SL AMB POCT HEMOGLOBIN AIC: 7.1 (ref ?–6.5)

## 2024-11-18 PROCEDURE — 99214 OFFICE O/P EST MOD 30 MIN: CPT | Performed by: FAMILY MEDICINE

## 2024-11-18 PROCEDURE — 83036 HEMOGLOBIN GLYCOSYLATED A1C: CPT | Performed by: FAMILY MEDICINE

## 2024-11-18 PROCEDURE — 90662 IIV NO PRSV INCREASED AG IM: CPT

## 2024-11-18 PROCEDURE — G0008 ADMIN INFLUENZA VIRUS VAC: HCPCS

## 2024-11-18 NOTE — PROGRESS NOTES
"Name: Davina Allen      : 1955      MRN: [id number]  Encounter Provider: Mariaa Layton DO  Encounter Date: 2024   Encounter department: [facility]    Assessment & Plan  Controlled type 2 diabetes mellitus without complication, without long-term current use of insulin (HCC)    Lab Results   Component Value Date    HGBA1C 7.1 (A) 2024       Orders:    POCT hemoglobin A1c    Albumin / creatinine urine ratio; Future    Hemoglobin A1C; Future    Benign essential hypertension    Orders:    Comprehensive metabolic panel; Future    Mixed hyperlipidemia    Orders:    TSH, 3rd generation with Free T4 reflex; Future    Lipid Panel with Direct LDL reflex; Future    Screening for colon cancer    Orders:    Ambulatory Referral to Gastroenterology; Future    Encounter for immunization    Orders:    influenza vaccine, high-dose, PF 0.5 mL (Fluzone High Dose)    Encounter for screening mammogram for breast cancer    Orders:    Mammo screening bilateral w 3d and cad; Future         History of Present Illness     History of Present Illness  Had knee replacement ad doing well  At 10 weeks  Was on prednisone  Going to pt  Sugar improving       Review of Systems   Constitutional: Negative.    HENT: Negative.     Eyes: Negative.    Respiratory: Negative.     Cardiovascular: Negative.    Gastrointestinal: Negative.    Endocrine: Negative.    Genitourinary: Negative.    Musculoskeletal:  Positive for arthralgias.   Allergic/Immunologic: Negative.    Neurological: Negative.    Hematological: Negative.    Psychiatric/Behavioral: Negative.       Objective   /70 (BP Location: Left arm, Patient Position: Sitting, Cuff Size: Standard) Comment (BP Location): left forearm  Pulse 82   Temp 97.8 °F (36.6 °C) (Tympanic)   Resp 17   Ht 5' 1\" (1.549 m)   Wt 78.2 kg (172 lb 6.4 oz)   SpO2 98%   BMI 32.57 kg/m²     Physical Exam    Physical Exam  Vitals and nursing note reviewed.   Constitutional:      "  Appearance: Normal appearance. She is well-developed.   HENT:      Head: Normocephalic and atraumatic.      Right Ear: External ear normal.      Left Ear: External ear normal.      Nose: Nose normal.   Eyes:      Extraocular Movements: Extraocular movements intact.      Conjunctiva/sclera: Conjunctivae normal.      Pupils: Pupils are equal, round, and reactive to light.   Cardiovascular:      Rate and Rhythm: Normal rate and regular rhythm.      Heart sounds: Normal heart sounds.   Pulmonary:      Effort: Pulmonary effort is normal.      Breath sounds: Normal breath sounds.   Abdominal:      General: Bowel sounds are normal.      Palpations: Abdomen is soft.   Musculoskeletal:         General: Normal range of motion.      Cervical back: Normal range of motion and neck supple.   Skin:     General: Skin is warm and dry.      Capillary Refill: Capillary refill takes less than 2 seconds.   Neurological:      Mental Status: She is alert and oriented to person, place, and time.   Psychiatric:         Mood and Affect: Mood normal.         Behavior: Behavior normal.         Thought Content: Thought content normal.         Judgment: Judgment normal.

## 2024-11-18 NOTE — ASSESSMENT & PLAN NOTE
Orders:    TSH, 3rd generation with Free T4 reflex; Future    Lipid Panel with Direct LDL reflex; Future

## 2024-11-18 NOTE — ASSESSMENT & PLAN NOTE
Lab Results   Component Value Date    HGBA1C 7.1 (A) 11/18/2024       Orders:    POCT hemoglobin A1c    Albumin / creatinine urine ratio; Future    Hemoglobin A1C; Future

## 2024-11-27 LAB
LEFT EYE DIABETIC RETINOPATHY: NORMAL
RIGHT EYE DIABETIC RETINOPATHY: NORMAL

## 2025-01-29 ENCOUNTER — HOSPITAL ENCOUNTER (OUTPATIENT)
Dept: RADIOLOGY | Age: 70
Discharge: HOME | End: 2025-01-29
Payer: MEDICARE

## 2025-01-29 VITALS — BODY MASS INDEX: 32.55 KG/M2 | WEIGHT: 172.4 LBS | HEIGHT: 61 IN

## 2025-01-29 DIAGNOSIS — Z12.31 ENCOUNTER FOR SCREENING MAMMOGRAM FOR BREAST CANCER: ICD-10-CM

## 2025-01-29 PROCEDURE — 77067 SCR MAMMO BI INCL CAD: CPT

## 2025-01-29 PROCEDURE — 77063 BREAST TOMOSYNTHESIS BI: CPT

## 2025-02-21 NOTE — PROGRESS NOTES
Cardiology Follow Up    Davina Allen  1955  [id number]  [facility] [place]  [address]  [place] [id number] [phone]    1. Benign essential hypertension        2. Hypercholesterolemia        3. Left atrial dilatation          Interval History:patient here for FU. Patient has HTN, HLD, psoriatic arthritis and DM.  There had been concern about strokelike symptoms.  Patient with possible TIA.  Patient had been hospitalized 1/2024.  She was placed on DAPT for 21 days and thereafter on a single agent.  She was seen by Neurology service.  MRI  and CT of the brain 1/22/2024 showed no acute ischemia/acute intracranial abnormality.  Echocardiogram 1/22/2024 demonstrated LVEF of 60%.  There was mild LAE.  There was no evidence of PFO.  There was mild AS with mean gradient of 11 mmHg.  Fibrocalcific disease of the MV was noted with mild to moderate MS with mean gradient of 6 mmHg.  MAC was present carotid Doppler was ordered but patient was no-show.  CTA H/N 1/22/2024 demonstrated no acute vascular pathology in the head or neck.  Mild plaque was noted in the right extracranial carotid segment.  EKG 1/22/2024 demonstrated NSR and was a normal tracing.  Lipid profile 1/22/2024 demonstrated  with an HDL of 39 and a calculated LDL of 75.  Patient is on statin.  Patient did smoke for 25 years but stopped in 1996.  Echocardiogram 7/1/2024 demonstrated normal LV systolic function with AV sclerosis and mild MAC/stenosis.  Zio patch 6/7/2024 demonstrated sinus rhythm with average heart rate of 77.  No evidence of prognostically important arrhythmia noted.  Patient has been well.  She has had no chest pain or significant dyspnea.      Patient Active Problem List   Diagnosis   • Hyperlipidemia   • Fibromyalgia   • Psoriasis with arthropathy (HCC)   • Rheumatoid arthritis involving multiple sites with positive rheumatoid factor (HCC)   •  Chronic pain of left knee   • Primary osteoarthritis of left knee   • Allergy to pollen   • Anxiety disorder   • Benign essential hypertension   • Controlled diabetes mellitus type II without complication (HCC)   • Generalized osteoarthritis of multiple sites   • Hearing loss   • Psoriasis   • Squamous cell carcinoma of skin   • Vitamin D deficiency   • Preoperative clearance   • Moderate persistent asthma   • Chronic neck pain   • Diverticulosis of colon   • Pulmonary nodules   • Functional diarrhea   • Encounter for screening mammogram for malignant neoplasm of breast   • Metatarsalgia, right foot   • Porokeratosis   • Cataract, nuclear sclerotic, both eyes   • Continuous opioid dependence (HCC)   • Chronic low back pain without sciatica   • Stroke-like symptoms   • Bilateral carotid artery stenosis   • Spondylolisthesis, lumbar region   • Diastasis recti   • Umbilical hernia without obstruction or gangrene   • Mitral valve annular calcification   • Irritable bowel syndrome with diarrhea   • Muscle spasm     Past Medical History:   Diagnosis Date   • Acute non-recurrent frontal sinusitis 05/15/2019   • Acute non-recurrent maxillary sinusitis 03/10/2020   • Allergic In my 60’s   • Anxiety    • Anxiety disorder    • Arthritis     OA   • Asthma     exercise iniduced.   • Cancer (HCC)     Squamous Cell on back   • Colon polyp    • COVID-19 10/4/2023   • Dependence on crutches     prn   • Depression     ro.    • Diabetes mellitus (HCC)     NIDDM; per All[employer]: Last Assessed: 7/15/15, New onset of type 2   • Diverticulitis of colon    • Fibromyalgia    • Hearing aid worn     mayra   • Heart murmur    • HL (hearing loss)     b/l - uses hearing aids   • Hyperglycemia     Last Assessed:10/29/15   • Hyperlipidemia    • Hypertension    • Inflammatory bowel disease In my 60’s   • LLQ abdominal pain 03/30/2020   • Pneumonia May 2023   • Psoriasis     red dry patch left side- waist area   • Psoriatic arthritis (HCC)    • RA  (rheumatoid arthritis) (HCC)    • Urinary tract infection Currently    Taking med   • Wears glasses     reading     Social History     Socioeconomic History   • Marital status: /Civil Union     Spouse name: Not on file   • Number of children: Not on file   • Years of education: Not on file   • Highest education level: Not on file   Occupational History   • Not on file   Tobacco Use   • Smoking status: Former     Current packs/day: 0.00     Average packs/day: 2.0 packs/day for 25.0 years (50.0 ttl pk-yrs)     Types: Cigarettes     Start date:      Quit date:      Years since quittin.1     Passive exposure: Past   • Smokeless tobacco: Never   Vaping Use   • Vaping status: Never Used   Substance and Sexual Activity   • Alcohol use: No   • Drug use: No   • Sexual activity: Yes     Partners: Male     Birth control/protection: Post-menopausal   Other Topics Concern   • Not on file   Social History Narrative   • Not on file     Social Drivers of Health     Financial Resource Strain: Low Risk  (2024)    Received from [facility]    Overall Financial Resource Strain (CARDIA)    • Difficulty of Paying Living Expenses: Not very hard   Food Insecurity: No Food Insecurity (2024)    Received from [facility]    Hunger Vital Sign    • Worried About Running Out of Food in the Last Year: Never true    • Ran Out of Food in the Last Year: Never true   Transportation Needs: No Transportation Needs (2024)    Received from [facility]    PRAPARE - Transportation    • Lack of Transportation (Medical): No    • Lack of Transportation (Non-Medical): No   Physical Activity: Not on file   Stress: Not on file   Social Connections: Not on file   Intimate Partner Violence: Not At Risk (2024)    [facility]    Humiliation, Afraid, Rape, and Kick questionnaire    • Fear of Current or Ex-Partner: No    •  Emotionally Abused: No    • Physically Abused: No    • Sexually Abused: No   Housing Stability: Low Risk  (2024)    Received from [facility]    Housing Stability Vital Sign    • Unable to Pay for Housing in the Last Year: No    • Number of Times Moved in the Last Year: 0    • Homeless in the Last Year: No      Family History   Problem Relation Age of Onset   • Hypertension Mother    • Heart attack Mother    • Osteoarthritis Mother    • Breast cancer Mother 82   • Arthritis Mother    • Cancer Mother    • Alcohol abuse Father    • Diabetes Sister    • No Known Problems Daughter    • No Known Problems Daughter    • No Known Problems Maternal Grandmother    • No Known Problems Maternal Grandfather    • No Known Problems Paternal Grandmother    • No Known Problems Paternal Grandfather    • Hypertension Brother    • Diabetes Brother    • Other Brother         Dyslipidemia   • Coronary artery disease Brother         Diabetes   • No Known Problems Maternal Aunt    • No Known Problems Paternal Aunt    • No Known Problems Paternal Aunt    • No Known Problems Paternal Aunt    • No Known Problems Paternal Aunt    • No Known Problems Paternal Aunt      Past Surgical History:   Procedure Laterality Date   • ARTHROSCOPY KNEE Left 3/2/2016    Procedure: ARTHROSCOPY KNEE;  Surgeon: Daniel Laureano MD;  Location: [facility];  Service:    •  SECTION      X2   • COLONOSCOPY  2019   • CYST REMOVAL Right     Cyst on bone    • FRACTURE SURGERY      (Right) tibia/fibula Fx, and ankle   • PILONIDAL CYST / SINUS EXCISION     • IN ARTHRS KNE SURG W/MENISCECTOMY MED/LAT W/SHVG Left 3/2/2016    Procedure: PARTIAL MEDIAL &  LATERAL MENISCECTOMIES, CHONDROPLASTY, REMOVAL OSTEOPHYTE, LIMITED SYNOVECTOMY;  Surgeon: Daniel Laureano MD;  Location: [facility];  Service: Orthopedics   • REPAIR KNEE LIGAMENT     • TOOTH EXTRACTION         Current Outpatient Medications:   •  ALPRAZolam (XANAX) 0.5 mg tablet, Take 0.5  tablets (0.25 mg total) by mouth daily, Disp: 30 tablet, Rfl: 0  •  amLODIPine (NORVASC) 10 mg tablet, Take 1 tablet (10 mg total) by mouth daily, Disp: 90 tablet, Rfl: 1  •  aspirin (ECOTRIN LOW STRENGTH) 81 mg EC tablet, Take 1 tablet (81 mg total) by mouth daily for 19 doses, Disp: 19 tablet, Rfl: 0  •  atorvastatin (LIPITOR) 40 mg tablet, take 1 tablet by mouth every morning, Disp: 90 tablet, Rfl: 1  •  budesonide-formoterol (Symbicort) 160-4.5 mcg/act inhaler, Inhale 2 puffs 2 (two) times a day Rinse mouth after use., Disp: 10.2 g, Rfl: 3  •  cholestyramine (QUESTRAN) 4 g packet, Take 1 packet (4 g total) by mouth 3 (three) times a day with meals, Disp: 270 each, Rfl: 3  •  ergocalciferol (VITAMIN D2) 50,000 units, take 1 capsule by mouth every week, Disp: 12 capsule, Rfl: 3  •  escitalopram (LEXAPRO) 10 mg tablet, take 1/2 tablet by mouth once daily, Disp: 90 tablet, Rfl: 1  •  estradiol (ESTRACE) 0.1 mg/g vaginal cream, MASSAGE A PEA SIZED AMOUNT INTO THE VAGINA TWICE A WEEK FOR 2 WEE...  (REFER TO PRESCRIPTION NOTES)., Disp: , Rfl:   •  hydroCHLOROthiazide 25 mg tablet, take 1 tablet by mouth once daily, Disp: 100 tablet, Rfl: 1  •  losartan (COZAAR) 50 mg tablet, take 1 tablet once daily as directed, Disp: 90 tablet, Rfl: 3  •  metFORMIN (GLUCOPHAGE-XR) 500 mg 24 hr tablet, Take 1 tablet (500 mg total) by mouth 3 (three) times a day, Disp: 270 tablet, Rfl: 1  •  metoprolol succinate (TOPROL-XL) 100 mg 24 hr tablet, take 1 tablet by mouth once daily, Disp: 90 tablet, Rfl: 1  •  mometasone (ELOCON) 0.1 % cream, Apply topically daily, Disp: 45 g, Rfl: 0  •  Multiple Vitamin (MULTIVITAMIN ADULT PO), multivitamin, Disp: , Rfl:   •  oxyCODONE (ROXICODONE) 5 immediate release tablet, take 1 tablet by mouth every 6 hours if needed for SEVERE PAIN ( ...  (REFER TO PRESCRIPTION NOTES)., Disp: , Rfl:   Allergies   Allergen Reactions   • Zithromax [Azithromycin] Hives   • Augmentin [Amoxicillin-Pot Clavulanate]  Vomiting   • Lisinopril Cough   • Cefprozil Diarrhea       Labs:not applicable  Imaging: Mammo screening bilateral w 3d and cad  Result Date: 1/29/2025  Narrative: DIAGNOSIS: Encounter for screening mammogram for breast cancer TECHNIQUE: Digital screening mammography was performed. Computer Aided Detection (CAD) analyzed all applicable images. COMPARISONS: Prior breast imaging dated: 05/07/2023, 02/04/2020, 01/15/2018, 01/15/2018, 10/17/2016, 06/25/2013, 06/14/2011, and 04/07/2010 RELEVANT HISTORY: Family Breast Cancer History: History of breast cancer in Mother. Family Medical History: Family medical history includes breast cancer in mother. Personal History: Hormone history includes estrogen replacement therapy. No known relevant surgical history. No known relevant medical history. The patient is scheduled in a reminder system for screening mammography. 8-10% of cancers will be missed on mammography. Management of a palpable abnormality must be based on clinical grounds.  Patients will be notified of their results via letter from our facility. Accredited by [employer] and FDA. RISK ASSESSMENT: 5 Year Tyrer-Cuzick: 3.07% 10 Year Tyrer-Cuzick: 6.47% Lifetime Tyrer-Cuzick: 9.38% TISSUE DENSITY: There are scattered areas of fibroglandular density. INDICATION: Davina Allen is a 69 y.o. female presenting for screening mammography. FINDINGS: There are no suspicious masses, grouped microcalcifications or areas of architectural distortion. The skin and nipple areolar complex are unremarkable.     Impression: No mammographic evidence of malignancy. ASSESSMENT/BI-RADS CATEGORY: Left: 1 - Negative Right: 1 - Negative Overall: 1 - Negative RECOMMENDATION:      - Routine screening mammogram in 1 year for both breasts. Workstation ID: [id number] Signed by:  Karlene Parker MD       Review of Systems:  Review of Systems   All other systems reviewed and are negative.      Physical Exam:  /54 (BP  "Location: Left arm, Patient Position: Sitting, Cuff Size: Standard) Comment: L Radial/ L Forearm Comment (BP Location): left forearm  Pulse 79   Ht 5' 1\" (1.549 m)   Wt 80.7 kg (178 lb)   SpO2 98%   BMI 33.63 kg/m²   Physical Exam  Vitals reviewed.   Constitutional:       Appearance: She is well-developed.   HENT:      Head: Normocephalic and atraumatic.   Eyes:      Conjunctiva/sclera: Conjunctivae normal.      Pupils: Pupils are equal, round, and reactive to light.   Cardiovascular:      Rate and Rhythm: Normal rate.      Heart sounds: Normal heart sounds.   Pulmonary:      Effort: Pulmonary effort is normal.      Breath sounds: Normal breath sounds.   Musculoskeletal:      Cervical back: Normal range of motion and neck supple.   Skin:     General: Skin is warm and dry.   Neurological:      Mental Status: She is alert and oriented to person, place, and time.       Discussion/Summary:I will continue the patient's present medical regimen.  I have asked the patient to call if there is a problem in the interim otherwise I will see the patient in six months time.  "

## 2025-02-23 DIAGNOSIS — E78.5 HYPERLIPIDEMIA: ICD-10-CM

## 2025-02-23 DIAGNOSIS — R29.90 STROKE-LIKE SYMPTOMS: ICD-10-CM

## 2025-02-24 RX ORDER — ATORVASTATIN CALCIUM 40 MG/1
40 TABLET, FILM COATED ORAL EVERY MORNING
Qty: 90 TABLET | Refills: 1 | Status: SHIPPED | OUTPATIENT
Start: 2025-02-24

## 2025-03-03 ENCOUNTER — OFFICE VISIT (OUTPATIENT)
Dept: CARDIOLOGY CLINIC | Facility: CLINIC | Age: 70
End: 2025-03-03
Payer: MEDICARE

## 2025-03-03 VITALS
HEART RATE: 79 BPM | OXYGEN SATURATION: 98 % | HEIGHT: 61 IN | DIASTOLIC BLOOD PRESSURE: 54 MMHG | BODY MASS INDEX: 33.61 KG/M2 | SYSTOLIC BLOOD PRESSURE: 102 MMHG | WEIGHT: 178 LBS

## 2025-03-03 DIAGNOSIS — I51.7 LEFT ATRIAL DILATATION: ICD-10-CM

## 2025-03-03 DIAGNOSIS — I10 BENIGN ESSENTIAL HYPERTENSION: Primary | ICD-10-CM

## 2025-03-03 DIAGNOSIS — E78.00 HYPERCHOLESTEROLEMIA: ICD-10-CM

## 2025-03-03 PROCEDURE — 99214 OFFICE O/P EST MOD 30 MIN: CPT | Performed by: INTERNAL MEDICINE

## 2025-03-03 NOTE — PATIENT INSTRUCTIONS
I will continue the patient's present medical regimen.  I have asked the patient to call if there is a problem in the interim otherwise I will see the patient in six months time.

## 2025-03-06 DIAGNOSIS — I10 ESSENTIAL HYPERTENSION: ICD-10-CM

## 2025-03-06 RX ORDER — HYDROCHLOROTHIAZIDE 25 MG/1
25 TABLET ORAL DAILY
Qty: 100 TABLET | Refills: 1 | Status: SHIPPED | OUTPATIENT
Start: 2025-03-06

## 2025-03-07 NOTE — ASSESSMENT & PLAN NOTE
Assessment:  Complained of severe lower back pain this morning which seems to be chronic in nature.   Pending spine lumbar xray.    Patient presented to the clinic for B-12 injection.  Injection given in Left Deltoid at 0833 AM.  Patient tolerated injection well.  No adverse reactions.     decreased sensation/decreased strength/impaired balance

## 2025-03-12 ENCOUNTER — OFFICE VISIT (OUTPATIENT)
Dept: FAMILY MEDICINE CLINIC | Facility: CLINIC | Age: 70
End: 2025-03-12
Payer: MEDICARE

## 2025-03-12 ENCOUNTER — HOSPITAL ENCOUNTER (OUTPATIENT)
Dept: RADIOLOGY | Facility: HOSPITAL | Age: 70
Discharge: HOME | End: 2025-03-12
Payer: MEDICARE

## 2025-03-12 VITALS
WEIGHT: 171.8 LBS | SYSTOLIC BLOOD PRESSURE: 110 MMHG | HEART RATE: 78 BPM | DIASTOLIC BLOOD PRESSURE: 66 MMHG | OXYGEN SATURATION: 97 % | TEMPERATURE: 97.8 F | HEIGHT: 61 IN | BODY MASS INDEX: 32.44 KG/M2 | RESPIRATION RATE: 16 BRPM

## 2025-03-12 DIAGNOSIS — E11.9 TYPE 2 DIABETES MELLITUS WITHOUT COMPLICATION, WITHOUT LONG-TERM CURRENT USE OF INSULIN (HCC): ICD-10-CM

## 2025-03-12 DIAGNOSIS — M25.561 ACUTE PAIN OF RIGHT KNEE: Primary | ICD-10-CM

## 2025-03-12 DIAGNOSIS — M25.561 ACUTE PAIN OF RIGHT KNEE: ICD-10-CM

## 2025-03-12 LAB — SL AMB POCT HEMOGLOBIN AIC: 9.1 (ref ?–6.5)

## 2025-03-12 PROCEDURE — 73564 X-RAY EXAM KNEE 4 OR MORE: CPT

## 2025-03-12 PROCEDURE — 83036 HEMOGLOBIN GLYCOSYLATED A1C: CPT | Performed by: FAMILY MEDICINE

## 2025-03-12 PROCEDURE — G2211 COMPLEX E/M VISIT ADD ON: HCPCS | Performed by: FAMILY MEDICINE

## 2025-03-12 PROCEDURE — 99213 OFFICE O/P EST LOW 20 MIN: CPT | Performed by: FAMILY MEDICINE

## 2025-03-12 RX ORDER — TIRZEPATIDE 2.5 MG/.5ML
2.5 INJECTION, SOLUTION SUBCUTANEOUS WEEKLY
Qty: 2 ML | Refills: 0 | Status: SHIPPED | OUTPATIENT
Start: 2025-03-12

## 2025-03-12 RX ORDER — CLINDAMYCIN HYDROCHLORIDE 300 MG/1
1 CAPSULE ORAL 4 TIMES DAILY
COMMUNITY
Start: 2024-12-18

## 2025-03-12 RX ORDER — LEFLUNOMIDE 10 MG/1
10 TABLET ORAL DAILY
COMMUNITY
Start: 2025-01-15

## 2025-03-12 NOTE — PROGRESS NOTES
Name: Davina Allen      : 1955      MRN: [id number]  Encounter Provider: Uriel George DO  Encounter Date: 3/12/2025   Encounter department: [facility]    Assessment & Plan  Acute pain of right knee  -Presents to discuss acute right knee pain started about 3 weeks ago where she fell directly onto her knees.  -Does have history of meniscal repair on that side many years ago.  -Has been able to bear weight with some pain however noting significant swelling on the medial side which has not improved.  -Significantly tender to touch.  -Recommend evaluation with x-rays   -Will follow-up with results.    Orders:    XR knee 4+ vw right injury; Future    Type 2 diabetes mellitus without complication, without long-term current use of insulin ([facility])    Lab Results   Component Value Date    HGBA1C 9.1 (A) 2025     -Discussed elevated hemoglobin A1c in office today.  -He has been taking her metformin as recommended however notes she not been careful with her diet more recently as she was trying to make some changes to help with some weight loss however had not been successful.  -Discussed additional medications such as GLP-1 agonist to help with improved blood sugar control and weight loss.  Patient would like to trial.  She has no contraindications to the medication.  -Discussed risk, benefits and side effects to the medication.  -Trial Mounjaro 2.5 mg q. weekly and titrate per scheduling to help improve hemoglobin A1c and weight loss.  -Patient to follow-up in 3 months or as scheduled.    Orders:    POCT hemoglobin A1c    Tirzepatide (Mounjaro) 2.5 MG/0.5ML SOAJ; Inject 2.5 mg under the skin once a week         History of Present Illness     Davina is a 69-year-old female presents today to discuss ongoing knee pain for the past 3 weeks.  She notes she fell about 3 weeks ago onto her knees and has had right-sided knee pain since then.  Has been able to walk with discomfort most notable going  up and down stairs.  She also had some significant swelling to the middle side of her right knee.  She thought this would go away however it has not and she has some significant tenderness over the swelling.  She does have history of previous knee injuries on that side with meniscal repair many years ago.  She denies any calf cramping or pain.  Denies any instability symptoms.  Denies fevers, chills, nausea, vomiting.  We also reviewed her elevated hemoglobin A1c today.  She relates she has been taking her medication as prescribed however has been very consistent with her eating patterns.  She did try to focus on her eating however fell off as she was not having much success.  We reviewed diet and lifestyle interventions.  We discussed additional medications to help with her blood sugar control and secondary benefit of weight loss.  She would be a good candidate for GLP-1 agonist.  She has no contraindications and we are going to trial as above.  No other concerns today.      Review of Systems   Constitutional:  Negative for chills and fever.   HENT:  Negative for ear pain and sore throat.    Eyes:  Negative for pain and visual disturbance.   Respiratory:  Negative for cough and shortness of breath.    Cardiovascular:  Negative for chest pain and palpitations.   Gastrointestinal:  Negative for abdominal pain and vomiting.   Genitourinary:  Negative for dysuria and hematuria.   Musculoskeletal:  Negative for arthralgias and back pain.        Right knee pain and swelling   Skin:  Negative for color change and rash.   Neurological:  Negative for seizures and syncope.   Psychiatric/Behavioral:  Negative for confusion and sleep disturbance. The patient is not nervous/anxious.    All other systems reviewed and are negative.    Past Medical History:   Diagnosis Date    Acute non-recurrent frontal sinusitis 05/15/2019    Acute non-recurrent maxillary sinusitis 03/10/2020    Allergic In my 60’s    Anxiety In my 50’s     Anxiety disorder     Arthritis In my 50’s    OA    Asthma In my 50’s    exercise iniduced.    Cancer (HCC) In my 60’s    Squamous Cell on back    Colon polyp     COVID-19 10/04/2023    Dependence on crutches     prn    Depression In my late 50’s    ro.     Diabetes mellitus (HCC) In my 60’s    NIDDM; per [employer]: Last Assessed: 7/15/15, New onset of type 2    Diverticulitis of colon In my 60’s    Fibromyalgia     Hearing aid worn     mayra    Heart murmur In my 40’s    HL (hearing loss) In my 60’s    b/l - uses hearing aids    Hyperglycemia     Last Assessed:10/29/15    Hyperlipidemia     Hypertension In my 40’s    Inflammatory bowel disease In my 60’s    LLQ abdominal pain 2020    Pneumonia May 2023    Psoriasis     red dry patch left side- waist area    Psoriatic arthritis (HCC)     RA (rheumatoid arthritis) (HCC)     Urinary tract infection Currently    Taking med    Wears glasses     reading     Past Surgical History:   Procedure Laterality Date    ARTHROSCOPY KNEE Left 2016    Procedure: ARTHROSCOPY KNEE;  Surgeon: Daniel Laureano MD;  Location: [facility];  Service:     CAROTID ENDARTERECTOMY       SECTION  1986 and Aug 15, 1989    X2    COLONOSCOPY  2019    CORONARY ANGIOPLASTY      CYST REMOVAL Right     Cyst on bone     FRACTURE SURGERY      (Right) tibia/fibula Fx, and ankle    PILONIDAL CYST / SINUS EXCISION      MO ARTHRS KNE SURG W/MENISCECTOMY MED/LAT W/SHVG Left 2016    Procedure: PARTIAL MEDIAL &  LATERAL MENISCECTOMIES, CHONDROPLASTY, REMOVAL OSTEOPHYTE, LIMITED SYNOVECTOMY;  Surgeon: Daniel Laureano MD;  Location: [facility];  Service: Orthopedics    REPAIR KNEE LIGAMENT      TOOTH EXTRACTION       Family History   Problem Relation Age of Onset    Hypertension Mother     Heart attack Mother     Osteoarthritis Mother     Breast cancer Mother 82    Arthritis Mother     Cancer Mother     Alcohol abuse Father     Diabetes Sister     No Known Problems Daughter     No  Known Problems Daughter     No Known Problems Maternal Grandmother     Heart attack Maternal Grandfather     Asthma Paternal Grandmother     Asthma Paternal Grandfather     Hypertension Brother         Triple bypass    Diabetes Brother     Coronary artery disease Brother         Diabetes    No Known Problems Maternal Aunt     No Known Problems Paternal Aunt     No Known Problems Paternal Aunt     No Known Problems Paternal Aunt     No Known Problems Paternal Aunt     No Known Problems Paternal Aunt     Heart attack Maternal Uncle      Social History     Tobacco Use    Smoking status: Former     Current packs/day: 0.00     Average packs/day: 2.0 packs/day for 25.0 years (50.0 ttl pk-yrs)     Types: Cigarettes     Start date:      Quit date:      Years since quittin.2     Passive exposure: Past    Smokeless tobacco: Never   Vaping Use    Vaping status: Never Used   Substance and Sexual Activity    Alcohol use: No    Drug use: No    Sexual activity: Yes     Partners: Male     Birth control/protection: Post-menopausal     Current Outpatient Medications on File Prior to Visit   Medication Sig    ALPRAZolam (XANAX) 0.5 mg tablet Take 0.5 tablets (0.25 mg total) by mouth daily    amLODIPine (NORVASC) 10 mg tablet Take 1 tablet (10 mg total) by mouth daily    aspirin (ECOTRIN LOW STRENGTH) 81 mg EC tablet Take 1 tablet (81 mg total) by mouth daily for 19 doses    atorvastatin (LIPITOR) 40 mg tablet take 1 tablet by mouth every morning    budesonide-formoterol (Symbicort) 160-4.5 mcg/act inhaler Inhale 2 puffs 2 (two) times a day Rinse mouth after use.    cholestyramine (QUESTRAN) 4 g packet Take 1 packet (4 g total) by mouth 3 (three) times a day with meals    clindamycin (CLEOCIN) 300 MG capsule Take 1 capsule by mouth 4 times a day    ergocalciferol (VITAMIN D2) 50,000 units take 1 capsule by mouth every week    escitalopram (LEXAPRO) 10 mg tablet take 1/2 tablet by mouth once daily    estradiol (ESTRACE) 0.1  mg/g vaginal cream MASSAGE A PEA SIZED AMOUNT INTO THE VAGINA TWICE A WEEK FOR 2 WEE...  (REFER TO PRESCRIPTION NOTES).    hydroCHLOROthiazide 25 mg tablet take 1 tablet by mouth once daily    leflunomide (ARAVA) 10 MG tablet Take 10 mg by mouth daily    losartan (COZAAR) 50 mg tablet take 1 tablet once daily as directed    metFORMIN (GLUCOPHAGE-XR) 500 mg 24 hr tablet Take 1 tablet (500 mg total) by mouth 3 (three) times a day    metoprolol succinate (TOPROL-XL) 100 mg 24 hr tablet take 1 tablet by mouth once daily    mometasone (ELOCON) 0.1 % cream Apply topically daily    Multiple Vitamin (MULTIVITAMIN ADULT PO) multivitamin    oxyCODONE (ROXICODONE) 5 immediate release tablet take 1 tablet by mouth every 6 hours if needed for SEVERE PAIN ( ...  (REFER TO PRESCRIPTION NOTES).     Allergies   Allergen Reactions    Zithromax [Azithromycin] Hives    Augmentin [Amoxicillin-Pot Clavulanate] Vomiting    Lisinopril Cough    Cefprozil Diarrhea     Immunization History   Administered Date(s) Administered    COVID-19 MODERNA VACC 0.5 ML IM 01/21/2021, 02/19/2021, 08/26/2021    INFLUENZA 01/01/2005, 10/16/2008, 09/23/2009, 10/29/2015, 10/01/2016, 11/21/2016, 10/02/2017, 09/07/2018, 11/15/2020, 10/05/2022    Influenza Quadrivalent Preservative Free 3 years and older IM 10/29/2015, 10/29/2015, 11/21/2016, 10/02/2017    Influenza Split High Dose Preservative Free IM 11/18/2024    Influenza, high dose seasonal 0.7 mL 10/15/2019, 11/25/2020, 11/17/2021, 12/22/2023    Influenza, seasonal, injectable 1955, 01/01/2014    Pneumococcal Conjugate 13-Valent 01/03/2018    Pneumococcal Conjugate Vaccine 20-valent (Pcv20), Polysace 11/30/2022    Pneumococcal Polysaccharide PPV23 10/29/2015, 12/28/2016    Tdap 07/07/2018, 08/26/2021    Zoster Vaccine Recombinant 06/28/2019, 10/04/2019     Objective   /66 (BP Location: Left arm, Patient Position: Sitting, Cuff Size: Standard)   Pulse 78   Temp 97.8 °F (36.6 °C) (Tympanic)    "Resp 16   Ht 5' 1\" (1.549 m)   Wt 77.9 kg (171 lb 12.8 oz)   SpO2 97%   BMI 32.46 kg/m²     Physical Exam  Vitals and nursing note reviewed.   Constitutional:       General: She is not in acute distress.     Appearance: Normal appearance. She is obese.   HENT:      Head: Normocephalic and atraumatic.      Right Ear: Tympanic membrane and external ear normal.      Left Ear: Tympanic membrane and external ear normal.      Nose: Nose normal.      Mouth/Throat:      Mouth: Mucous membranes are moist.   Eyes:      Extraocular Movements: Extraocular movements intact.      Conjunctiva/sclera: Conjunctivae normal.      Pupils: Pupils are equal, round, and reactive to light.   Cardiovascular:      Rate and Rhythm: Normal rate and regular rhythm.      Pulses: Normal pulses.      Heart sounds: Normal heart sounds. No murmur heard.  Pulmonary:      Effort: Pulmonary effort is normal.      Breath sounds: Normal breath sounds. No wheezing, rhonchi or rales.   Abdominal:      General: Bowel sounds are normal.      Palpations: Abdomen is soft.      Tenderness: There is no abdominal tenderness. There is no guarding.   Musculoskeletal:         General: Normal range of motion.      Cervical back: Normal range of motion.      Right lower leg: No edema.      Left lower leg: No edema.      Comments: Right knee: Tenderness and swelling on the medial side of her knee.  Appears as soft tissue hematoma.  Stable knee exam.   Lymphadenopathy:      Cervical: No cervical adenopathy.   Skin:     General: Skin is warm.      Capillary Refill: Capillary refill takes less than 2 seconds.   Neurological:      General: No focal deficit present.      Mental Status: She is alert and oriented to person, place, and time.   Psychiatric:         Mood and Affect: Mood normal.         Behavior: Behavior normal.         "

## 2025-03-12 NOTE — ASSESSMENT & PLAN NOTE
Lab Results   Component Value Date    HGBA1C 9.1 (A) 03/12/2025     -Discussed elevated hemoglobin A1c in office today.  -He has been taking her metformin as recommended however notes she not been careful with her diet more recently as she was trying to make some changes to help with some weight loss however had not been successful.  -Discussed additional medications such as GLP-1 agonist to help with improved blood sugar control and weight loss.  Patient would like to trial.  She has no contraindications to the medication.  -Discussed risk, benefits and side effects to the medication.  -Trial Mounjaro 2.5 mg q. weekly and titrate per scheduling to help improve hemoglobin A1c and weight loss.  -Patient to follow-up in 3 months or as scheduled.    Orders:    POCT hemoglobin A1c    Tirzepatide (Mounjaro) 2.5 MG/0.5ML SOAJ; Inject 2.5 mg under the skin once a week

## 2025-03-13 ENCOUNTER — TELEPHONE (OUTPATIENT)
Age: 70
End: 2025-03-13

## 2025-03-13 NOTE — TELEPHONE ENCOUNTER
Mounjaro 2.5 mg needs a PA, please have Dr. George complete and sign his OV notes so they can be submitted with the PA. Thank you!

## 2025-03-13 NOTE — TELEPHONE ENCOUNTER
JD Diaz 2.5 MG/0.5ML SUBMITTED     to [employer]     via    []CMM-KEY:    [x]Surescripts-Case ID # [id number]  []Availity-Auth ID #  NDC #    []Faxed to plan   []Other website    []Phone call Case ID #      []PA sent as URGENT    All office notes, labs and other pertaining documents and studies sent. Clinical questions answered. Awaiting determination from insurance company.     Turnaround time for your insurance to make a decision on your Prior Authorization can take 7-21 business days.

## 2025-03-13 NOTE — TELEPHONE ENCOUNTER
PT inquiring about the XR she had done yesterday 3/12. She stated that she did not think an XR took this long to result. She said she is leaving for [place] tomorrow, and will be doing a lot of walking when she gets there, and wants to know if she will have any restrictions, or if she injure any further if she puts weight on it.    PT requesting a phone call ASAP to advise.    ThankYou

## 2025-03-14 ENCOUNTER — RESULTS FOLLOW-UP (OUTPATIENT)
Dept: FAMILY MEDICINE CLINIC | Facility: CLINIC | Age: 70
End: 2025-03-14

## 2025-03-14 NOTE — RESULT ENCOUNTER NOTE
Robbin Ghosh,    Your x-ray has come back.  There is no significant joint space fluid which is good.  No fractures or dislocations.  It does show severe osteoarthritis which would be something to follow-up with the Orthopedic team for if it continues to cause you pain.  The lump on the medial side is just soft tissue swelling, continue with compression as we discussed.  Please keep us posted if this is not improving.    Enjoy your weekend.  -Dr. PRATHER

## 2025-03-28 ENCOUNTER — TELEPHONE (OUTPATIENT)
Age: 70
End: 2025-03-28

## 2025-03-28 ENCOUNTER — OFFICE VISIT (OUTPATIENT)
Dept: FAMILY MEDICINE CLINIC | Facility: CLINIC | Age: 70
End: 2025-03-28
Payer: COMMERCIAL

## 2025-03-28 ENCOUNTER — TELEPHONE (OUTPATIENT)
Dept: FAMILY MEDICINE CLINIC | Facility: CLINIC | Age: 70
End: 2025-03-28

## 2025-03-28 VITALS
BODY MASS INDEX: 33 KG/M2 | TEMPERATURE: 98.4 F | DIASTOLIC BLOOD PRESSURE: 80 MMHG | HEART RATE: 72 BPM | HEIGHT: 61 IN | SYSTOLIC BLOOD PRESSURE: 128 MMHG | OXYGEN SATURATION: 96 % | WEIGHT: 174.8 LBS | RESPIRATION RATE: 18 BRPM

## 2025-03-28 DIAGNOSIS — M62.838 MUSCLE SPASM: ICD-10-CM

## 2025-03-28 DIAGNOSIS — S13.4XXA NECK PAIN WITH NECK STIFFNESS AFTER WHIPLASH INJURY TO NECK: Primary | ICD-10-CM

## 2025-03-28 DIAGNOSIS — M05.79 RHEUMATOID ARTHRITIS INVOLVING MULTIPLE SITES WITH POSITIVE RHEUMATOID FACTOR (HCC): ICD-10-CM
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
## 2025-03-28 DIAGNOSIS — E11.69 TYPE 2 DIABETES MELLITUS WITH OTHER SPECIFIED COMPLICATION, UNSPECIFIED WHETHER LONG TERM INSULIN USE (HCC): ICD-10-CM

## 2025-03-28 DIAGNOSIS — F11.20 CONTINUOUS OPIOID DEPENDENCE (HCC): ICD-10-CM

## 2025-03-28 DIAGNOSIS — M54.9 ACUTE UPPER BACK PAIN: ICD-10-CM

## 2025-03-28 PROCEDURE — 99214 OFFICE O/P EST MOD 30 MIN: CPT | Performed by: NURSE PRACTITIONER

## 2025-03-28 RX ORDER — CYCLOBENZAPRINE HCL 10 MG
10 TABLET ORAL 2 TIMES DAILY PRN
Qty: 30 TABLET | Refills: 5 | Status: SHIPPED | OUTPATIENT
Start: 2025-03-28
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
## 2025-03-28 NOTE — TELEPHONE ENCOUNTER
BRIDGET pt called stating she is not on meloxicam.     Was prescribed cyclobenzaprine 10 mg on 10/21/24. Needs no med refills

## 2025-03-28 NOTE — ASSESSMENT & PLAN NOTE
See above.  Orders:    cyclobenzaprine (FLEXERIL) 10 mg tablet; Take 1 tablet (10 mg total) by mouth 2 (two) times a day as needed for muscle spasms    Ambulatory Referral to Physical Therapy; Future
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
## 2025-04-02 ENCOUNTER — TELEPHONE (OUTPATIENT)
Dept: FAMILY MEDICINE CLINIC | Facility: CLINIC | Age: 70
End: 2025-04-02

## 2025-04-02 ENCOUNTER — HOSPITAL ENCOUNTER (OUTPATIENT)
Dept: RADIOLOGY | Facility: HOSPITAL | Age: 70
Discharge: HOME | End: 2025-04-02
Payer: MEDICARE

## 2025-04-02 DIAGNOSIS — S13.4XXA NECK PAIN WITH NECK STIFFNESS AFTER WHIPLASH INJURY TO NECK: ICD-10-CM

## 2025-04-02 DIAGNOSIS — M54.9 ACUTE UPPER BACK PAIN: ICD-10-CM
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
## 2025-04-02 PROCEDURE — 72072 X-RAY EXAM THORAC SPINE 3VWS: CPT

## 2025-04-02 PROCEDURE — 72040 X-RAY EXAM NECK SPINE 2-3 VW: CPT

## 2025-04-03 ENCOUNTER — RESULTS FOLLOW-UP (OUTPATIENT)
Dept: FAMILY MEDICINE CLINIC | Facility: CLINIC | Age: 70
End: 2025-04-03

## 2025-04-03 NOTE — TELEPHONE ENCOUNTER
JD Diaz 2.5 MG/0.5ML SUBMITTED     to  [employer]     via    []CMM-KEY:    [x]Surescripts-Case ID # [id number]  []Availity-Auth ID #   NDC #    []Faxed to plan   []Other website    []Phone call Case ID #      []PA sent as URGENT    All office notes, labs and other pertaining documents and studies sent. Clinical questions answered. Awaiting determination from insurance company.     Turnaround time for your insurance to make a decision on your Prior Authorization can take 7-21 business days.

## 2025-04-04 NOTE — TELEPHONE ENCOUNTER
PA Mounjaro 2.5 MG/0.5ML APPROVED         Patient advised by          []MyChart Message  []Phone call   [x]LMOM  []L/M to call office as no active Communication consent on file  []Unable to leave detailed message as VM not approved on Communication consent       Pharmacy advised by    [x]Fax  []Phone call  []Secure Chat    Specialty Pharmacy    []

## 2025-04-07 ENCOUNTER — TELEPHONE (OUTPATIENT)
Age: 70
End: 2025-04-07

## 2025-04-07 NOTE — TELEPHONE ENCOUNTER
The patient called she would like here script for physical therapy faxed to [facility] at [phone]  att:  Jeni    thank you

## 2025-04-13 DIAGNOSIS — E11.9 CONTROLLED TYPE 2 DIABETES MELLITUS WITHOUT COMPLICATION, WITHOUT LONG-TERM CURRENT USE OF INSULIN (HCC): ICD-10-CM

## 2025-04-14 RX ORDER — METFORMIN HYDROCHLORIDE 500 MG/1
500 TABLET, EXTENDED RELEASE ORAL 3 TIMES DAILY
Qty: 270 TABLET | Refills: 1 | Status: SHIPPED | OUTPATIENT
Start: 2025-04-14

## 2025-04-21 ENCOUNTER — TELEPHONE (OUTPATIENT)
Age: 70
End: 2025-04-21

## 2025-04-21 NOTE — TELEPHONE ENCOUNTER
Patient called in about Mounjaro PA.  Advised that it was approved and sent to pharmacy by fax on 4/4.  She is at the pharmacy and they are working on it.    She will also need her RX for PT, sent to [employer].  This is to help expedite her claim.  She did not have contact information.      She will call back with updates.    IWONA.

## 2025-04-24 DIAGNOSIS — I10 ESSENTIAL HYPERTENSION: ICD-10-CM

## 2025-04-24 RX ORDER — METOPROLOL SUCCINATE 100 MG/1
100 TABLET, EXTENDED RELEASE ORAL DAILY
Qty: 90 TABLET | Refills: 1 | Status: SHIPPED | OUTPATIENT
Start: 2025-04-24

## 2025-04-25 ENCOUNTER — OFFICE VISIT (OUTPATIENT)
Dept: FAMILY MEDICINE CLINIC | Facility: CLINIC | Age: 70
End: 2025-04-25
Payer: MEDICARE

## 2025-04-25 VITALS
TEMPERATURE: 97.6 F | BODY MASS INDEX: 33.34 KG/M2 | RESPIRATION RATE: 16 BRPM | SYSTOLIC BLOOD PRESSURE: 112 MMHG | HEIGHT: 61 IN | DIASTOLIC BLOOD PRESSURE: 72 MMHG | OXYGEN SATURATION: 94 % | WEIGHT: 176.6 LBS | HEART RATE: 90 BPM

## 2025-04-25 DIAGNOSIS — M05.79 RHEUMATOID ARTHRITIS INVOLVING MULTIPLE SITES WITH POSITIVE RHEUMATOID FACTOR (HCC): ICD-10-CM

## 2025-04-25 DIAGNOSIS — J98.9 RESPIRATORY ILLNESS: ICD-10-CM

## 2025-04-25 DIAGNOSIS — R05.8 OTHER COUGH: Primary | ICD-10-CM

## 2025-04-25 DIAGNOSIS — J45.40 MODERATE PERSISTENT ASTHMA WITHOUT COMPLICATION: ICD-10-CM

## 2025-04-25 DIAGNOSIS — E11.69 TYPE 2 DIABETES MELLITUS WITH OTHER SPECIFIED COMPLICATION, UNSPECIFIED WHETHER LONG TERM INSULIN USE (HCC): ICD-10-CM

## 2025-04-25 PROBLEM — F11.20 CONTINUOUS OPIOID DEPENDENCE (HCC): Status: RESOLVED | Noted: 2021-11-17 | Resolved: 2025-04-25

## 2025-04-25 LAB
SARS-COV-2 AG UPPER RESP QL IA: NEGATIVE
VALID CONTROL: NORMAL

## 2025-04-25 PROCEDURE — G2211 COMPLEX E/M VISIT ADD ON: HCPCS | Performed by: FAMILY MEDICINE

## 2025-04-25 PROCEDURE — 87811 SARS-COV-2 COVID19 W/OPTIC: CPT | Performed by: FAMILY MEDICINE

## 2025-04-25 PROCEDURE — 99214 OFFICE O/P EST MOD 30 MIN: CPT | Performed by: FAMILY MEDICINE

## 2025-04-25 RX ORDER — FLUTICASONE PROPIONATE 50 MCG
1 SPRAY, SUSPENSION (ML) NASAL DAILY
Qty: 9.9 ML | Refills: 0 | Status: SHIPPED | OUTPATIENT
Start: 2025-04-25

## 2025-04-25 RX ORDER — ALBUTEROL SULFATE 90 UG/1
2 INHALANT RESPIRATORY (INHALATION) EVERY 6 HOURS PRN
Qty: 6.7 G | Refills: 0 | Status: SHIPPED | OUTPATIENT
Start: 2025-04-25

## 2025-04-25 RX ORDER — GUAIFENESIN 600 MG/1
600 TABLET, EXTENDED RELEASE ORAL EVERY 12 HOURS SCHEDULED
Qty: 15 TABLET | Refills: 0 | Status: SHIPPED | OUTPATIENT
Start: 2025-04-25

## 2025-04-25 NOTE — PROGRESS NOTES
Name: Davina Allen      : 1955      MRN: [id number]  Encounter Provider: Hernan Pandey MD  Encounter Date: 2025   Encounter department: [facility]  :  Assessment & Plan  Other cough  Cough x 1 day. COVID negative. Supportive care. Likely viral. Asked to call if symptoms do not resolve  Orders:    POCT Rapid Covid Ag    fluticasone (FLONASE) 50 mcg/act nasal spray; 1 spray into each nostril daily    guaiFENesin (MUCINEX) 600 mg 12 hr tablet; Take 1 tablet (600 mg total) by mouth every 12 (twelve) hours    albuterol (Ventolin HFA) 90 mcg/act inhaler; Inhale 2 puffs every 6 (six) hours as needed for wheezing or shortness of breath    Rheumatoid arthritis involving multiple sites with positive rheumatoid factor (HCC)    Orders:    albuterol (Ventolin HFA) 90 mcg/act inhaler; Inhale 2 puffs every 6 (six) hours as needed for wheezing or shortness of breath    Type 2 diabetes mellitus with other specified complication, unspecified whether long term insulin use ([facility])    Lab Results   Component Value Date    HGBA1C 9.1 (A) 2025   Suboptimal. Follows with endocrine. Continue metformin 500 mg TID            Respiratory illness  NAD. Supportive care.  Orders:    fluticasone (FLONASE) 50 mcg/act nasal spray; 1 spray into each nostril daily    guaiFENesin (MUCINEX) 600 mg 12 hr tablet; Take 1 tablet (600 mg total) by mouth every 12 (twelve) hours    Moderate persistent asthma without complication    Orders:    guaiFENesin (MUCINEX) 600 mg 12 hr tablet; Take 1 tablet (600 mg total) by mouth every 12 (twelve) hours    albuterol (Ventolin HFA) 90 mcg/act inhaler; Inhale 2 puffs every 6 (six) hours as needed for wheezing or shortness of breath           History of Present Illness   Presents with cough, nasal congestion, sinus pressure, and allergy symptoms.  Started yesterday   Did a nebulizer treatment this am and felt better  Cough is productive   Chest hurts  No fevers or wheezing   Nasal  "discharge is clear  No GI symptom or history of asthma   Hisotry of asthma      Wheezing  Associated symptoms include wheezing.     Review of Systems   Respiratory:  Positive for wheezing.        Objective   /72 (BP Location: Left arm, Patient Position: Sitting, Cuff Size: Standard)   Pulse 90   Temp 97.6 °F (36.4 °C) (Tympanic)   Resp 16   Ht 5' 1\" (1.549 m)   Wt 80.1 kg (176 lb 9.6 oz)   SpO2 94%   BMI 33.37 kg/m²      Physical Exam  Vitals reviewed.   Constitutional:       Appearance: Normal appearance.   HENT:      Head: Normocephalic.      Right Ear: Tympanic membrane normal.      Left Ear: Tympanic membrane normal.      Nose: Congestion present.      Mouth/Throat:      Mouth: Mucous membranes are moist.   Eyes:      Extraocular Movements: Extraocular movements intact.   Cardiovascular:      Rate and Rhythm: Normal rate and regular rhythm.      Heart sounds: No murmur heard.  Pulmonary:      Effort: Pulmonary effort is normal.   Lymphadenopathy:      Cervical: No cervical adenopathy.   Skin:     General: Skin is warm.   Neurological:      Mental Status: She is alert.   Psychiatric:         Mood and Affect: Mood normal.         Behavior: Behavior normal.         Thought Content: Thought content normal.       Administrative Statements   I have spent a total time of 24 minutes in caring for this patient on the day of the visit/encounter including Risks and benefits of tx options, Instructions for management, Patient and family education, Importance of tx compliance, Risk factor reductions, Impressions, Counseling / Coordination of care, Documenting in the medical record, Reviewing/placing orders in the medical record (including tests, medications, and/or procedures), and Obtaining or reviewing history  .  "

## 2025-04-28 NOTE — ASSESSMENT & PLAN NOTE
Lab Results   Component Value Date    HGBA1C 9.1 (A) 03/12/2025   Suboptimal. Follows with endocrine. Continue metformin 500 mg TID

## 2025-04-28 NOTE — ASSESSMENT & PLAN NOTE
Orders:    guaiFENesin (MUCINEX) 600 mg 12 hr tablet; Take 1 tablet (600 mg total) by mouth every 12 (twelve) hours    albuterol (Ventolin HFA) 90 mcg/act inhaler; Inhale 2 puffs every 6 (six) hours as needed for wheezing or shortness of breath

## 2025-04-30 ENCOUNTER — TELEPHONE (OUTPATIENT)
Dept: FAMILY MEDICINE CLINIC | Facility: CLINIC | Age: 70
End: 2025-04-30

## 2025-04-30 NOTE — TELEPHONE ENCOUNTER
Reason for call:   [x] Refill   [] Prior Auth  [] Other: Not on med list - buys OTC cheaper for pt to get Rx for them     Office:   [x] PCP/Provider -   [] Specialty/Provider -     Medication: One touch ultra delica test strips once daily         Pharmacy: [facility]     Local Pharmacy   Does the patient have enough for 3 days?   [] Yes   [x] No - Send as HP to POD    Mail Away Pharmacy   Does the patient have enough for 10 days?   [] Yes   [] No - Send as HP to POD

## 2025-05-01 DIAGNOSIS — E11.69 TYPE 2 DIABETES MELLITUS WITH OTHER SPECIFIED COMPLICATION, UNSPECIFIED WHETHER LONG TERM INSULIN USE (HCC): Primary | ICD-10-CM

## 2025-05-01 RX ORDER — BLOOD SUGAR DIAGNOSTIC
1 STRIP MISCELLANEOUS DAILY
Qty: 100 EACH | Refills: 3 | Status: SHIPPED | OUTPATIENT
Start: 2025-05-01

## 2025-05-01 RX ORDER — BLOOD SUGAR DIAGNOSTIC
1 STRIP MISCELLANEOUS DAILY
COMMUNITY
End: 2025-05-01 | Stop reason: SDUPTHER

## 2025-05-06 ENCOUNTER — TELEPHONE (OUTPATIENT)
Dept: ADMINISTRATIVE | Facility: OTHER | Age: 70
End: 2025-05-06

## 2025-05-06 ENCOUNTER — RA CDI HCC (OUTPATIENT)
Dept: OTHER | Facility: HOSPITAL | Age: 70
End: 2025-05-06

## 2025-05-06 NOTE — TELEPHONE ENCOUNTER
05/06/25 2:16 PM    Patient contacted to bring Advance Directive, POLST, or Living Will document to next scheduled pcp visit.[facility] Department left message.    Thank you.  Norma Becerril MA  [facility]

## 2025-05-07 ENCOUNTER — OFFICE VISIT (OUTPATIENT)
Dept: FAMILY MEDICINE CLINIC | Facility: CLINIC | Age: 70
End: 2025-05-07
Payer: MEDICARE

## 2025-05-07 VITALS
HEIGHT: 61 IN | DIASTOLIC BLOOD PRESSURE: 80 MMHG | OXYGEN SATURATION: 95 % | SYSTOLIC BLOOD PRESSURE: 122 MMHG | WEIGHT: 174.6 LBS | RESPIRATION RATE: 18 BRPM | BODY MASS INDEX: 32.97 KG/M2 | TEMPERATURE: 98.9 F | HEART RATE: 79 BPM

## 2025-05-07 DIAGNOSIS — E78.2 MIXED HYPERLIPIDEMIA: ICD-10-CM

## 2025-05-07 DIAGNOSIS — J45.40 MODERATE PERSISTENT ASTHMA WITHOUT COMPLICATION: ICD-10-CM

## 2025-05-07 DIAGNOSIS — E11.9 CONTROLLED TYPE 2 DIABETES MELLITUS WITHOUT COMPLICATION, WITHOUT LONG-TERM CURRENT USE OF INSULIN (HCC): ICD-10-CM

## 2025-05-07 DIAGNOSIS — I10 BENIGN ESSENTIAL HYPERTENSION: Primary | ICD-10-CM

## 2025-05-07 PROCEDURE — G2211 COMPLEX E/M VISIT ADD ON: HCPCS | Performed by: FAMILY MEDICINE

## 2025-05-07 PROCEDURE — 99214 OFFICE O/P EST MOD 30 MIN: CPT | Performed by: FAMILY MEDICINE

## 2025-05-07 RX ORDER — BLOOD-GLUCOSE METER
KIT MISCELLANEOUS
Qty: 1 KIT | Refills: 0 | Status: SHIPPED | OUTPATIENT
Start: 2025-05-07

## 2025-05-07 RX ORDER — METFORMIN HYDROCHLORIDE 500 MG/1
500 TABLET, EXTENDED RELEASE ORAL 3 TIMES DAILY
Start: 2025-05-07

## 2025-05-07 RX ORDER — OXYCODONE AND ACETAMINOPHEN 5; 325 MG/1; MG/1
1.5 TABLET ORAL DAILY
COMMUNITY
Start: 2025-05-02

## 2025-05-07 RX ORDER — TIRZEPATIDE 5 MG/.5ML
5 INJECTION, SOLUTION SUBCUTANEOUS WEEKLY
Qty: 2 ML | Refills: 0 | Status: SHIPPED | OUTPATIENT
Start: 2025-05-07

## 2025-05-07 RX ORDER — LANCETS 33 GAUGE
EACH MISCELLANEOUS
Qty: 100 EACH | Refills: 3 | Status: SHIPPED | OUTPATIENT
Start: 2025-05-07

## 2025-05-07 RX ORDER — BLOOD SUGAR DIAGNOSTIC
STRIP MISCELLANEOUS
Qty: 100 EACH | Refills: 3 | Status: SHIPPED | OUTPATIENT
Start: 2025-05-07
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
## 2025-05-08 ENCOUNTER — TELEPHONE (OUTPATIENT)
Age: 70
End: 2025-05-08

## 2025-05-08 NOTE — TELEPHONE ENCOUNTER
Thanh from [facility] PA dept called to say that they received the PA request for the mounjaro 5mg, however the PA is coming up as already approved since a PA was completed on 4/3 for the 2.5 dose, it will extend to the 5mg and the PA will be good until 12/31/2025.    ThankYou

## 2025-05-08 NOTE — TELEPHONE ENCOUNTER
PA for Tirzepatide (Mounjaro) 5 MG/0.5ML SOAJ SUBMITTED to     via      [x][employer]-Case ID # [id number]       [x]PA sent as URGENT    All office notes, labs and other pertaining documents and studies sent. Clinical questions answered. Awaiting determination from insurance company.     Turnaround time for your insurance to make a decision on your Prior Authorization can take 7-21 business days.

## 2025-05-09 NOTE — TELEPHONE ENCOUNTER
PA for MOUNJARO 5MG CANCELLED due to     [x]Approval on file-dates approved 4/3/2025-12/31/2025            Scanned into Media  yes

## 2025-05-21 ENCOUNTER — TELEPHONE (OUTPATIENT)
Dept: FAMILY MEDICINE CLINIC | Facility: CLINIC | Age: 70
End: 2025-05-21

## 2025-05-21 NOTE — TELEPHONE ENCOUNTER
Patient calling regarding glucose machine and test strips. Made aware they have been sent to pharmacy 5/7/25.

## 2025-06-11 ENCOUNTER — NURSE TRIAGE (OUTPATIENT)
Age: 70
End: 2025-06-11

## 2025-06-11 NOTE — TELEPHONE ENCOUNTER
"REASON FOR CONVERSATION: Foot Injury    SYMPTOMS: Last night, the patient dropped a dish from the counter onto her left foot.  Dish did not break.  There is no open wound.  [facility] was a [employer] dinner plate.     Plate hit, primarily the left great toe joint.  She is endorsing bruising.  She denies any swelling.  She is able to bend the toe and weight bear.      Her  urged her to call, to see if an X-ray is necessary.      OTHER HEALTH INFORMATION: Patient is taking ibuprofen for discomfort.      PROTOCOL DISPOSITION: Home Care    CARE ADVICE PROVIDED: Elevation, ice and rest as needed.      PRACTICE FOLLOW-UP: Patient does not really feel she needs an X-ray.  Home care advice give.  Please advise on any further recommendations.        Reason for Disposition   Minor injury or pain from direct blow or crushing injury    Answer Assessment - Initial Assessment Questions  1. MECHANISM: \"How did the injury happen?\" (e.g., twisting injury, direct blow)       Dropped a dish from the counter to her left foot.   2. ONSET: \"When did the injury happen?\" (e.g., minutes or hours ago)       Last night   3. LOCATION: \"Where is the injury located?\"       Joint of the big toe  4. APPEARANCE of INJURY: \"What does the injury look like?\"       Bruised   5. WEIGHT-BEARING: \"Can you put weight on that foot?\" \"Can you walk (four steps or more)?\"        Yes   6. SIZE: For cuts, bruises, or swelling, ask: \"How large is it?\" (e.g., inches or centimeters;  entire joint)       2 inches   7. PAIN: \"Is there pain?\" If Yes, ask: \"How bad is the pain?\" \"What does it keep you from doing?\" (Scale 0-10; or none, mild, moderate, severe)      \"Sore\"   8. TETANUS: For any breaks in the skin, ask: \"When was the last tetanus booster?\"      Did not break skin.   9. OTHER SYMPTOMS: \"Do you have any other symptoms?\"   Denies    Protocols used: Foot Injury-Adult-[place]    "

## 2025-06-12 ENCOUNTER — OFFICE VISIT (OUTPATIENT)
Dept: FAMILY MEDICINE CLINIC | Facility: CLINIC | Age: 70
End: 2025-06-12
Payer: MEDICARE

## 2025-06-12 ENCOUNTER — HOSPITAL ENCOUNTER (OUTPATIENT)
Dept: RADIOLOGY | Facility: HOSPITAL | Age: 70
Discharge: HOME | End: 2025-06-12
Payer: MEDICARE

## 2025-06-12 VITALS
OXYGEN SATURATION: 96 % | TEMPERATURE: 97.9 F | BODY MASS INDEX: 31.53 KG/M2 | RESPIRATION RATE: 16 BRPM | HEART RATE: 77 BPM | DIASTOLIC BLOOD PRESSURE: 66 MMHG | WEIGHT: 167 LBS | HEIGHT: 61 IN | SYSTOLIC BLOOD PRESSURE: 122 MMHG

## 2025-06-12 DIAGNOSIS — M79.675 GREAT TOE PAIN, LEFT: Primary | ICD-10-CM

## 2025-06-12 DIAGNOSIS — I10 ESSENTIAL HYPERTENSION: ICD-10-CM

## 2025-06-12 PROCEDURE — G2211 COMPLEX E/M VISIT ADD ON: HCPCS | Performed by: FAMILY MEDICINE

## 2025-06-12 PROCEDURE — 99213 OFFICE O/P EST LOW 20 MIN: CPT | Performed by: FAMILY MEDICINE

## 2025-06-12 PROCEDURE — 73630 X-RAY EXAM OF FOOT: CPT

## 2025-06-12 RX ORDER — ESTRADIOL 0.1 MG/G
0.1 CREAM VAGINAL 3 TIMES WEEKLY
COMMUNITY
Start: 2025-05-24

## 2025-06-12 RX ORDER — AMLODIPINE BESYLATE 10 MG/1
10 TABLET ORAL DAILY
Qty: 90 TABLET | Refills: 1 | Status: SHIPPED | OUTPATIENT
Start: 2025-06-12

## 2025-06-13 RX ORDER — HYDROCHLOROTHIAZIDE 25 MG/1
25 TABLET ORAL DAILY
Qty: 90 TABLET | Refills: 1 | Status: SHIPPED | OUTPATIENT
Start: 2025-06-13

## 2025-06-16 DIAGNOSIS — E11.9 CONTROLLED TYPE 2 DIABETES MELLITUS WITHOUT COMPLICATION, WITHOUT LONG-TERM CURRENT USE OF INSULIN (HCC): ICD-10-CM

## 2025-06-17 NOTE — TELEPHONE ENCOUNTER
LM for patient to return call. We can send next dose if she is OK with that and not having any side effects

## 2025-06-18 DIAGNOSIS — E11.9 CONTROLLED TYPE 2 DIABETES MELLITUS WITHOUT COMPLICATION, WITHOUT LONG-TERM CURRENT USE OF INSULIN (HCC): Primary | ICD-10-CM

## 2025-06-18 RX ORDER — TIRZEPATIDE 5 MG/.5ML
5 INJECTION, SOLUTION SUBCUTANEOUS WEEKLY
Qty: 2 ML | Refills: 0 | OUTPATIENT
Start: 2025-06-18

## 2025-06-18 RX ORDER — TIRZEPATIDE 7.5 MG/.5ML
7.5 INJECTION, SOLUTION SUBCUTANEOUS WEEKLY
Qty: 2 ML | Refills: 0 | Status: SHIPPED | OUTPATIENT
Start: 2025-06-18

## 2025-06-18 NOTE — TELEPHONE ENCOUNTER
Patient called back informed of message and she is okay with next dosage being sent and stated not having any side effects. Please advise. Thank you

## 2025-06-19 ENCOUNTER — TELEMEDICINE (OUTPATIENT)
Age: 70
End: 2025-06-19
Payer: MEDICARE

## 2025-06-19 ENCOUNTER — TELEPHONE (OUTPATIENT)
Age: 70
End: 2025-06-19

## 2025-06-19 DIAGNOSIS — H10.023 PINK EYE DISEASE OF BOTH EYES: Primary | ICD-10-CM

## 2025-06-19 PROCEDURE — 99213 OFFICE O/P EST LOW 20 MIN: CPT | Performed by: INTERNAL MEDICINE

## 2025-06-19 PROCEDURE — G2211 COMPLEX E/M VISIT ADD ON: HCPCS | Performed by: INTERNAL MEDICINE

## 2025-06-19 RX ORDER — OFLOXACIN 3 MG/ML
1 SOLUTION/ DROPS OPHTHALMIC 4 TIMES DAILY
Qty: 5 ML | Refills: 0 | Status: SHIPPED | OUTPATIENT
Start: 2025-06-19 | End: 2025-06-24

## 2025-06-19 NOTE — TELEPHONE ENCOUNTER
JD Diaz 7.5 MG/0.5ML SUBMITTED    to [facility]     via    []CMM-KEY:    [x][employer]-Case ID # [id number]  []Availity-Auth ID #  NDC #    []Faxed to plan   []Other website    []Phone call Case ID #      []PA sent as URGENT    All office notes, labs and other pertaining documents and studies sent. Clinical questions answered. Awaiting determination from insurance company.     Turnaround time for your insurance to make a decision on your Prior Authorization can take 7-21 business days.

## 2025-06-19 NOTE — PROGRESS NOTES
Virtual Regular Visit  Name: Davina Allen      : 1955      MRN: [id number]  Encounter Provider: Yanelis Eastman MD  Encounter Date: 2025   Encounter department: [facility]  :  Assessment & Plan  Pink eye disease of both eyes  Possible viral/allergic etiology, will give antibiotic eyedrops as the patient has noticed symptom improvement with leftover prescriptions.  Recommend eye hygiene, avoid eye make-up, follow-up with primary PCP for persistent symptoms  Orders:    ofloxacin (OCUFLOX) 0.3 % ophthalmic solution; Administer 1 drop to both eyes 4 (four) times a day for 5 days        History of Present Illness     Conjunctivitis   Associated symptoms include eye itching, eye discharge and eye redness. Pertinent negatives include no fever, no photophobia, no abdominal pain, no constipation, no diarrhea, no nausea, no vomiting, no congestion, no ear discharge, no ear pain, no headaches, no hearing loss, no rhinorrhea, no sore throat, no stridor, no cough, no wheezing and no eye pain.       Review of Systems   Constitutional:  Negative for activity change, appetite change, chills, diaphoresis, fatigue, fever and unexpected weight change.   HENT:  Negative for congestion, drooling, ear discharge, ear pain, facial swelling, hearing loss, postnasal drip, rhinorrhea, sinus pressure, sinus pain, sneezing, sore throat, tinnitus, trouble swallowing and voice change.    Eyes:  Positive for discharge, redness and itching. Negative for photophobia, pain and visual disturbance.   Respiratory:  Negative for apnea, cough, choking, chest tightness, shortness of breath, wheezing and stridor.    Cardiovascular:  Negative for chest pain, palpitations and leg swelling.   Gastrointestinal:  Negative for abdominal distention, abdominal pain, anal bleeding, blood in stool, constipation, diarrhea, nausea and vomiting.   Genitourinary:  Negative for decreased urine volume, difficulty urinating,  frequency and urgency.   Musculoskeletal:  Negative for arthralgias, back pain and myalgias.   Skin:  Negative for color change.   Neurological:  Negative for dizziness, light-headedness, numbness and headaches.       Objective   There were no vitals taken for this visit.      Physical Exam  Constitutional:       General: She is not in acute distress.     Appearance: Normal appearance. She is not ill-appearing, toxic-appearing or diaphoretic.     Eyes:      Conjunctiva/sclera:      Right eye: Right conjunctiva is injected. Exudate present.      Left eye: Left conjunctiva is injected. Exudate present.       Neurological:      Mental Status: She is alert and oriented to person, place, and time.       Administrative Statements   Encounter provider Yanelis Eastman MD    The Patient is located at Home and in the following state in which I hold an active license [place].    The patient was identified by name and date of birth. Davina Allen was informed that this is a telemedicine visit and that the visit is being conducted through the Epic Embedded platform. She agrees to proceed..  My office door was closed. No one else was in the room.  She acknowledged consent and understanding of privacy and security of the video platform. The patient has agreed to participate and understands they can discontinue the visit at any time.    I have spent a total time of 15 minutes in caring for this patient on the day of the visit/encounter including Diagnostic results, Prognosis, Risks and benefits of tx options, Instructions for management, Patient and family education, Importance of tx compliance, Risk factor reductions, Impressions, Counseling / Coordination of care, Documenting in the medical record, Reviewing/placing orders in the medical record (including tests, medications, and/or procedures), and Obtaining or reviewing history  , not including the time spent for establishing the audio/video connection.

## 2025-06-20 NOTE — PROGRESS NOTES
Name: Davina Allen      : 1955      MRN: [id number]  Encounter Provider: Uriel George DO  Encounter Date: 2025   Encounter department: [facility]    Assessment & Plan  Great toe pain, left  - Noting toe pain and left great toe after recent trauma.  Mild bruising at the base of the great toe.  -Discussed roger taping, exercises and stretching.  -Will get x-ray to further evaluate.  -Continue with more stiff soled shoe for better support.  -Will follow-up with results.  Orders:  •  XR foot 3+ vw left    Essential hypertension  Stable.  Refill provided for amlodipine 10 mg p.o. daily.  Orders:  •  amLODIPine (NORVASC) 10 mg tablet; Take 1 tablet (10 mg total) by mouth daily         History of Present Illness     Toe Pain   The incident occurred 3 to 5 days ago. The injury mechanism was a direct blow. The pain is present in the left toes. The quality of the pain is described as cramping. The pain is at a severity of 1/10. The pain is mild. The pain has been Improving since onset. She reports no foreign bodies present. The symptoms are aggravated by palpation. She has tried elevation and ice for the symptoms. The treatment provided mild relief.     Review of Systems   Constitutional:  Negative for chills and fever.   HENT:  Negative for ear pain and sore throat.    Eyes:  Negative for pain and visual disturbance.   Respiratory:  Negative for cough and shortness of breath.    Cardiovascular:  Negative for chest pain and palpitations.   Gastrointestinal:  Negative for abdominal pain and vomiting.   Genitourinary:  Negative for dysuria and hematuria.   Musculoskeletal:  Negative for arthralgias and back pain.        Left toe pain   Skin:  Negative for color change and rash.   Neurological:  Negative for seizures and syncope.   Psychiatric/Behavioral:  Negative for confusion.    All other systems reviewed and are negative.    Past Medical History[1]  Past Surgical History[2]  Family  "History[3]  Social History[4]  Medications[5]  Allergies   Allergen Reactions   • Zithromax [Azithromycin] Hives   • Augmentin [Amoxicillin-Pot Clavulanate] Vomiting   • Lisinopril Cough   • Cefprozil Diarrhea   • Cyclobenzaprine GI Intolerance     Immunization History   Administered Date(s) Administered   • COVID-19 MODERNA VACC 0.5 ML IM 01/21/2021, 02/19/2021, 08/26/2021   • INFLUENZA 01/01/2005, 10/16/2008, 09/23/2009, 10/29/2015, 10/01/2016, 11/21/2016, 10/02/2017, 09/07/2018, 11/15/2020, 10/05/2022   • Influenza Quadrivalent Preservative Free 3 years and older IM 10/29/2015, 10/29/2015, 11/21/2016, 10/02/2017   • Influenza Split High Dose Preservative Free IM 11/18/2024   • Influenza, high dose seasonal 0.7 mL 10/15/2019, 11/25/2020, 11/17/2021, 12/22/2023   • Influenza, seasonal, injectable 1955, 01/01/2014   • Pneumococcal Conjugate 13-Valent 01/03/2018   • Pneumococcal Conjugate Vaccine 20-valent (Pcv20), Polysace 11/30/2022   • Pneumococcal Polysaccharide PPV23 10/29/2015, 12/28/2016   • Tdap 07/07/2018, 08/26/2021   • Zoster Vaccine Recombinant 06/28/2019, 10/04/2019     Objective   /66 (BP Location: Left arm, Patient Position: Sitting, Cuff Size: Large)   Pulse 77   Temp 97.9 °F (36.6 °C) (Tympanic)   Resp 16   Ht 5' 1\" (1.549 m)   Wt 75.8 kg (167 lb)   SpO2 96%   BMI 31.55 kg/m²     Physical Exam  Vitals and nursing note reviewed.   Constitutional:       General: She is not in acute distress.     Appearance: Normal appearance.   HENT:      Head: Normocephalic and atraumatic.      Right Ear: Tympanic membrane and external ear normal.      Left Ear: Tympanic membrane and external ear normal.      Nose: Nose normal.      Mouth/Throat:      Mouth: Mucous membranes are moist.     Eyes:      Extraocular Movements: Extraocular movements intact.      Conjunctiva/sclera: Conjunctivae normal.      Pupils: Pupils are equal, round, and reactive to light.       Cardiovascular:      Rate and " Rhythm: Normal rate and regular rhythm.      Pulses: Normal pulses.      Heart sounds: No murmur heard.  Pulmonary:      Effort: Pulmonary effort is normal.      Breath sounds: Normal breath sounds. No wheezing, rhonchi or rales.   Abdominal:      General: Bowel sounds are normal.      Palpations: Abdomen is soft.      Tenderness: There is no abdominal tenderness. There is no guarding.     Musculoskeletal:         General: Normal range of motion.      Cervical back: Normal range of motion.      Right lower leg: No edema.      Left lower leg: No edema.      Comments: Left toe: 5 out of 5 strength in flexion extension, mild bruising at the base of great toe.  No evidence of infection,   Lymphadenopathy:      Cervical: No cervical adenopathy.     Skin:     General: Skin is warm.      Capillary Refill: Capillary refill takes less than 2 seconds.     Neurological:      General: No focal deficit present.      Mental Status: She is alert and oriented to person, place, and time.     Psychiatric:         Mood and Affect: Mood normal.         Behavior: Behavior normal.                [1]  Past Medical History:  Diagnosis Date   • Acute non-recurrent frontal sinusitis 05/15/2019   • Acute non-recurrent maxillary sinusitis 03/10/2020   • Allergic In my 60’s   • Anxiety In my 50’s   • Anxiety disorder    • Arthritis In my 50’s    OA   • Asthma In my 50’s    exercise iniduced.   • Callus    • Cancer (HCC) In my 60’s    Squamous Cell on back   • Colon polyp    • COVID-19 10/04/2023   • Dependence on crutches     prn   • Depression In my late 50’s    ro.    • Diabetes mellitus (HCC) In my 60’s    NIDDM; per [employer]: Last Assessed: 7/15/15, New onset of type 2   • Diverticulitis of colon In my 60’s   • Fibromyalgia    • Fractures    • Hearing aid worn     mayra   • Heart murmur In my 40’s   • HL (hearing loss)     b/l - uses hearing aids   • Hyperglycemia     Last Assessed:10/29/15   • Hyperlipidemia    • Hypertension In my 40’s    • Inflammatory bowel disease In my 60’s   • Irritable bowel syndrome    • LLQ abdominal pain 2020   • Pneumonia May 2023   • Psoriasis     red dry patch left side- waist area   • Psoriatic arthritis (HCC)    • RA (rheumatoid arthritis) (HCC)    • Skin disorder    • Stomach disorder    • Urinary tract infection Currently    Taking med   • Wears glasses     reading   [2]  Past Surgical History:  Procedure Laterality Date   • ANKLE SURGERY     • ARTHROSCOPY KNEE Left 2016    Procedure: ARTHROSCOPY KNEE;  Surgeon: Daniel Laureano MD;  Location: [facility];  Service:    • CAROTID ENDARTERECTOMY     •  SECTION  1986 and Aug 15, 1989    X2   • COLONOSCOPY  2019   • CORONARY ANGIOPLASTY     • CYST REMOVAL Right     Cyst on bone    • FRACTURE SURGERY      (Right) tibia/fibula Fx, and ankle   • PILONIDAL CYST / SINUS EXCISION     • MN ARTHRS KNE SURG W/MENISCECTOMY MED/LAT W/SHVG Left 2016    Procedure: PARTIAL MEDIAL &  LATERAL MENISCECTOMIES, CHONDROPLASTY, REMOVAL OSTEOPHYTE, LIMITED SYNOVECTOMY;  Surgeon: Daniel Laureano MD;  Location: [facility];  Service: Orthopedics   • REPAIR KNEE LIGAMENT     • TOOTH EXTRACTION     [3]  Family History  Problem Relation Name Age of Onset   • Hypertension Mother Alba Beltre    • Heart attack Mother Alba Beltre    • Osteoarthritis Mother Alba Beltre    • Breast cancer Mother Alba Beltre 82   • Arthritis Mother Alba Beltre    • Cancer Mother Alba Beltre    • Rheumatologic disease Mother lAba Beltre    • Psoriasis Mother Alba Beltre    • Rashes / Skin problems Mother Alba Beltre    • Miscarriages / Stillbirths Mother Alba Beltre    • Alcohol abuse Father Sam Beltre    • Diabetes Sister Loida Avalos    • No Known Problems Daughter     • No Known Problems Daughter     • No Known Problems Maternal Grandmother     • Heart attack Maternal Grandfather Ang Beltre    • Asthma Paternal Grandmother Yoanna Whittington    • Asthma  Paternal Grandfather Ang Beltre    • Hypertension Brother Sam         Triple bypass   • Diabetes Brother Sam    • Coronary artery disease Brother Sam         Diabetes   • No Known Problems Maternal Aunt     • No Known Problems Paternal Aunt     • No Known Problems Paternal Aunt     • No Known Problems Paternal Aunt     • No Known Problems Paternal Aunt     • No Known Problems Paternal Aunt     • Heart attack Maternal Uncle Joseph Alicia    [4]  Social History  Tobacco Use   • Smoking status: Former     Current packs/day: 0.00     Average packs/day: 2.0 packs/day for 25.0 years (50.0 ttl pk-yrs)     Types: Cigarettes     Start date:      Quit date:      Years since quittin.4     Passive exposure: Past   • Smokeless tobacco: Never   Vaping Use   • Vaping status: Never Used   Substance and Sexual Activity   • Alcohol use: No   • Drug use: No   • Sexual activity: Yes     Partners: Male     Birth control/protection: Post-menopausal   [5]  Current Outpatient Medications on File Prior to Visit   Medication Sig   • albuterol (Ventolin HFA) 90 mcg/act inhaler Inhale 2 puffs every 6 (six) hours as needed for wheezing or shortness of breath   • ALPRAZolam (XANAX) 0.5 mg tablet Take 0.5 tablets (0.25 mg total) by mouth daily   • aspirin (ECOTRIN LOW STRENGTH) 81 mg EC tablet Take 1 tablet (81 mg total) by mouth daily for 19 doses   • atorvastatin (LIPITOR) 40 mg tablet take 1 tablet by mouth every morning   • Blood Glucose Monitoring Suppl (OneTouch Verio Reflect) w/Device KIT Check blood sugars once daily. Please substitute with appropriate alternative as covered by patient's insurance. Dx: E11.65   • budesonide-formoterol (Symbicort) 160-4.5 mcg/act inhaler Inhale 2 puffs 2 (two) times a day Rinse mouth after use.   • cholestyramine (QUESTRAN) 4 g packet Take 1 packet (4 g total) by mouth 3 (three) times a day with meals   • ergocalciferol (VITAMIN D2) 50,000 units take 1 capsule by mouth every week   •  escitalopram (LEXAPRO) 10 mg tablet take 1/2 tablet by mouth once daily   • estradiol (ESTRACE) 0.1 mg/g vaginal cream Insert 0.1 g into the vagina 3 (three) times a week   • glucose blood (OneTouch Verio) test strip Check blood sugars once daily. Please substitute with appropriate alternative as covered by patient's insurance. Dx: E11.65   • losartan (COZAAR) 50 mg tablet take 1 tablet once daily as directed   • metFORMIN (GLUCOPHAGE-XR) 500 mg 24 hr tablet Take 1 tablet (500 mg total) by mouth 3 (three) times a day   • metoprolol succinate (TOPROL-XL) 100 mg 24 hr tablet take 1 tablet by mouth once daily   • mometasone (ELOCON) 0.1 % cream Apply topically daily   • Multiple Vitamin (MULTIVITAMIN ADULT PO)    • OneTouch Delica Lancets 33G MISC Check blood sugars once daily. Please substitute with appropriate alternative as covered by patient's insurance. Dx: E11.65   • oxyCODONE-acetaminophen (PERCOCET) 5-325 mg per tablet Take 1.5 tablets by mouth in the morning.   • cyclobenzaprine (FLEXERIL) 10 mg tablet Take 1 tablet (10 mg total) by mouth 2 (two) times a day as needed for muscle spasms   • fluticasone (FLONASE) 50 mcg/act nasal spray 1 spray into each nostril daily   • guaiFENesin (MUCINEX) 600 mg 12 hr tablet Take 1 tablet (600 mg total) by mouth every 12 (twelve) hours   • leflunomide (ARAVA) 10 MG tablet Take 10 mg by mouth daily

## 2025-06-23 ENCOUNTER — TELEPHONE (OUTPATIENT)
Age: 70
End: 2025-06-23

## 2025-06-23 NOTE — TELEPHONE ENCOUNTER
Patient calling to follow up after picking up   Tirzepatide (Mounjaro) 7.5 MG/0.5ML SOAJ which she is due for today. Patient reports she is very nervous about increasing the dosage and is now wishing she could stay on the 5 mg because of her on and off nausea as well as lack of appetite. Is concerned the increased dose is going to exacerbate these things for her and did not realize staying at the 5 mg was an option. Patient is wondering if there is any way she would be able to stay on the 5 mg Mounjaro. Reports she has been taking her as needed zofran to help with the nausea and states it helps a little. Is there any way to be put back on the 5 mg? Read online that she can delay injection up to 3 days. Please reach out to patient with provider response .

## 2025-06-24 NOTE — TELEPHONE ENCOUNTER
Patient returned call and was made aware that we can send order for the mounjaro 5 mg but due to that she has already picked up the 7.5 mg her insurance may not cover it. Patient will call insurance and check with them and will call us back.

## 2025-07-17 DIAGNOSIS — E11.9 CONTROLLED TYPE 2 DIABETES MELLITUS WITHOUT COMPLICATION, WITHOUT LONG-TERM CURRENT USE OF INSULIN (HCC): ICD-10-CM

## 2025-07-18 RX ORDER — TIRZEPATIDE 7.5 MG/.5ML
7.5 INJECTION, SOLUTION SUBCUTANEOUS WEEKLY
Qty: 2 ML | Refills: 0 | Status: SHIPPED | OUTPATIENT
Start: 2025-07-18

## 2025-07-18 NOTE — TELEPHONE ENCOUNTER
Patient called, she requested refill of mounjaro 7.5mg, she feels like she is losing muscle mass and would like to go off medication.  Wondering if she can just stop it or should she go back to 5mg and slowly wean off.

## 2025-07-29 ENCOUNTER — TELEPHONE (OUTPATIENT)
Dept: FAMILY MEDICINE CLINIC | Facility: CLINIC | Age: 70
End: 2025-07-29

## 2025-07-29 ENCOUNTER — APPOINTMENT (OUTPATIENT)
Dept: LAB | Facility: AMBULARY SURGERY CENTER | Age: 70
End: 2025-07-29
Payer: MEDICARE

## 2025-07-29 DIAGNOSIS — E78.2 MIXED HYPERLIPIDEMIA: ICD-10-CM

## 2025-07-29 DIAGNOSIS — E11.9 CONTROLLED TYPE 2 DIABETES MELLITUS WITHOUT COMPLICATION, WITHOUT LONG-TERM CURRENT USE OF INSULIN (HCC): ICD-10-CM

## 2025-07-29 DIAGNOSIS — I10 BENIGN ESSENTIAL HYPERTENSION: ICD-10-CM

## 2025-07-29 LAB
ALBUMIN SERPL BCG-MCNC: 4 G/DL (ref 3.5–5)
ALP SERPL-CCNC: 75 U/L (ref 34–104)
ALT SERPL W P-5'-P-CCNC: 12 U/L (ref 7–52)
ANION GAP SERPL CALCULATED.3IONS-SCNC: 9 MMOL/L (ref 4–13)
AST SERPL W P-5'-P-CCNC: 15 U/L (ref 13–39)
BILIRUB SERPL-MCNC: 0.36 MG/DL (ref 0.2–1)
BUN SERPL-MCNC: 9 MG/DL (ref 5–25)
CALCIUM SERPL-MCNC: 9.4 MG/DL (ref 8.4–10.2)
CHLORIDE SERPL-SCNC: 97 MMOL/L (ref 96–108)
CHOLEST SERPL-MCNC: 120 MG/DL (ref ?–200)
CO2 SERPL-SCNC: 29 MMOL/L (ref 21–32)
CREAT SERPL-MCNC: 0.48 MG/DL (ref 0.6–1.3)
CREAT UR-MCNC: 40.7 MG/DL
GFR SERPL CREATININE-BSD FRML MDRD: 98 ML/MIN/1.73SQ M
GLUCOSE P FAST SERPL-MCNC: 90 MG/DL (ref 65–99)
HDLC SERPL-MCNC: 43 MG/DL
LDLC SERPL CALC-MCNC: 53 MG/DL (ref 0–100)
MICROALBUMIN UR-MCNC: <7 MG/L
POTASSIUM SERPL-SCNC: 3.7 MMOL/L (ref 3.5–5.3)
PROT SERPL-MCNC: 7.1 G/DL (ref 6.4–8.4)
SODIUM SERPL-SCNC: 135 MMOL/L (ref 135–147)
TRIGL SERPL-MCNC: 118 MG/DL (ref ?–150)
TSH SERPL DL<=0.05 MIU/L-ACNC: 1.37 UIU/ML (ref 0.45–4.5)

## 2025-07-29 PROCEDURE — 83036 HEMOGLOBIN GLYCOSYLATED A1C: CPT

## 2025-07-29 PROCEDURE — 84443 ASSAY THYROID STIM HORMONE: CPT

## 2025-07-29 PROCEDURE — 80053 COMPREHEN METABOLIC PANEL: CPT

## 2025-07-29 PROCEDURE — 82570 ASSAY OF URINE CREATININE: CPT

## 2025-07-29 PROCEDURE — 82043 UR ALBUMIN QUANTITATIVE: CPT

## 2025-07-29 PROCEDURE — 80061 LIPID PANEL: CPT

## 2025-07-29 PROCEDURE — 36415 COLL VENOUS BLD VENIPUNCTURE: CPT

## 2025-07-30 LAB
EST. AVERAGE GLUCOSE BLD GHB EST-MCNC: 126 MG/DL
HBA1C MFR BLD: 6 %

## 2025-07-31 ENCOUNTER — OFFICE VISIT (OUTPATIENT)
Dept: FAMILY MEDICINE CLINIC | Facility: CLINIC | Age: 70
End: 2025-07-31
Payer: MEDICARE

## 2025-07-31 VITALS
SYSTOLIC BLOOD PRESSURE: 110 MMHG | HEART RATE: 73 BPM | HEIGHT: 61 IN | WEIGHT: 162.6 LBS | TEMPERATURE: 99 F | RESPIRATION RATE: 18 BRPM | DIASTOLIC BLOOD PRESSURE: 72 MMHG | BODY MASS INDEX: 30.7 KG/M2 | OXYGEN SATURATION: 98 %

## 2025-07-31 DIAGNOSIS — I10 BENIGN ESSENTIAL HYPERTENSION: ICD-10-CM

## 2025-07-31 DIAGNOSIS — E11.69 TYPE 2 DIABETES MELLITUS WITH OTHER SPECIFIED COMPLICATION, UNSPECIFIED WHETHER LONG TERM INSULIN USE (HCC): ICD-10-CM

## 2025-07-31 DIAGNOSIS — Z00.00 MEDICARE ANNUAL WELLNESS VISIT, SUBSEQUENT: Primary | ICD-10-CM

## 2025-07-31 PROCEDURE — G0439 PPPS, SUBSEQ VISIT: HCPCS | Performed by: FAMILY MEDICINE

## 2025-07-31 PROCEDURE — 99214 OFFICE O/P EST MOD 30 MIN: CPT | Performed by: FAMILY MEDICINE

## 2025-07-31 PROCEDURE — G2211 COMPLEX E/M VISIT ADD ON: HCPCS | Performed by: FAMILY MEDICINE

## 2025-08-04 DIAGNOSIS — F41.1 GENERALIZED ANXIETY DISORDER: ICD-10-CM

## 2025-08-06 RX ORDER — ESCITALOPRAM OXALATE 10 MG/1
TABLET ORAL
Qty: 45 TABLET | Refills: 1 | Status: SHIPPED | OUTPATIENT
Start: 2025-08-06